# Patient Record
Sex: MALE | Race: WHITE | NOT HISPANIC OR LATINO | Employment: FULL TIME | URBAN - METROPOLITAN AREA
[De-identification: names, ages, dates, MRNs, and addresses within clinical notes are randomized per-mention and may not be internally consistent; named-entity substitution may affect disease eponyms.]

---

## 2017-07-27 ENCOUNTER — HOSPITAL ENCOUNTER (INPATIENT)
Facility: HOSPITAL | Age: 52
LOS: 7 days | Discharge: HOME | DRG: 025 | End: 2017-08-03
Attending: EMERGENCY MEDICINE | Admitting: NEUROLOGICAL SURGERY
Payer: COMMERCIAL

## 2017-07-27 DIAGNOSIS — G93.6 CEREBRAL EDEMA: ICD-10-CM

## 2017-07-27 DIAGNOSIS — T45.515A WARFARIN-INDUCED COAGULOPATHY: ICD-10-CM

## 2017-07-27 DIAGNOSIS — D69.6 THROMBOCYTOPENIA: ICD-10-CM

## 2017-07-27 DIAGNOSIS — C20 RECTAL CANCER: Primary | ICD-10-CM

## 2017-07-27 DIAGNOSIS — D68.32 WARFARIN-INDUCED COAGULOPATHY: ICD-10-CM

## 2017-07-27 DIAGNOSIS — C78.00 LUNG METASTASES: ICD-10-CM

## 2017-07-27 DIAGNOSIS — C79.31 METASTASIS TO BRAIN: ICD-10-CM

## 2017-07-27 DIAGNOSIS — C78.00 MALIGNANT NEOPLASM METASTATIC TO LUNG, UNSPECIFIED LATERALITY: ICD-10-CM

## 2017-07-27 DIAGNOSIS — G93.89 FRONTAL MASS OF BRAIN: ICD-10-CM

## 2017-07-27 DIAGNOSIS — C79.31 BRAIN METASTASES: ICD-10-CM

## 2017-07-27 LAB
ABO + RH BLD: NORMAL
ALBUMIN SERPL BCP-MCNC: 3.6 G/DL
ALBUMIN SERPL BCP-MCNC: 3.9 G/DL
ALP SERPL-CCNC: 83 U/L
ALP SERPL-CCNC: 85 U/L
ALT SERPL W/O P-5'-P-CCNC: 17 U/L
ALT SERPL W/O P-5'-P-CCNC: 17 U/L
ANION GAP SERPL CALC-SCNC: 7 MMOL/L
ANION GAP SERPL CALC-SCNC: 8 MMOL/L
APTT BLDCRRT: 32.9 SEC
AST SERPL-CCNC: 17 U/L
AST SERPL-CCNC: 18 U/L
BASOPHILS # BLD AUTO: 0 K/UL
BASOPHILS # BLD AUTO: 0 K/UL
BASOPHILS NFR BLD: 0 %
BASOPHILS NFR BLD: 0 %
BILIRUB SERPL-MCNC: 0.9 MG/DL
BILIRUB SERPL-MCNC: 0.9 MG/DL
BLD GP AB SCN CELLS X3 SERPL QL: NORMAL
BLD PROD TYP BPU: NORMAL
BLOOD UNIT EXPIRATION DATE: NORMAL
BLOOD UNIT TYPE CODE: 6200
BLOOD UNIT TYPE: NORMAL
BUN SERPL-MCNC: 13 MG/DL
BUN SERPL-MCNC: 14 MG/DL
CALCIUM SERPL-MCNC: 8.9 MG/DL
CALCIUM SERPL-MCNC: 9 MG/DL
CHLORIDE SERPL-SCNC: 104 MMOL/L
CHLORIDE SERPL-SCNC: 106 MMOL/L
CO2 SERPL-SCNC: 23 MMOL/L
CO2 SERPL-SCNC: 25 MMOL/L
CODING SYSTEM: NORMAL
CREAT SERPL-MCNC: 0.7 MG/DL
CREAT SERPL-MCNC: 0.8 MG/DL
DIFFERENTIAL METHOD: ABNORMAL
DIFFERENTIAL METHOD: ABNORMAL
DISPENSE STATUS: NORMAL
EOSINOPHIL # BLD AUTO: 0 K/UL
EOSINOPHIL # BLD AUTO: 0 K/UL
EOSINOPHIL NFR BLD: 0 %
EOSINOPHIL NFR BLD: 0.3 %
ERYTHROCYTE [DISTWIDTH] IN BLOOD BY AUTOMATED COUNT: 13.5 %
ERYTHROCYTE [DISTWIDTH] IN BLOOD BY AUTOMATED COUNT: 13.7 %
EST. GFR  (AFRICAN AMERICAN): >60 ML/MIN/1.73 M^2
EST. GFR  (AFRICAN AMERICAN): >60 ML/MIN/1.73 M^2
EST. GFR  (NON AFRICAN AMERICAN): >60 ML/MIN/1.73 M^2
EST. GFR  (NON AFRICAN AMERICAN): >60 ML/MIN/1.73 M^2
GLUCOSE SERPL-MCNC: 151 MG/DL
GLUCOSE SERPL-MCNC: 156 MG/DL
HCT VFR BLD AUTO: 40.7 %
HCT VFR BLD AUTO: 42 %
HGB BLD-MCNC: 14.2 G/DL
HGB BLD-MCNC: 14.7 G/DL
INR PPP: 1.4
INR PPP: 1.6
INR PPP: 1.6
LYMPHOCYTES # BLD AUTO: 0.3 K/UL
LYMPHOCYTES # BLD AUTO: 0.3 K/UL
LYMPHOCYTES NFR BLD: 5 %
LYMPHOCYTES NFR BLD: 7.6 %
MAGNESIUM SERPL-MCNC: 2.2 MG/DL
MCH RBC QN AUTO: 31.3 PG
MCH RBC QN AUTO: 31.6 PG
MCHC RBC AUTO-ENTMCNC: 34.9 G/DL
MCHC RBC AUTO-ENTMCNC: 35 G/DL
MCV RBC AUTO: 90 FL
MCV RBC AUTO: 90 FL
MONOCYTES # BLD AUTO: 0 K/UL
MONOCYTES # BLD AUTO: 0.1 K/UL
MONOCYTES NFR BLD: 0.6 %
MONOCYTES NFR BLD: 1 %
NEUTROPHILS # BLD AUTO: 3.1 K/UL
NEUTROPHILS # BLD AUTO: 4.9 K/UL
NEUTROPHILS NFR BLD: 91.2 %
NEUTROPHILS NFR BLD: 94 %
NUM UNITS TRANS FFP: NORMAL
PHOSPHATE SERPL-MCNC: 2.6 MG/DL
PLATELET # BLD AUTO: 93 K/UL
PLATELET # BLD AUTO: 93 K/UL
PMV BLD AUTO: 10.6 FL
PMV BLD AUTO: 9.9 FL
POCT GLUCOSE: 112 MG/DL (ref 70–110)
POCT GLUCOSE: 164 MG/DL (ref 70–110)
POTASSIUM SERPL-SCNC: 4 MMOL/L
POTASSIUM SERPL-SCNC: 4.2 MMOL/L
PROT SERPL-MCNC: 7 G/DL
PROT SERPL-MCNC: 7.1 G/DL
PROTHROMBIN TIME: 14.5 SEC
PROTHROMBIN TIME: 16.6 SEC
PROTHROMBIN TIME: 16.6 SEC
RBC # BLD AUTO: 4.54 M/UL
RBC # BLD AUTO: 4.65 M/UL
SODIUM SERPL-SCNC: 135 MMOL/L
SODIUM SERPL-SCNC: 138 MMOL/L
WBC # BLD AUTO: 3.44 K/UL
WBC # BLD AUTO: 5.2 K/UL

## 2017-07-27 PROCEDURE — C9113 INJ PANTOPRAZOLE SODIUM, VIA: HCPCS | Performed by: STUDENT IN AN ORGANIZED HEALTH CARE EDUCATION/TRAINING PROGRAM

## 2017-07-27 PROCEDURE — 63600175 PHARM REV CODE 636 W HCPCS: Performed by: STUDENT IN AN ORGANIZED HEALTH CARE EDUCATION/TRAINING PROGRAM

## 2017-07-27 PROCEDURE — 20000000 HC ICU ROOM

## 2017-07-27 PROCEDURE — 84100 ASSAY OF PHOSPHORUS: CPT

## 2017-07-27 PROCEDURE — A9585 GADOBUTROL INJECTION: HCPCS | Performed by: INTERNAL MEDICINE

## 2017-07-27 PROCEDURE — 83735 ASSAY OF MAGNESIUM: CPT

## 2017-07-27 PROCEDURE — 25000003 PHARM REV CODE 250: Performed by: NURSE PRACTITIONER

## 2017-07-27 PROCEDURE — 99285 EMERGENCY DEPT VISIT HI MDM: CPT | Mod: 25

## 2017-07-27 PROCEDURE — 99255 IP/OBS CONSLTJ NEW/EST HI 80: CPT | Mod: ,,, | Performed by: INTERNAL MEDICINE

## 2017-07-27 PROCEDURE — 99283 EMERGENCY DEPT VISIT LOW MDM: CPT | Mod: ,,, | Performed by: EMERGENCY MEDICINE

## 2017-07-27 PROCEDURE — 80053 COMPREHEN METABOLIC PANEL: CPT

## 2017-07-27 PROCEDURE — 63600175 PHARM REV CODE 636 W HCPCS: Performed by: EMERGENCY MEDICINE

## 2017-07-27 PROCEDURE — 25500020 PHARM REV CODE 255: Performed by: INTERNAL MEDICINE

## 2017-07-27 PROCEDURE — P9017 PLASMA 1 DONOR FRZ W/IN 8 HR: HCPCS

## 2017-07-27 PROCEDURE — 86900 BLOOD TYPING SEROLOGIC ABO: CPT

## 2017-07-27 PROCEDURE — 96361 HYDRATE IV INFUSION ADD-ON: CPT

## 2017-07-27 PROCEDURE — 86850 RBC ANTIBODY SCREEN: CPT

## 2017-07-27 PROCEDURE — 96374 THER/PROPH/DIAG INJ IV PUSH: CPT

## 2017-07-27 PROCEDURE — 85730 THROMBOPLASTIN TIME PARTIAL: CPT

## 2017-07-27 PROCEDURE — 85025 COMPLETE CBC W/AUTO DIFF WBC: CPT

## 2017-07-27 PROCEDURE — 85610 PROTHROMBIN TIME: CPT

## 2017-07-27 PROCEDURE — 99223 1ST HOSP IP/OBS HIGH 75: CPT | Mod: ,,, | Performed by: PHYSICIAN ASSISTANT

## 2017-07-27 PROCEDURE — 85610 PROTHROMBIN TIME: CPT | Mod: 91

## 2017-07-27 PROCEDURE — 63600175 PHARM REV CODE 636 W HCPCS

## 2017-07-27 PROCEDURE — 80053 COMPREHEN METABOLIC PANEL: CPT | Mod: 91

## 2017-07-27 PROCEDURE — 36430 TRANSFUSION BLD/BLD COMPNT: CPT

## 2017-07-27 PROCEDURE — 25000003 PHARM REV CODE 250: Performed by: PHYSICIAN ASSISTANT

## 2017-07-27 RX ORDER — ACETAMINOPHEN 325 MG/1
650 TABLET ORAL EVERY 4 HOURS PRN
Status: DISCONTINUED | OUTPATIENT
Start: 2017-07-27 | End: 2017-08-03 | Stop reason: HOSPADM

## 2017-07-27 RX ORDER — DEXAMETHASONE SODIUM PHOSPHATE 4 MG/ML
10 INJECTION, SOLUTION INTRA-ARTICULAR; INTRALESIONAL; INTRAMUSCULAR; INTRAVENOUS; SOFT TISSUE ONCE
Status: COMPLETED | OUTPATIENT
Start: 2017-07-27 | End: 2017-07-27

## 2017-07-27 RX ORDER — HYDROCODONE BITARTRATE AND ACETAMINOPHEN 500; 5 MG/1; MG/1
TABLET ORAL
Status: DISCONTINUED | OUTPATIENT
Start: 2017-07-27 | End: 2017-07-28

## 2017-07-27 RX ORDER — IBUPROFEN 200 MG
16 TABLET ORAL
Status: DISCONTINUED | OUTPATIENT
Start: 2017-07-27 | End: 2017-08-03 | Stop reason: HOSPADM

## 2017-07-27 RX ORDER — HALOPERIDOL 5 MG/ML
2 INJECTION INTRAMUSCULAR ONCE
Status: COMPLETED | OUTPATIENT
Start: 2017-07-27 | End: 2017-07-27

## 2017-07-27 RX ORDER — DEXAMETHASONE SODIUM PHOSPHATE 4 MG/ML
4 INJECTION, SOLUTION INTRA-ARTICULAR; INTRALESIONAL; INTRAMUSCULAR; INTRAVENOUS; SOFT TISSUE EVERY 6 HOURS
Status: DISCONTINUED | OUTPATIENT
Start: 2017-07-27 | End: 2017-07-27

## 2017-07-27 RX ORDER — IBUPROFEN 200 MG
24 TABLET ORAL
Status: DISCONTINUED | OUTPATIENT
Start: 2017-07-27 | End: 2017-08-03 | Stop reason: HOSPADM

## 2017-07-27 RX ORDER — DEXAMETHASONE SODIUM PHOSPHATE 4 MG/ML
8 INJECTION, SOLUTION INTRA-ARTICULAR; INTRALESIONAL; INTRAMUSCULAR; INTRAVENOUS; SOFT TISSUE EVERY 12 HOURS
Status: DISCONTINUED | OUTPATIENT
Start: 2017-07-27 | End: 2017-07-31

## 2017-07-27 RX ORDER — ONDANSETRON 2 MG/ML
4 INJECTION INTRAMUSCULAR; INTRAVENOUS EVERY 6 HOURS PRN
Status: DISCONTINUED | OUTPATIENT
Start: 2017-07-27 | End: 2017-08-03 | Stop reason: HOSPADM

## 2017-07-27 RX ORDER — GLUCAGON 1 MG
1 KIT INJECTION
Status: DISCONTINUED | OUTPATIENT
Start: 2017-07-27 | End: 2017-08-03 | Stop reason: HOSPADM

## 2017-07-27 RX ORDER — PANTOPRAZOLE SODIUM 40 MG/10ML
40 INJECTION, POWDER, LYOPHILIZED, FOR SOLUTION INTRAVENOUS DAILY
Status: DISCONTINUED | OUTPATIENT
Start: 2017-07-27 | End: 2017-08-01

## 2017-07-27 RX ORDER — GADOBUTROL 604.72 MG/ML
10 INJECTION INTRAVENOUS
Status: COMPLETED | OUTPATIENT
Start: 2017-07-27 | End: 2017-07-27

## 2017-07-27 RX ORDER — HALOPERIDOL 5 MG/ML
INJECTION INTRAMUSCULAR
Status: COMPLETED
Start: 2017-07-27 | End: 2017-07-27

## 2017-07-27 RX ORDER — DEXAMETHASONE SODIUM PHOSPHATE 4 MG/ML
8 INJECTION, SOLUTION INTRA-ARTICULAR; INTRALESIONAL; INTRAMUSCULAR; INTRAVENOUS; SOFT TISSUE EVERY 12 HOURS
Status: DISCONTINUED | OUTPATIENT
Start: 2017-07-27 | End: 2017-07-27

## 2017-07-27 RX ORDER — LEVETIRACETAM 5 MG/ML
500 INJECTION INTRAVASCULAR EVERY 12 HOURS
Status: DISCONTINUED | OUTPATIENT
Start: 2017-07-27 | End: 2017-08-01

## 2017-07-27 RX ORDER — INSULIN ASPART 100 [IU]/ML
1-10 INJECTION, SOLUTION INTRAVENOUS; SUBCUTANEOUS EVERY 6 HOURS PRN
Status: DISCONTINUED | OUTPATIENT
Start: 2017-07-27 | End: 2017-08-03 | Stop reason: HOSPADM

## 2017-07-27 RX ORDER — SODIUM CHLORIDE 9 MG/ML
INJECTION, SOLUTION INTRAVENOUS CONTINUOUS
Status: DISCONTINUED | OUTPATIENT
Start: 2017-07-27 | End: 2017-08-01

## 2017-07-27 RX ADMIN — DEXAMETHASONE SODIUM PHOSPHATE 8 MG: 4 INJECTION, SOLUTION INTRAMUSCULAR; INTRAVENOUS at 09:07

## 2017-07-27 RX ADMIN — LEVETIRACETAM 500 MG: 5 INJECTION INTRAVENOUS at 09:07

## 2017-07-27 RX ADMIN — SODIUM CHLORIDE: 0.9 INJECTION, SOLUTION INTRAVENOUS at 11:07

## 2017-07-27 RX ADMIN — HALOPERIDOL LACTATE 2 MG: 5 INJECTION, SOLUTION INTRAMUSCULAR at 09:07

## 2017-07-27 RX ADMIN — SODIUM CHLORIDE: 0.9 INJECTION, SOLUTION INTRAVENOUS at 09:07

## 2017-07-27 RX ADMIN — DEXAMETHASONE SODIUM PHOSPHATE 10 MG: 4 INJECTION, SOLUTION INTRAMUSCULAR; INTRAVENOUS at 01:07

## 2017-07-27 RX ADMIN — DEXAMETHASONE SODIUM PHOSPHATE 8 MG: 4 INJECTION, SOLUTION INTRAMUSCULAR; INTRAVENOUS at 05:07

## 2017-07-27 RX ADMIN — HALOPERIDOL 2 MG: 5 INJECTION INTRAMUSCULAR at 09:07

## 2017-07-27 RX ADMIN — GADOBUTROL 10 ML: 604.72 INJECTION INTRAVENOUS at 09:07

## 2017-07-27 RX ADMIN — PANTOPRAZOLE SODIUM 40 MG: 40 INJECTION, POWDER, FOR SOLUTION INTRAVENOUS at 09:07

## 2017-07-27 RX ADMIN — SODIUM CHLORIDE 1000 ML: 0.9 INJECTION, SOLUTION INTRAVENOUS at 03:07

## 2017-07-27 RX ADMIN — IOHEXOL 100 ML: 350 INJECTION, SOLUTION INTRAVENOUS at 08:07

## 2017-07-27 NOTE — ASSESSMENT & PLAN NOTE
-- Noted on CT, MRI with and without em at OSH  -- Repeat MRI stealth with and without pending here  -- NSGY, heme/onc services following  -- Admit to NCC for q1h neuro checks given significant associated edema  -- Dexamethasone 10 mg once now, then 8 mg q12h, IV PPI while on high dose of steroids   -- Levetiracetam 500 mg BID for seizure ppx

## 2017-07-27 NOTE — ED TRIAGE NOTES
Claude Penn III, a 52 y.o. male presents to the ED via EMS as tx from [facility] from [place]. Pt here for oncology for lesions on frontal lobe of brain after c/o headache x 2 weeks. Pt has hx of rectal cancer that has metastasized to lungs.      Chief Complaint   Patient presents with    [place] Tx     Pt tx from [facility] from [place]. Pt her for Oncology for lesions on frontal lobe of brain after c/o headache x 2 weeks.     Review of patient's allergies indicates:  No Known Allergies  Past Medical History:   Diagnosis Date    Cancer

## 2017-07-27 NOTE — CONSULTS
[facility]  Hematology/Oncology  Consult Note    Patient Name: Claude Penn III  MRN: [id number]  Admission Date: 7/27/2017  Hospital Length of Stay: 0 days  Code Status: Full Code   Attending Provider: Jeremiah Villasenor MD  Consulting Provider: Rene Rose MD  Primary Care Physician: Dallas Agarwal MD  Principal Problem:Frontal mass of brain    Inpatient consult to Hematology/Oncology  Consult performed by: RENE ROSE  Consult ordered by: GLADIS AGUDELO        Subjective:     HPI: Mr. Trujillo presents from [place] with new right frontal lobe brain mass causing mass effect and vasogenic edema and to two other smaller lesions. He presented with slurred speech, headache x 2 weeks. Primary oncologist Dr Collins who follows patient for metastatic rectal cancer directed patient to the ED. His symptoms have improved with steroids. Patient has been on chronic anticoagulation with coumadin for hx of pe 2013. He was originally diagnosed with rectal ca and mets to the lung in 2013. Has followed at [facility] is additional to locally. He received CRT followed by LAR ypT3N0. KRAS and TP53 mutant, NASIR. He then received FOLFOX then FOLFIRI/avastin followed by maintenance 5FU/alicja then FOLFOX alicja followed by FOLFIRI. Has had PD and awaiting stirvarga.     Oncology Treatment Plan:   [No treatment plan]    Medications:  Continuous Infusions:   sodium chloride 0.9% 100 mL/hr at 07/27/17 1159     Scheduled Meds:   dexamethasone  8 mg Intravenous Q12H    levetiracetam IVPB  500 mg Intravenous Q12H    pantoprazole  40 mg Intravenous Daily     PRN Meds:sodium chloride, acetaminophen, dextrose 50%, dextrose 50%, glucagon (human recombinant), glucose, glucose, insulin aspart, ondansetron     Review of patient's allergies indicates:  No Known Allergies     Past Medical History:   Diagnosis Date    Cancer      Past Surgical History:   Procedure Laterality Date    HERNIA REPAIR      ILEOSTOMY REVISION       rectal tumor      removed     Family History     None        Social History Main Topics    Smoking status: Never Smoker    Smokeless tobacco: Former User    Alcohol use 1.2 oz/week     2 Cans of beer per week    Drug use: No    Sexual activity: No       Review of Systems   Constitutional: Positive for fatigue. Negative for fever.   HENT: Negative for congestion and trouble swallowing.    Eyes: Negative for photophobia and visual disturbance.   Respiratory: Negative for cough and shortness of breath.    Gastrointestinal: Negative for abdominal distention and abdominal pain.   Genitourinary: Negative for dysuria and hematuria.   Musculoskeletal: Negative for myalgias and neck pain.   Skin: Negative for color change and pallor.   Neurological: Positive for speech difficulty and headaches.   Hematological: Negative for adenopathy. Does not bruise/bleed easily.   Psychiatric/Behavioral: Negative for agitation and behavioral problems.     Objective:     Vital Signs (Most Recent):  Temp: 97.5 °F (36.4 °C) (07/27/17 0730)  Pulse: 78 (07/27/17 1100)  Resp: 11 (07/27/17 1100)  BP: (!) 142/93 (07/27/17 1100)  SpO2: (!) 94 % (07/27/17 1100) Vital Signs (24h Range):  Temp:  [97.5 °F (36.4 °C)-97.7 °F (36.5 °C)] 97.5 °F (36.4 °C)  Pulse:  [66-79] 78  Resp:  [11-28] 11  SpO2:  [93 %-99 %] 94 %  BP: (120-155)/(76-94) 142/93     Weight: 120.7 kg (266 lb)  Body mass index is 32.38 kg/m².  Body surface area is 2.54 meters squared.      Intake/Output Summary (Last 24 hours) at 07/27/17 1219  Last data filed at 07/27/17 0915   Gross per 24 hour   Intake             1000 ml   Output              525 ml   Net              475 ml       Physical Exam   Constitutional: He is oriented to person, place, and time. He appears well-developed and well-nourished.   HENT:   Mouth/Throat: Oropharynx is clear and moist. No oropharyngeal exudate.   Eyes: Conjunctivae are normal. Pupils are equal, round, and reactive to light.   Neck: Normal  range of motion.   Cardiovascular: Normal rate and regular rhythm.    Pulmonary/Chest: Effort normal and breath sounds normal.   Abdominal: Soft. Bowel sounds are normal.   Musculoskeletal: Normal range of motion. He exhibits no edema.   Lymphadenopathy:     He has no cervical adenopathy.   Neurological: He is alert and oriented to person, place, and time.   Skin: Skin is warm and dry.   Psychiatric: He has a normal mood and affect. His behavior is normal.       Significant Labs:   CBC:   Recent Labs  Lab 07/27/17  0102 07/27/17  0340   WBC 5.20 3.44*   HGB 14.7 14.2   HCT 42.0 40.7   PLT 93* 93*    and CMP:   Recent Labs  Lab 07/27/17  0102 07/27/17  0340   * 138   K 4.0 4.2    106   CO2 23 25   * 156*   BUN 13 14   CREATININE 0.8 0.7   CALCIUM 9.0 8.9   PROT 7.1 7.0   ALBUMIN 3.9 3.6   BILITOT 0.9 0.9   ALKPHOS 85 83   AST 17 18   ALT 17 17   ANIONGAP 8 7*   EGFRNONAA >60.0 >60.0       Diagnostic Results:  MRI: reviewed    Assessment/Plan:     Active Diagnoses:    Diagnosis Date Noted POA    PRINCIPAL PROBLEM:  Frontal mass of brain [G93.9] 07/27/2017 Yes    Rectal cancer [C20] 07/27/2017 Yes    Warfarin-induced coagulopathy [T45.511A, D68.9] 07/27/2017 Yes      Problems Resolved During this Admission:    Diagnosis Date Noted Date Resolved POA       A/P  Patient has a large right frontal lobe mass with vasogenic edema. Coumadin reversed. On decadron to reduce edema with some symptom improvement. On Keppra ppx. Plan is to remove problematic mass. Additional lesions noted which can be addressed with XRT vs gamma knife, defer to neurosurgery. Patient will eventually follow up with Dr Collins.        Thank you for your consult.     Raysa Hernandez MD  Hematology/Oncology  [facility]      STAFF NOTE:  I have personally taken the history and examined this patient and agree with Dr. Pereira's Note as stated above.

## 2017-07-27 NOTE — CONSULTS
Consult Note  Neurosurgery    Admit Date: 7/27/2017  LOS: 0    Code Status: Full Code     CC: <principal problem not specified>    SUBJECTIVE:     History of Present Illness: 53yo male with pmh of rectal CA presents to [facility] ED with imaging findings suspicious for mass in right frontal subcortex. Pt with two week history of 'acting strange' and 'zoning out with arm and body twitches'. On exam pt is neurologically intact and subjectively asymptomatic.    MRI shows right solitary frontal lesion with significant vasogenic edema and midline shift.    Pt takes warfarin for PE diagnosed in 2013. INR from OSH 1.68.      No Headaches.  No Vertigo.  No Nausea.  No Vomiting.  No Paresthesias.  No Focal deficit.  No Subjective fevers or chills.    Pt is afebrile and without leukocytosis. Pt is neurologically intact and hemodynamically stable.     Neurosurgery is consulted for right frontal mass.           OBJECTIVE:   Vital Signs (Most Recent):   Temp: 97.5 °F (36.4 °C) (07/27/17 0004)  Pulse: 77 (07/27/17 0132)  Resp: 18 (07/27/17 0004)  BP: 120/80 (07/27/17 0132)  SpO2: 96 % (07/27/17 0132)    Vital Signs (24h Range):   Temp:  [97.5 °F (36.4 °C)] 97.5 °F (36.4 °C)  Pulse:  [76-79] 77  Resp:  [18] 18  SpO2:  [95 %-97 %] 96 %  BP: (120-133)/(76-80) 120/80      I & O (Last 24h):  No intake or output data in the 24 hours ending 07/27/17 0322    Physical Exam:  General: well developed, well nourished, no distress.   Head: normocephalic, atraumatic  Cervical Spine: No midline tenderness to palpation.  Thoracolumbosacral Spine: No midline tenderness to palpation.  GCS: Motor: 6/Verbal: 5/Eyes: 4 GCS Total: 15  Mental Status: Awake, Alert, Oriented x 4  Language: No aphasia  Speech: No dysarthria  Facial Droop: None   Cranial nerves: CN III-XII grossly intact.  Visual Fields: Intact.   Eyes: Pupils equal and reactive to light. Intact Conjugate horizontal and vertical pursuit. No nystagmus. No gaze deviation.   Pulmonary: No  distress.  Sensory: No deficit.  Propioception: No deficit in 1st digit of toe bilaterally.  Rectal Tone: Not tested.  Drift: None.  Upper Extremity Ataxia: No Dysmetria Bilaterally  Lower Extremity Ataxia: Not tested.  Dysdiadochokinesia: Not tested.  Reflexes: 2+ patellar bilaterally.  Hernandez: Absent  Clonus: Absent  Babinski: Absent  Romberg: Not Tested  Pulses: Brisk and symmetric radial, DP and tibial pulses.  Motor Strength:    Strength  Shoulder Abduction Elbow Extension Elbow Flexion Wrist Extension Wrist Flexion Finger Opposition Finger Add Finger Abd   Upper: R 5/5 5/5 5/5 5/5 5/5 5/5 5/5 5/5    L 5/5 5/5 5/5 5/5 5/5 5/5 5/5 5/5     Hip Flexion Knee Extension Knee  Flexion Ankle Dflexion Ankle Pflexion EHL     Lower: R 5/5 5/5 5/5 5/5 5/5 5/5      L 5/5 5/5 5/5 5/5 5/5 5/5           Lines/Drains/Airway:          Nutrition/Tube Feeds:   Current Diet Order   Procedures    Diet NPO       Labs:  ABG: No results for input(s): PH, PO2, PCO2, HCO3, POCSATURATED, BE in the last 24 hours.  BMP:  Recent Labs  Lab 07/27/17  0102   *   K 4.0      CO2 23   BUN 13   CREATININE 0.8   *     LFT: Lab Results   Component Value Date    AST 17 07/27/2017    ALT 17 07/27/2017    ALKPHOS 85 07/27/2017    BILITOT 0.9 07/27/2017    ALBUMIN 3.9 07/27/2017    PROT 7.1 07/27/2017     CBC:   Lab Results   Component Value Date    WBC 5.20 07/27/2017    HGB 14.7 07/27/2017    HCT 42.0 07/27/2017    MCV 90 07/27/2017    PLT 93 (L) 07/27/2017     Microbiology x 7d:   Microbiology Results (last 7 days)     ** No results found for the last 168 hours. **            ASSESSMENT/PLAN:   53 yo male with pmh of rectal ca and pe ani-coagulated with warfarin now with imaging concerning for right frontal met. Pt is admitted to hematology oncology    --No acute neurosurgical intervention required.  --Transfer to neuro ICU for q1 neuro checks.  --Chemical coagulopathy reversal per NCC.  --Please hold home anti-coag and  anti-platelet medications.  --Will obtain MRI brain w/wo contrast and STEALTH.  --Will obtain CT chest/abdomen/pelvis with iv contrast.  --Begin dexamethsone 4q6.  --Begin levetiracetam 500 bid.  --Begin q1 neurochecks.  --Begin q1 vital checks.  --Please keep pt NPO.  --Elevate head of bed.  --Will discuss potential management strategies with pt in morning.  --We will continue to monitor closely, please contact us with any questions or concerns.    Brett Wang

## 2017-07-27 NOTE — ED TRIAGE NOTES
Pt transfer from [place] for neuro consult. Pt presented to ED in [place] with c/o HA and slurred speech that comes and goes for the last several weeks. Pt was told by his PCP to go to ED for eval. Pt denies c/o at this time.

## 2017-07-27 NOTE — SUBJECTIVE & OBJECTIVE
Oncology Treatment Plan:     Oncology History   Mutational profile:  K-jim, TP53 mutated.  NASIR.    Adenocarcinoma of rectum   7/9/2013 Initial Diagnosis   Adenocarcinoma of rectum diagnosed on colonoscopy.      7/31/2013 - 9/6/2013 Radiation   Capecitabine-based chemoradiation to 50.4 Gy in 28 fractions      11/22/2013 Surgery   LAR with diverting loop ileostomy; ypT3 N0.        2/2014 - 7/3/2014 Chemotherapy/Biotherapy/Hormonal   FOLFOX ×4 cycles followed by 5-FU x 4 cycles, given thrombocytopenia      1/14/2015 - 5/20/2015 Chemotherapy/Biotherapy/Hormonal   FOLFIRI/bevacizumab for incidentally discovered pulmonary metastases.        5/28/2015 - 1/15/2016 Chemotherapy/Biotherapy/Hormonal   Maintenance 5-FU/bevacizumab      1/15/2016 - 11/9/2016 Chemotherapy/Biotherapy/Hormonal   FOLFOX/bevacizumab      11/9/2016 Progression   PD      11/9/2016 - Chemotherapy/Biotherapy/Hormonal   FOLFIRI            Medications:  Continuous Infusions:   Scheduled Meds:   dexamethasone  10 mg Intravenous Once    dexamethasone  8 mg Intravenous Q12H    levetiracetam IVPB  500 mg Intravenous Q12H    pantoprazole  40 mg Intravenous Daily     PRN Meds:acetaminophen, dextrose 50%, dextrose 50%, glucagon (human recombinant), glucose, glucose, ondansetron     Review of patient's allergies indicates:  No Known Allergies     Past Medical History:   Diagnosis Date    Cancer      Past Surgical History:   Procedure Laterality Date    HERNIA REPAIR      ILEOSTOMY REVISION      rectal tumor      removed     Family History     None        Social History Main Topics    Smoking status: Never Smoker    Smokeless tobacco: Former User    Alcohol use 1.2 oz/week     2 Cans of beer per week    Drug use: No    Sexual activity: No       Review of Systems   Constitutional: Positive for fatigue. Negative for chills and fever.   HENT: Negative for congestion and sore throat.    Eyes: Negative for visual disturbance.   Respiratory: Negative for  cough and shortness of breath.    Cardiovascular: Negative for chest pain, palpitations and leg swelling.   Gastrointestinal: Negative for abdominal pain, blood in stool, constipation, diarrhea, nausea and vomiting.   Genitourinary: Negative for dysuria and hematuria.   Musculoskeletal: Negative for arthralgias and myalgias.   Skin: Negative for rash.   Neurological: Positive for speech difficulty, weakness and headaches. Negative for light-headedness and numbness.   Psychiatric/Behavioral: Negative for agitation.     Objective:     Vital Signs (Most Recent):  Temp: 97.5 °F (36.4 °C) (07/27/17 0004)  Pulse: 76 (07/27/17 0004)  Resp: 18 (07/27/17 0004)  BP: 133/80 (07/27/17 0004)  SpO2: 95 % (07/27/17 0004) Vital Signs (24h Range):  Temp:  [97.5 °F (36.4 °C)] 97.5 °F (36.4 °C)  Pulse:  [76] 76  Resp:  [18] 18  SpO2:  [95 %] 95 %  BP: (133)/(80) 133/80     Weight: 120.7 kg (266 lb)  Body mass index is 32.38 kg/m².  Body surface area is 2.54 meters squared.    No intake or output data in the 24 hours ending 07/27/17 0106    Physical Exam   Constitutional: No distress.   HENT:   Mouth/Throat: Oropharynx is clear and moist. No oropharyngeal exudate.   Eyes: No scleral icterus.   Cardiovascular: Normal rate, regular rhythm and normal heart sounds.    Pulmonary/Chest: Effort normal and breath sounds normal.   Abdominal: Soft. Bowel sounds are normal. There is no tenderness.   Lymphadenopathy:     He has no cervical adenopathy.       Significant Labs:   CBC: No results for input(s): WBC, HGB, HCT, PLT in the last 48 hours., CMP: No results for input(s): NA, K, CL, CO2, GLU, BUN, CREATININE, CALCIUM, PROT, ALBUMIN, BILITOT, ALKPHOS, AST, ALT, ANIONGAP, EGFRNONAA in the last 48 hours.    Invalid input(s): ESTGFAFRICA and Coagulation: No results for input(s): INR, APTT in the last 48 hours.    Invalid input(s): PT    Diagnostic Results:  I have reviewed all pertinent imaging results/findings within the past 24 hours.

## 2017-07-27 NOTE — HPI
51 yo male with of metastatic rectal adenocarcinoma to lungs, with progressive disease on FOLFIRI as well as FOLFOX, and a known K-jim mutation who was transferred from OSH for higher level of care. Patient presented to OSH with 2 wk hx of progressively worsening HA and intermittent dysarthria- workup at OSH notable for mass in frontal lobe concerning for mets with associated vasogenic edema.

## 2017-07-27 NOTE — ED PROVIDER NOTES
Encounter Date: 7/27/2017       History     Chief Complaint   Patient presents with    [place] Tx     Pt tx from [facility] from [place]. Pt her for Oncology for lesions on frontal lobe of brain after c/o headache x 2 weeks.     Urgent evaluation of a 52-year-old male transferred to this facility from [place].  Patient has known rectal cancer with metastases to the lungs.  He is currently being treated by Dr. Taylor??  Unsure of spelling.  Patient presented to clinic Monday, 2 days ago for routine checkup.  Patient reports at that time he's been having headaches for 2 weeks with some abnormal speech and being very fatigued.  He went to the ER today CT scan and MRI revealed new metastatic lesions to the brain.  Patient was sent here for hematology oncology and neurosurgery evaluation.  Currently he does not have a headache.  His GCS score is 15.  He has gotten 10 mg of Decadron IV.  He is in no acute distress.          Review of patient's allergies indicates:  No Known Allergies  Past Medical History:   Diagnosis Date    Cancer      Past Surgical History:   Procedure Laterality Date    HERNIA REPAIR      ILEOSTOMY REVISION      rectal tumor      removed     History reviewed. No pertinent family history.  Social History   Substance Use Topics    Smoking status: Never Smoker    Smokeless tobacco: Former User    Alcohol use 1.2 oz/week     2 Cans of beer per week     Review of Systems   Constitutional: Negative for fever.   HENT: Negative for sore throat.    Respiratory: Negative for shortness of breath.    Cardiovascular: Negative for chest pain.   Gastrointestinal: Negative for nausea.   Genitourinary: Negative for dysuria.   Musculoskeletal: Negative for back pain.   Skin: Negative for rash.   Neurological: Positive for headaches. Negative for weakness.   Hematological: Does not bruise/bleed easily.       Physical Exam     Initial Vitals [07/27/17 0004]   BP Pulse Resp Temp SpO2   133/80 76 18 97.5  °F (36.4 °C) 95 %      MAP       97.67         Physical Exam    Constitutional: Vital signs are normal. He appears well-developed and well-nourished. He is not diaphoretic. No distress.   HENT:   Head: Normocephalic and atraumatic.   Right Ear: External ear normal.   Left Ear: External ear normal.   Eyes: Conjunctivae are normal.   Cardiovascular: Normal rate and regular rhythm. Exam reveals no gallop.    No murmur heard.  Pulmonary/Chest: No respiratory distress. He has no wheezes. He has no rhonchi. He has no rales. He exhibits no tenderness.   Abdominal: Soft. Normal appearance, normal aorta and bowel sounds are normal.   Musculoskeletal: Normal range of motion.   Neurological: He is alert and oriented to person, place, and time.   Nonfocal neuro exam with no acute red flags   Skin: Skin is warm and intact.   Psychiatric: He has a normal mood and affect. His speech is normal and behavior is normal. Cognition and memory are normal.         ED Course   Procedures  Labs Reviewed   CBC W/ AUTO DIFFERENTIAL - Abnormal; Notable for the following:        Result Value    MCH 31.6 (*)     Platelets 93 (*)     Lymph # 0.3 (*)     Mono # 0.1 (*)     Gran% 94.0 (*)     Lymph% 5.0 (*)     Mono% 1.0 (*)     All other components within normal limits   COMPREHENSIVE METABOLIC PANEL - Abnormal; Notable for the following:     Sodium 135 (*)     Glucose 151 (*)     All other components within normal limits   PROTIME-INR - Abnormal; Notable for the following:     Prothrombin Time 16.6 (*)     INR 1.6 (*)     All other components within normal limits   APTT - Abnormal; Notable for the following:     aPTT 32.9 (*)     All other components within normal limits   PROTIME-INR - Abnormal; Notable for the following:     Prothrombin Time 16.6 (*)     INR 1.6 (*)     All other components within normal limits   COMPREHENSIVE METABOLIC PANEL   MAGNESIUM   PHOSPHORUS   CBC W/ AUTO DIFFERENTIAL   TYPE & SCREEN             Medical Decision  Making:   ED Management:  52-year-old male with rectal cancer with metastatic disease to lungs and a brain.  I have contacted hematology oncology along with neurosurgery.  Plan to admit the patient neurosurgery.  Patient has been loaded with IV Decadron. Pt is stable.               Attending Attestation:     Physician Attestation Statement for NP/PA:   I have conducted a face to face encounter with this patient in addition to the NP/PA, due to Medical Complexity    Other NP/PA Attestation Additions:    History of Present Illness: 53 y/o M h/o rectal CA - known mets to lung currently 'inbetween chemo' treatment with HAs over the past few weeks, new metastastic mass with surrounding edema found on CT/MRI at OSH.   Physical Exam: Non focal neuro exam   Medical Decision Making: Decadron ordered.  Hem-onc and neurosurg consulted.  Plan to admit to neurosurg  neurocritical care consulted for admission                 ED Course     Clinical Impression:   The primary encounter diagnosis was Rectal cancer. Diagnoses of Metastasis to brain and Malignant neoplasm metastatic to lung, unspecified laterality were also pertinent to this visit.    Disposition:   Disposition: Admitted  Condition: Stable                        Enoc Ribeiro PA-C  07/27/17 0139

## 2017-07-27 NOTE — PLAN OF CARE
07/27/17 1107   Discharge Assessment   Assessment Type Discharge Planning Assessment   Confirmed/corrected address and phone number on facesheet? Yes   Assessment information obtained from? Patient;Caregiver   Communicated expected length of stay with patient/caregiver yes   Prior to hospitilization cognitive status: Alert/Oriented   Prior to hospitalization functional status: Independent   Current cognitive status: Alert/Oriented   Current Functional Status: Needs Assistance   Arrived From admitted as an inpatient;[facility]  ([facility] [place])   Lives With spouse   Able to Return to Prior Arrangements unable to determine at this time (comments)   Is patient able to care for self after discharge? Unable to determine at this time (comments)   How many people do you have in your home that can help with your care after discharge? 1   Who are your caregiver(s) and their phone number(s)? Sandra Trujillo (wife)  [phone]   Patient's perception of discharge disposition home or selfcare   Readmission Within The Last 30 Days no previous admission in last 30 days   Patient currently being followed by outpatient case management? No   Patient currently receives home health services? No   Does the patient currently use HME? No   Patient currently receives private duty nursing? N/A   Patient currently receives any other outside agency services? No   Equipment Currently Used at Home none   Do you have any problems affording any of your prescribed medications? No   Is the patient taking medications as prescribed? yes   Do you have any financial concerns preventing you from receiving the healthcare you need? No   Does the patient have transportation to healthcare appointments? Yes   Transportation Available family or friend will provide   Does the patient receive services at the Coumadin Clinic? (Patient followed by Dr. Collins at [facility] and in [place])   Are there any open cases? No   Discharge  Plan A Home with family   Discharge Plan B Home with family;Home Health   Patient/Family In Agreement With Plan yes       Discharge/ My Health Packet Folder Given to patient/family:     Yes         PCP:  Dallas Agarwal MD        Pharmacy:    [id number] - [place], [place] - [address]  [address] [place] [address]  Phone: [phone] Fax: [phone]  ,    Emergency Contacts:  Extended Emergency Contact Information  Primary Emergency Contact: Sandra Trujillo  Address: [address] [place] [address], [place] [address] [facility]  Home Phone: [phone]  Mobile Phone: [phone]  Relation: Spouse      Insurance:    Payor: BLUE CROSS BLUE SHIELD / Plan: [employer] [place] PPO / Product Type: PPO /       Jayla Escobar RN, CCRN-K, [facility]  Neuro-Critical Care   X [phone]

## 2017-07-27 NOTE — HPI
Mr. Trujillo is a 51 y/o male with h/o known metastatic rectal CA and h/o PE on warfarin who was transferred [place] [facility] [place] for evaluation of new multiple R sided brain lesions, most notably a 3.5x 3.2x 3.2 anterior right frontal lobe lesion with vasogenic edema producing MLS. Patient and wife report a couple week history of generalized weakness, fatigue, HA and slowing in speech. Patient has known metastases to lungs and is being followed as outpatient by oncology.

## 2017-07-27 NOTE — H&P
[facility]  Neurocritical Care  History & Physical    Admit Date: 7/27/2017  Service Date: 07/27/2017  Length of Stay: 0    Subjective:     Chief Complaint: Frontal mass of brain    History of Present Illness: Mr. Trujillo is a 53 y/o male with h/o known metastatic rectal CA and h/o PE on warfarin who was [place] [facility] [place] evaluation of new multiple R sided brain lesions, most notably a 3.5x 3.2x 3.2 anterior right frontal lobe lesion with vasogenic edema producing MLS. Patient and wife report a couple week history of generalized weakness, fatigue, HA and slowing in speech. Patient has known metastases to lungs and is being followed as outpatient by oncology.       Vitals:   Temp: 97.5 °F (36.4 °C) (07/27/17 0004)  Pulse: 77 (07/27/17 0132)  Resp: 18 (07/27/17 0004)  BP: 120/80 (07/27/17 0132)  SpO2: 96 % (07/27/17 0132)    Temp:  [97.5 °F (36.4 °C)] 97.5 °F (36.4 °C)  Pulse:  [76-79] 77  Resp:  [18] 18  SpO2:  [95 %-97 %] 96 %  BP: (120-133)/(76-80) 120/80    No intake/output data recorded.     Examination:   Constitutional: Well-nourished and -developed. No apparent distress.   Eyes: Conjunctiva clear, anicteric. Lids no lesions.  Head/Ears/Nose/Mouth/Throat/Neck: Moist mucous membranes. External ears, nose atraumatic.   Cardiovascular: Regular rhythm. No murmurs. No leg edema.  Respiratory: Comfortable respirations. Clear to auscultation.  Gastrointestinal: No hernia. Soft, nondistended, nontender. + bowel sounds.    Neurologic:   -[id number]  -Alert. Oriented to person, place, and time. Speech fluent. Follows commands. Recent and remote memory adequate. Judgment good. Insight appropriate.  -Cranial nerves intact  -Strength 4+/5 to LUE, otherwise 5/5 and symmetric in arms, legs. Tone normal.   -Sensation intact to touch in arms, legs.  -Dysmetria noted on LUE     Today I independently reviewed pertinent medications, lines/drains/airways, imaging, lab results,      Assessment/Plan:      Hem/Onc   * Frontal mass of brain    -- Noted on CT, MRI with and without em at OSH  -- Repeat MRI stealth with and without pending here  -- NSGY, heme/onc services following  -- Admit to [facility] for q1h neuro checks given significant associated edema  -- Dexamethasone 10 mg once now, then 8 mg q12h, IV PPI while on high dose of steroids   -- Levetiracetam 500 mg BID for seizure ppx        Warfarin-induced coagulopathy    -- On warfarin for h/o PE  -- INR 1.6  -- 1u FFP now  -- Hold antiplatelet, anticoagulation         Rectal cancer    -- Known primary CA with lung mets  -- CT C/A/P with contrast pending  -- Oncology following             Prophylaxis:  Venous Thromboembolism: mechanical  Stress Ulcer: PPI  Ventilator Pneumonia: not applicable     Activity Orders          None        Full Code    Loren Serrano PA-C  Neurocritical Care  [facility]

## 2017-07-28 ENCOUNTER — ANESTHESIA EVENT (OUTPATIENT)
Dept: SURGERY | Facility: HOSPITAL | Age: 52
DRG: 025 | End: 2017-07-28
Payer: COMMERCIAL

## 2017-07-28 PROBLEM — C79.31 METASTASIS TO BRAIN: Status: ACTIVE | Noted: 2017-07-28

## 2017-07-28 PROBLEM — G93.6 CEREBRAL EDEMA: Status: ACTIVE | Noted: 2017-07-28

## 2017-07-28 LAB
ALBUMIN SERPL BCP-MCNC: 3.8 G/DL
ALP SERPL-CCNC: 85 U/L
ALT SERPL W/O P-5'-P-CCNC: 16 U/L
ANION GAP SERPL CALC-SCNC: 7 MMOL/L
AST SERPL-CCNC: 12 U/L
BASOPHILS # BLD AUTO: 0 K/UL
BASOPHILS NFR BLD: 0 %
BILIRUB SERPL-MCNC: 1.1 MG/DL
BUN SERPL-MCNC: 12 MG/DL
CALCIUM SERPL-MCNC: 8.7 MG/DL
CHLORIDE SERPL-SCNC: 108 MMOL/L
CO2 SERPL-SCNC: 25 MMOL/L
CREAT SERPL-MCNC: 0.7 MG/DL
DIFFERENTIAL METHOD: ABNORMAL
EOSINOPHIL # BLD AUTO: 0 K/UL
EOSINOPHIL NFR BLD: 0 %
ERYTHROCYTE [DISTWIDTH] IN BLOOD BY AUTOMATED COUNT: 13.2 %
EST. GFR  (AFRICAN AMERICAN): >60 ML/MIN/1.73 M^2
EST. GFR  (NON AFRICAN AMERICAN): >60 ML/MIN/1.73 M^2
GLUCOSE SERPL-MCNC: 136 MG/DL
HCT VFR BLD AUTO: 42.1 %
HGB BLD-MCNC: 14.9 G/DL
LYMPHOCYTES # BLD AUTO: 0.4 K/UL
LYMPHOCYTES NFR BLD: 4.7 %
MAGNESIUM SERPL-MCNC: 2.4 MG/DL
MCH RBC QN AUTO: 31.9 PG
MCHC RBC AUTO-ENTMCNC: 35.4 G/DL
MCV RBC AUTO: 90 FL
MONOCYTES # BLD AUTO: 0.2 K/UL
MONOCYTES NFR BLD: 2 %
NEUTROPHILS # BLD AUTO: 7.2 K/UL
NEUTROPHILS NFR BLD: 93.2 %
PHOSPHATE SERPL-MCNC: 3.2 MG/DL
PLATELET # BLD AUTO: 111 K/UL
PMV BLD AUTO: 10.5 FL
POCT GLUCOSE: 120 MG/DL (ref 70–110)
POCT GLUCOSE: 137 MG/DL (ref 70–110)
POCT GLUCOSE: 137 MG/DL (ref 70–110)
POCT GLUCOSE: 139 MG/DL (ref 70–110)
POTASSIUM SERPL-SCNC: 4 MMOL/L
PROT SERPL-MCNC: 7.1 G/DL
RBC # BLD AUTO: 4.67 M/UL
SODIUM SERPL-SCNC: 140 MMOL/L
WBC # BLD AUTO: 7.67 K/UL

## 2017-07-28 PROCEDURE — 83735 ASSAY OF MAGNESIUM: CPT

## 2017-07-28 PROCEDURE — 63600175 PHARM REV CODE 636 W HCPCS: Performed by: STUDENT IN AN ORGANIZED HEALTH CARE EDUCATION/TRAINING PROGRAM

## 2017-07-28 PROCEDURE — 63600175 PHARM REV CODE 636 W HCPCS: Performed by: EMERGENCY MEDICINE

## 2017-07-28 PROCEDURE — 20600001 HC STEP DOWN PRIVATE ROOM

## 2017-07-28 PROCEDURE — 85025 COMPLETE CBC W/AUTO DIFF WBC: CPT

## 2017-07-28 PROCEDURE — 99232 SBSQ HOSP IP/OBS MODERATE 35: CPT | Mod: ,,, | Performed by: NURSE PRACTITIONER

## 2017-07-28 PROCEDURE — 84100 ASSAY OF PHOSPHORUS: CPT

## 2017-07-28 PROCEDURE — C9113 INJ PANTOPRAZOLE SODIUM, VIA: HCPCS | Performed by: STUDENT IN AN ORGANIZED HEALTH CARE EDUCATION/TRAINING PROGRAM

## 2017-07-28 PROCEDURE — 80053 COMPREHEN METABOLIC PANEL: CPT

## 2017-07-28 RX ADMIN — LEVETIRACETAM 500 MG: 5 INJECTION INTRAVENOUS at 08:07

## 2017-07-28 RX ADMIN — DEXAMETHASONE SODIUM PHOSPHATE 8 MG: 4 INJECTION, SOLUTION INTRAMUSCULAR; INTRAVENOUS at 08:07

## 2017-07-28 RX ADMIN — PANTOPRAZOLE SODIUM 40 MG: 40 INJECTION, POWDER, FOR SOLUTION INTRAVENOUS at 08:07

## 2017-07-28 NOTE — HOSPITAL COURSE
7/28: transfer to stepdown with NSGY   7/31: Patient taken to OR for R frontal crani with stealth for tumor resection without complication. Admitted to [facility] for post-operative monitoring  8/1: plan to [place] to NSGY

## 2017-07-28 NOTE — NURSING TRANSFER
Nursing Transfer Note      7/28/2017     Transfer To: [id number]    Transfer via wheelchair    Transfer with cardiac monitoring    Transported by BERNARDO Quinn    Medicines sent: N/A    Chart send with patient: Yes    Notified: spouse, present during transfer    Patient reassessed at: 7/28/17, 1730    Upon arrival to floor: cardiac monitor applied, patient oriented to room, call bell in reach and bed in lowest position    Notified alisa Velazquez RN, who is assuming care at this time.

## 2017-07-28 NOTE — HOSPITAL COURSE
7/28: stable on exam, booked for surgery Monday 7/29: no changes, awaiting surgery  7/30: stable, surgery tomorrow  7/31: OR for R crani for tumor resection  8/1: stable postop, monitored in ICU  8/2: TTF  8/3: Therapy recommending outpatient PT. Plan to DC home today.

## 2017-07-28 NOTE — PROGRESS NOTES
[facility]  Neurosurgery  Progress Note    Subjective:     History of Present Illness: 51yo male with pmh of rectal CA presents to [facility] ED with imaging findings suspicious for mass in right frontal subcortex. Pt with two week history of 'acting strange' and 'zoning out with arm and body twitches'. On exam pt is neurologically intact and subjectively asymptomatic.     MRI shows right solitary frontal lesion with significant vasogenic edema and midline shift.     Pt takes warfarin for PE diagnosed in 2013. INR from OSH 1.68.        No Headaches.  No Vertigo.  No Nausea.  No Vomiting.  No Paresthesias.  No Focal deficit.  No Subjective fevers or chills.     Pt is afebrile and without leukocytosis. Pt is neurologically intact and hemodynamically stable.      Neurosurgery is consulted for right frontal mass.     Post-Op Info:  Procedure(s) (LRB):  Right frontal CRANIOTOMY WITH STEALTH for tumor resection; microscope; [place] (Right)         Interval History: NAEON. Patient feeling better after steroids.    Medications:  Continuous Infusions:   sodium chloride 0.9% 100 mL/hr at 07/28/17 1300     Scheduled Meds:   dexamethasone  8 mg Intravenous Q12H    levetiracetam IVPB  500 mg Intravenous Q12H    pantoprazole  40 mg Intravenous Daily     PRN Meds:acetaminophen, dextrose 50%, dextrose 50%, glucagon (human recombinant), glucose, glucose, insulin aspart, ondansetron     Review of Systems  Objective:     Weight: 120.7 kg (266 lb)  Body mass index is 32.38 kg/m².  Vital Signs (Most Recent):  Temp: 97.6 °F (36.4 °C) (07/28/17 1115)  Pulse: 80 (07/28/17 1100)  Resp: (!) 27 (07/28/17 1100)  BP: (!) 144/90 (07/28/17 1100)  SpO2: 99 % (07/28/17 1100) Vital Signs (24h Range):  Temp:  [97.6 °F (36.4 °C)-98.4 °F (36.9 °C)] 97.6 °F (36.4 °C)  Pulse:  [74-91] 80  Resp:  [9-47] 27  SpO2:  [95 %-100 %] 99 %  BP: (115-162)/() 144/90       Date 07/28/17 0700 - 07/29/17 0659   Shift 5813-72920092 4952-7680  4340-0400 24 Hour Total   I  N  T  A  K  E   P.O. 300   300    Shift Total  (mL/kg) 300  (2.5)   300  (2.5)   O  U  T  P  U  T   Urine  (mL/kg/hr) 150   150    Shift Total  (mL/kg) 150  (1.2)   150  (1.2)   Weight (kg) 120.7 120.7 120.7 120.7     Physical Exam:    Constitutional: He appears well-developed and well-nourished.     Eyes: Pupils are equal, round, and reactive to light. EOM are normal.     Cardiovascular: Regular rhythm.     Abdominal: Soft.     Psych/Behavior: He is alert. He is oriented to person, place, and time.     Musculoskeletal:        Right Upper Extremities: Muscle strength is 5/5.        Left Upper Extremities: Muscle strength is 5/5.       Right Lower Extremities: Muscle strength is 5/5.     Neurological:        Sensory: There is no sensory deficit in the trunk. There is no sensory deficit in the extremities.        DTRs: DTRs are DTRS NORMAL AND SYMMETRICnormal and symmetric.        Cranial nerves: Cranial nerve(s) II, III, IV, V, VI, VII, VIII, IX, X, XI and XII are intact.        Significant Labs:    Recent Labs  Lab 07/27/17 0102 07/27/17  0340 07/28/17  0440   * 156* 136*   * 138 140   K 4.0 4.2 4.0    106 108   CO2 23 25 25   BUN 13 14 12   CREATININE 0.8 0.7 0.7   CALCIUM 9.0 8.9 8.7   MG  --  2.2 2.4       Recent Labs  Lab 07/27/17 0102 07/27/17 0340 07/28/17  0440   WBC 5.20 3.44* 7.67   HGB 14.7 14.2 14.9   HCT 42.0 40.7 42.1   PLT 93* 93* 111*       Recent Labs  Lab 07/27/17 0102 07/27/17  0559   INR 1.6*  1.6* 1.4*   APTT 32.9*  --      Microbiology Results (last 7 days)     ** No results found for the last 168 hours. **        Significant Diagnostics:  MRI: Mri Brain W Wo Contrast    Result Date: 7/27/2017  3.5 cm hemorrhagic enhancing lesion centered in the right middle frontal gyrus compatible with metastasis with surrounding vasogenic edema and right-to-left subfalcine herniation.   Additional subcentimeter metastatic lesion in the right orbitofrontal  lobe. Small focus of enhancement at the left cerebellopontine angle, suspicious for leptomeningeal disease.  A schwannoma can have a similar appearance.  Suggest correlation with CSF analysis.  Mild motion degraded examination. Stealth protocol MRI performed for purposes of procedural localization.     Assessment/Plan:     * Frontal mass of brain    52M PMH rectal cancer with mets to leg, likely new mets to brain.    -- No acute neurosurgical intervention necessary.  -- MRI brain done  -- Dex 4q6  -- Keppra 500bid  -- Plan for OR for crani tumor resection Monday.  -- TTF today.  -- PPI, SQH  -- Vitals, labs, regular diet.             Alfonso Mclaughlin MD  Neurosurgery  [facility]

## 2017-07-28 NOTE — HPI
53yo male with pmh of rectal CA presents to [facility] ED with imaging findings suspicious for mass in right frontal subcortex. Pt with two week history of 'acting strange' and 'zoning out with arm and body twitches'. On exam pt is neurologically intact and subjectively asymptomatic.     MRI shows right solitary frontal lesion with significant vasogenic edema and midline shift.     Pt takes warfarin for PE diagnosed in 2013. INR from OSH 1.68.        No Headaches.  No Vertigo.  No Nausea.  No Vomiting.  No Paresthesias.  No Focal deficit.  No Subjective fevers or chills.     Pt is afebrile and without leukocytosis. Pt is neurologically intact and hemodynamically stable.      Neurosurgery is consulted for right frontal mass.

## 2017-07-28 NOTE — PLAN OF CARE
Problem: Patient Care Overview  Goal: Plan of Care Review  7/27 DX: Brain Mass    PMHX: Rectal CA w/mets to lungs   Outcome: Ongoing (interventions implemented as appropriate)  Focused Review of Systems Throughout Shift: oriented x 3, calm, respirations even and unlabored, and skin is warm, clean, dry, & intact. Neuro assessment WDL    Safety Status: Patient sitting up in bed, no acute distress noted, awake, and alert. Side rails up x 2, call light in reach, bed low and locked. ID band on right wrist. family at bedside.     Mobility Status: patient able to ambulate per self to bathroom. All cables and iv tubing disconnected. Will continue to monitor.     Plan of Care: Patient verbalized understanding and taught back status and plan of care. Patient progressed with no setbacks today. Adjustments to plan included: Diet advanced after CT and MRI completed.  Surgery scheduled for Monday . Patient denies needs at this time.

## 2017-07-28 NOTE — ASSESSMENT & PLAN NOTE
52M PMH rectal cancer with mets to leg, likely new mets to brain.    -- No acute neurosurgical intervention necessary.  -- MRI brain done  -- Dex 4q6  -- Keppra 500bid  -- Plan for OR for crani tumor resection Monday.  -- TTF today.  -- PPI, SQH  -- Vitals, labs, regular diet.

## 2017-07-28 NOTE — PLAN OF CARE
Problem: Patient Care Overview  Goal: Plan of Care Review  7/27 DX: Brain Mass   7/27 CT w/ IV contrast chest abd pelvis  7/27 Repeat MRI   7/27 - Surgery and blood consent signed, plan for SX on Monday 7/31 7/28: Anesthesia consent signed. Transfer orders to 7th floor in place    PMHX: Rectal CA w/mets to lungs   [place]  Q6 Accu check  Q1 neuro checks   Outcome: Ongoing (interventions implemented as appropriate)  No acute events this shift. VSS, afebrile. Q1H neuro checks in place, AAOx4, follows commands. Denies pain. SpO2 %, RA. NS @ 100 mL/hr. Pt voids without difficulty. Ambulates in room without difficulty. Pt currently OOBTC. Call light within reach. Transfer orders in place, awaiting bed placement at this time. Pt and spouse updated on POC. Anesthesia consent obtained today for scheduled procedure on Monday. See flowsheets for assessment and vitals. Will continue to monitor pt.

## 2017-07-28 NOTE — PROGRESS NOTES
[facility]  Neurocritical Care  Progress/Transfer Note    Admit Date: 7/27/2017  Service Date: 07/28/2017  Length of Stay: 1    Subjective:     Chief Complaint: Frontal mass of brain    History of Present Illness: Mr. Trujillo is a 51 y/o male with h/o known metastatic rectal CA and h/o PE on warfarin who was transferred to [facility] from [place] for evaluation of new multiple R sided brain lesions, most notably a 3.5x 3.2x 3.2 anterior right frontal lobe lesion with vasogenic edema producing MLS. Patient and wife report a couple week history of generalized weakness, fatigue, HA and slowing in speech. Patient has known metastases to lungs and is being followed as outpatient by oncology.       Hospital Course: 7/28: [facility]     No new subjective & objective note has been filed under this hospital service since the last note was generated.    Assessment/Plan:     Hem/Onc   * Frontal mass of brain    -- Noted on CT, MRI with and without em at OSH  -- Repeat MRI stealth with and without pending here  -- NSGY, heme/onc services following  -- Admit to [facility] for q1h neuro checks given significant associated edema  -- Dexamethasone 10 mg once now, then 8 mg q12h, IV PPI while on high dose of steroids   -- Levetiracetam 500 mg BID for seizure ppx  -- Stepdown to [facility]   -- Plan for OR 7/31 per [facility]        Warfarin-induced coagulopathy    -- On warfarin for h/o PE  -- INR 1.6  -- 1u FFP   -- Hold antiplatelet, anticoagulation         Rectal cancer    -- Known primary CA with lung mets  -- CT C/A/P with contrast pending  -- Oncology following             Prophylaxis:  Venous Thromboembolism: mechanical  Stress Ulcer: PPI  Ventilator Pneumonia: not applicable     Activity Orders          None        Full Code    Ranjit Yan NP  Neurocritical Care  [facility]

## 2017-07-28 NOTE — ASSESSMENT & PLAN NOTE
-- Noted on CT, MRI with and without em at OSH  -- Repeat MRI stealth with and without pending here  -- NSGY, heme/onc services following  -- Admit to NCC for q1h neuro checks given significant associated edema  -- Dexamethasone 10 mg once now, then 8 mg q12h, IV PPI while on high dose of steroids   -- Levetiracetam 500 mg BID for seizure ppx  -- Stepdown to NSGY   -- Plan for OR 7/31 per nsgy

## 2017-07-28 NOTE — ANESTHESIA PREPROCEDURE EVALUATION
Pre-operative evaluation for Procedure(s) (LRB):  Right frontal CRANIOTOMY WITH STEALTH for tumor resection; microscope; [place] (Right)    Mr. Trujillo is a 53 y/o male with h/o known metastatic rectal CA and h/o PE on warfarin (7/27/17 INR 1.6)  who was transferred to [facility] [place] for evaluation of new multiple R sided brain lesions. Plan is to undergo the above stated procedure on 7/31/17.    IV Access:    Central Venous Catheter Line          Port A Cath Single Lumen 07/27/17 0619 left subclavian 1 day      Peripheral Intravenous Line          Peripheral IV - Single Lumen 07/27/17 0102 Right Wrist 1 day        Peripheral IV - Single Lumen 07/27/17 1245 Left;Anterior Forearm less than 1 day          Oxygen Requirements:    O2/Vent Data   (Last 4)     07/28 0430 07/28 0500 07/28 0600 07/28 0700   SpO2 (%) 100 100 100 97   O2 Device (Oxygen Therapy)    room air         Infusions:    NaCl at 100 mL/hr.       Last Airway:  None on file.         Patient Active Problem List   Diagnosis    Perirectal abscess    Intra-abdominal abscess    Rectal cancer    Frontal mass of brain    Warfarin-induced coagulopathy    Metastasis to brain    Cerebral edema       Review of patient's allergies indicates:  No Known Allergies    No current facility-administered medications on file prior to encounter.      Current Outpatient Prescriptions on File Prior to Encounter   Medication Sig Dispense Refill    diphenoxylate-atropine 2.5-0.025 mg (LOMOTIL) 2.5-0.025 mg per tablet Take 1 tablet by mouth 4 (four) times daily as needed for Diarrhea.      ergocalciferol (VITAMIN D2) 50,000 unit Cap Take 50,000 Units by mouth every 7 days.      warfarin (COUMADIN) 7.5 MG tablet Take 7.5 mg by mouth once daily.      olsalazine (DIPENTUM) 250 mg capsule Take 500 mg by mouth 2 (two) times daily.      promethazine (PHENERGAN) 25 MG tablet Take 25 mg by mouth every 4 (four) hours.         Past Surgical History:   Procedure Laterality Date     HERNIA REPAIR      ILEOSTOMY REVISION      rectal tumor      removed       Social History     Social History    Marital status:      Spouse name: N/A    Number of children: N/A    Years of education: N/A     Occupational History    Not on file.     Social History Main Topics    Smoking status: Never Smoker    Smokeless tobacco: Former User    Alcohol use 1.2 oz/week     2 Cans of beer per week    Drug use: No    Sexual activity: No     Other Topics Concern    Not on file     Social History Narrative    No narrative on file         Vital Signs Range (Last 24H):  Temp:  [36.4 °C (97.6 °F)-36.9 °C (98.4 °F)]   Pulse:  [74-91]   Resp:  [9-47]   BP: (115-163)/(63-94)   SpO2:  [94 %-100 %]       CBC:   Recent Labs      17   0340  17   0440   WBC  3.44*  7.67   RBC  4.54*  4.67   HGB  14.2  14.9   HCT  40.7  42.1   PLT  93*  111*   MCV  90  90   MCH  31.3*  31.9*   MCHC  34.9  35.4       CMP:   Recent Labs      17   0340  17   0440   NA  138  140   K  4.2  4.0   CL  106  108   CO2  25  25   BUN  14  12   CREATININE  0.7  0.7   GLU  156*  136*   MG  2.2  2.4   PHOS  2.6*  3.2   CALCIUM  8.9  8.7   ALBUMIN  3.6  3.8   PROT  7.0  7.1   ALKPHOS  83  85   ALT  17  16   AST  18  12   BILITOT  0.9  1.1*       INR  Recent Labs      17   0102  17   0559   INR  1.6*  1.6*  1.4*   APTT  32.9*   --            Diagnostic Studies:      EK/9/16    Test Reason : R00.0  Blood Pressure : 111/081 mmHG  Vent. Rate : 103 BPM     Atrial Rate : 103 BPM     P-R Int : 192 ms          QRS Dur : 098 ms      QT Int : 344 ms       P-R-T Axes : 024 004 020 degrees     QTc Int : 450 ms    Sinus tachycardia  Normal ECG    No previous ECGs available      2D Echo: None on file.       Anesthesia Evaluation    I have reviewed the Patient Summary Reports.    I have reviewed the Nursing Notes.      Review of Systems  Anesthesia Hx:  History of prior surgery of interest to airway management or  planning: Denies Family Hx of Anesthesia complications.   Denies Personal Hx of Anesthesia complications.   Hematology/Oncology:  Hematology Normal   Oncology Normal     EENT/Dental:EENT/Dental Normal   Cardiovascular:  Cardiovascular Normal  Anticoagulation on coumadin for warfarin.    Pulmonary:   Prior PE   Renal/:  Renal/ Normal     Hepatic/GI:  Hepatic/GI Normal    Musculoskeletal:  Musculoskeletal Normal    Neurological:   Rectal Ca metastatic disease to the matilde. Now with vascular edema.    Endocrine:  Endocrine Normal    Dermatological:  Skin Normal    Psych:  Psychiatric Normal           Physical Exam  General:  Well nourished    Airway/Jaw/Neck:  Airway Findings: Mouth Opening: Normal Tongue: Normal  General Airway Assessment: Adult  Mallampati: III  Improves to II with phonation.  TM Distance: Normal, at least 6 cm  Jaw/Neck Findings:  Neck ROM: Normal ROM     Eyes/Ears/Nose:  EYES/EARS/NOSE FINDINGS: Normal   Dental:  Dental Findings: Periodontal disease, Mild   Chest/Lungs:  Chest/Lungs Findings: Clear to auscultation, Normal Respiratory Rate     Heart/Vascular:  Heart Findings: Rate: Normal  Rhythm: Regular Rhythm  Heart murmur: negative    Abdomen:  Abdomen Findings: Normal    Musculoskeletal:  Musculoskeletal Findings: Normal   Skin:  Skin Findings: Normal    Mental Status:  Mental Status Findings: Normal        Anesthesia Plan  Type of Anesthesia, risks & benefits discussed:  Anesthesia Type:  general, MAC, regional  Patient's Preference:   Intra-op Monitoring Plan: standard ASA monitors  Intra-op Monitoring Plan Comments:   Post Op Pain Control Plan: IV/PO Opioids PRN and multimodal analgesia  Post Op Pain Control Plan Comments:   Induction:   IV  Beta Blocker:  Patient is not currently on a Beta-Blocker (No further documentation required).       Informed Consent: Patient understands risks and agrees with Anesthesia plan.  Questions answered. Anesthesia consent signed with patient.  ASA Score:  3     Day of Surgery Review of History & Physical: I have interviewed and examined the patient. I have reviewed the patient's H&P dated:  There are no significant changes.  H&P update referred to the surgeon.         Ready For Surgery From Anesthesia Perspective.

## 2017-07-28 NOTE — SUBJECTIVE & OBJECTIVE
Interval History: NAEON. Patient feeling better after steroids.    Medications:  Continuous Infusions:   sodium chloride 0.9% 100 mL/hr at 07/28/17 1300     Scheduled Meds:   dexamethasone  8 mg Intravenous Q12H    levetiracetam IVPB  500 mg Intravenous Q12H    pantoprazole  40 mg Intravenous Daily     PRN Meds:acetaminophen, dextrose 50%, dextrose 50%, glucagon (human recombinant), glucose, glucose, insulin aspart, ondansetron     Review of Systems  Objective:     Weight: 120.7 kg (266 lb)  Body mass index is 32.38 kg/m².  Vital Signs (Most Recent):  Temp: 97.6 °F (36.4 °C) (07/28/17 1115)  Pulse: 80 (07/28/17 1100)  Resp: (!) 27 (07/28/17 1100)  BP: (!) 144/90 (07/28/17 1100)  SpO2: 99 % (07/28/17 1100) Vital Signs (24h Range):  Temp:  [97.6 °F (36.4 °C)-98.4 °F (36.9 °C)] 97.6 °F (36.4 °C)  Pulse:  [74-91] 80  Resp:  [9-47] 27  SpO2:  [95 %-100 %] 99 %  BP: (115-162)/() 144/90       Date 07/28/17 0700 - 07/29/17 0659   Shift 1628-5488 0400-4419 1187-1521 24 Hour Total   I  N  T  A  K  E   P.O. 300   300    Shift Total  (mL/kg) 300  (2.5)   300  (2.5)   O  U  T  P  U  T   Urine  (mL/kg/hr) 150   150    Shift Total  (mL/kg) 150  (1.2)   150  (1.2)   Weight (kg) 120.7 120.7 120.7 120.7     Physical Exam:    Constitutional: He appears well-developed and well-nourished.     Eyes: Pupils are equal, round, and reactive to light. EOM are normal.     Cardiovascular: Regular rhythm.     Abdominal: Soft.     Psych/Behavior: He is alert. He is oriented to person, place, and time.     Musculoskeletal:        Right Upper Extremities: Muscle strength is 5/5.        Left Upper Extremities: Muscle strength is 5/5.       Right Lower Extremities: Muscle strength is 5/5.     Neurological:        Sensory: There is no sensory deficit in the trunk. There is no sensory deficit in the extremities.        DTRs: DTRs are DTRS NORMAL AND SYMMETRICnormal and symmetric.        Cranial nerves: Cranial nerve(s) II, III, IV, V, VI,  VII, VIII, IX, X, XI and XII are intact.        Significant Labs:    Recent Labs  Lab 07/27/17  0102 07/27/17 0340 07/28/17  0440   * 156* 136*   * 138 140   K 4.0 4.2 4.0    106 108   CO2 23 25 25   BUN 13 14 12   CREATININE 0.8 0.7 0.7   CALCIUM 9.0 8.9 8.7   MG  --  2.2 2.4       Recent Labs  Lab 07/27/17 0102 07/27/17 0340 07/28/17  0440   WBC 5.20 3.44* 7.67   HGB 14.7 14.2 14.9   HCT 42.0 40.7 42.1   PLT 93* 93* 111*       Recent Labs  Lab 07/27/17 0102 07/27/17  0559   INR 1.6*  1.6* 1.4*   APTT 32.9*  --      Microbiology Results (last 7 days)     ** No results found for the last 168 hours. **        Significant Diagnostics:  MRI: Mri Brain W Wo Contrast    Result Date: 7/27/2017  3.5 cm hemorrhagic enhancing lesion centered in the right middle frontal gyrus compatible with metastasis with surrounding vasogenic edema and right-to-left subfalcine herniation.   Additional subcentimeter metastatic lesion in the right orbitofrontal lobe. Small focus of enhancement at the left cerebellopontine angle, suspicious for leptomeningeal disease.  A schwannoma can have a similar appearance.  Suggest correlation with CSF analysis.  Mild motion degraded examination. Stealth protocol MRI performed for purposes of procedural localization.

## 2017-07-29 LAB
ALBUMIN SERPL BCP-MCNC: 3.5 G/DL
ALP SERPL-CCNC: 72 U/L
ALT SERPL W/O P-5'-P-CCNC: 14 U/L
ANION GAP SERPL CALC-SCNC: 8 MMOL/L
AST SERPL-CCNC: 11 U/L
BASOPHILS # BLD AUTO: 0 K/UL
BASOPHILS NFR BLD: 0 %
BILIRUB SERPL-MCNC: 0.9 MG/DL
BUN SERPL-MCNC: 17 MG/DL
CALCIUM SERPL-MCNC: 8.2 MG/DL
CHLORIDE SERPL-SCNC: 112 MMOL/L
CO2 SERPL-SCNC: 21 MMOL/L
CREAT SERPL-MCNC: 0.7 MG/DL
DIFFERENTIAL METHOD: ABNORMAL
EOSINOPHIL # BLD AUTO: 0 K/UL
EOSINOPHIL NFR BLD: 0 %
ERYTHROCYTE [DISTWIDTH] IN BLOOD BY AUTOMATED COUNT: 13.3 %
EST. GFR  (AFRICAN AMERICAN): >60 ML/MIN/1.73 M^2
EST. GFR  (NON AFRICAN AMERICAN): >60 ML/MIN/1.73 M^2
GLUCOSE SERPL-MCNC: 130 MG/DL
HCT VFR BLD AUTO: 39.9 %
HGB BLD-MCNC: 13.3 G/DL
LYMPHOCYTES # BLD AUTO: 0.3 K/UL
LYMPHOCYTES NFR BLD: 4.3 %
MAGNESIUM SERPL-MCNC: 2.4 MG/DL
MCH RBC QN AUTO: 30.6 PG
MCHC RBC AUTO-ENTMCNC: 33.3 G/DL
MCV RBC AUTO: 92 FL
MONOCYTES # BLD AUTO: 0.2 K/UL
MONOCYTES NFR BLD: 2.4 %
NEUTROPHILS # BLD AUTO: 6.2 K/UL
NEUTROPHILS NFR BLD: 93 %
PHOSPHATE SERPL-MCNC: 2.9 MG/DL
PLATELET # BLD AUTO: 102 K/UL
PMV BLD AUTO: 11 FL
POCT GLUCOSE: 100 MG/DL (ref 70–110)
POCT GLUCOSE: 124 MG/DL (ref 70–110)
POCT GLUCOSE: 129 MG/DL (ref 70–110)
POCT GLUCOSE: 137 MG/DL (ref 70–110)
POTASSIUM SERPL-SCNC: 3.9 MMOL/L
PROT SERPL-MCNC: 6.4 G/DL
RBC # BLD AUTO: 4.34 M/UL
SODIUM SERPL-SCNC: 141 MMOL/L
WBC # BLD AUTO: 6.68 K/UL

## 2017-07-29 PROCEDURE — 84100 ASSAY OF PHOSPHORUS: CPT

## 2017-07-29 PROCEDURE — 80053 COMPREHEN METABOLIC PANEL: CPT

## 2017-07-29 PROCEDURE — 83735 ASSAY OF MAGNESIUM: CPT

## 2017-07-29 PROCEDURE — 63600175 PHARM REV CODE 636 W HCPCS: Performed by: STUDENT IN AN ORGANIZED HEALTH CARE EDUCATION/TRAINING PROGRAM

## 2017-07-29 PROCEDURE — C9113 INJ PANTOPRAZOLE SODIUM, VIA: HCPCS | Performed by: STUDENT IN AN ORGANIZED HEALTH CARE EDUCATION/TRAINING PROGRAM

## 2017-07-29 PROCEDURE — 85025 COMPLETE CBC W/AUTO DIFF WBC: CPT

## 2017-07-29 PROCEDURE — 20600001 HC STEP DOWN PRIVATE ROOM

## 2017-07-29 PROCEDURE — 63600175 PHARM REV CODE 636 W HCPCS: Performed by: EMERGENCY MEDICINE

## 2017-07-29 PROCEDURE — 25000003 PHARM REV CODE 250: Performed by: STUDENT IN AN ORGANIZED HEALTH CARE EDUCATION/TRAINING PROGRAM

## 2017-07-29 RX ORDER — CHLORPROMAZINE HYDROCHLORIDE 25 MG/1
25 TABLET, FILM COATED ORAL EVERY 8 HOURS PRN
Status: DISCONTINUED | OUTPATIENT
Start: 2017-07-29 | End: 2017-08-03 | Stop reason: HOSPADM

## 2017-07-29 RX ADMIN — LEVETIRACETAM 500 MG: 5 INJECTION INTRAVENOUS at 08:07

## 2017-07-29 RX ADMIN — PANTOPRAZOLE SODIUM 40 MG: 40 INJECTION, POWDER, FOR SOLUTION INTRAVENOUS at 08:07

## 2017-07-29 RX ADMIN — DEXAMETHASONE SODIUM PHOSPHATE 8 MG: 4 INJECTION, SOLUTION INTRAMUSCULAR; INTRAVENOUS at 08:07

## 2017-07-29 RX ADMIN — CHLORPROMAZINE HYDROCHLORIDE 25 MG: 25 TABLET, SUGAR COATED ORAL at 04:07

## 2017-07-29 NOTE — PROGRESS NOTES
[facility]  Neurosurgery  Progress Note    Subjective:     History of Present Illness: 53yo male with pmh of rectal CA presents to [facility] ED with imaging findings suspicious for mass in right frontal subcortex. Pt with two week history of 'acting strange' and 'zoning out with arm and body twitches'. On exam pt is neurologically intact and subjectively asymptomatic.     MRI shows right solitary frontal lesion with significant vasogenic edema and midline shift.     Pt takes warfarin for PE diagnosed in 2013. INR from OSH 1.68.        No Headaches.  No Vertigo.  No Nausea.  No Vomiting.  No Paresthesias.  No Focal deficit.  No Subjective fevers or chills.     Pt is afebrile and without leukocytosis. Pt is neurologically intact and hemodynamically stable.      Neurosurgery is consulted for right frontal mass.     Post-Op Info:  Procedure(s) (LRB):  Right frontal CRANIOTOMY WITH STEALTH for tumor resection; microscope; [place] (Right)         Interval History: naeon    Medications:  Continuous Infusions:   sodium chloride 0.9% 100 mL/hr at 07/28/17 1700     Scheduled Meds:   dexamethasone  8 mg Intravenous Q12H    levetiracetam IVPB  500 mg Intravenous Q12H    pantoprazole  40 mg Intravenous Daily     PRN Meds:acetaminophen, dextrose 50%, dextrose 50%, glucagon (human recombinant), glucose, glucose, insulin aspart, ondansetron     Review of Systems  Objective:     Weight: 120.7 kg (266 lb)  Body mass index is 32.38 kg/m².  Vital Signs (Most Recent):  Temp: 98 °F (36.7 °C) (07/29/17 0720)  Pulse: 73 (07/29/17 0720)  Resp: 18 (07/29/17 0720)  BP: (!) 141/62 (07/29/17 0720)  SpO2: (!) 94 % (07/29/17 0720) Vital Signs (24h Range):  Temp:  [97.4 °F (36.3 °C)-98.3 °F (36.8 °C)] 98 °F (36.7 °C)  Pulse:  [72-89] 73  Resp:  [18-30] 18  SpO2:  [94 %-100 %] 94 %  BP: (130-159)/(62-98) 141/62                           Neurosurgery Physical Exam  Constitutional: He appears well-developed and well-nourished.       Eyes: Pupils are equal, round, and reactive to light. EOM are normal.      Cardiovascular: Regular rhythm.      Abdominal: Soft.     Psych/Behavior: He is alert. He is oriented to person, place, and time.     Musculoskeletal:        Right Upper Extremities: Muscle strength is 5/5.        Left Upper Extremities: Muscle strength is 5/5.       Right Lower Extremities: Muscle strength is 5/5.     Neurological:        Sensory: There is no sensory deficit in the trunk. There is no sensory deficit in the extremities.        DTRs: DTRs are DTRS NORMAL AND SYMMETRICnormal and symmetric.        Cranial nerves: Cranial nerve(s) II, III, IV, V, VI, VII, VIII, IX, X, XI and XII are intact.        Significant Labs:    Recent Labs  Lab 07/28/17 0440 07/29/17  0353   * 130*    141   K 4.0 3.9    112*   CO2 25 21*   BUN 12 17   CREATININE 0.7 0.7   CALCIUM 8.7 8.2*   MG 2.4 2.4       Recent Labs  Lab 07/28/17 0440 07/29/17  0353   WBC 7.67 6.68   HGB 14.9 13.3*   HCT 42.1 39.9*   * 102*     No results for input(s): LABPT, INR, APTT in the last 48 hours.  Microbiology Results (last 7 days)     ** No results found for the last 168 hours. **        All pertinent labs from the last 24 hours have been reviewed.    Significant Diagnostics:  CT: No results found in the last 24 hours.    Assessment/Plan:     * Frontal mass of brain    52M PMH rectal cancer with mets to leg, likely new mets to brain.    -- No acute neurosurgical intervention necessary.  -- MRI brain done  -- Dex 4q6  -- Keppra 500bid  -- Plan for OR for crani tumor resection Monday.  -- TTF today.  -- PPI, SQH  -- Vitals, labs, regular diet.             Lisandro navarrete MD  Neurosurgery  [facility]

## 2017-07-29 NOTE — SUBJECTIVE & OBJECTIVE
Interval History: naeon    Medications:  Continuous Infusions:   sodium chloride 0.9% 100 mL/hr at 07/28/17 1700     Scheduled Meds:   dexamethasone  8 mg Intravenous Q12H    levetiracetam IVPB  500 mg Intravenous Q12H    pantoprazole  40 mg Intravenous Daily     PRN Meds:acetaminophen, dextrose 50%, dextrose 50%, glucagon (human recombinant), glucose, glucose, insulin aspart, ondansetron     Review of Systems  Objective:     Weight: 120.7 kg (266 lb)  Body mass index is 32.38 kg/m².  Vital Signs (Most Recent):  Temp: 98 °F (36.7 °C) (07/29/17 0720)  Pulse: 73 (07/29/17 0720)  Resp: 18 (07/29/17 0720)  BP: (!) 141/62 (07/29/17 0720)  SpO2: (!) 94 % (07/29/17 0720) Vital Signs (24h Range):  Temp:  [97.4 °F (36.3 °C)-98.3 °F (36.8 °C)] 98 °F (36.7 °C)  Pulse:  [72-89] 73  Resp:  [18-30] 18  SpO2:  [94 %-100 %] 94 %  BP: (130-159)/(62-98) 141/62                           Neurosurgery Physical Exam  Constitutional: He appears well-developed and well-nourished.      Eyes: Pupils are equal, round, and reactive to light. EOM are normal.      Cardiovascular: Regular rhythm.      Abdominal: Soft.     Psych/Behavior: He is alert. He is oriented to person, place, and time.     Musculoskeletal:        Right Upper Extremities: Muscle strength is 5/5.        Left Upper Extremities: Muscle strength is 5/5.       Right Lower Extremities: Muscle strength is 5/5.     Neurological:        Sensory: There is no sensory deficit in the trunk. There is no sensory deficit in the extremities.        DTRs: DTRs are DTRS NORMAL AND SYMMETRICnormal and symmetric.        Cranial nerves: Cranial nerve(s) II, III, IV, V, VI, VII, VIII, IX, X, XI and XII are intact.        Significant Labs:    Recent Labs  Lab 07/28/17  0440 07/29/17  0353   * 130*    141   K 4.0 3.9    112*   CO2 25 21*   BUN 12 17   CREATININE 0.7 0.7   CALCIUM 8.7 8.2*   MG 2.4 2.4       Recent Labs  Lab 07/28/17  0440 07/29/17  0353   WBC 7.67 6.68   HGB  14.9 13.3*   HCT 42.1 39.9*   * 102*     No results for input(s): LABPT, INR, APTT in the last 48 hours.  Microbiology Results (last 7 days)     ** No results found for the last 168 hours. **        All pertinent labs from the last 24 hours have been reviewed.    Significant Diagnostics:  CT: No results found in the last 24 hours.

## 2017-07-29 NOTE — PLAN OF CARE
Plan of care reviewed with patient. Vital signs stable. No complaints of pain. Neuro intact. NS @100 infusing. Will continue to monitor.

## 2017-07-30 LAB
ALBUMIN SERPL BCP-MCNC: 3.2 G/DL
ALP SERPL-CCNC: 68 U/L
ALT SERPL W/O P-5'-P-CCNC: 13 U/L
ANION GAP SERPL CALC-SCNC: 7 MMOL/L
AST SERPL-CCNC: 11 U/L
BASOPHILS # BLD AUTO: 0 K/UL
BASOPHILS NFR BLD: 0 %
BILIRUB SERPL-MCNC: 1 MG/DL
BUN SERPL-MCNC: 12 MG/DL
CALCIUM SERPL-MCNC: 8.3 MG/DL
CHLORIDE SERPL-SCNC: 111 MMOL/L
CO2 SERPL-SCNC: 19 MMOL/L
CREAT SERPL-MCNC: 0.7 MG/DL
DIFFERENTIAL METHOD: ABNORMAL
EOSINOPHIL # BLD AUTO: 0 K/UL
EOSINOPHIL NFR BLD: 0 %
ERYTHROCYTE [DISTWIDTH] IN BLOOD BY AUTOMATED COUNT: 13.1 %
EST. GFR  (AFRICAN AMERICAN): >60 ML/MIN/1.73 M^2
EST. GFR  (NON AFRICAN AMERICAN): >60 ML/MIN/1.73 M^2
GLUCOSE SERPL-MCNC: 133 MG/DL
HCT VFR BLD AUTO: 37.5 %
HGB BLD-MCNC: 13.1 G/DL
LYMPHOCYTES # BLD AUTO: 0.3 K/UL
LYMPHOCYTES NFR BLD: 7.6 %
MAGNESIUM SERPL-MCNC: 2 MG/DL
MCH RBC QN AUTO: 31.5 PG
MCHC RBC AUTO-ENTMCNC: 34.9 G/DL
MCV RBC AUTO: 90 FL
MONOCYTES # BLD AUTO: 0.2 K/UL
MONOCYTES NFR BLD: 4 %
NEUTROPHILS # BLD AUTO: 3.9 K/UL
NEUTROPHILS NFR BLD: 88 %
PHOSPHATE SERPL-MCNC: 2.5 MG/DL
PLATELET # BLD AUTO: 74 K/UL
PMV BLD AUTO: 10.1 FL
POCT GLUCOSE: 101 MG/DL (ref 70–110)
POCT GLUCOSE: 118 MG/DL (ref 70–110)
POCT GLUCOSE: 134 MG/DL (ref 70–110)
POCT GLUCOSE: 77 MG/DL (ref 70–110)
POTASSIUM SERPL-SCNC: 4 MMOL/L
PROT SERPL-MCNC: 5.9 G/DL
RBC # BLD AUTO: 4.16 M/UL
SODIUM SERPL-SCNC: 137 MMOL/L
WBC # BLD AUTO: 4.48 K/UL

## 2017-07-30 PROCEDURE — 83735 ASSAY OF MAGNESIUM: CPT

## 2017-07-30 PROCEDURE — 85025 COMPLETE CBC W/AUTO DIFF WBC: CPT

## 2017-07-30 PROCEDURE — 20600001 HC STEP DOWN PRIVATE ROOM

## 2017-07-30 PROCEDURE — 63600175 PHARM REV CODE 636 W HCPCS: Performed by: STUDENT IN AN ORGANIZED HEALTH CARE EDUCATION/TRAINING PROGRAM

## 2017-07-30 PROCEDURE — 25000003 PHARM REV CODE 250: Performed by: NURSE PRACTITIONER

## 2017-07-30 PROCEDURE — 80053 COMPREHEN METABOLIC PANEL: CPT

## 2017-07-30 PROCEDURE — 84100 ASSAY OF PHOSPHORUS: CPT

## 2017-07-30 PROCEDURE — 36415 COLL VENOUS BLD VENIPUNCTURE: CPT

## 2017-07-30 PROCEDURE — 63600175 PHARM REV CODE 636 W HCPCS: Performed by: EMERGENCY MEDICINE

## 2017-07-30 PROCEDURE — C9113 INJ PANTOPRAZOLE SODIUM, VIA: HCPCS | Performed by: STUDENT IN AN ORGANIZED HEALTH CARE EDUCATION/TRAINING PROGRAM

## 2017-07-30 RX ADMIN — SODIUM CHLORIDE: 0.9 INJECTION, SOLUTION INTRAVENOUS at 04:07

## 2017-07-30 RX ADMIN — DEXAMETHASONE SODIUM PHOSPHATE 8 MG: 4 INJECTION, SOLUTION INTRAMUSCULAR; INTRAVENOUS at 09:07

## 2017-07-30 RX ADMIN — LEVETIRACETAM 500 MG: 5 INJECTION INTRAVENOUS at 09:07

## 2017-07-30 RX ADMIN — PANTOPRAZOLE SODIUM 40 MG: 40 INJECTION, POWDER, FOR SOLUTION INTRAVENOUS at 09:07

## 2017-07-30 NOTE — PLAN OF CARE
Problem: Patient Care Overview  Goal: Plan of Care Review  7/27 DX: Brain Mass   7/27 CT w/ IV contrast chest abd pelvis  7/27 Repeat MRI   7/27 - Surgery and blood consent signed, plan for SX on Monday 7/31 7/28: Anesthesia consent signed. Transfer orders to 7th floor in place    PMHX: Rectal CA w/mets to lungs   [place]  Q6 Accu check  Q1 neuro checks    Outcome: Ongoing (interventions implemented as appropriate)  Patient is awaiting crainiotomy tomorrow. Patient is showing no neurological deficits. No complaints of pain or hiccoughs.

## 2017-07-30 NOTE — PLAN OF CARE
Problem: Patient Care Overview  Goal: Plan of Care Review    No acute changes overnight. Pt AAOx4. Neuro checks performed q 4 hr. Slight drooping noted to R side of mouth. No slurring noted with speech. Patient denies any numbess, tingling, headaches, or pain. Continues on NS @ 100 ml/hr. Glucose monitoring performed q 6 hrs. No other issues noted. Wife remains at bedside. Will contiue to monitor.

## 2017-07-30 NOTE — PLAN OF CARE
Problem: Patient Care Overview  Goal: Plan of Care Review    No acute changes overnight. Pt AAOX4. Ambulatory and independent. Denies any pain when asked. Neuro checks performed q 4 hrs. CBG monitioring q 6 hrs. Readings WNL. No other issues noted. Will continue to monitor.

## 2017-07-31 ENCOUNTER — SURGERY (OUTPATIENT)
Age: 52
End: 2017-07-31

## 2017-07-31 ENCOUNTER — ANESTHESIA (OUTPATIENT)
Dept: SURGERY | Facility: HOSPITAL | Age: 52
DRG: 025 | End: 2017-07-31
Payer: COMMERCIAL

## 2017-07-31 ENCOUNTER — TELEPHONE (OUTPATIENT)
Dept: PHARMACY | Facility: CLINIC | Age: 52
End: 2017-07-31

## 2017-07-31 PROBLEM — C79.31 BRAIN METASTASES: Status: ACTIVE | Noted: 2017-07-31

## 2017-07-31 PROBLEM — C78.00 LUNG METASTASES: Status: ACTIVE | Noted: 2017-07-31

## 2017-07-31 PROBLEM — R29.810 FACIAL DROOP: Status: ACTIVE | Noted: 2017-07-31

## 2017-07-31 PROBLEM — R00.1 BRADYCARDIA: Status: ACTIVE | Noted: 2017-07-31

## 2017-07-31 PROBLEM — R47.1 DYSARTHRIA: Status: ACTIVE | Noted: 2017-07-31

## 2017-07-31 LAB
ABO + RH BLD: NORMAL
ALBUMIN SERPL BCP-MCNC: 3.2 G/DL
ALP SERPL-CCNC: 64 U/L
ALT SERPL W/O P-5'-P-CCNC: 13 U/L
ANION GAP SERPL CALC-SCNC: 8 MMOL/L
AST SERPL-CCNC: 9 U/L
BASOPHILS # BLD AUTO: 0 K/UL
BASOPHILS # BLD AUTO: 0 K/UL
BASOPHILS NFR BLD: 0 %
BASOPHILS NFR BLD: 0 %
BILIRUB SERPL-MCNC: 1 MG/DL
BLD GP AB SCN CELLS X3 SERPL QL: NORMAL
BLD PROD TYP BPU: NORMAL
BLOOD UNIT EXPIRATION DATE: NORMAL
BLOOD UNIT TYPE CODE: 5100
BLOOD UNIT TYPE CODE: 7300
BLOOD UNIT TYPE CODE: 9500
BLOOD UNIT TYPE: NORMAL
BUN SERPL-MCNC: 11 MG/DL
CALCIUM SERPL-MCNC: 7.6 MG/DL
CHLORIDE SERPL-SCNC: 109 MMOL/L
CO2 SERPL-SCNC: 21 MMOL/L
CODING SYSTEM: NORMAL
CREAT SERPL-MCNC: 0.6 MG/DL
DIFFERENTIAL METHOD: ABNORMAL
DIFFERENTIAL METHOD: ABNORMAL
DISPENSE STATUS: NORMAL
EOSINOPHIL # BLD AUTO: 0 K/UL
EOSINOPHIL # BLD AUTO: 0 K/UL
EOSINOPHIL NFR BLD: 0 %
EOSINOPHIL NFR BLD: 0 %
ERYTHROCYTE [DISTWIDTH] IN BLOOD BY AUTOMATED COUNT: 13.1 %
ERYTHROCYTE [DISTWIDTH] IN BLOOD BY AUTOMATED COUNT: 13.2 %
EST. GFR  (AFRICAN AMERICAN): >60 ML/MIN/1.73 M^2
EST. GFR  (NON AFRICAN AMERICAN): >60 ML/MIN/1.73 M^2
GLUCOSE SERPL-MCNC: 129 MG/DL
HCT VFR BLD AUTO: 34.3 %
HCT VFR BLD AUTO: 36.4 %
HGB BLD-MCNC: 11.8 G/DL
HGB BLD-MCNC: 12.8 G/DL
LYMPHOCYTES # BLD AUTO: 0.2 K/UL
LYMPHOCYTES # BLD AUTO: 0.2 K/UL
LYMPHOCYTES NFR BLD: 5.2 %
LYMPHOCYTES NFR BLD: 5.8 %
MAGNESIUM SERPL-MCNC: 2.1 MG/DL
MCH RBC QN AUTO: 30.8 PG
MCH RBC QN AUTO: 31.1 PG
MCHC RBC AUTO-ENTMCNC: 34.4 G/DL
MCHC RBC AUTO-ENTMCNC: 35.2 G/DL
MCV RBC AUTO: 88 FL
MCV RBC AUTO: 90 FL
MONOCYTES # BLD AUTO: 0.2 K/UL
MONOCYTES # BLD AUTO: 0.2 K/UL
MONOCYTES NFR BLD: 3.6 %
MONOCYTES NFR BLD: 4.3 %
NEUTROPHILS # BLD AUTO: 3.2 K/UL
NEUTROPHILS # BLD AUTO: 3.7 K/UL
NEUTROPHILS NFR BLD: 90.2 %
NEUTROPHILS NFR BLD: 90.4 %
PHOSPHATE SERPL-MCNC: 2.6 MG/DL
PLATELET # BLD AUTO: 61 K/UL
PLATELET # BLD AUTO: 87 K/UL
PMV BLD AUTO: 10.4 FL
PMV BLD AUTO: 10.8 FL
POCT GLUCOSE: 130 MG/DL (ref 70–110)
POTASSIUM SERPL-SCNC: 3.8 MMOL/L
PROT SERPL-MCNC: 5.8 G/DL
RBC # BLD AUTO: 3.83 M/UL
RBC # BLD AUTO: 4.12 M/UL
SODIUM SERPL-SCNC: 138 MMOL/L
TRANS PLATPHERESIS VOL PATIENT: NORMAL ML
WBC # BLD AUTO: 3.49 K/UL
WBC # BLD AUTO: 4.11 K/UL

## 2017-07-31 PROCEDURE — 25000003 PHARM REV CODE 250: Performed by: STUDENT IN AN ORGANIZED HEALTH CARE EDUCATION/TRAINING PROGRAM

## 2017-07-31 PROCEDURE — 25000003 PHARM REV CODE 250: Performed by: NURSE PRACTITIONER

## 2017-07-31 PROCEDURE — 88341 IMHCHEM/IMCYTCHM EA ADD ANTB: CPT | Mod: 26,,, | Performed by: PATHOLOGY

## 2017-07-31 PROCEDURE — 36000712 HC OR TIME LEV V 1ST 15 MIN: Performed by: NEUROLOGICAL SURGERY

## 2017-07-31 PROCEDURE — 86901 BLOOD TYPING SEROLOGIC RH(D): CPT

## 2017-07-31 PROCEDURE — 84100 ASSAY OF PHOSPHORUS: CPT

## 2017-07-31 PROCEDURE — 69990 MICROSURGERY ADD-ON: CPT | Mod: ,,, | Performed by: NEUROLOGICAL SURGERY

## 2017-07-31 PROCEDURE — 61510 CRNEC TREPH EXC BRN TUM STTL: CPT | Mod: ,,, | Performed by: NEUROLOGICAL SURGERY

## 2017-07-31 PROCEDURE — 63600175 PHARM REV CODE 636 W HCPCS: Performed by: STUDENT IN AN ORGANIZED HEALTH CARE EDUCATION/TRAINING PROGRAM

## 2017-07-31 PROCEDURE — P9035 PLATELET PHERES LEUKOREDUCED: HCPCS

## 2017-07-31 PROCEDURE — C1713 ANCHOR/SCREW BN/BN,TIS/BN: HCPCS | Performed by: NEUROLOGICAL SURGERY

## 2017-07-31 PROCEDURE — 85025 COMPLETE CBC W/AUTO DIFF WBC: CPT | Mod: 91

## 2017-07-31 PROCEDURE — 36415 COLL VENOUS BLD VENIPUNCTURE: CPT

## 2017-07-31 PROCEDURE — D9220A PRA ANESTHESIA: Mod: ,,, | Performed by: ANESTHESIOLOGY

## 2017-07-31 PROCEDURE — 27201423 OPTIME MED/SURG SUP & DEVICES STERILE SUPPLY: Performed by: NEUROLOGICAL SURGERY

## 2017-07-31 PROCEDURE — 00B70ZZ EXCISION OF CEREBRAL HEMISPHERE, OPEN APPROACH: ICD-10-PCS | Performed by: NEUROLOGICAL SURGERY

## 2017-07-31 PROCEDURE — 37000008 HC ANESTHESIA 1ST 15 MINUTES: Performed by: NEUROLOGICAL SURGERY

## 2017-07-31 PROCEDURE — 37000009 HC ANESTHESIA EA ADD 15 MINS: Performed by: NEUROLOGICAL SURGERY

## 2017-07-31 PROCEDURE — 93010 ELECTROCARDIOGRAM REPORT: CPT | Mod: ,,, | Performed by: INTERNAL MEDICINE

## 2017-07-31 PROCEDURE — 20000000 HC ICU ROOM

## 2017-07-31 PROCEDURE — 61781 SCAN PROC CRANIAL INTRA: CPT | Mod: ,,, | Performed by: NEUROLOGICAL SURGERY

## 2017-07-31 PROCEDURE — 63600175 PHARM REV CODE 636 W HCPCS: Performed by: PSYCHIATRY & NEUROLOGY

## 2017-07-31 PROCEDURE — 36000713 HC OR TIME LEV V EA ADD 15 MIN: Performed by: NEUROLOGICAL SURGERY

## 2017-07-31 PROCEDURE — 88307 TISSUE EXAM BY PATHOLOGIST: CPT | Mod: 26,,, | Performed by: PATHOLOGY

## 2017-07-31 PROCEDURE — 80053 COMPREHEN METABOLIC PANEL: CPT

## 2017-07-31 PROCEDURE — C9113 INJ PANTOPRAZOLE SODIUM, VIA: HCPCS | Performed by: STUDENT IN AN ORGANIZED HEALTH CARE EDUCATION/TRAINING PROGRAM

## 2017-07-31 PROCEDURE — 63600175 PHARM REV CODE 636 W HCPCS: Performed by: NEUROLOGICAL SURGERY

## 2017-07-31 PROCEDURE — 86900 BLOOD TYPING SEROLOGIC ABO: CPT

## 2017-07-31 PROCEDURE — 36620 INSERTION CATHETER ARTERY: CPT | Mod: 59,,, | Performed by: ANESTHESIOLOGY

## 2017-07-31 PROCEDURE — 25000003 PHARM REV CODE 250: Performed by: NEUROLOGICAL SURGERY

## 2017-07-31 PROCEDURE — 86920 COMPATIBILITY TEST SPIN: CPT

## 2017-07-31 PROCEDURE — 88307 TISSUE EXAM BY PATHOLOGIST: CPT | Performed by: PATHOLOGY

## 2017-07-31 PROCEDURE — 83735 ASSAY OF MAGNESIUM: CPT

## 2017-07-31 PROCEDURE — 93005 ELECTROCARDIOGRAM TRACING: CPT

## 2017-07-31 PROCEDURE — 27201037 HC PRESSURE MONITORING SET UP

## 2017-07-31 PROCEDURE — 88342 IMHCHEM/IMCYTCHM 1ST ANTB: CPT | Mod: 26,,, | Performed by: PATHOLOGY

## 2017-07-31 PROCEDURE — 99223 1ST HOSP IP/OBS HIGH 75: CPT | Mod: ,,, | Performed by: PSYCHIATRY & NEUROLOGY

## 2017-07-31 DEVICE — SCREW SD 1.5X4MM TI CROSS PIN: Type: IMPLANTABLE DEVICE | Site: CRANIAL | Status: FUNCTIONAL

## 2017-07-31 DEVICE — PLATE BONE BUR HOLE COVER 10MM: Type: IMPLANTABLE DEVICE | Site: CRANIAL | Status: FUNCTIONAL

## 2017-07-31 RX ORDER — BUPIVACAINE HYDROCHLORIDE AND EPINEPHRINE 5; 5 MG/ML; UG/ML
INJECTION, SOLUTION EPIDURAL; INTRACAUDAL; PERINEURAL
Status: DISCONTINUED | OUTPATIENT
Start: 2017-07-31 | End: 2017-07-31

## 2017-07-31 RX ORDER — LEVETIRACETAM 500 MG/5ML
INJECTION, SOLUTION, CONCENTRATE INTRAVENOUS
Status: DISCONTINUED | OUTPATIENT
Start: 2017-07-31 | End: 2017-07-31

## 2017-07-31 RX ORDER — FENTANYL CITRATE 50 UG/ML
INJECTION, SOLUTION INTRAMUSCULAR; INTRAVENOUS
Status: DISCONTINUED | OUTPATIENT
Start: 2017-07-31 | End: 2017-07-31

## 2017-07-31 RX ORDER — GABAPENTIN 300 MG/1
300 CAPSULE ORAL 3 TIMES DAILY
Status: DISCONTINUED | OUTPATIENT
Start: 2017-07-31 | End: 2017-08-02

## 2017-07-31 RX ORDER — HYDROCODONE BITARTRATE AND ACETAMINOPHEN 500; 5 MG/1; MG/1
TABLET ORAL
Status: DISCONTINUED | OUTPATIENT
Start: 2017-07-31 | End: 2017-08-03 | Stop reason: HOSPADM

## 2017-07-31 RX ORDER — MIDAZOLAM HYDROCHLORIDE 1 MG/ML
INJECTION, SOLUTION INTRAMUSCULAR; INTRAVENOUS
Status: DISCONTINUED | OUTPATIENT
Start: 2017-07-31 | End: 2017-07-31

## 2017-07-31 RX ORDER — LIDOCAINE HYDROCHLORIDE AND EPINEPHRINE 10; 10 MG/ML; UG/ML
INJECTION, SOLUTION INFILTRATION; PERINEURAL
Status: DISCONTINUED | OUTPATIENT
Start: 2017-07-31 | End: 2017-07-31

## 2017-07-31 RX ORDER — PROPOFOL 10 MG/ML
VIAL (ML) INTRAVENOUS
Status: DISCONTINUED | OUTPATIENT
Start: 2017-07-31 | End: 2017-07-31

## 2017-07-31 RX ORDER — DEXAMETHASONE SODIUM PHOSPHATE 4 MG/ML
4 INJECTION, SOLUTION INTRA-ARTICULAR; INTRALESIONAL; INTRAMUSCULAR; INTRAVENOUS; SOFT TISSUE EVERY 6 HOURS
Status: DISCONTINUED | OUTPATIENT
Start: 2017-07-31 | End: 2017-08-01

## 2017-07-31 RX ORDER — BACITRACIN 50000 [IU]/1
INJECTION, POWDER, FOR SOLUTION INTRAMUSCULAR
Status: DISCONTINUED | OUTPATIENT
Start: 2017-07-31 | End: 2017-07-31

## 2017-07-31 RX ORDER — LIDOCAINE HYDROCHLORIDE 10 MG/ML
INJECTION, SOLUTION INTRAVENOUS
Status: DISCONTINUED | OUTPATIENT
Start: 2017-07-31 | End: 2017-07-31

## 2017-07-31 RX ORDER — METOPROLOL TARTRATE 1 MG/ML
INJECTION, SOLUTION INTRAVENOUS
Status: DISCONTINUED | OUTPATIENT
Start: 2017-07-31 | End: 2017-07-31

## 2017-07-31 RX ORDER — DEXAMETHASONE SODIUM PHOSPHATE 4 MG/ML
INJECTION, SOLUTION INTRA-ARTICULAR; INTRALESIONAL; INTRAMUSCULAR; INTRAVENOUS; SOFT TISSUE
Status: DISCONTINUED | OUTPATIENT
Start: 2017-07-31 | End: 2017-07-31

## 2017-07-31 RX ORDER — GABAPENTIN 300 MG/1
300 CAPSULE ORAL 3 TIMES DAILY
Status: DISCONTINUED | OUTPATIENT
Start: 2017-07-31 | End: 2017-07-31

## 2017-07-31 RX ORDER — ROCURONIUM BROMIDE 10 MG/ML
INJECTION, SOLUTION INTRAVENOUS
Status: DISCONTINUED | OUTPATIENT
Start: 2017-07-31 | End: 2017-07-31

## 2017-07-31 RX ORDER — HYDRALAZINE HYDROCHLORIDE 20 MG/ML
INJECTION INTRAMUSCULAR; INTRAVENOUS
Status: DISCONTINUED | OUTPATIENT
Start: 2017-07-31 | End: 2017-07-31

## 2017-07-31 RX ORDER — ATROPINE SULFATE 0.4 MG/ML
INJECTION, SOLUTION ENDOTRACHEAL; INTRAMEDULLARY; INTRAMUSCULAR; INTRAVENOUS; SUBCUTANEOUS
Status: DISPENSED
Start: 2017-07-31 | End: 2017-08-01

## 2017-07-31 RX ORDER — HYDRALAZINE HYDROCHLORIDE 20 MG/ML
10 INJECTION INTRAMUSCULAR; INTRAVENOUS EVERY 4 HOURS PRN
Status: DISCONTINUED | OUTPATIENT
Start: 2017-07-31 | End: 2017-08-03 | Stop reason: HOSPADM

## 2017-07-31 RX ORDER — PROPOFOL 10 MG/ML
VIAL (ML) INTRAVENOUS CONTINUOUS PRN
Status: DISCONTINUED | OUTPATIENT
Start: 2017-07-31 | End: 2017-07-31

## 2017-07-31 RX ORDER — SUCCINYLCHOLINE CHLORIDE 20 MG/ML
INJECTION INTRAMUSCULAR; INTRAVENOUS
Status: DISCONTINUED | OUTPATIENT
Start: 2017-07-31 | End: 2017-07-31

## 2017-07-31 RX ORDER — BACITRACIN ZINC 500 UNIT/G
OINTMENT (GRAM) TOPICAL
Status: DISCONTINUED | OUTPATIENT
Start: 2017-07-31 | End: 2017-07-31

## 2017-07-31 RX ORDER — SODIUM CHLORIDE 9 MG/ML
INJECTION, SOLUTION INTRAVENOUS CONTINUOUS PRN
Status: DISCONTINUED | OUTPATIENT
Start: 2017-07-31 | End: 2017-07-31

## 2017-07-31 RX ADMIN — GABAPENTIN 300 MG: 300 CAPSULE ORAL at 05:07

## 2017-07-31 RX ADMIN — THROMBIN, TOPICAL (BOVINE) 5000 UNITS: KIT at 08:07

## 2017-07-31 RX ADMIN — PROPOFOL 75 MG: 10 INJECTION, EMULSION INTRAVENOUS at 09:07

## 2017-07-31 RX ADMIN — SODIUM CHLORIDE: 0.9 INJECTION, SOLUTION INTRAVENOUS at 05:07

## 2017-07-31 RX ADMIN — CEFTRIAXONE 2 G: 1 INJECTION, SOLUTION INTRAVENOUS at 07:07

## 2017-07-31 RX ADMIN — SUCCINYLCHOLINE CHLORIDE 160 MG: 20 INJECTION, SOLUTION INTRAMUSCULAR; INTRAVENOUS at 07:07

## 2017-07-31 RX ADMIN — HYDRALAZINE HYDROCHLORIDE 10 MG: 20 INJECTION INTRAMUSCULAR; INTRAVENOUS at 03:07

## 2017-07-31 RX ADMIN — PROPOFOL 200 MG: 10 INJECTION, EMULSION INTRAVENOUS at 07:07

## 2017-07-31 RX ADMIN — FENTANYL CITRATE 100 MCG: 50 INJECTION, SOLUTION INTRAMUSCULAR; INTRAVENOUS at 07:07

## 2017-07-31 RX ADMIN — HYDRALAZINE HYDROCHLORIDE 5 MG: 20 INJECTION INTRAMUSCULAR; INTRAVENOUS at 09:07

## 2017-07-31 RX ADMIN — SODIUM CHLORIDE: 0.9 INJECTION, SOLUTION INTRAVENOUS at 10:07

## 2017-07-31 RX ADMIN — LEVETIRACETAM 1000 MG: 100 INJECTION, SOLUTION, CONCENTRATE INTRAVENOUS at 08:07

## 2017-07-31 RX ADMIN — REMIFENTANIL HYDROCHLORIDE 0.2 MCG/KG/MIN: 1 INJECTION, POWDER, LYOPHILIZED, FOR SOLUTION INTRAVENOUS at 07:07

## 2017-07-31 RX ADMIN — PROPOFOL 100 MCG/KG/MIN: 10 INJECTION, EMULSION INTRAVENOUS at 07:07

## 2017-07-31 RX ADMIN — FIBRINOGEN HUMAN AND THROMBIN HUMAN 5 ML: KIT at 08:07

## 2017-07-31 RX ADMIN — DEXAMETHASONE SODIUM PHOSPHATE 12 MG: 4 INJECTION, SOLUTION INTRAMUSCULAR; INTRAVENOUS at 08:07

## 2017-07-31 RX ADMIN — ACETAMINOPHEN 650 MG: 325 TABLET ORAL at 03:07

## 2017-07-31 RX ADMIN — PANTOPRAZOLE SODIUM 40 MG: 40 INJECTION, POWDER, FOR SOLUTION INTRAVENOUS at 11:07

## 2017-07-31 RX ADMIN — METOPROLOL TARTRATE 1 MG: 5 INJECTION, SOLUTION INTRAVENOUS at 09:07

## 2017-07-31 RX ADMIN — PROPOFOL 40 MG: 10 INJECTION, EMULSION INTRAVENOUS at 07:07

## 2017-07-31 RX ADMIN — ACETAMINOPHEN 650 MG: 325 TABLET ORAL at 08:07

## 2017-07-31 RX ADMIN — HYDRALAZINE HYDROCHLORIDE 10 MG: 20 INJECTION INTRAMUSCULAR; INTRAVENOUS at 08:07

## 2017-07-31 RX ADMIN — ROCURONIUM BROMIDE 50 MG: 10 INJECTION, SOLUTION INTRAVENOUS at 07:07

## 2017-07-31 RX ADMIN — DEXAMETHASONE SODIUM PHOSPHATE 4 MG: 4 INJECTION, SOLUTION INTRAMUSCULAR; INTRAVENOUS at 05:07

## 2017-07-31 RX ADMIN — LIDOCAINE HYDROCHLORIDE AND EPINEPHRINE 8 ML: 10; 10 INJECTION, SOLUTION INFILTRATION; PERINEURAL at 09:07

## 2017-07-31 RX ADMIN — BUPIVACAINE HYDROCHLORIDE AND EPINEPHRINE BITARTRATE 8 ML: 5; .0091 INJECTION, SOLUTION EPIDURAL; INTRACAUDAL; PERINEURAL at 09:07

## 2017-07-31 RX ADMIN — LEVETIRACETAM 500 MG: 5 INJECTION INTRAVENOUS at 11:07

## 2017-07-31 RX ADMIN — BACITRACIN ZINC 1 TUBE: 500 OINTMENT TOPICAL at 08:07

## 2017-07-31 RX ADMIN — LEVETIRACETAM 500 MG: 5 INJECTION INTRAVENOUS at 08:07

## 2017-07-31 RX ADMIN — GELATIN ABSORBABLE SPONGE SIZE 100 1 APPLICATOR: MISC at 08:07

## 2017-07-31 RX ADMIN — GABAPENTIN 300 MG: 300 CAPSULE ORAL at 09:07

## 2017-07-31 RX ADMIN — SODIUM CHLORIDE, SODIUM GLUCONATE, SODIUM ACETATE, POTASSIUM CHLORIDE, MAGNESIUM CHLORIDE, SODIUM PHOSPHATE, DIBASIC, AND POTASSIUM PHOSPHATE: .53; .5; .37; .037; .03; .012; .00082 INJECTION, SOLUTION INTRAVENOUS at 07:07

## 2017-07-31 RX ADMIN — ROCURONIUM BROMIDE 5 MG: 10 INJECTION, SOLUTION INTRAVENOUS at 07:07

## 2017-07-31 RX ADMIN — SODIUM CHLORIDE, SODIUM GLUCONATE, SODIUM ACETATE, POTASSIUM CHLORIDE, MAGNESIUM CHLORIDE, SODIUM PHOSPHATE, DIBASIC, AND POTASSIUM PHOSPHATE: .53; .5; .37; .037; .03; .012; .00082 INJECTION, SOLUTION INTRAVENOUS at 08:07

## 2017-07-31 RX ADMIN — SODIUM CHLORIDE: 0.9 INJECTION, SOLUTION INTRAVENOUS at 07:07

## 2017-07-31 RX ADMIN — MIDAZOLAM HYDROCHLORIDE 2 MG: 1 INJECTION, SOLUTION INTRAMUSCULAR; INTRAVENOUS at 07:07

## 2017-07-31 RX ADMIN — ONDANSETRON 4 MG: 2 INJECTION INTRAMUSCULAR; INTRAVENOUS at 09:07

## 2017-07-31 RX ADMIN — LIDOCAINE HYDROCHLORIDE 80 MG: 10 INJECTION, SOLUTION INTRAVENOUS at 07:07

## 2017-07-31 RX ADMIN — BACITRACIN 50000 UNITS: 50000 INJECTION, POWDER, LYOPHILIZED, FOR SOLUTION INTRAMUSCULAR at 08:07

## 2017-07-31 NOTE — PLAN OF CARE
Problem: Patient Care Overview  Goal: Plan of Care Review  7/27 DX: Brain Mass   7/27 CT w/ IV contrast chest abd pelvis  7/27 Repeat MRI   7/27 - Surgery and blood consent signed, plan for SX on Monday 7/31 7/28: Anesthesia consent signed. Transfer orders to 7th floor in place    PMHX: Rectal CA w/mets to lungs   Q6 Accu check  Q1 neuro checks      Outcome: Ongoing (interventions implemented as appropriate)  POC reviewed with pt and family at 1400. Pt verbalized understanding. All questions and concerns addressed. No acute events today. Pt progressing toward goals. Will continue to monitor. See flowsheets for full assessment and VS info.

## 2017-07-31 NOTE — SUBJECTIVE & OBJECTIVE
Interval History: NAEON    Medications:  Continuous Infusions:   sodium chloride 0.9% 100 mL/hr at 07/30/17 1633     Scheduled Meds:   dexamethasone  8 mg Intravenous Q12H    levetiracetam IVPB  500 mg Intravenous Q12H    pantoprazole  40 mg Intravenous Daily     PRN Meds:acetaminophen, chlorproMAZINE, dextrose 50%, dextrose 50%, glucagon (human recombinant), glucose, glucose, insulin aspart, ondansetron     Review of Systems    Objective:     Weight: 120.7 kg (266 lb)  Body mass index is 32.38 kg/m².  Vital Signs (Most Recent):  Temp: 97.8 °F (36.6 °C) (07/30/17 2027)  Pulse: 60 (07/30/17 2027)  Resp: 18 (07/30/17 2027)  BP: (!) 180/90 (07/30/17 2027)  SpO2: (!) 92 % (07/30/17 1217) Vital Signs (24h Range):  Temp:  [97 °F (36.1 °C)-98.2 °F (36.8 °C)] 97.8 °F (36.6 °C)  Pulse:  [60-83] 60  Resp:  [18] 18  SpO2:  [92 %-96 %] 92 %  BP: (128-180)/(78-91) 180/90                           Neurosurgery Physical Exam    Constitutional: He appears well-developed and well-nourished.      Eyes: Pupils are equal, round, and reactive to light. EOM are normal.      Cardiovascular: Regular rhythm.      Abdominal: Soft.     Psych/Behavior: He is alert. He is oriented to person, place, and time.     Musculoskeletal:        Right Upper Extremities: Muscle strength is 5/5.        Left Upper Extremities: Muscle strength is 5/5.       Right Lower Extremities: Muscle strength is 5/5.     Neurological:        Sensory: There is no sensory deficit in the trunk. There is no sensory deficit in the extremities.        DTRs: DTRs are DTRS NORMAL AND SYMMETRICnormal and symmetric.        Cranial nerves: Cranial nerve(s) II, III, IV, V, VI, VII, VIII, IX, X, XI and XII are intact.        Significant Labs:    Recent Labs  Lab 07/29/17  0353 07/30/17  0429   * 133*    137   K 3.9 4.0   * 111*   CO2 21* 19*   BUN 17 12   CREATININE 0.7 0.7   CALCIUM 8.2* 8.3*   MG 2.4 2.0       Recent Labs  Lab 07/29/17  0353 07/30/17  0425    WBC 6.68 4.48   HGB 13.3* 13.1*   HCT 39.9* 37.5*   * 74*     No results for input(s): LABPT, INR, APTT in the last 48 hours.  Microbiology Results (last 7 days)     ** No results found for the last 168 hours. **        All pertinent labs from the last 24 hours have been reviewed.    Significant Diagnostics:  CT: No results found in the last 24 hours.

## 2017-07-31 NOTE — PHYSICIAN QUERY
PT Name: Claude Penn III  MR #: [id number]     Physician Query Form - Documentation Clarification      CDS/: DESHAUN Best RN              Contact information: [phone]    This form is a permanent document in the medical record.     Query Date: July 31, 2017    By submitting this query, we are merely seeking further clarification of documentation. Please utilize your independent clinical judgment when addressing the question(s) below.    The Medical record reflects the following:    Supporting Clinical Findings Location in Medical Record   3.5 cm hemorrhagic enhancing lesion centered in the right middle frontal gyrus compatible with metastasis with surrounding vasogenic edema and right-to-left subfalcine herniation.      · --Begin q1 neurochecks.      dexamethasone injection 4 mg 4 mg, Intravenous, Every 6 hours         Plan for OR for crani tumor resection Monday..  MRI Brain 7/27            Neuro surgery consult 7/27      MAR 7/31          Neurosurgery PN 7/30                 Patient and wife report a couple week history of generalized weakness, fatigue, HA and slowing in speech.         H & P 7/27                                                                            Doctor, Please specify diagnosis or diagnoses associated with above clinical findings.    Provider Use Only      [   x  ]  Cerebral Compression    [     ]  Other                                                                                                                         [  ] Clinically undetermined

## 2017-07-31 NOTE — NURSING
Pt off floor to OR, no signs of distress noted. Denies pain. VSS. NPO since midnight, NS infusing at 100ml/hr. Wife at bedside. Wife has belongings.

## 2017-07-31 NOTE — TRANSFER OF CARE
"Anesthesia Transfer of Care Note    Patient: Claude Penn III    Procedure(s) Performed: Procedure(s) (LRB):  Right frontal CRANIOTOMY WITH STEALTH for tumor resection; microscope; zee (Right)    Patient location: ICU    Anesthesia Type: general    Transport from OR: Transported from OR on 6-10 L/min O2 by face mask with adequate spontaneous ventilation    Post pain: adequate analgesia    Post assessment: no apparent anesthetic complications    Post vital signs: stable    Level of consciousness: awake, oriented and alert    Nausea/Vomiting: no nausea/vomiting    Complications: none          Last vitals:   Visit Vitals  /87 (BP Location: Right arm, Patient Position: Lying, BP Method: Automatic)   Pulse 68   Temp 36.5 °C (97.7 °F) (Oral)   Resp 18   Ht 6' 4" (1.93 m)   Wt 116.1 kg (256 lb)   SpO2 97%   BMI 31.16 kg/m²     "

## 2017-07-31 NOTE — SUBJECTIVE & OBJECTIVE
Past Medical History:   Diagnosis Date    Cancer      Past Surgical History:   Procedure Laterality Date    HERNIA REPAIR      ILEOSTOMY REVISION      rectal tumor      removed      No current facility-administered medications on file prior to encounter.      Current Outpatient Prescriptions on File Prior to Encounter   Medication Sig Dispense Refill    diphenoxylate-atropine 2.5-0.025 mg (LOMOTIL) 2.5-0.025 mg per tablet Take 1 tablet by mouth 4 (four) times daily as needed for Diarrhea.      ergocalciferol (VITAMIN D2) 50,000 unit Cap Take 50,000 Units by mouth every 7 days.      warfarin (COUMADIN) 7.5 MG tablet Take 7.5 mg by mouth once daily.      olsalazine (DIPENTUM) 250 mg capsule Take 500 mg by mouth 2 (two) times daily.      promethazine (PHENERGAN) 25 MG tablet Take 25 mg by mouth every 4 (four) hours.        Allergies: Review of patient's allergies indicates no known allergies.    History reviewed. No pertinent family history.  Social History   Substance Use Topics    Smoking status: Never Smoker    Smokeless tobacco: Former User    Alcohol use 1.2 oz/week     2 Cans of beer per week     Review of Systems   Constitutional: Negative for chills, diaphoresis and fever.   HENT: Negative for trouble swallowing.    Eyes: Negative for visual disturbance.   Respiratory: Negative for cough, chest tightness and shortness of breath.    Cardiovascular: Negative for chest pain.   Gastrointestinal: Negative for abdominal pain, nausea and vomiting.   Endocrine: Negative for heat intolerance.   Genitourinary: Negative for difficulty urinating.   Musculoskeletal: Negative for neck pain and neck stiffness.   Skin: Negative for rash.   Neurological: Negative for seizures, facial asymmetry, speech difficulty, weakness, numbness and headaches.   Psychiatric/Behavioral: Negative for confusion.     Objective:     Vitals:  Temp: 98.3 °F (36.8 °C) (07/31/17 1105)  Pulse: (!) 56 (07/31/17 1205)  Resp: 12 (07/31/17  1205)  BP: (!) 148/86 (07/31/17 1205)  SpO2: 97 % (07/31/17 1205)    Temp:  [96.9 °F (36.1 °C)-98.6 °F (37 °C)] 98.3 °F (36.8 °C)  Pulse:  [56-68] 56  Resp:  [12-29] 12  SpO2:  [96 %-100 %] 97 %  BP: (122-180)/(86-93) 148/86  Arterial Line BP: (157-158)/(79-82) 157/82              07/30 0701 - 07/31 0700  In: 400 [P.O.:200]  Out: -     Physical Exam   Constitutional: He is oriented to person, place, and time. He appears well-developed and well-nourished.   HENT:   Head: Normocephalic and atraumatic.   Right Ear: External ear normal.   Left Ear: External ear normal.   Mouth/Throat: Oropharynx is clear and moist.   Eyes: Conjunctivae are normal.   Neck: Normal range of motion.   Cardiovascular: Normal rate, regular rhythm and intact distal pulses.    Pulmonary/Chest: Effort normal and breath sounds normal.   Abdominal: Soft. Bowel sounds are normal. There is no tenderness.   Musculoskeletal: He exhibits no deformity.   Neurological: He is alert and oriented to person, place, and time. He has normal strength and normal reflexes. No cranial nerve deficit or sensory deficit (subjectively to light touch). He exhibits normal muscle tone. Coordination (Finger to Nose WNL BL) normal. GCS eye subscore is 4. GCS verbal subscore is 5. GCS motor subscore is 6. He displays no Babinski's sign on the right side. He displays no Babinski's sign on the left side.   Pupils Equal Round and Reactive to Light, Corneal Reflex Intact bilateral, ExtraOccular Muscles Intact bilateral, facies symmetric to sensation bilateral, slight L VII palsy UMN pattern, muscles of mastication grossly intact bilateral, hearing grossly intact to finger rub bilateral, occulocephalic reflex intact bilateral, tongue/soft palate midline, SCM/trap 5/5 bilateral    Slight dysarthria   Skin: Skin is warm and dry.   Psychiatric: His behavior is normal.   Vitals reviewed.                    07/30 0701 - 07/31 0700  In: 400 [P.O.:200]  Out: -        4  07/30 0701 -  07/31 0700  In: 400 [P.O.:200]  Out: -    No results for input(s): PH, PCO2, PO2, BE, POCLAC, LACTATE in the last 24 hours.  Recent Labs  Lab 07/31/17  0338      K 3.8      CO2 21*   BUN 11   CREATININE 0.6   CALCIUM 7.6*   MG 2.1   PHOS 2.6*     Recent Labs  Lab 07/31/17  0925   WBC 3.49*   HGB 11.8*   PLT 87*     Recent Labs  Lab 07/31/17  0338   PROT 5.8*   ALBUMIN 3.2*   AST 9*   ALT 13   ALKPHOS 64   BILITOT 1.0     Recent Labs      07/30/17   1201  07/30/17   1712  07/31/17   0534   POCTGLUCOSE  77  101  130*         Lines/Drains/Airways     Central Venous Catheter Line                 Port A Cath Single Lumen 07/27/17 0619 left subclavian 4 days          Drain                 Closed/Suction Drain 05/12/16 1716 Right Buttock Bulb 8 Fr. 444 days         Urethral Catheter 07/31/17 0737 Non-latex;Straight-tip 16 Fr. less than 1 day          Arterial Line                 Arterial Line 07/31/17 0735 Left Radial less than 1 day          Peripheral Intravenous Line                 Peripheral IV - Single Lumen 07/30/17 2234 Left Forearm less than 1 day         Peripheral IV - Single Lumen 07/31/17 0741 Right Hand less than 1 day               Activity Orders          None                  Today I personally reviewed pertinent medications, lines/drains/airways, imaging, cardiology, lab results,

## 2017-07-31 NOTE — ASSESSMENT & PLAN NOTE
-- NSGY, heme/onc services following  -- s/p crani with resection   -- Admit to [facility] for q1h neuro checks  -- Dexamethasone 4mg IV q6 hours for vasogenic edema  -- Levetiracetam 500 mg q12 hours for seizure ppx  -- stat head ct for any change in exam

## 2017-07-31 NOTE — TELEPHONE ENCOUNTER
[facility] received prescription for Stivarga 160mg daily for 3 weeks then 1 week off.  $4.00 copay.     Patient's Estimated Creatinine Clearance: 196.1 mL/min (based on Cr of 0.6).     Dosage appropriate based on therapy for Colorectal cancer, metastatic: Oral: 160 mg once daily for the first 21 days of each 28-day cycle; continue until disease progression or unacceptable toxicity (Jimbo 2013)    DDIs: medication list reviewed, none.

## 2017-07-31 NOTE — H&P
[facility]  Neurocritical Care  History & Physical    Admit Date: 7/27/2017  Service Date: 07/31/2017  Length of Stay: 4    Subjective:     Chief Complaint: Frontal mass of brain    History of Present Illness: Mr. Trujillo is a 51 y/o male with h/o known metastatic rectal CA and h/o PE on warfarin who [place] [facility] [place] evaluation of new multiple R sided brain lesions, most notably a 3.5x 3.2x 3.2 anterior right frontal lobe lesion with vasogenic edema producing MLS. Patient and wife report a couple week history of generalized weakness, fatigue, HA and slowing in speech. Patient has known metastases to lungs and is being followed as outpatient by oncology.       Past Medical History:   Diagnosis Date    Cancer      Past Surgical History:   Procedure Laterality Date    HERNIA REPAIR      ILEOSTOMY REVISION      rectal tumor      removed      No current facility-administered medications on file prior to encounter.      Current Outpatient Prescriptions on File Prior to Encounter   Medication Sig Dispense Refill    diphenoxylate-atropine 2.5-0.025 mg (LOMOTIL) 2.5-0.025 mg per tablet Take 1 tablet by mouth 4 (four) times daily as needed for Diarrhea.      ergocalciferol (VITAMIN D2) 50,000 unit Cap Take 50,000 Units by mouth every 7 days.      warfarin (COUMADIN) 7.5 MG tablet Take 7.5 mg by mouth once daily.      olsalazine (DIPENTUM) 250 mg capsule Take 500 mg by mouth 2 (two) times daily.      promethazine (PHENERGAN) 25 MG tablet Take 25 mg by mouth every 4 (four) hours.        Allergies: Review of patient's allergies indicates no known allergies.    History reviewed. No pertinent family history.  Social History   Substance Use Topics    Smoking status: Never Smoker    Smokeless tobacco: Former User    Alcohol use 1.2 oz/week     2 Cans of beer per week     Review of Systems   Constitutional: Negative for chills, diaphoresis and fever.   HENT: Negative for trouble  swallowing.    Eyes: Negative for visual disturbance.   Respiratory: Negative for cough, chest tightness and shortness of breath.    Cardiovascular: Negative for chest pain.   Gastrointestinal: Negative for abdominal pain, nausea and vomiting.   Endocrine: Negative for heat intolerance.   Genitourinary: Negative for difficulty urinating.   Musculoskeletal: Negative for neck pain and neck stiffness.   Skin: Negative for rash.   Neurological: Negative for seizures, facial asymmetry, speech difficulty, weakness, numbness and headaches.   Psychiatric/Behavioral: Negative for confusion.     Objective:     Vitals:  Temp: 98.3 °F (36.8 °C) (07/31/17 1105)  Pulse: (!) 56 (07/31/17 1205)  Resp: 12 (07/31/17 1205)  BP: (!) 148/86 (07/31/17 1205)  SpO2: 97 % (07/31/17 1205)    Temp:  [96.9 °F (36.1 °C)-98.6 °F (37 °C)] 98.3 °F (36.8 °C)  Pulse:  [56-68] 56  Resp:  [12-29] 12  SpO2:  [96 %-100 %] 97 %  BP: (122-180)/(86-93) 148/86  Arterial Line BP: (157-158)/(79-82) 157/82              07/30 0701 - 07/31 0700  In: 400 [P.O.:200]  Out: -     Physical Exam   Constitutional: He is oriented to person, place, and time. He appears well-developed and well-nourished.   HENT:   Head: Normocephalic and atraumatic.   Right Ear: External ear normal.   Left Ear: External ear normal.   Mouth/Throat: Oropharynx is clear and moist.   Eyes: Conjunctivae are normal.   Neck: Normal range of motion.   Cardiovascular: Normal rate, regular rhythm and intact distal pulses.    Pulmonary/Chest: Effort normal and breath sounds normal.   Abdominal: Soft. Bowel sounds are normal. There is no tenderness.   Musculoskeletal: He exhibits no deformity.   Neurological: He is alert and oriented to person, place, and time. He has normal strength and normal reflexes. No cranial nerve deficit or sensory deficit (subjectively to light touch). He exhibits normal muscle tone. Coordination (Finger to Nose WNL BL) normal. GCS eye subscore is 4. GCS verbal subscore is  5. GCS motor subscore is 6. He displays no Babinski's sign on the right side. He displays no Babinski's sign on the left side.   Pupils Equal Round and Reactive to Light, Corneal Reflex Intact bilateral, ExtraOccular Muscles Intact bilateral, facies symmetric to sensation bilateral, slight L VII palsy UMN pattern, muscles of mastication grossly intact bilateral, hearing grossly intact to finger rub bilateral, occulocephalic reflex intact bilateral, tongue/soft palate midline, SCM/trap 5/5 bilateral    Slight dysarthria   Skin: Skin is warm and dry.   Psychiatric: His behavior is normal.   Vitals reviewed.                    07/30 0701 - 07/31 0700  In: 400 [P.O.:200]  Out: -        4  07/30 0701 - 07/31 0700  In: 400 [P.O.:200]  Out: -    No results for input(s): PH, PCO2, PO2, BE, POCLAC, LACTATE in the last 24 hours.  Recent Labs  Lab 07/31/17  0338      K 3.8      CO2 21*   BUN 11   CREATININE 0.6   CALCIUM 7.6*   MG 2.1   PHOS 2.6*     Recent Labs  Lab 07/31/17  0925   WBC 3.49*   HGB 11.8*   PLT 87*     Recent Labs  Lab 07/31/17  0338   PROT 5.8*   ALBUMIN 3.2*   AST 9*   ALT 13   ALKPHOS 64   BILITOT 1.0     Recent Labs      07/30/17   1201  07/30/17   1712  07/31/17   0534   POCTGLUCOSE  77  101  130*         Lines/Drains/Airways     Central Venous Catheter Line                 Port A Cath Single Lumen 07/27/17 0619 left subclavian 4 days          Drain                 Closed/Suction Drain 05/12/16 1716 Right Buttock Bulb 8 Fr. 444 days         Urethral Catheter 07/31/17 0737 Non-latex;Straight-tip 16 Fr. less than 1 day          Arterial Line                 Arterial Line 07/31/17 0735 Left Radial less than 1 day          Peripheral Intravenous Line                 Peripheral IV - Single Lumen 07/30/17 2234 Left Forearm less than 1 day         Peripheral IV - Single Lumen 07/31/17 0741 Right Hand less than 1 day               Activity Orders          None                  Today I personally  reviewed pertinent medications, lines/drains/airways, imaging, cardiology, lab results,     Assessment/Plan:     Neuro   Dysarthria    See frontal mass above        Brain metastases    See frontal mass        Cerebral edema    See frontal mass        Metastasis to brain    See frontal mass above        Pulmonary   Lung metastases    Oncology following        Hem/Onc   * Frontal mass of brain    -- NSGY, heme/onc services following  -- s/p crani with resection   -- Admit to [facility] for q1h neuro checks  -- Dexamethasone 4mg IV q6 hours for vasogenic edema  -- Levetiracetam 500 mg q12 hours for seizure ppx  -- stat head ct for any change in exam          Warfarin-induced coagulopathy    -- On warfarin for h/o PE  -- will get U/S BL UE and LE to eval for need for IVF        Rectal cancer    -- Known primary CA with lung mets  -- Oncology following         Other   Facial droop    See frontal mass above        Bradycardia    EKG  Atropine to bedside            Prophylaxis:  Venous Thromboembolism: mechanical  Stress Ulcer: PPI  Ventilator Pneumonia: not applicable     Activity Orders          None        Full Code    James Khan MD  Neurocritical Care  [facility]

## 2017-07-31 NOTE — ANESTHESIA PROCEDURE NOTES
Arterial    Diagnosis: brain tumor    Patient location during procedure: done in OR  Procedure start time: 7/31/2017 7:35 AM  Timeout: 7/31/2017 7:35 AM  Procedure end time: 7/31/2017 7:40 AM  Staffing  Anesthesiologist: SAHARA MARTINEZ JR  Resident/CRNA: SAMUEL MCGRAW  Performed: resident/CRNA   Anesthesiologist was present at the time of the procedure.  Preanesthetic Checklist  Completed: patient identified, site marked, surgical consent, pre-op evaluation, timeout performed, IV checked, risks and benefits discussed, monitors and equipment checked and anesthesia consent givenArterial  Skin Prep: chlorhexidine gluconate  Local Infiltration: none  Orientation: left  Location: radial  Catheter Size: 20 G  Catheter placement by Anatomical landmarks. Heme positive aspiration all ports.Insertion Attempts: 1  Assessment  Dressing: secured with tape and tegaderm  Patient: Tolerated well

## 2017-07-31 NOTE — PROGRESS NOTES
[facility]  Neurosurgery  Progress Note    Subjective:     History of Present Illness: 53yo male with pmh of rectal CA [place] [facility] [place] findings suspicious for mass in right frontal subcortex. Pt with two week history of 'acting strange' and 'zoning out with arm and body twitches'. On exam pt is neurologically intact and subjectively asymptomatic.     MRI shows right solitary frontal lesion with significant vasogenic edema and midline shift.     Pt takes warfarin for PE diagnosed in 2013. INR from OSH 1.68.        No Headaches.  No Vertigo.  No Nausea.  No Vomiting.  No Paresthesias.  No Focal deficit.  No Subjective fevers or chills.     Pt is afebrile and without leukocytosis. Pt is neurologically intact and hemodynamically stable.      Neurosurgery is consulted for right frontal mass.     Post-Op Info:  Procedure(s) (LRB):  Right frontal CRANIOTOMY WITH STEALTH for tumor resection; microscope; [place] (Right)         Interval History: NAEON    Medications:  Continuous Infusions:   sodium chloride 0.9% 100 mL/hr at 07/30/17 1633     Scheduled Meds:   dexamethasone  8 mg Intravenous Q12H    levetiracetam IVPB  500 mg Intravenous Q12H    pantoprazole  40 mg Intravenous Daily     PRN Meds:acetaminophen, chlorproMAZINE, dextrose 50%, dextrose 50%, glucagon (human recombinant), glucose, glucose, insulin aspart, ondansetron     Review of Systems    Objective:     Weight: 120.7 kg (266 lb)  Body mass index is 32.38 kg/m².  Vital Signs (Most Recent):  Temp: 97.8 °F (36.6 °C) (07/30/17 2027)  Pulse: 60 (07/30/17 2027)  Resp: 18 (07/30/17 2027)  BP: (!) 180/90 (07/30/17 2027)  SpO2: (!) 92 % (07/30/17 1217) Vital Signs (24h Range):  Temp:  [97 °F (36.1 °C)-98.2 °F (36.8 °C)] 97.8 °F (36.6 °C)  Pulse:  [60-83] 60  Resp:  [18] 18  SpO2:  [92 %-96 %] 92 %  BP: (128-180)/(78-91) 180/90                           Neurosurgery Physical Exam    Constitutional: He appears well-developed and  well-nourished.      Eyes: Pupils are equal, round, and reactive to light. EOM are normal.      Cardiovascular: Regular rhythm.      Abdominal: Soft.     Psych/Behavior: He is alert. He is oriented to person, place, and time.     Musculoskeletal:        Right Upper Extremities: Muscle strength is 5/5.        Left Upper Extremities: Muscle strength is 5/5.       Right Lower Extremities: Muscle strength is 5/5.     Neurological:        Sensory: There is no sensory deficit in the trunk. There is no sensory deficit in the extremities.        DTRs: DTRs are DTRS NORMAL AND SYMMETRICnormal and symmetric.        Cranial nerves: Cranial nerve(s) II, III, IV, V, VI, VII, VIII, IX, X, XI and XII are intact.        Significant Labs:    Recent Labs  Lab 07/29/17 0353 07/30/17 0429   * 133*    137   K 3.9 4.0   * 111*   CO2 21* 19*   BUN 17 12   CREATININE 0.7 0.7   CALCIUM 8.2* 8.3*   MG 2.4 2.0       Recent Labs  Lab 07/29/17 0353 07/30/17  0429   WBC 6.68 4.48   HGB 13.3* 13.1*   HCT 39.9* 37.5*   * 74*     No results for input(s): LABPT, INR, APTT in the last 48 hours.  Microbiology Results (last 7 days)     ** No results found for the last 168 hours. **        All pertinent labs from the last 24 hours have been reviewed.    Significant Diagnostics:  CT: No results found in the last 24 hours.    Assessment/Plan:     * Frontal mass of brain    52M PMH rectal cancer with mets to leg, likely new mets to brain.    -- Dex 4q6  -- Keppra 500bid  -- Plan for OR for crani tumor resection Monday..  -- PPI, SQH  -- Vitals, labs, regular diet.   -- NPO at midnight            Alfonso Mclaughlin MD  Neurosurgery  [facility]

## 2017-07-31 NOTE — ASSESSMENT & PLAN NOTE
52M PMH rectal cancer with mets to leg, likely new mets to brain.    -- Dex 4q6  -- Keppra 500bid  -- Plan for OR for crani tumor resection Monday..  -- PPI, SQH  -- Vitals, labs, regular diet.   -- NPO at midnight

## 2017-07-31 NOTE — BRIEF OP NOTE
[facility]  Brief Operative Note    SUMMARY     Surgery Date: 7/31/2017     Surgeon(s) and Role:     * Vlad Alonso MD - Primary    Assisting Surgeon: TANNA Mclaughlin MD - Resident Assisting    Pre-op Diagnosis:  Metastasis to brain [C79.31]  Frontal mass of brain [G93.9]    Post-op Diagnosis:  Post-Op Diagnosis Codes:     * Metastasis to brain [C79.31]     * Frontal mass of brain [G93.9]    Procedure(s) (LRB):  Right frontal CRANIOTOMY WITH STEALTH for tumor resection; microscope; [place] (Right)    Anesthesia: General    Description of Procedure: R crani with resection of tumor    Description of the findings of the procedure: R frontal brain lesion    Estimated Blood Loss: 100 mL         Specimens:   Specimen (12h ago through future)    None

## 2017-08-01 PROBLEM — C79.31 BRAIN METASTASIS: Status: ACTIVE | Noted: 2017-08-01

## 2017-08-01 LAB
ALBUMIN SERPL BCP-MCNC: 3.2 G/DL
ALP SERPL-CCNC: 71 U/L
ALT SERPL W/O P-5'-P-CCNC: 18 U/L
ANION GAP SERPL CALC-SCNC: 8 MMOL/L
AST SERPL-CCNC: 18 U/L
BASOPHILS # BLD AUTO: 0 K/UL
BASOPHILS NFR BLD: 0 %
BILIRUB SERPL-MCNC: 1 MG/DL
BUN SERPL-MCNC: 10 MG/DL
CALCIUM SERPL-MCNC: 7.8 MG/DL
CHLORIDE SERPL-SCNC: 111 MMOL/L
CO2 SERPL-SCNC: 20 MMOL/L
CREAT SERPL-MCNC: 0.6 MG/DL
DIFFERENTIAL METHOD: ABNORMAL
EOSINOPHIL # BLD AUTO: 0 K/UL
EOSINOPHIL NFR BLD: 0 %
ERYTHROCYTE [DISTWIDTH] IN BLOOD BY AUTOMATED COUNT: 13.1 %
EST. GFR  (AFRICAN AMERICAN): >60 ML/MIN/1.73 M^2
EST. GFR  (NON AFRICAN AMERICAN): >60 ML/MIN/1.73 M^2
GLUCOSE SERPL-MCNC: 121 MG/DL
GLUCOSE SERPL-MCNC: 134 MG/DL (ref 70–110)
HCO3 UR-SCNC: 21.1 MMOL/L (ref 24–28)
HCT VFR BLD AUTO: 37.8 %
HCT VFR BLD CALC: 32 %PCV (ref 36–54)
HGB BLD-MCNC: 13.1 G/DL
INR PPP: 1
LYMPHOCYTES # BLD AUTO: 0.2 K/UL
LYMPHOCYTES NFR BLD: 3.4 %
MAGNESIUM SERPL-MCNC: 2.6 MG/DL
MCH RBC QN AUTO: 31.1 PG
MCHC RBC AUTO-ENTMCNC: 34.7 G/DL
MCV RBC AUTO: 90 FL
MONOCYTES # BLD AUTO: 0.6 K/UL
MONOCYTES NFR BLD: 8.2 %
NEUTROPHILS # BLD AUTO: 6 K/UL
NEUTROPHILS NFR BLD: 88.1 %
PCO2 BLDA: 30 MMHG (ref 35–45)
PH SMN: 7.45 [PH] (ref 7.35–7.45)
PHOSPHATE SERPL-MCNC: 2.8 MG/DL
PLATELET # BLD AUTO: 120 K/UL
PMV BLD AUTO: 10.8 FL
PO2 BLDA: 118 MMHG (ref 80–100)
POC BE: -3 MMOL/L
POC IONIZED CALCIUM: 0.98 MMOL/L (ref 1.06–1.42)
POC SATURATED O2: 99 % (ref 95–100)
POC TCO2: 22 MMOL/L (ref 23–27)
POCT GLUCOSE: 109 MG/DL (ref 70–110)
POCT GLUCOSE: 115 MG/DL (ref 70–110)
POCT GLUCOSE: 120 MG/DL (ref 70–110)
POTASSIUM BLD-SCNC: 3.3 MMOL/L (ref 3.5–5.1)
POTASSIUM SERPL-SCNC: 4 MMOL/L
PROT SERPL-MCNC: 6.2 G/DL
PROTHROMBIN TIME: 11 SEC
RBC # BLD AUTO: 4.21 M/UL
SAMPLE: ABNORMAL
SODIUM BLD-SCNC: 139 MMOL/L (ref 136–145)
SODIUM SERPL-SCNC: 139 MMOL/L
WBC # BLD AUTO: 6.83 K/UL

## 2017-08-01 PROCEDURE — 84100 ASSAY OF PHOSPHORUS: CPT

## 2017-08-01 PROCEDURE — 85025 COMPLETE CBC W/AUTO DIFF WBC: CPT

## 2017-08-01 PROCEDURE — 99233 SBSQ HOSP IP/OBS HIGH 50: CPT | Mod: ,,, | Performed by: PSYCHIATRY & NEUROLOGY

## 2017-08-01 PROCEDURE — 25000003 PHARM REV CODE 250: Performed by: STUDENT IN AN ORGANIZED HEALTH CARE EDUCATION/TRAINING PROGRAM

## 2017-08-01 PROCEDURE — 25000003 PHARM REV CODE 250: Performed by: INTERNAL MEDICINE

## 2017-08-01 PROCEDURE — 80053 COMPREHEN METABOLIC PANEL: CPT

## 2017-08-01 PROCEDURE — C9113 INJ PANTOPRAZOLE SODIUM, VIA: HCPCS | Performed by: STUDENT IN AN ORGANIZED HEALTH CARE EDUCATION/TRAINING PROGRAM

## 2017-08-01 PROCEDURE — 63600175 PHARM REV CODE 636 W HCPCS: Performed by: STUDENT IN AN ORGANIZED HEALTH CARE EDUCATION/TRAINING PROGRAM

## 2017-08-01 PROCEDURE — 85610 PROTHROMBIN TIME: CPT

## 2017-08-01 PROCEDURE — 63600175 PHARM REV CODE 636 W HCPCS: Performed by: PSYCHIATRY & NEUROLOGY

## 2017-08-01 PROCEDURE — 83735 ASSAY OF MAGNESIUM: CPT

## 2017-08-01 PROCEDURE — 25000003 PHARM REV CODE 250: Performed by: NURSE PRACTITIONER

## 2017-08-01 PROCEDURE — 20000000 HC ICU ROOM

## 2017-08-01 RX ORDER — LEVETIRACETAM 500 MG/1
500 TABLET ORAL 2 TIMES DAILY
Status: DISCONTINUED | OUTPATIENT
Start: 2017-08-01 | End: 2017-08-03 | Stop reason: HOSPADM

## 2017-08-01 RX ORDER — PANTOPRAZOLE SODIUM 40 MG/1
40 TABLET, DELAYED RELEASE ORAL DAILY
Status: DISCONTINUED | OUTPATIENT
Start: 2017-08-02 | End: 2017-08-03 | Stop reason: HOSPADM

## 2017-08-01 RX ORDER — DEXAMETHASONE 4 MG/1
4 TABLET ORAL EVERY 6 HOURS
Status: DISCONTINUED | OUTPATIENT
Start: 2017-08-01 | End: 2017-08-03 | Stop reason: HOSPADM

## 2017-08-01 RX ADMIN — PANTOPRAZOLE SODIUM 40 MG: 40 INJECTION, POWDER, FOR SOLUTION INTRAVENOUS at 08:08

## 2017-08-01 RX ADMIN — LEVETIRACETAM 500 MG: 5 INJECTION INTRAVENOUS at 08:08

## 2017-08-01 RX ADMIN — DEXAMETHASONE 4 MG: 4 TABLET ORAL at 06:08

## 2017-08-01 RX ADMIN — DEXAMETHASONE SODIUM PHOSPHATE 4 MG: 4 INJECTION, SOLUTION INTRAMUSCULAR; INTRAVENOUS at 06:08

## 2017-08-01 RX ADMIN — GABAPENTIN 300 MG: 300 CAPSULE ORAL at 06:08

## 2017-08-01 RX ADMIN — ACETAMINOPHEN 650 MG: 325 TABLET ORAL at 09:08

## 2017-08-01 RX ADMIN — DEXAMETHASONE 4 MG: 4 TABLET ORAL at 11:08

## 2017-08-01 RX ADMIN — LEVETIRACETAM 500 MG: 500 TABLET, FILM COATED ORAL at 09:08

## 2017-08-01 RX ADMIN — GABAPENTIN 300 MG: 300 CAPSULE ORAL at 02:08

## 2017-08-01 RX ADMIN — GABAPENTIN 300 MG: 300 CAPSULE ORAL at 09:08

## 2017-08-01 RX ADMIN — DEXAMETHASONE SODIUM PHOSPHATE 4 MG: 4 INJECTION, SOLUTION INTRAMUSCULAR; INTRAVENOUS at 01:08

## 2017-08-01 NOTE — SUBJECTIVE & OBJECTIVE
Interval History:  Hemicrani yesterday.    Review of Systems   Constitutional: Negative for chills, diaphoresis and fever.   HENT: Negative for trouble swallowing.    Eyes: Negative for visual disturbance.   Respiratory: Negative for cough, chest tightness and shortness of breath.    Cardiovascular: Negative for chest pain.   Gastrointestinal: Negative for abdominal pain, nausea and vomiting.   Endocrine: Negative for heat intolerance.   Genitourinary: Negative for difficulty urinating.   Musculoskeletal: Negative for neck pain and neck stiffness.   Skin: Negative for rash.   Neurological: Negative for seizures, facial asymmetry, speech difficulty, numbness and headaches.     Objective:     Vitals:  Temp: 98.2 °F (36.8 °C) (08/01/17 1105)  Pulse: (!) 58 (08/01/17 1305)  Resp: 18 (08/01/17 1305)  BP: 129/85 (08/01/17 1305)  SpO2: 95 % (08/01/17 1305)    Temp:  [97.2 °F (36.2 °C)-98.2 °F (36.8 °C)] 98.2 °F (36.8 °C)  Pulse:  [46-91] 58  Resp:  [10-30] 18  SpO2:  [90 %-99 %] 95 %  BP: (122-164)/(73-97) 129/85  Arterial Line BP: (114-159)/(55-98) 156/77              07/31 0701 - 08/01 0700  In: 3583 [I.V.:2985]  Out: 4965 [Urine:4865]    Physical Exam   Constitutional: He appears well-developed and well-nourished.   HENT:   Head: Normocephalic.   Bandage to hemicrani site C/D/I   Eyes: Conjunctivae are normal.   Neck: Normal range of motion.   Cardiovascular: Normal rate and regular rhythm.    Pulmonary/Chest: Effort normal and breath sounds normal.   Abdominal: Soft. There is no tenderness.   Musculoskeletal: He exhibits no deformity.   Neurological: He is alert. No sensory deficit (subjectively to light touch). Abnormal coordination: Finger to Nose WNL BL. GCS eye subscore is 4. GCS verbal subscore is 5. GCS motor subscore is 6.   Pupils Equal Round and Reactive to Light, Corneal Reflex Intact bilateral, ExtraOccular Muscles Intact bilateral, facies symmetric to sensation bilateral.  Slight dysarthria.  4+/5 on L    Skin: Skin is warm and dry.   Psychiatric: His behavior is normal.   Vitals reviewed.    Medications:  Continuous Scheduled  dexamethasone 4 mg Q6H   gabapentin 300 mg TID   levetiracetam 500 mg BID   [START ON 8/2/2017] pantoprazole 40 mg Daily   PRN  sodium chloride  Q24H PRN   sodium chloride  Q24H PRN   acetaminophen 650 mg Q4H PRN   chlorproMAZINE 25 mg Q8H PRN   dextrose 50% 12.5 g PRN   dextrose 50% 25 g PRN   glucagon (human recombinant) 1 mg PRN   glucose 16 g PRN   glucose 24 g PRN   hydrALAZINE 10 mg Q4H PRN   insulin aspart 1-10 Units Q6H PRN   ondansetron 4 mg Q6H PRN     Today I personally reviewed pertinent medications, lines/drains/airways, imaging, cardiology, lab results,

## 2017-08-01 NOTE — SUBJECTIVE & OBJECTIVE
Interval History: s/p right frontal crani for tumor resection. Doing well post-operatively. No complaints. Likely stepdown today.     Medications:  Continuous Infusions:   Scheduled Meds:   dexamethasone  4 mg Oral Q6H    gabapentin  300 mg Oral TID    levetiracetam  500 mg Oral BID    [START ON 8/2/2017] pantoprazole  40 mg Oral Daily     PRN Meds:sodium chloride, sodium chloride, acetaminophen, chlorproMAZINE, dextrose 50%, dextrose 50%, glucagon (human recombinant), glucose, glucose, hydrALAZINE, insulin aspart, ondansetron     Review of Systems  Objective:     Weight: 116.1 kg (256 lb)  Body mass index is 31.16 kg/m².  Vital Signs (Most Recent):  Temp: 98.2 °F (36.8 °C) (08/01/17 1105)  Pulse: 61 (08/01/17 1405)  Resp: (!) 22 (08/01/17 1405)  BP: (!) 151/90 (08/01/17 1405)  SpO2: 98 % (08/01/17 1405) Vital Signs (24h Range):  Temp:  [97.2 °F (36.2 °C)-98.2 °F (36.8 °C)] 98.2 °F (36.8 °C)  Pulse:  [51-91] 61  Resp:  [11-30] 22  SpO2:  [90 %-99 %] 98 %  BP: (122-164)/(73-97) 151/90  Arterial Line BP: (114-159)/(55-98) 156/77       Date 08/01/17 0700 - 08/02/17 0659   Shift 4356-2211 7432-1105 3433-0084 24 Hour Total   I  N  T  A  K  E   P.O. 250   250    I.V.  (mL/kg) 408.3  (3.5)   408.3  (3.5)    Shift Total  (mL/kg) 658.3  (5.7)   658.3  (5.7)   O  U  T  P  U  T   Urine  (mL/kg/hr) 655  (0.7)   655    Shift Total  (mL/kg) 655  (5.6)   655  (5.6)   Weight (kg) 116.1 116.1 116.1 116.1                        Neurosurgery Physical Exam  Alert and oriented to person, place and time  Disdiadokinetic on left  Follows commands x 4  PERRL      Significant Labs:    Recent Labs  Lab 07/31/17 0338 08/01/17 0220   * 121*    139   K 3.8 4.0    111*   CO2 21* 20*   BUN 11 10   CREATININE 0.6 0.6   CALCIUM 7.6* 7.8*   MG 2.1 2.6       Recent Labs  Lab 07/31/17 0338 07/31/17  0848 07/31/17  0925 08/01/17 0220   WBC 4.11  --  3.49* 6.83   HGB 12.8*  --  11.8* 13.1*   HCT 36.4* 32* 34.3* 37.8*   PLT 61*   --  87* 120*       Recent Labs  Lab 08/01/17  1138   INR 1.0     Microbiology Results (last 7 days)     ** No results found for the last 168 hours. **        All pertinent labs from the last 24 hours have been reviewed.    Significant Diagnostics:  CT: No results found in the last 24 hours.

## 2017-08-01 NOTE — PLAN OF CARE
Problem: Patient Care Overview  Goal: Plan of Care Review  7/27 DX: Brain Mass   7/27 CT w/ IV contrast chest abd pelvis  7/27 Repeat MRI   7/27 - Surgery and blood consent signed, plan for SX on Monday 7/31 7/28: Anesthesia consent signed. Transfer orders to 7th floor in place    PMHX: Rectal CA w/mets to lungs   [place]  Q6 Accu check  Q1 neuro checks    Outcome: Ongoing (interventions implemented as appropriate)  POC reviewed with pt at 0600. Pt verbalized understanding. Questions and concerns addressed. No acute events overnight. Pt progressing toward goals. Will continue to monitor. See flowsheets for full assessment and VS info

## 2017-08-01 NOTE — PLAN OF CARE
Patient in the Neuro ICU S/p Right frontal CRANIOTOMY WITH STEALTH for tumor resection; microscope; [place] (Right) 1 Day Post-Op  Awaiting post op therapy recs.   CM will follow for discharge needs.     Jayla Escobar RN, CCRN-K, [facility]  Neuro-Critical Care   [phone]

## 2017-08-01 NOTE — PROGRESS NOTES
[facility]  Neurosurgery  Progress Note    Subjective:     History of Present Illness: 51yo male with pmh of rectal CA presents to [facility] ED with imaging findings suspicious for mass in right frontal subcortex. Pt with two week history of 'acting strange' and 'zoning out with arm and body twitches'. On exam pt is neurologically intact and subjectively asymptomatic.     MRI shows right solitary frontal lesion with significant vasogenic edema and midline shift.     Pt takes warfarin for PE diagnosed in 2013. INR from OSH 1.68.        No Headaches.  No Vertigo.  No Nausea.  No Vomiting.  No Paresthesias.  No Focal deficit.  No Subjective fevers or chills.     Pt is afebrile and without leukocytosis. Pt is neurologically intact and hemodynamically stable.      Neurosurgery is consulted for right frontal mass.     Post-Op Info:  Procedure(s) (LRB):  Right frontal CRANIOTOMY WITH STEALTH for tumor resection; microscope; [place] (Right)   1 Day Post-Op     Interval History: s/p right frontal crani for tumor resection. Doing well post-operatively. No complaints. Likely stepdown today.     Medications:  Continuous Infusions:   Scheduled Meds:   dexamethasone  4 mg Oral Q6H    gabapentin  300 mg Oral TID    levetiracetam  500 mg Oral BID    [START ON 8/2/2017] pantoprazole  40 mg Oral Daily     PRN Meds:sodium chloride, sodium chloride, acetaminophen, chlorproMAZINE, dextrose 50%, dextrose 50%, glucagon (human recombinant), glucose, glucose, hydrALAZINE, insulin aspart, ondansetron     Review of Systems  Objective:     Weight: 116.1 kg (256 lb)  Body mass index is 31.16 kg/m².  Vital Signs (Most Recent):  Temp: 98.2 °F (36.8 °C) (08/01/17 1105)  Pulse: 61 (08/01/17 1405)  Resp: (!) 22 (08/01/17 1405)  BP: (!) 151/90 (08/01/17 1405)  SpO2: 98 % (08/01/17 1405) Vital Signs (24h Range):  Temp:  [97.2 °F (36.2 °C)-98.2 °F (36.8 °C)] 98.2 °F (36.8 °C)  Pulse:  [51-91] 61  Resp:  [11-30] 22  SpO2:  [90 %-99 %]  98 %  BP: (122-164)/(73-97) 151/90  Arterial Line BP: (114-159)/(55-98) 156/77       Date 08/01/17 0700 - 08/02/17 0659   Shift [id number] 2019-4974 24 Hour Total   I  N  T  A  K  E   P.O. 250   250    I.V.  (mL/kg) 408.3  (3.5)   408.3  (3.5)    Shift Total  (mL/kg) 658.3  (5.7)   658.3  (5.7)   O  U  T  P  U  T   Urine  (mL/kg/hr) 655  (0.7)   655    Shift Total  (mL/kg) 655  (5.6)   655  (5.6)   Weight (kg) 116.1 116.1 116.1 116.1                        Neurosurgery Physical Exam  Alert and oriented to person, place and time  Disdiadokinetic on left  Follows commands x 4  PERRL      Significant Labs:    Recent Labs  Lab 07/31/17  0338 08/01/17  0220   * 121*    139   K 3.8 4.0    111*   CO2 21* 20*   BUN 11 10   CREATININE 0.6 0.6   CALCIUM 7.6* 7.8*   MG 2.1 2.6       Recent Labs  Lab 07/31/17  0338 07/31/17  0848 07/31/17  0925 08/01/17  0220   WBC 4.11  --  3.49* 6.83   HGB 12.8*  --  11.8* 13.1*   HCT 36.4* 32* 34.3* 37.8*   PLT 61*  --  87* 120*       Recent Labs  Lab 08/01/17  1138   INR 1.0     Microbiology Results (last 7 days)     ** No results found for the last 168 hours. **        All pertinent labs from the last 24 hours have been reviewed.    Significant Diagnostics:  CT: No results found in the last 24 hours.    Assessment/Plan:     * Frontal mass of brain    52M PMH rectal cancer with mets to leg, likely new mets to brain.    -- Dex 4q6  -- Keppra 500bid  -- PPI, SQH  -- Vitals, labs, regular diet.   -- may stepdown to neurosurgery floor             Lisandro navarrete MD  Neurosurgery  [facility]

## 2017-08-01 NOTE — ASSESSMENT & PLAN NOTE
-- NSGY, heme/onc services following  -- s/p crani with resection 7/31  -- Dexamethasone 4mgq6 hours for vasogenic edema  -- Levetiracetam 500 mg q12 hours for seizure ppx  -- stat head ct for any change in exam  -- SD to NSGY today

## 2017-08-01 NOTE — PROGRESS NOTES
[facility]  Neurocritical Care  Progress Note    Admit Date: 7/27/2017  Service Date: 08/01/2017  Length of Stay: 5    Subjective:     Chief Complaint: Frontal mass of brain    History of Present Illness: Mr. Trujillo is a 53 y/o male with h/o known metastatic rectal CA and h/o PE on warfarin who was transferred to [facility] from [place] for evaluation of new multiple R sided brain lesions, most notably a 3.5x 3.2x 3.2 anterior right frontal lobe lesion with vasogenic edema producing MLS. Patient and wife report a couple week history of generalized weakness, fatigue, HA and slowing in speech. Patient has known metastases to lungs and is being followed as outpatient by oncology.     Hospital Course: 7/28: transfer to stepdown with NSGY   7/31: Patient taken to OR for R frontal crani with stealth for tumor resection without complication. Admitted to [facility] for post-operative monitoring  8/1: plan to SD to NS    Interval History:  Hemicrani yesterday.    Review of Systems   Constitutional: Negative for chills, diaphoresis and fever.   HENT: Negative for trouble swallowing.    Eyes: Negative for visual disturbance.   Respiratory: Negative for cough, chest tightness and shortness of breath.    Cardiovascular: Negative for chest pain.   Gastrointestinal: Negative for abdominal pain, nausea and vomiting.   Endocrine: Negative for heat intolerance.   Genitourinary: Negative for difficulty urinating.   Musculoskeletal: Negative for neck pain and neck stiffness.   Skin: Negative for rash.   Neurological: Negative for seizures, facial asymmetry, speech difficulty, numbness and headaches.     Objective:     Vitals:  Temp: 98.2 °F (36.8 °C) (08/01/17 1105)  Pulse: (!) 58 (08/01/17 1305)  Resp: 18 (08/01/17 1305)  BP: 129/85 (08/01/17 1305)  SpO2: 95 % (08/01/17 1305)    Temp:  [97.2 °F (36.2 °C)-98.2 °F (36.8 °C)] 98.2 °F (36.8 °C)  Pulse:  [46-91] 58  Resp:  [10-30] 18  SpO2:  [90 %-99 %] 95 %  BP: (122-164)/(73-97)  129/85  Arterial Line BP: (114-159)/(55-98) 156/77              07/31 0701 - 08/01 0700  In: 3583 [I.V.:2985]  Out: 4965 [Urine:4865]    Physical Exam   Constitutional: He appears well-developed and well-nourished.   HENT:   Head: Normocephalic.   Bandage to hemicrani site C/D/I   Eyes: Conjunctivae are normal.   Neck: Normal range of motion.   Cardiovascular: Normal rate and regular rhythm.    Pulmonary/Chest: Effort normal and breath sounds normal.   Abdominal: Soft. There is no tenderness.   Musculoskeletal: He exhibits no deformity.   Neurological: He is alert. No sensory deficit (subjectively to light touch). Abnormal coordination: Finger to Nose WNL BL. GCS eye subscore is 4. GCS verbal subscore is 5. GCS motor subscore is 6.   Pupils Equal Round and Reactive to Light, Corneal Reflex Intact bilateral, ExtraOccular Muscles Intact bilateral, facies symmetric to sensation bilateral.  Slight dysarthria.  4+/5 on L   Skin: Skin is warm and dry.   Psychiatric: His behavior is normal.   Vitals reviewed.    Medications:  Continuous Scheduled  dexamethasone 4 mg Q6H   gabapentin 300 mg TID   levetiracetam 500 mg BID   [START ON 8/2/2017] pantoprazole 40 mg Daily   PRN  sodium chloride  Q24H PRN   sodium chloride  Q24H PRN   acetaminophen 650 mg Q4H PRN   chlorproMAZINE 25 mg Q8H PRN   dextrose 50% 12.5 g PRN   dextrose 50% 25 g PRN   glucagon (human recombinant) 1 mg PRN   glucose 16 g PRN   glucose 24 g PRN   hydrALAZINE 10 mg Q4H PRN   insulin aspart 1-10 Units Q6H PRN   ondansetron 4 mg Q6H PRN     Today I personally reviewed pertinent medications, lines/drains/airways, imaging, cardiology, lab results,     Assessment/Plan:     Neuro   Dysarthria    See frontal mass         Brain metastases    See frontal mass        Cerebral edema    Dex 4mg q6h  2/2 frontal brain lesion        Pulmonary   Lung metastases    Oncology following  H/o rectal CA        Hem/Onc   * Frontal mass of brain    -- NSGY, heme/onc services  following  -- s/p crani with resection 7/31  -- Dexamethasone 4mgq6 hours for vasogenic edema  -- Levetiracetam 500 mg q12 hours for seizure ppx  -- stat head ct for any change in exam  -- SD to NSGY today        Warfarin-induced coagulopathy    -- On warfarin for h/o PE  -- BUE and BLE US 7/31 negative for DVT  -- recheck INR        Rectal cancer    -- Known primary CA with lung mets  -- Oncology following             Prophylaxis:  Venous Thromboembolism: mechanical - plan to start chemical tomorrow  Stress Ulcer: PPI  Ventilator Pneumonia: not applicable     Activity Orders          Activity as tolerated starting at 08/01 1117        Full Code    Missy Payne MD  Neurocritical Care  [facility]

## 2017-08-01 NOTE — ASSESSMENT & PLAN NOTE
52M PMH rectal cancer with mets to leg, likely new mets to brain.    -- Dex 4q6  -- Keppra 500bid  -- PPI, SQH  -- Vitals, labs, regular diet.   -- may stepdown to neurosurgery floor

## 2017-08-01 NOTE — PLAN OF CARE
Problem: Patient Care Overview  Goal: Plan of Care Review  7/27 DX: Brain Mass   7/27 CT w/ IV contrast chest abd pelvis  7/27 Repeat MRI   7/27 - Surgery and blood consent signed, plan for SX on Monday 7/31 7/28: Anesthesia consent signed. Transfer orders to 7th floor in place    PMHX: Rectal CA w/mets to lungs   Day Bath  Q6 Accu check  Q1 neuro checks    Outcome: Ongoing (interventions implemented as appropriate)  POC reviewed with pt and family at 1400. Pt verbalized understanding. Questions and concerns addressed. No acute events today. Pt progressing toward goals. Will continue to monitor. See flowsheets for full assessment and VS info.

## 2017-08-01 NOTE — PLAN OF CARE
ICU Attending Note  Neurocritical Care    [id number]    -dexamethasone to PO  -levetiracetam to PO for epilepsy  --160  -stop IVF  -check INR  -ADAT  -plan heparin prophylaxis tomorrow  -remove AL  -activity as tolerated  -transfer to floor on Neurosurgery

## 2017-08-01 NOTE — PROGRESS NOTES
Pharmacist Intervention IV to PO Note    Claude Penn III is a 52 y.o. male being treated with IV medications levetiracetam and pantoprazole     Patient Data:    Vital Signs (Most Recent):  Temp: 97.8 °F (36.6 °C) (08/01/17 0705)  Pulse: 66 (08/01/17 0905)  Resp: 12 (08/01/17 0905)  BP: (!) 155/91 (08/01/17 0905)  SpO2: 95 % (08/01/17 0905)   Vital Signs (72h Range):  Temp:  [96.9 °F (36.1 °C)-98.6 °F (37 °C)]   Pulse:  [46-97]   Resp:  [10-30]   BP: (122-180)/(73-98)   SpO2:  [90 %-100 %]   Arterial Line BP: (114-159)/()      CBC:    Recent Labs     Lab 07/31/17  0338 07/31/17  0848 07/31/17  0925 08/01/17  0220   WBC 4.11 --  3.49* 6.83   RBC 4.12* --  3.83* 4.21*   HGB 12.8* --  11.8* 13.1*   HCT 36.4* 32* 34.3* 37.8*   PLT 61* --  87* 120*   MCV 88 --  90 90   MCH 31.1* --  30.8 31.1*   MCHC 35.2 --  34.4 34.7     CMP:     Recent Labs     Lab 07/30/17  0429 07/31/17  0338 08/01/17  0220   * 129* 121*   CALCIUM 8.3* 7.6* 7.8*   ALBUMIN 3.2* 3.2* 3.2*   PROT 5.9* 5.8* 6.2    138 139   K 4.0 3.8 4.0   CO2 19* 21* 20*   * 109 111*   BUN 12 11 10   CREATININE 0.7 0.6 0.6   ALKPHOS 68 64 71   ALT 13 13 18   AST 11 9* 18   BILITOT 1.0 1.0 1.0       Dietary Orders:  Diet Orders            Diet Adult Regular: Regular starting at 07/31 2010            Based on the following criteria, this patient qualifies for intravenous to oral conversion:  [x] The patients gastrointestinal tract is functioning (tolerating medications via oral or enteral route for 24 hours and tolerating food or enteral feeds for 24 hours).  [x] The patient is hemodynamically stable for 24 hours (heart rate <100 beats per minute, systolic blood pressure >99 mm Hg, and respiratory rate <20 breaths per minute).  [x] The patient shows clinical improvement (afebrile for at least 24 hours and white blood cell count downtrending or normalized). Additionally, the patient must be non-neutropenic (absolute neutrophil count >500  cells/mm3).  [x] For antimicrobials, the patient has received IV therapy for at least 24 hours.    IV medications levetiracetam and pantoprazole will be changed to the oral route.     Pharmacist's Name: Bea Bro, PharmD, [facility]  Pharmacist's Extension: [phone]

## 2017-08-02 PROBLEM — D69.6 THROMBOCYTOPENIA: Status: ACTIVE | Noted: 2017-08-02

## 2017-08-02 LAB
ALBUMIN SERPL BCP-MCNC: 3.3 G/DL
ALP SERPL-CCNC: 76 U/L
ALT SERPL W/O P-5'-P-CCNC: 16 U/L
ANION GAP SERPL CALC-SCNC: 8 MMOL/L
AST SERPL-CCNC: 13 U/L
BASOPHILS # BLD AUTO: 0 K/UL
BASOPHILS NFR BLD: 0 %
BILIRUB SERPL-MCNC: 1 MG/DL
BLD PROD TYP BPU: NORMAL
BLOOD UNIT EXPIRATION DATE: NORMAL
BLOOD UNIT TYPE CODE: 6200
BLOOD UNIT TYPE: NORMAL
BUN SERPL-MCNC: 14 MG/DL
CALCIUM SERPL-MCNC: 7.9 MG/DL
CHLORIDE SERPL-SCNC: 107 MMOL/L
CO2 SERPL-SCNC: 21 MMOL/L
CODING SYSTEM: NORMAL
CREAT SERPL-MCNC: 0.7 MG/DL
DIFFERENTIAL METHOD: ABNORMAL
DISPENSE STATUS: NORMAL
EOSINOPHIL # BLD AUTO: 0 K/UL
EOSINOPHIL NFR BLD: 0 %
ERYTHROCYTE [DISTWIDTH] IN BLOOD BY AUTOMATED COUNT: 13.1 %
EST. GFR  (AFRICAN AMERICAN): >60 ML/MIN/1.73 M^2
EST. GFR  (NON AFRICAN AMERICAN): >60 ML/MIN/1.73 M^2
GLUCOSE SERPL-MCNC: 127 MG/DL
HCT VFR BLD AUTO: 38.8 %
HGB BLD-MCNC: 13.7 G/DL
LYMPHOCYTES # BLD AUTO: 0.2 K/UL
LYMPHOCYTES NFR BLD: 2.8 %
MAGNESIUM SERPL-MCNC: 2.5 MG/DL
MCH RBC QN AUTO: 31.5 PG
MCHC RBC AUTO-ENTMCNC: 35.3 G/DL
MCV RBC AUTO: 89 FL
MONOCYTES # BLD AUTO: 0.4 K/UL
MONOCYTES NFR BLD: 6.7 %
NEUTROPHILS # BLD AUTO: 5.5 K/UL
NEUTROPHILS NFR BLD: 90 %
PHOSPHATE SERPL-MCNC: 2.6 MG/DL
PLATELET # BLD AUTO: 92 K/UL
PMV BLD AUTO: 10.1 FL
POCT GLUCOSE: 118 MG/DL (ref 70–110)
POCT GLUCOSE: 121 MG/DL (ref 70–110)
POCT GLUCOSE: 134 MG/DL (ref 70–110)
POCT GLUCOSE: 147 MG/DL (ref 70–110)
POTASSIUM SERPL-SCNC: 4 MMOL/L
PROT SERPL-MCNC: 6.3 G/DL
RBC # BLD AUTO: 4.35 M/UL
SODIUM SERPL-SCNC: 136 MMOL/L
TRANS PLATPHERESIS VOL PATIENT: NORMAL ML
WBC # BLD AUTO: 6.14 K/UL

## 2017-08-02 PROCEDURE — 94660 CPAP INITIATION&MGMT: CPT

## 2017-08-02 PROCEDURE — 84100 ASSAY OF PHOSPHORUS: CPT

## 2017-08-02 PROCEDURE — 99232 SBSQ HOSP IP/OBS MODERATE 35: CPT | Mod: ,,, | Performed by: PSYCHIATRY & NEUROLOGY

## 2017-08-02 PROCEDURE — P9035 PLATELET PHERES LEUKOREDUCED: HCPCS

## 2017-08-02 PROCEDURE — 36430 TRANSFUSION BLD/BLD COMPNT: CPT

## 2017-08-02 PROCEDURE — 25000003 PHARM REV CODE 250: Performed by: INTERNAL MEDICINE

## 2017-08-02 PROCEDURE — 63600175 PHARM REV CODE 636 W HCPCS: Performed by: STUDENT IN AN ORGANIZED HEALTH CARE EDUCATION/TRAINING PROGRAM

## 2017-08-02 PROCEDURE — 25000003 PHARM REV CODE 250: Performed by: NURSE PRACTITIONER

## 2017-08-02 PROCEDURE — 20600001 HC STEP DOWN PRIVATE ROOM

## 2017-08-02 PROCEDURE — 85025 COMPLETE CBC W/AUTO DIFF WBC: CPT

## 2017-08-02 PROCEDURE — 80053 COMPREHEN METABOLIC PANEL: CPT

## 2017-08-02 PROCEDURE — 83735 ASSAY OF MAGNESIUM: CPT

## 2017-08-02 RX ORDER — HYDROCODONE BITARTRATE AND ACETAMINOPHEN 500; 5 MG/1; MG/1
TABLET ORAL
Status: DISCONTINUED | OUTPATIENT
Start: 2017-08-02 | End: 2017-08-03 | Stop reason: HOSPADM

## 2017-08-02 RX ADMIN — LEVETIRACETAM 500 MG: 500 TABLET, FILM COATED ORAL at 08:08

## 2017-08-02 RX ADMIN — DEXAMETHASONE 4 MG: 4 TABLET ORAL at 11:08

## 2017-08-02 RX ADMIN — DEXAMETHASONE 4 MG: 4 TABLET ORAL at 05:08

## 2017-08-02 RX ADMIN — DEXAMETHASONE 4 MG: 4 TABLET ORAL at 12:08

## 2017-08-02 RX ADMIN — GABAPENTIN 300 MG: 300 CAPSULE ORAL at 05:08

## 2017-08-02 RX ADMIN — PANTOPRAZOLE SODIUM 40 MG: 40 TABLET, DELAYED RELEASE ORAL at 08:08

## 2017-08-02 NOTE — SUBJECTIVE & OBJECTIVE
Interval History:  Boarding for NSGY.    Review of Systems   Constitutional: Negative for chills, diaphoresis and fever.   HENT: Negative for trouble swallowing.    Eyes: Negative for visual disturbance.   Respiratory: Negative for cough, chest tightness and shortness of breath.    Cardiovascular: Negative for chest pain.   Gastrointestinal: Negative for abdominal pain, nausea and vomiting.   Endocrine: Negative for heat intolerance.   Genitourinary: Negative for difficulty urinating.   Musculoskeletal: Negative for neck pain and neck stiffness.   Skin: Negative for rash.   Neurological: Negative for seizures, facial asymmetry, speech difficulty, numbness and headaches.     Objective:     Vitals:  Temp: 98.5 °F (36.9 °C) (08/02/17 1105)  Pulse: (!) 52 (08/02/17 1200)  Resp: 17 (08/02/17 1200)  BP: (!) 157/89 (08/02/17 1200)  SpO2: 100 % (08/02/17 1200)    Temp:  [97.7 °F (36.5 °C)-98.5 °F (36.9 °C)] 98.5 °F (36.9 °C)  Pulse:  [49-93] 52  Resp:  [9-39] 17  SpO2:  [96 %-100 %] 100 %  BP: (115-157)/(45-97) 157/89        08/01 0701 - 08/02 0700  In: 1728.3 [P.O.:1320; I.V.:408.3]  Out: 2280 [Urine:2280]    Physical Exam   Constitutional: He appears well-developed and well-nourished.   HENT:   Head: Normocephalic.   Bandage to hemicrani site C/D/I   Eyes: Conjunctivae are normal.   Neck: Normal range of motion.   Cardiovascular: Normal rate and regular rhythm.    Pulmonary/Chest: Effort normal and breath sounds normal.   Abdominal: Soft. There is no tenderness.   Musculoskeletal: He exhibits no deformity.   Neurological: He is alert. No sensory deficit. GCS eye subscore is 4. GCS verbal subscore is 5. GCS motor subscore is 6.   Pupils Equal Round and Reactive to Light, Corneal Reflex Intact bilateral, ExtraOccular Muscles Intact bilateral, facies symmetric to sensation bilateral.  Slight dysarthria.  4+/5 on L   Skin: Skin is warm and dry.   Psychiatric: His behavior is normal.   Vitals  reviewed.    Medications:  Continuous Scheduled    dexamethasone 4 mg Q6H   levetiracetam 500 mg BID   pantoprazole 40 mg Daily   PRN    sodium chloride  Q24H PRN   sodium chloride  Q24H PRN   sodium chloride  Q24H PRN   acetaminophen 650 mg Q4H PRN   chlorproMAZINE 25 mg Q8H PRN   dextrose 50% 12.5 g PRN   dextrose 50% 25 g PRN   glucagon (human recombinant) 1 mg PRN   glucose 16 g PRN   glucose 24 g PRN   hydrALAZINE 10 mg Q4H PRN   insulin aspart 1-10 Units Q6H PRN   ondansetron 4 mg Q6H PRN     Today I personally reviewed pertinent medications, lines/drains/airways, imaging, cardiology, lab results,

## 2017-08-02 NOTE — PLAN OF CARE
ICU Attending Note  Neurocritical Care    [id number]    -dexamethasone  -levetiracetam  -gabapentin x3 d for incisional pain  --160  -PLT >100 per Neurosurgery  -hold heparin until PLT reliably >100  -pantoprazole prophylaxis  -transfer to floor on Neurosurgery

## 2017-08-02 NOTE — NURSING TRANSFER
Nursing Transfer Note      8/2/2017     Transfer to 747 from 7080.    Transfer via wheelchair.    Transfer with room air, tele monitoring, and personal belongings.     Transported by RN x 2    Medicines sent: none    Chart send with patient: yes    Notified: BERNARDO Gutiérrez, wife and sister at bedside    Upon arrival to floor: Transferred into bed. Bed in lowest position. Wheels locked. Side rails upx3. Urinal within reach. Call light within reach. Pt verbalized understanding to use call light if need. Pt wife and sister at bedside. BERNARDO Gutiérrez notified of arrival.

## 2017-08-02 NOTE — PROGRESS NOTES
[facility]  Neurocritical Care  Progress Note    Admit Date: 7/27/2017  Service Date: 08/02/2017  Length of Stay: 6    Subjective:     Chief Complaint: Frontal mass of brain    History of Present Illness: Mr. Trujillo is a 53 y/o male with h/o known metastatic rectal CA and h/o PE on warfarin who was transferred to [facility] [place] [facility] from [place] for evaluation of new multiple R sided brain lesions, most notably a 3.5x 3.2x 3.2 anterior right frontal lobe lesion with vasogenic edema producing MLS. Patient and wife report a couple week history of generalized weakness, fatigue, HA and slowing in speech. Patient has known metastases to lungs and is being followed as outpatient by oncology.       Hospital Course: 7/28: transfer to stepdown with NSGY   7/31: Patient taken to OR for R frontal crani with stealth for tumor resection without complication. Admitted to [facility] for post-operative monitoring  8/1: plan to SD to NSGY    Interval History:  Boarding for NSGY.    Review of Systems   Constitutional: Negative for chills, diaphoresis and fever.   HENT: Negative for trouble swallowing.    Eyes: Negative for visual disturbance.   Respiratory: Negative for cough, chest tightness and shortness of breath.    Cardiovascular: Negative for chest pain.   Gastrointestinal: Negative for abdominal pain, nausea and vomiting.   Endocrine: Negative for heat intolerance.   Genitourinary: Negative for difficulty urinating.   Musculoskeletal: Negative for neck pain and neck stiffness.   Skin: Negative for rash.   Neurological: Negative for seizures, facial asymmetry, speech difficulty, numbness and headaches.     Objective:     Vitals:  Temp: 98.5 °F (36.9 °C) (08/02/17 1105)  Pulse: (!) 52 (08/02/17 1200)  Resp: 17 (08/02/17 1200)  BP: (!) 157/89 (08/02/17 1200)  SpO2: 100 % (08/02/17 1200)    Temp:  [97.7 °F (36.5 °C)-98.5 °F (36.9 °C)] 98.5 °F (36.9 °C)  Pulse:  [49-93] 52  Resp:  [9-39] 17  SpO2:  [96 %-100 %] 100 %  BP:  (115-157)/(45-97) 157/89        08/01 0701 - 08/02 0700  In: 1728.3 [P.O.:1320; I.V.:408.3]  Out: 2280 [Urine:2280]    Physical Exam   Constitutional: He appears well-developed and well-nourished.   HENT:   Head: Normocephalic.   Bandage to hemicrani site C/D/I   Eyes: Conjunctivae are normal.   Neck: Normal range of motion.   Cardiovascular: Normal rate and regular rhythm.    Pulmonary/Chest: Effort normal and breath sounds normal.   Abdominal: Soft. There is no tenderness.   Musculoskeletal: He exhibits no deformity.   Neurological: He is alert. No sensory deficit. GCS eye subscore is 4. GCS verbal subscore is 5. GCS motor subscore is 6.   Pupils Equal Round and Reactive to Light, Corneal Reflex Intact bilateral, ExtraOccular Muscles Intact bilateral, facies symmetric to sensation bilateral.  Slight dysarthria.  4+/5 on L   Skin: Skin is warm and dry.   Psychiatric: His behavior is normal.   Vitals reviewed.    Medications:  Continuous Scheduled    dexamethasone 4 mg Q6H   levetiracetam 500 mg BID   pantoprazole 40 mg Daily   PRN    sodium chloride  Q24H PRN   sodium chloride  Q24H PRN   sodium chloride  Q24H PRN   acetaminophen 650 mg Q4H PRN   chlorproMAZINE 25 mg Q8H PRN   dextrose 50% 12.5 g PRN   dextrose 50% 25 g PRN   glucagon (human recombinant) 1 mg PRN   glucose 16 g PRN   glucose 24 g PRN   hydrALAZINE 10 mg Q4H PRN   insulin aspart 1-10 Units Q6H PRN   ondansetron 4 mg Q6H PRN     Today I personally reviewed pertinent medications, lines/drains/airways, imaging, cardiology, lab results,     Assessment/Plan:     Neuro   Dysarthria    See frontal mass         Brain metastases    Metastatic adenoCA, as seen on path report from specimen sent to path on 7/31  H/o rectal CA  See frontal mass        Cerebral edema    Dex 4mg q6h  2/2 frontal brain lesion        Pulmonary   Lung metastases    Oncology following  H/o rectal CA        Hem/Onc   * Frontal mass of brain    -- NSGY, heme/onc services following  --  s/p crani with resection 7/31  -- Dexamethasone 4mgq6 hours for vasogenic edema  -- Levetiracetam 500 mg q12 hours for seizure ppx  -- Boarding for NSGY        Warfarin-induced coagulopathy    -- On warfarin for h/o PE  -- BUE and BLE US 7/31 negative for DVT  -- PT/INR wnl        Rectal cancer    -- Known primary CA with lung and brain mets  -- Oncology following         Other   Thrombocytopenia    Plts 92 today from 120 yesterday  Platelets infusing, per NSGY  Goal plt>100K            Prophylaxis:  Venous Thromboembolism: mechanical 2/2 thrombocytopenia  Stress Ulcer: PPI  Ventilator Pneumonia: not applicable     Activity Orders          Activity as tolerated starting at 08/01 1117        Full Code    Missy Payne MD  Neurocritical Care  [facility]

## 2017-08-02 NOTE — ASSESSMENT & PLAN NOTE
-- NSGY, heme/onc services following  -- s/p crani with resection 7/31  -- Dexamethasone 4mgq6 hours for vasogenic edema  -- Levetiracetam 500 mg q12 hours for seizure ppx  -- Boarding for NSGY

## 2017-08-02 NOTE — PLAN OF CARE
Problem: Patient Care Overview  Goal: Plan of Care Review  7/27 DX: Brain Mass   7/27 CT w/ IV contrast chest abd pelvis  7/27 Repeat MRI   7/27 - Surgery and blood consent signed, plan for SX on Monday 7/31 7/28: Anesthesia consent signed. Transfer orders to 7th floor in place    PMHX: Rectal CA w/mets to lungs   Day Bath  Q6 Accu check  Q1 neuro checks    Outcome: Ongoing (interventions implemented as appropriate)  POC reviewed with pt at 0600. Pt verbalized understanding. Questions and concerns addressed. No acute events overnight. Pt progressing toward goals. Has transfer orders, awaiting bed. Will continue to monitor. See flowsheets for full assessment and VS info

## 2017-08-02 NOTE — OP NOTE
DATE OF PROCEDURE:  07/31/2017    SURGEON:  Vlad Alonso M.D., Ph.D.    ASSISTANT:  Alfonso Mclaughlin M.D. (the assistant is a [facility] Resident   Neurosurgery resident).    PREOPERATIVE DIAGNOSES:  1.  Right frontal metastasis.  2.  History of rectal cancer.  3.  Cerebral edema.    POSTOPERATIVE DIAGNOSES:  1.  Right frontal metastasis.  2.  History of rectal cancer.  3.  Cerebral edema.    PROCEDURES:  1.  Right frontal craniotomy for resection of tumor.  2.  Stealth navigation.  3.  Microsurgical technique.    INDICATIONS IN DETAIL:  Mr. Claude Penn is a very pleasant gentleman who   presented with generalized weakness, fatigue and headache with slurred speech.    The patient had an MRI that showed a 3.5 cm hemorrhagic enhancing lesion seen on   the right frontal gyrus compatible with metastasis as well as additional   subcentimeter metastatic lesion in the right orbitofrontal region.  In addition,   there was a small focus and enhancement at the lesser cerebellopontine angle   suspicious for leptomeningeal disease.  The patient was seen and evaluated by   Dr. Vlad Alonso and after the risks, benefits and alternatives were described,   he wished to proceed with removal of the large hemorrhagic lesion.    PROCEDURE IN DETAIL:  The patient was brought to the Operating Room and general   anesthetic was administered.  All proper lines were placed.  The patient was   placed in the supine position on an operative table.  A bump was placed under   his right shoulder and his head was turned towards the left to allow access to   the right frontal region.  The patient's head was placed in 3-point fixation   using Manter headholder.  The Stealth navigation system was brought to the   field and an accurate registration was achieved using the tracer function.  The   area of tumor could be seen and this was traced over the patient's scalp.  The   hairline was traced and then the hair was clipped over the area of the  tumor.  A   line bisecting the projection of the tumor on the skin was drawn of   approximately 8 cm.  The head was then cleaned, prepped and draped in usual   fashion and an incision was made using a #10 blade of the aforementioned line.    This was carried deep to the area of the calvarium and dissection of both the   temporalis muscle and the skin was made and held in retraction using a   cerebellar retractor.  At this point, 2 mana holes were made at the superior and   inferior aspect of our exposure.  Once this was performed using a high-speed   Liqu[employer] drill with an acorn bur, we then turned a craniotomy flap using a   craniotome attachment.  Once this was removed, the dura was opened with the flap   forward.  The microscope was brought to the field.  At this point, the cortex   could be seen.  We verified the location using stealth navigation and could see   discoloration over the area of tumor.  We then performed a corticectomy using   suction and bipolar.  The hemorrhagic tumor was soon seen.  We were then able to   dissect around this tumor and then the tumor was then cored out using suction   and bipolar.  We were able to remove the entire tumor from all the areas except   for the very pial surface on the sylvian fissure where the tumor was densely   infiltrated.  We did not perform any aggressive maneuvers at that level given   the location of the MCA just inferior to the area of tumor removal.  At this   point, the wound was irrigated copiously and all bleeding points were   coagulated.  Attention was turned to closure.  The resection cavity was lined   with Surgicel and sealed in place using Evicel.  Once this was complete, the   dura was closed using 4-0 Nurolon sutures in an interrupted fashion.  The bone   flap was replaced using Nando plates and screws.  The soft tissue was then   closed in layers with staples in the skin.  A clean dressing was placed.  The   patient was taken out of 3-point  fixation.  The patient was awakened by the   Anesthesia staff.  At that point, the patient was awake and following commands,   he was extubated.  EBL was approximately 100 mL.  There were no intra procedure   complications.  All counts were correct at the end of the surgery.    Dr. Vlad Alonso was present during the entire procedure.      GEREMIAS  dd: 08/02/2017 12:22:10 (CDT)  td: 08/02/2017 12:48:18 (CDT)  Doc ID   #[id number]  Job ID #[id number]    CC:

## 2017-08-03 VITALS
DIASTOLIC BLOOD PRESSURE: 86 MMHG | RESPIRATION RATE: 18 BRPM | WEIGHT: 256 LBS | HEART RATE: 68 BPM | BODY MASS INDEX: 31.17 KG/M2 | OXYGEN SATURATION: 97 % | TEMPERATURE: 98 F | HEIGHT: 76 IN | SYSTOLIC BLOOD PRESSURE: 128 MMHG

## 2017-08-03 LAB
ALBUMIN SERPL BCP-MCNC: 3.4 G/DL
ALP SERPL-CCNC: 78 U/L
ALT SERPL W/O P-5'-P-CCNC: 14 U/L
ANION GAP SERPL CALC-SCNC: 10 MMOL/L
AST SERPL-CCNC: 11 U/L
BASOPHILS # BLD AUTO: 0 K/UL
BASOPHILS NFR BLD: 0 %
BILIRUB SERPL-MCNC: 1.3 MG/DL
BUN SERPL-MCNC: 17 MG/DL
CALCIUM SERPL-MCNC: 8.1 MG/DL
CHLORIDE SERPL-SCNC: 105 MMOL/L
CO2 SERPL-SCNC: 21 MMOL/L
CREAT SERPL-MCNC: 0.7 MG/DL
DIFFERENTIAL METHOD: ABNORMAL
EOSINOPHIL # BLD AUTO: 0 K/UL
EOSINOPHIL NFR BLD: 0 %
ERYTHROCYTE [DISTWIDTH] IN BLOOD BY AUTOMATED COUNT: 13.2 %
EST. GFR  (AFRICAN AMERICAN): >60 ML/MIN/1.73 M^2
EST. GFR  (NON AFRICAN AMERICAN): >60 ML/MIN/1.73 M^2
GLUCOSE SERPL-MCNC: 117 MG/DL
HCT VFR BLD AUTO: 40.2 %
HGB BLD-MCNC: 14.3 G/DL
INR PPP: 1
LYMPHOCYTES # BLD AUTO: 0.3 K/UL
LYMPHOCYTES NFR BLD: 4.9 %
MAGNESIUM SERPL-MCNC: 2.4 MG/DL
MCH RBC QN AUTO: 30.8 PG
MCHC RBC AUTO-ENTMCNC: 35.6 G/DL
MCV RBC AUTO: 87 FL
MONOCYTES # BLD AUTO: 0.4 K/UL
MONOCYTES NFR BLD: 6 %
NEUTROPHILS # BLD AUTO: 5.9 K/UL
NEUTROPHILS NFR BLD: 88.7 %
PHOSPHATE SERPL-MCNC: 2.4 MG/DL
PLATELET # BLD AUTO: 114 K/UL
PMV BLD AUTO: 10.5 FL
POCT GLUCOSE: 100 MG/DL (ref 70–110)
POCT GLUCOSE: 141 MG/DL (ref 70–110)
POCT GLUCOSE: 154 MG/DL (ref 70–110)
POCT GLUCOSE: 94 MG/DL (ref 70–110)
POTASSIUM SERPL-SCNC: 3.9 MMOL/L
PROT SERPL-MCNC: 6.5 G/DL
PROTHROMBIN TIME: 10.7 SEC
RBC # BLD AUTO: 4.64 M/UL
SODIUM SERPL-SCNC: 136 MMOL/L
WBC # BLD AUTO: 6.7 K/UL

## 2017-08-03 PROCEDURE — 83735 ASSAY OF MAGNESIUM: CPT

## 2017-08-03 PROCEDURE — 25000003 PHARM REV CODE 250: Performed by: INTERNAL MEDICINE

## 2017-08-03 PROCEDURE — 94640 AIRWAY INHALATION TREATMENT: CPT

## 2017-08-03 PROCEDURE — 92523 SPEECH SOUND LANG COMPREHEN: CPT

## 2017-08-03 PROCEDURE — 63600175 PHARM REV CODE 636 W HCPCS: Performed by: STUDENT IN AN ORGANIZED HEALTH CARE EDUCATION/TRAINING PROGRAM

## 2017-08-03 PROCEDURE — 99024 POSTOP FOLLOW-UP VISIT: CPT | Mod: ,,, | Performed by: PHYSICIAN ASSISTANT

## 2017-08-03 PROCEDURE — 99900035 HC TECH TIME PER 15 MIN (STAT)

## 2017-08-03 PROCEDURE — 97161 PT EVAL LOW COMPLEX 20 MIN: CPT

## 2017-08-03 PROCEDURE — 94799 UNLISTED PULMONARY SVC/PX: CPT

## 2017-08-03 PROCEDURE — 97165 OT EVAL LOW COMPLEX 30 MIN: CPT

## 2017-08-03 PROCEDURE — 94761 N-INVAS EAR/PLS OXIMETRY MLT: CPT

## 2017-08-03 PROCEDURE — 84100 ASSAY OF PHOSPHORUS: CPT

## 2017-08-03 PROCEDURE — 85025 COMPLETE CBC W/AUTO DIFF WBC: CPT

## 2017-08-03 PROCEDURE — 85610 PROTHROMBIN TIME: CPT

## 2017-08-03 PROCEDURE — 36415 COLL VENOUS BLD VENIPUNCTURE: CPT

## 2017-08-03 PROCEDURE — 80053 COMPREHEN METABOLIC PANEL: CPT

## 2017-08-03 RX ORDER — PANTOPRAZOLE SODIUM 40 MG/1
40 TABLET, DELAYED RELEASE ORAL DAILY
Qty: 30 TABLET | Refills: 3 | Status: SHIPPED | OUTPATIENT
Start: 2017-08-03 | End: 2018-08-03

## 2017-08-03 RX ORDER — DEXAMETHASONE 4 MG/1
4 TABLET ORAL EVERY 6 HOURS
Qty: 120 TABLET | Refills: 0 | Status: ON HOLD | OUTPATIENT
Start: 2017-08-03 | End: 2017-09-06 | Stop reason: HOSPADM

## 2017-08-03 RX ORDER — LEVETIRACETAM 500 MG/1
500 TABLET ORAL 2 TIMES DAILY
Qty: 60 TABLET | Refills: 3 | Status: ON HOLD | OUTPATIENT
Start: 2017-08-03 | End: 2017-08-25 | Stop reason: DRUGHIGH

## 2017-08-03 RX ADMIN — DEXAMETHASONE 4 MG: 4 TABLET ORAL at 11:08

## 2017-08-03 RX ADMIN — LEVETIRACETAM 500 MG: 500 TABLET, FILM COATED ORAL at 08:08

## 2017-08-03 RX ADMIN — PANTOPRAZOLE SODIUM 40 MG: 40 TABLET, DELAYED RELEASE ORAL at 08:08

## 2017-08-03 RX ADMIN — DEXAMETHASONE 4 MG: 4 TABLET ORAL at 06:08

## 2017-08-03 NOTE — PLAN OF CARE
"Problem: Patient Care Overview  Goal: Individualization & Mutuality  Outcome: Ongoing (interventions implemented as appropriate)  POC reviewed with patient and spouse at bedside, understanding verbalized. Patient able to reposition independently and ambulate to restroom with stand by assist, no neuro deficits noted. No complaints of pain. Craini incision remains clean, well approximated, with staples intact. Educated patient on call bell and bed alarm use, understanding verbalized. Commode placed in bathroom for safety purposes, states toilet is "too low". VSS, all safety precautions maintained, will continue to monitor.       "

## 2017-08-03 NOTE — DISCHARGE SUMMARY
[facility]  Neurosurgery  Discharge Summary      Patient Name: Claude Penn III  MRN: [id number]  Admission Date: 7/27/2017  Hospital Length of Stay: 7 days  Discharge Date and Time:  08/03/2017   Attending Physician: Vlad Alonso MD   Discharging Provider: Salo Cormier PA-C  Primary Care Provider: Dallas Agarwal MD    HPI:   53yo male with pmh of rectal CA presents to [facility] [place] [place] ED with imaging findings suspicious for mass in right frontal subcortex. Pt with two week history of 'acting strange' and 'zoning out with arm and body twitches'. On exam pt is neurologically intact and subjectively asymptomatic.     MRI shows right solitary frontal lesion with significant vasogenic edema and midline shift.     Pt takes warfarin for PE diagnosed in 2013. INR from OSH 1.68.        No Headaches.  No Vertigo.  No Nausea.  No Vomiting.  No Paresthesias.  No Focal deficit.  No Subjective fevers or chills.     Pt is afebrile and without leukocytosis. Pt is neurologically intact and hemodynamically stable.      Neurosurgery is consulted for right frontal mass.     Procedure(s) (LRB):  Right frontal CRANIOTOMY WITH STEALTH for tumor resection; microscope; [place] (Right)     Hospital Course:  7/28: stable on exam, booked for surgery Monday 7/29: no changes, awaiting surgery  7/30: stable, surgery tomorrow  7/31: OR for R crani for tumor resection  8/1: stable postop, monitored in ICU  8/2: TTF  8/3: Therapy recommending outpatient PT. Plan to DC home today.     Consults:   Consults         Status Ordering Provider     Inpatient consult to Hematology/Oncology  Once     Provider:  (Not yet assigned)    Completed GLADIS AGUDELO     Inpatient consult to Neurosurgery  Once     Provider:  (Not yet assigned)    Completed GLADIS AGUDELO              Pending Diagnostic Studies:     None        Final Active Diagnoses:    Diagnosis Date Noted POA    PRINCIPAL PROBLEM:  Frontal mass of brain [G93.9] 07/27/2017 Yes     Thrombocytopenia [D69.6] 08/02/2017 Unknown    Brain metastasis [C79.31] 08/01/2017 Yes    Brain metastases [C79.31] 07/31/2017 Yes    Bradycardia [R00.1] 07/31/2017 Yes    Lung metastases [C78.00] 07/31/2017 Yes    Facial droop [R29.810] 07/31/2017 Yes    Dysarthria [R47.1] 07/31/2017 Yes    Metastasis to brain [C79.31] 07/28/2017 No    Cerebral edema [G93.6] 07/28/2017 No    Rectal cancer [C20] 07/27/2017 Yes    Warfarin-induced coagulopathy [T45.511A, D68.9] 07/27/2017 Yes      Problems Resolved During this Admission:    Diagnosis Date Noted Date Resolved POA      Discharged Condition: good    Disposition: home with outpatient therapy    Follow Up:  Follow-up Information     Vlad Alonso MD On 8/11/2017.    Specialties:  Neurosurgery, Spine Surgery  Why:  gamma knife radiosurgery   Contact information:  [address] [place] [id number] [phone]                 Patient Instructions:   No discharge procedures on file.  Medications:  Reconciled Home Medications:   Current Discharge Medication List      START taking these medications    Details   dexamethasone (DECADRON) 4 MG Tab Take 1 tablet (4 mg total) by mouth every 6 (six) hours.  Qty: 120 tablet, Refills: 0      levetiracetam (KEPPRA) 500 MG Tab Take 1 tablet (500 mg total) by mouth 2 (two) times daily.  Qty: 60 tablet, Refills: 3      pantoprazole (PROTONIX) 40 MG tablet Take 1 tablet (40 mg total) by mouth once daily.  Qty: 30 tablet, Refills: 3         CONTINUE these medications which have NOT CHANGED    Details   diphenoxylate-atropine 2.5-0.025 mg (LOMOTIL) 2.5-0.025 mg per tablet Take 1 tablet by mouth 4 (four) times daily as needed for Diarrhea.      ergocalciferol (VITAMIN D2) 50,000 unit Cap Take 50,000 Units by mouth every 7 days.      olsalazine (DIPENTUM) 250 mg capsule Take 500 mg by mouth 2 (two) times daily.      promethazine (PHENERGAN) 25 MG tablet Take 25 mg by mouth every 4 (four) hours.         STOP taking these  medications       warfarin (COUMADIN) 7.5 MG tablet Comments: hold coumadin till 1-2 weeks after gamma knife radiosurgery  Reason for Stopping:               Salo Cormier PA-C  Neurosurgery  [facility]

## 2017-08-03 NOTE — PROGRESS NOTES
Discharge instructions reviewed with pt and wife. Both verbalized understanding. IV and telemetry removed at discharge. Pt in possession of all belongings. Wheelchair ordered. NAD

## 2017-08-03 NOTE — PROGRESS NOTES
[facility]  Neurosurgery  Progress Note    Subjective:     History of Present Illness: 51yo male with pmh of rectal CA [place] [facility] [place] findings suspicious for mass in right frontal subcortex. Pt with two week history of 'acting strange' and 'zoning out with arm and body twitches'. On exam pt is neurologically intact and subjectively asymptomatic.     MRI shows right solitary frontal lesion with significant vasogenic edema and midline shift.     Pt takes warfarin for PE diagnosed in 2013. INR from OSH 1.68.        No Headaches.  No Vertigo.  No Nausea.  No Vomiting.  No Paresthesias.  No Focal deficit.  No Subjective fevers or chills.     Pt is afebrile and without leukocytosis. Pt is neurologically intact and hemodynamically stable.      Neurosurgery is consulted for right frontal mass.     Post-Op Info:  Procedure(s) (LRB):  Right frontal CRANIOTOMY WITH STEALTH for tumor resection; microscope; [place] (Right)   2 Days Post-Op     Interval History: NAEON.    Medications:  Continuous Infusions:   Scheduled Meds:   dexamethasone  4 mg Oral Q6H    levetiracetam  500 mg Oral BID    pantoprazole  40 mg Oral Daily     PRN Meds:sodium chloride, sodium chloride, sodium chloride, acetaminophen, chlorproMAZINE, dextrose 50%, dextrose 50%, glucagon (human recombinant), glucose, glucose, hydrALAZINE, insulin aspart, ondansetron     Review of Systems    Objective:     Weight: 116.1 kg (256 lb)  Body mass index is 31.16 kg/m².  Vital Signs (Most Recent):  Temp: 97.7 °F (36.5 °C) (08/02/17 2049)  Pulse: 69 (08/02/17 2049)  Resp: 20 (08/02/17 2049)  BP: 133/75 (08/02/17 2049)  SpO2: 99 % (08/02/17 2049) Vital Signs (24h Range):  Temp:  [97.7 °F (36.5 °C)-98.5 °F (36.9 °C)] 97.7 °F (36.5 °C)  Pulse:  [49-77] 69  Resp:  [9-39] 20  SpO2:  [96 %-100 %] 99 %  BP: (115-157)/(45-97) 133/75       Date 08/02/17 0700 - 08/03/17 0659   Shift [id number] 8074-5219 5590-8218 24 Hour Total    I  N  T  A  K  E   P.O. 225 250  475    I.V.  (mL/kg) 20  (0.2)   20  (0.2)    Blood 225   225    Shift Total  (mL/kg) 470  (4) 250  (2.2)  720  (6.2)   O  U  T  P  U  T   Urine  (mL/kg/hr) 925  (1) 400  1325    Other 0 0  0    Shift Total  (mL/kg) 925  (8) 400  (3.4)  1325  (11.4)   Weight (kg) 116.1 116.1 116.1 116.1                        Neurosurgery Physical Exam    Alert and oriented to person, place and time  Disdiadokinetic on left  Follows commands x 4  PERRL      Significant Labs:    Recent Labs  Lab 08/01/17 0220 08/02/17  0317   * 127*    136   K 4.0 4.0   * 107   CO2 20* 21*   BUN 10 14   CREATININE 0.6 0.7   CALCIUM 7.8* 7.9*   MG 2.6 2.5       Recent Labs  Lab 08/01/17 0220 08/02/17  0317   WBC 6.83 6.14   HGB 13.1* 13.7*   HCT 37.8* 38.8*   * 92*       Recent Labs  Lab 08/01/17  1138   INR 1.0     Microbiology Results (last 7 days)     ** No results found for the last 168 hours. **        All pertinent labs from the last 24 hours have been reviewed.    Significant Diagnostics:  CT: No results found in the last 24 hours.    Assessment/Plan:     * Frontal mass of brain    52M PMH rectal cancer with mets to leg, likely new mets to brain.    -- Dex 4q6  -- Keppra 500bid  -- PPI, SQH  -- Vitals, labs, regular diet.   -- 1 platelets today, keep >100k  -- OOB 6 hours a  day  -- may stepdown to neurosurgery floor             Alfonso Mclaughlin MD  Neurosurgery  [facility]

## 2017-08-03 NOTE — PLAN OF CARE
Problem: Occupational Therapy Goal  Goal: Occupational Therapy Goal  Outcome: Ongoing (interventions implemented as appropriate)  Pt d/c'd from acute OT services.  No goals set.  CLYDE Alvarez  8/3/2017

## 2017-08-03 NOTE — PT/OT/SLP EVAL
Occupational Therapy  Evaluation/Discharge    Claude Penn III   MRN: [id number]   Admitting Diagnosis: Frontal mass of brain    OT Date of Treatment: 08/03/17   OT Start Time: 0905  OT Stop Time: 0915  OT Total Time (min): 10 min  Co-eval with PT    Billable Minutes:  Evaluation 10    Diagnosis: Frontal mass of brain S/p Right frontal CRANIOTOMY WITH STEALTH for tumor resection; microscope; koch (Right)    Past Medical History:   Diagnosis Date    Cancer       Past Surgical History:   Procedure Laterality Date    HERNIA REPAIR      ILEOSTOMY REVISION      rectal tumor      removed     Referring physician: Missy Payne MD  Date referred to OT: 8/2/17    General Precautions: Standard, fall  Orthopedic Precautions: N/A  Braces: N/A    Patient History:  Living Environment  Living Environment Comment: Pt lives with wife in 1SH with threshold [place].  Reports (I) with ADLs and mobility.  Wife and other family can assist if needed.  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    Equipment Currently Used at Home: none    Subjective:  Communicated with RN prior to session.    Chief Complaint: none  Patient/Family stated goals: return home    Pain/Comfort  Pain Rating 1: 0/10  Pain Rating Post-Intervention 1: 0/10    Objective:  Pt presented supine with spouse present    Cognitive Exam:  Oriented to: Person, Place, Time and Situation  Follows Commands/attention: Follows multistep  commands  Communication: clear/fluent  Memory:  No Deficits noted  Safety awareness/insight to disability: intact  Coping skills/emotional control: Appropriate to situation    Visual/perceptual:  Intact    Physical Exam:  Postural examination/scapula alignment: Rounded shoulder, R hip elevation and R scapular depression.   Skin integrity: Visible skin intact  Edema: None noted     Sensation:   Intact    Upper Extremity Range of Motion:  Right Upper Extremity: WFL  Left Upper Extremity: WFL    Upper Extremity  "Strength:  Right Upper Extremity: WFL  Left Upper Extremity: WFL   Strength: WFL    Fine motor coordination:   Intact    Gross motor coordination: WFL    Functional Mobility:  Bed Mobility:  Supine to Sit: Independent     Transfers:  Sit <> Stand Assistance: Supervision  Sit <> Stand Assistive Device: No Assistive Device     Functional Ambulation:  ~200ft no LOB or SOB    Activities of Daily Living:  UE dressing: Set-up assist     Balance:   Static Sit: GOOD+: Takes MAXIMAL challenges from all directions.    Dynamic Sit: GOOD+: Maintains balance through MAXIMAL excursions of active trunk motion  Static Stand: GOOD: Takes MODERATE challenges from all directions  Dynamic stand: GOOD: Needs SUPERVISION only during gait and able to self right with moderate     Therapeutic Activities and Exercises:  Pt and wife educated on role of OT-discharge from OT services; verbalized understanding with no further questions    AM-PAC 6 CLICK ADL  How much help from another person does this patient currently need?  1 = Unable, Total/Dependent Assistance  2 = A lot, Maximum/Moderate Assistance  3 = A little, Minimum/Contact Guard/Supervision  4 = None, Modified [place]/Independent    Putting on and taking off regular lower body clothing? : 4  Bathing (including washing, rinsing, drying)?: 4  Toileting, which includes using toilet, bedpan, or urinal? : 4  Putting on and taking off regular upper body clothing?: 4  Taking care of personal grooming such as brushing teeth?: 4  Eating meals?: 4  Total Score: 24    AM-PAC Raw Score CMS "G-Code Modifier Level of Impairment Assistance   6 % Total / Unable   7 - 9 CM 80 - 100% Maximal Assist   10-14 CL 60 - 80% Moderate Assist   15 - 19 CK 40 - 60% Moderate Assist   20 - 22 CJ 20 - 40% Minimal Assist   23 CI 1-20% SBA / CGA   24 CH 0% Independent/ Mod I       Patient left up in chair with call button in reach and wife present    Assessment:  Claude Penn III is a 52 y.o. male with " a medical diagnosis of Frontal mass of brain and presents completing ADL/functional mobility (I)-SPV level.  Pt is performing at baseline level of function and will not benefit from skilled OT services at this time.  Pt/spouse verbalized understanding of this.      Pt presented with a low complexity OT evaluation. Pt required a brief review of medical history and occupational profile. Pt demod 0 performance deficits (physical, cognitive, or psychosocial) resulting in limitations and engagement restrictions. Clinical decision making required analytical complexity with limited treatment options. Pt with cormorbidities and required minimal modification of task/assistance with assessment.       Discharge recommendations: home no OT needs    Barriers to discharge: Barriers to Discharge: None    Equipment recommendations:  none    CLYDE Alvarez  08/03/2017

## 2017-08-03 NOTE — SUBJECTIVE & OBJECTIVE
Interval History: NAEON.    Medications:  Continuous Infusions:   Scheduled Meds:   dexamethasone  4 mg Oral Q6H    levetiracetam  500 mg Oral BID    pantoprazole  40 mg Oral Daily     PRN Meds:sodium chloride, sodium chloride, sodium chloride, acetaminophen, chlorproMAZINE, dextrose 50%, dextrose 50%, glucagon (human recombinant), glucose, glucose, hydrALAZINE, insulin aspart, ondansetron     Review of Systems    Objective:     Weight: 116.1 kg (256 lb)  Body mass index is 31.16 kg/m².  Vital Signs (Most Recent):  Temp: 97.7 °F (36.5 °C) (08/02/17 2049)  Pulse: 69 (08/02/17 2049)  Resp: 20 (08/02/17 2049)  BP: 133/75 (08/02/17 2049)  SpO2: 99 % (08/02/17 2049) Vital Signs (24h Range):  Temp:  [97.7 °F (36.5 °C)-98.5 °F (36.9 °C)] 97.7 °F (36.5 °C)  Pulse:  [49-77] 69  Resp:  [9-39] 20  SpO2:  [96 %-100 %] 99 %  BP: (115-157)/(45-97) 133/75       Date 08/02/17 0700 - 08/03/17 0659   Shift 7502-8424 9295-2680 5936-5366 24 Hour Total   I  N  T  A  K  E   P.O. 225 250  475    I.V.  (mL/kg) 20  (0.2)   20  (0.2)    Blood 225   225    Shift Total  (mL/kg) 470  (4) 250  (2.2)  720  (6.2)   O  U  T  P  U  T   Urine  (mL/kg/hr) 925  (1) 400  1325    Other 0 0  0    Shift Total  (mL/kg) 925  (8) 400  (3.4)  1325  (11.4)   Weight (kg) 116.1 116.1 116.1 116.1                        Neurosurgery Physical Exam    Alert and oriented to person, place and time  Disdiadokinetic on left  Follows commands x 4  PERRL      Significant Labs:    Recent Labs  Lab 08/01/17  0220 08/02/17  0317   * 127*    136   K 4.0 4.0   * 107   CO2 20* 21*   BUN 10 14   CREATININE 0.6 0.7   CALCIUM 7.8* 7.9*   MG 2.6 2.5       Recent Labs  Lab 08/01/17  0220 08/02/17  0317   WBC 6.83 6.14   HGB 13.1* 13.7*   HCT 37.8* 38.8*   * 92*       Recent Labs  Lab 08/01/17  1138   INR 1.0     Microbiology Results (last 7 days)     ** No results found for the last 168 hours. **        All pertinent labs from the last 24 hours have been  reviewed.    Significant Diagnostics:  CT: No results found in the last 24 hours.

## 2017-08-03 NOTE — ASSESSMENT & PLAN NOTE
52M PMH rectal cancer with mets to leg, likely new mets to brain.    -- Dex 4q6  -- Keppra 500bid  -- PPI, SQH  -- Vitals, labs, regular diet.   -- 1 platelets today, keep >100k  -- OOB 6 hours a  day  -- may stepdown to neurosurgery floor

## 2017-08-03 NOTE — SUBJECTIVE & OBJECTIVE
Interval History: NAEON. Patient was TTF 8/2/17. Patient received 1 pack plt 8/2/17 for plt level 87; pt states he's has low plt at baseline and is followed by outpt hem/onc in [place], [place]. He was ambulating in halls prior to exam. He has no complaints. Family at bedside. Tolerating diet. Plan to DC home today and f/u with Dr. Alonso on 8/11 for gamma knife radiosurgery.     Medications:  Continuous Infusions:   Scheduled Meds:   dexamethasone  4 mg Oral Q6H    levetiracetam  500 mg Oral BID    pantoprazole  40 mg Oral Daily     PRN Meds:sodium chloride, sodium chloride, sodium chloride, acetaminophen, chlorproMAZINE, dextrose 50%, dextrose 50%, glucagon (human recombinant), glucose, glucose, hydrALAZINE, insulin aspart, ondansetron     Review of Systems  Objective:     Weight: 116.1 kg (256 lb)  Body mass index is 31.16 kg/m².  Vital Signs (Most Recent):  Temp: 98 °F (36.7 °C) (08/03/17 1126)  Pulse: 75 (08/03/17 1126)  Resp: 18 (08/03/17 1126)  BP: 128/86 (08/03/17 1126)  SpO2: 97 % (08/03/17 1126) Vital Signs (24h Range):  Temp:  [97.7 °F (36.5 °C)-98.3 °F (36.8 °C)] 98 °F (36.7 °C)  Pulse:  [49-80] 75  Resp:  [13-26] 18  SpO2:  [95 %-100 %] 97 %  BP: (125-152)/(75-93) 128/86                           Neurosurgery Physical Exam   General: well developed, well nourished. no acute distress. Comfortable.   Neurologic: Awake, alert and oriented x3. Thought content appropriate.  Head: normocephalic, atraumatic   GCS: Motor: 6/Verbal: 5/Eyes: 4 GCS Total: 15  Cranial nerves: face symmetric, tongue midline, pupils equal, round, reactive to light with accomodation, extraocular muscles intact. CN II-XII grossly intact.   Language: no aphasia  Speech: no dysarthria   Sensory: response to light touch throughout  Motor Strength: Moves all extremities spontaneously with good tone. Full strength upper and lower extremities. No abnormal movements seen.        Pronator Drift: no drift noted  Coordination: finger to nose normal  bilaterally  Clonus: absent  Babinski: absent   Gait: Normal  No focal numbness or weakness  ENT: normal hearing with finger rub  Heart: RRR, no cyanosis, pallor, or edema.   Lungs:  normal respiratory effort  Abdomen: soft, non-tender and symmetric  Extremities: warm with no cyanosis, edema, or clubbing  Pulses: palpable distal pulses  Skin: warm, dry and intact. No visible rashes or lesions.       wound is c/d/i, no edema, ttp,  no drainage.  wound edges are well approximated with staples.      Significant Labs:    Recent Labs  Lab 08/02/17 0317 08/03/17  0417   * 117*    136   K 4.0 3.9    105   CO2 21* 21*   BUN 14 17   CREATININE 0.7 0.7   CALCIUM 7.9* 8.1*   MG 2.5 2.4       Recent Labs  Lab 08/02/17 0317 08/03/17  0417   WBC 6.14 6.70   HGB 13.7* 14.3   HCT 38.8* 40.2   PLT 92* 114*       Recent Labs  Lab 08/03/17  0931   INR 1.0     Microbiology Results (last 7 days)     ** No results found for the last 168 hours. **            Significant Diagnostics:  Post op CTH 7/31 reviewed

## 2017-08-03 NOTE — PT/OT/SLP EVAL
"Speech Language Pathology Evaluation/Discharge Summary      Claude Penn III   MRN: [id number]   Admitting Diagnosis: Frontal mass of brain    Diet recommendations: Solid Diet Level: Regular  Liquid Diet Level: Thin   · Pt pending discharge later this date, Pt would benefit from ongoing assessment of higher level cognitive skills as an out Patient     SLP Treatment Date: 08/03/17  Speech Start Time: 1058     Speech Stop Time: 1118     Speech Total (min): 20 min       TREATMENT BILLABLE MINUTES:  Eval 20     Diagnosis: Frontal mass of brain      Past Medical History:   Diagnosis Date    Cancer      Past Surgical History:   Procedure Laterality Date    HERNIA REPAIR      ILEOSTOMY REVISION      rectal tumor      removed              General Precautions: Standard,          Social Hx: Patient lives with spouse    Occupational/hobbies/homemaking: [employer]     Subjective:  "i'm gonna have plenty of help I can take a break from work for a while"     Pain/Comfort  Pain Rating 1: 0/10  Pain Rating Post-Intervention 1: 0/10     Objective:    Oral Musculature Evaluation  Oral Musculature: WFL     Cognitive Status:  Behavioral Observations: alert, appropriate and cooperative- spouse reports intermittent mood fluctuations different from baseline    Memory: WFL immediate recall 100%, delayed recall 2/3 independently increased to 100% with min verbal cue   Orientation: WFL    Attention: WFL   Problem Solving:  Basic WFL- ongoing assessment of higher level skills warranted   Pragmatics: flat affect  Executive Function: compare/constrast task 100% however responses were rigid in nature, sequencing 100% acc independently, planning to completed simple clock drawing tasks impaired warranting moderate verbal cueing and 3rd trial to complete correctly -ongoing assessment of higher level skills warranted. Mental flexibility 75% acc independently      Language:     Auditory Comprehension: able to identify objects, " answers yes/no questions and able to follow commands-WFL     Verbal Expression: automatic speech, fluency and conversational speech-WFL     Motor Speech: WFL     Voice: WFL       Reading: basic WFL     Writing: not yet assessed, pt demosntrated ability to wite 1-12 within clock drawing task legibly     Visual-Spatial: no deficits observed     Additional Treatment:  Education provided re: role of SLP within acute care setting. SLP discussed with spouse re: types of deficits commonly associated with area of surgery and mild impairments observed today. SLP encouraged spouse to establish outpatient evaluation and services to ensure ongoing assessment of higher level skills and that strategies able to be implemented for optimal safety within home environment. Spouse demonstrated understanding and in agreement with plan.     FIM:  Social Interaction: 6 Complete Liberty--The patient interacts appropriately with staff, other patients, and family members (e.g., controls temper, accepts criticism, is aware that words and actions have an impact on others), and does not require medication for   Problem Solvin Modified [place]--In most situations, the patient recognizes a present problem, and with only mild difficulty makes appropriate decisions, initiates and carries out a sequence of steps to solve complex problems, or requires more than a   Comprehension: 7 Complete [place]--The patient understand complex or abstract directions and conversation, and understand either spoken or written language (not necessarily English).   Expression: 7 Complete [place]--The patient expresses complex or abstract ideas clearly and fluently (not necessarily in English).   Memory: 7 Complete [place]--The patient recognizes people frequently encountered, remebers daily routines, and executes requests of others without need for repetition.     Assessment:  Claude Penn III is a 52 y.o. male with a SLP diagnosis of mild  Cognitive-Linguistic Impairment. ongoing assessment of high level cognitive skills warranted.        Discharge recommendations: Discharge Facility/Level Of Care Needs: outpatient speech therapy     Plan:   Plan of Care reviewed with: patient, spouse        Vivian Zavala CCC-SLP  08/03/2017

## 2017-08-03 NOTE — PROGRESS NOTES
[facility]  Neurosurgery  Progress Note    Subjective:     History of Present Illness: 53yo male with pmh of rectal CA presents to [facility] ED with imaging findings suspicious for mass in right frontal subcortex. Pt with two week history of 'acting strange' and 'zoning out with arm and body twitches'. On exam pt is neurologically intact and subjectively asymptomatic.     MRI shows right solitary frontal lesion with significant vasogenic edema and midline shift.     Pt takes warfarin for PE diagnosed in 2013. INR from OSH 1.68.        No Headaches.  No Vertigo.  No Nausea.  No Vomiting.  No Paresthesias.  No Focal deficit.  No Subjective fevers or chills.     Pt is afebrile and without leukocytosis. Pt is neurologically intact and hemodynamically stable.      Neurosurgery is consulted for right frontal mass.     Post-Op Info:  Procedure(s) (LRB):  Right frontal CRANIOTOMY WITH STEALTH for tumor resection; microscope; [place] (Right)   3 Days Post-Op     Interval History: NAEON. Patient was TTF 8/2/17. Patient received 1 pack plt 8/2/17 for plt level 87; pt states he's has low plt at baseline and is followed by outpt hem/onc in [place], [place]. He was ambulating in halls prior to exam. He has no complaints. Family at bedside. Tolerating diet. Plan to DC home today and f/u with Dr. Alonso on 8/11 for gamma knife radiosurgery.     Medications:  Continuous Infusions:   Scheduled Meds:   dexamethasone  4 mg Oral Q6H    levetiracetam  500 mg Oral BID    pantoprazole  40 mg Oral Daily     PRN Meds:sodium chloride, sodium chloride, sodium chloride, acetaminophen, chlorproMAZINE, dextrose 50%, dextrose 50%, glucagon (human recombinant), glucose, glucose, hydrALAZINE, insulin aspart, ondansetron     Review of Systems  Objective:     Weight: 116.1 kg (256 lb)  Body mass index is 31.16 kg/m².  Vital Signs (Most Recent):  Temp: 98 °F (36.7 °C) (08/03/17 1126)  Pulse: 75 (08/03/17 1126)  Resp: 18 (08/03/17 1126)  BP:  128/86 (08/03/17 1126)  SpO2: 97 % (08/03/17 1126) Vital Signs (24h Range):  Temp:  [97.7 °F (36.5 °C)-98.3 °F (36.8 °C)] 98 °F (36.7 °C)  Pulse:  [49-80] 75  Resp:  [13-26] 18  SpO2:  [95 %-100 %] 97 %  BP: (125-152)/(75-93) 128/86                           Neurosurgery Physical Exam   General: well developed, well nourished. no acute distress. Comfortable.   Neurologic: Awake, alert and oriented x3. Thought content appropriate.  Head: normocephalic, atraumatic   GCS: Motor: 6/Verbal: 5/Eyes: 4 GCS Total: 15  Cranial nerves: face symmetric, tongue midline, pupils equal, round, reactive to light with accomodation, extraocular muscles intact. CN II-XII grossly intact.   Language: no aphasia  Speech: no dysarthria   Sensory: response to light touch throughout  Motor Strength: Moves all extremities spontaneously with good tone. Full strength upper and lower extremities. No abnormal movements seen.        Pronator Drift: no drift noted  Coordination: finger to nose normal bilaterally  Clonus: absent  Babinski: absent   Gait: Normal  No focal numbness or weakness  ENT: normal hearing with finger rub  Heart: RRR, no cyanosis, pallor, or edema.   Lungs:  normal respiratory effort  Abdomen: soft, non-tender and symmetric  Extremities: warm with no cyanosis, edema, or clubbing  Pulses: palpable distal pulses  Skin: warm, dry and intact. No visible rashes or lesions.       wound is c/d/i, no edema, ttp,  no drainage.  wound edges are well approximated with staples.      Significant Labs:    Recent Labs  Lab 08/02/17 0317 08/03/17 0417   * 117*    136   K 4.0 3.9    105   CO2 21* 21*   BUN 14 17   CREATININE 0.7 0.7   CALCIUM 7.9* 8.1*   MG 2.5 2.4       Recent Labs  Lab 08/02/17 0317 08/03/17  0417   WBC 6.14 6.70   HGB 13.7* 14.3   HCT 38.8* 40.2   PLT 92* 114*       Recent Labs  Lab 08/03/17  0931   INR 1.0     Microbiology Results (last 7 days)     ** No results found for the last 168 hours. **             Significant Diagnostics:  Post op [facility] 7/31 reviewed     Assessment/Plan:     * Frontal mass of brain    52M PMH rectal cancer with mets to leg, likely new mets to brain. S/p crani for resection 7/31/17 with Dr. Alonso.     -- Dex 4q6  -- Keppra 500bid  -- PPI, SQH  -- Vitals, labs, regular diet.   -- OOB 6 hours a  day  -- Therapy recommending outpatient PT.  -- Plan to DC home today. Patient has follow up with Dr. Alonso on 8/11/17 for gamma knife radiosurgery. Patient is to hold coumadin till 1-2 weeks after gamma knife procedure, which will be further directed per Dr. Alonso.     Patient and family was counseled about activity restriction, wound care, follow up appointment, and other discharge instructions. Patient and family verbalized understanding. All of their questions were answered. They were encouraged to call clinic with any concerns.     Discussed with Dr. Celeste Cormier PA-C  Neurosurgery  [facility]

## 2017-08-03 NOTE — PLAN OF CARE
Patient discharged home with family. Patient to follow up with Outpatient Therapy. No other needs identified. Neurosurgery clinic to schedule follow up appointments.

## 2017-08-03 NOTE — PLAN OF CARE
08/03/17 1543   Final Note   Assessment Type Final Discharge Note   Discharge Disposition Home   Discharge planning education complete? Yes   Hospital Follow Up  Appt(s) scheduled? No  (Neurosurgery clinic to schedule follow up.)   Discharge plans and expectations educations in teach back method with documentation complete? Yes   Offered [facility] -- Bedside Delivery? n/a   Discharge/Hospital Encounter Summary to (non-[facility]) PCP n/a   Referral to Outpatient Case Management complete? n/a   Referral to / orders for Home Health Complete? n/a   30 day supply of medicines given at discharge, if documented non-compliance / non-adherence? n/a   Any social issues identified prior to discharge? No   Did you assess the readiness or willingness of the family or caregiver to support self management of care? Yes

## 2017-08-03 NOTE — PT/OT/SLP EVAL
Physical Therapy  Evaluation/ Discharge    Claude Penn III   MRN: [id number]   Admitting Diagnosis: Frontal mass of brain    PT Received On: 08/03/17  PT Start Time: 0902     PT Stop Time: 0918    PT Total Time (min): 16 min       Billable Minutes:  Evaluation 16    Diagnosis: Frontal mass of brain   S/p Right frontal CRANIOTOMY WITH STEALTH for tumor resection; microscope; [place] (Right)    History: personal factors and/or comorbidities that impact the plan of care: rectal CA  Evaluation of Body Systems: cardiovascular/pulmonary, integumentary, musculoskeletal, neuromuscular, cognition/communication  Functional Outcome Tools: AMPAC, ROM, MMT  Clinical Presentation: stable      Evaluation Complexity Level: Low    Past Medical History:   Diagnosis Date    Cancer       Past Surgical History:   Procedure Laterality Date    HERNIA REPAIR      ILEOSTOMY REVISION      rectal tumor      removed       Referring physician: DESHAUN Alonso  Date referred to PT: 08/02/2017    General Precautions: Standard, fall  Orthopedic Precautions: N/A   Braces: N/A            Patient History:  Lives With: spouse  Living Arrangements: house  Home Layout: Able to live on 1st floor  Living Environment Comment: Pt lives with wife in 1-story house with threshold [place]E. Pt reports (I) with ADLs and amb. Pt reports wife and other family members are able to provide assist if needed. Pt currently working at construction company.   Equipment Currently Used at Home: none  DME owned (not currently used): none    Previous Level of Function:  Ambulation Skills: independent  Transfer Skills: independent  ADL Skills: independent  Work/Leisure Activity: independent    Subjective:  Communicated with RN prior to session.  Pt agreeable to therapy session.   Chief Complaint: NA  Patient goals: return home    Pain/Comfort  Pain Rating 1: 0/10  Pain Rating Post-Intervention 1: 0/10      Objective:         Cognitive Exam:  Oriented to: Person, Place, Time and  Situation    Follows Commands/attention: Follows multistep  commands  Communication: clear/fluent  Safety awareness/insight to disability: intact    Physical Exam:  Postural examination/scapula alignment: Rounded shoulder, R hip elevation and R scapular depression.     Skin integrity: Visible skin intact  Edema: None noted B LE    Sensation:   Intact  light/touch B LE    Lower Extremity Range of Motion:  Right Lower Extremity: WFL  Left Lower Extremity: WFL    Lower Extremity Strength:  Right Lower Extremity: WFL  Left Lower Extremity: WFL     Gross motor coordination: WFL    Functional Mobility:  Bed Mobility:  Supine to Sit: Independent    Transfers:  Sit <> Stand Assistance: Supervision  Sit <> Stand Assistive Device: No Assistive Device    Gait:   Gait Distance: ~200ft no LOB or SOB  Assistance 1: Supervision  Gait Assistive Device: No device  Gait Pattern: reciprocal    Balance:   Static Sit: GOOD+: Takes MAXIMAL challenges from all directions.    Dynamic Sit: GOOD+: Maintains balance through MAXIMAL excursions of active trunk motion  Static Stand: GOOD: Takes MODERATE challenges from all directions  Dynamic stand: GOOD: Needs SUPERVISION only during gait and able to self right with moderate     Therapeutic Activities and Exercises:  Pt and wife educated on role of PT/POC, amb in hallway 3x's daily.  Pt and wife educated on benefits of OP PT for posture and spine/hip alignment.   Pt safe to amb in hallway with family or RN staff.     AM-PAC 6 CLICK MOBILITY  How much help from another person does this patient currently need?   1 = Unable, Total/Dependent Assistance  2 = A lot, Maximum/Moderate Assistance  3 = A little, Minimum/Contact Guard/Supervision  4 = None, Modified Patrick/Independent    Turning over in bed (including adjusting bedclothes, sheets and blankets)?: 4  Sitting down on and standing up from a chair with arms (e.g., wheelchair, bedside commode, etc.): 3  Moving from lying on back to  sitting on the side of the bed?: 4  Moving to and from a bed to a chair (including a wheelchair)?: 3  Need to walk in hospital room?: 3  Climbing 3-5 steps with a railing?: 3  Total Score: 20     AM-PAC Raw Score CMS G-Code Modifier Level of Impairment Assistance   6 % Total / Unable   7 - 9 CM 80 - 100% Maximal Assist   10 - 14 CL 60 - 80% Moderate Assist   15 - 19 CK 40 - 60% Moderate Assist   20 - 22 CJ 20 - 40% Minimal Assist   23 CI 1-20% SBA / CGA   24 CH 0% Independent/ Mod I     Patient left up in chair with call button in reach, RN notified and wife present.    Assessment:   Claude Penn III is a 52 y.o. male with a medical diagnosis of Frontal mass of brain. Pt performed bed mobility (I) and transfers S. Pt amb ~200ft S without AD; no LOB or SOB. Pt safe to d/c home from a mobility standpoint without needs for skilled PT at this time. Pt will benefit from OP PT for posture and spine/hip alignment.     Discharge recommendations: Discharge Facility/Level Of Care Needs: outpatient PT     Barriers to discharge: Barriers to Discharge: None    Equipment recommendations: Equipment Needed After Discharge: none     GOALS:    Physical Therapy Goals     Not on file          Multidisciplinary Problems (Resolved)        Problem: Physical Therapy Goal    Goal Priority Disciplines Outcome Goal Variances Interventions   Physical Therapy Goal   (Resolved)     PT/OT, PT Outcome(s) achieved                     PLAN:    D/C PT.     HUDSON RICHARDSON, PT  08/03/2017

## 2017-08-03 NOTE — ASSESSMENT & PLAN NOTE
52M PMH rectal cancer with mets to leg, likely new mets to brain. S/p crani for resection 7/31/17 with Dr. Alonso.     -- Dex 4q6  -- Keppra 500bid  -- PPI, SQH  -- Vitals, labs, regular diet.   -- OOB 6 hours a  day  -- Therapy recommending outpatient PT.  -- Plan to DC home today. Patient has follow up with Dr. Alonso on 8/11/17 for gamma knife radiosurgery. Patient is to hold coumadin till 1-2 weeks after gamma knife procedure, which will be further directed per Dr. Alonso.     Patient and family was counseled about activity restriction, wound care, follow up appointment, and other discharge instructions. Patient and family verbalized understanding. All of their questions were answered. They were encouraged to call clinic with any concerns.     Discussed with Dr. Alonso

## 2017-08-03 NOTE — PLAN OF CARE
Problem: Physical Therapy Goal  Goal: Physical Therapy Goal  Outcome: Outcome(s) achieved Date Met: 08/03/17  Pt evaluation complete. Pt safe to d/c home from a mobility standpoint without needs for skilled PT at this time.     HUDSON RICHARDSON, PT  8/3/2017

## 2017-08-04 LAB
BLD PROD TYP BPU: NORMAL
BLD PROD TYP BPU: NORMAL
BLOOD UNIT EXPIRATION DATE: NORMAL
BLOOD UNIT EXPIRATION DATE: NORMAL
BLOOD UNIT TYPE CODE: 6200
BLOOD UNIT TYPE CODE: 6200
BLOOD UNIT TYPE: NORMAL
BLOOD UNIT TYPE: NORMAL
CODING SYSTEM: NORMAL
CODING SYSTEM: NORMAL
DISPENSE STATUS: NORMAL
DISPENSE STATUS: NORMAL
TRANS ERYTHROCYTES VOL PATIENT: NORMAL ML
TRANS ERYTHROCYTES VOL PATIENT: NORMAL ML

## 2017-08-04 NOTE — TELEPHONE ENCOUNTER
F/u call about Stivarga medication initiation. Spoke Sandra (spouse) and will f/u with MD office after Aug 15th appointment to clarify if patient will start Stivarga and if we will ship medication to office.

## 2017-08-05 NOTE — ANESTHESIA POSTPROCEDURE EVALUATION
"Anesthesia Post Evaluation    Patient: Claude Penn III    Procedure(s) Performed: Procedure(s) (LRB):  Right frontal CRANIOTOMY WITH STEALTH for tumor resection; microscope; [place] (Right)    Final Anesthesia Type: general  Patient location during evaluation: floor  Patient participation: Yes- Able to Participate  Level of consciousness: awake and alert  Post-procedure vital signs: reviewed and stable  Pain management: adequate  Airway patency: patent  PONV status at discharge: No PONV  Anesthetic complications: no      Cardiovascular status: blood pressure returned to baseline  Respiratory status: unassisted  Hydration status: euvolemic  Follow-up not needed.        Visit Vitals  /86 (BP Location: Right arm, Patient Position: Sitting, BP Method: Automatic)   Pulse 68   Temp 36.7 °C (98 °F) (Oral)   Resp 18   Ht 6' 4" (1.93 m)   Wt 116.1 kg (256 lb)   SpO2 97%   BMI 31.16 kg/m²       Pain/Marie Score: No Data Recorded   Pt seen on floor prior to DC. Note entered after DC  Rich An Jr, MD  8/5/2017        "

## 2017-08-15 ENCOUNTER — TELEPHONE (OUTPATIENT)
Dept: NEUROSURGERY | Facility: CLINIC | Age: 52
End: 2017-08-15

## 2017-08-15 NOTE — TELEPHONE ENCOUNTER
----- Message from Daron Monreal sent at 8/15/2017 11:42 AM CDT -----  Contact: Patient @ [phone]  Patient is requesting a return call regarding a f/u appt, and to discuss issues he's having, pls call

## 2017-08-16 NOTE — TELEPHONE ENCOUNTER
Stivarga start to be determined by Dr. Collins sometime next week.  Will f/u on 8/25. Patient aware to call [facility] with any questions or changes.

## 2017-08-17 ENCOUNTER — TELEPHONE (OUTPATIENT)
Dept: NEUROSURGERY | Facility: CLINIC | Age: 52
End: 2017-08-17

## 2017-08-17 NOTE — TELEPHONE ENCOUNTER
----- Message from Daron Monreal sent at 8/17/2017 11:28 AM CDT -----  Contact: Yasmin ( nurse practitioner ) with Dr. Collins [phone] opt 2  Caller is calling to get clearance to restart the anticoagulant therapy, pls call or fax clinic notes to [phone] AttN: Yasmin

## 2017-08-17 NOTE — TELEPHONE ENCOUNTER
Spoke with Jojo, advised that Dr. Alonso is out of the office until Monday. Will follow up with instructions.

## 2017-08-22 PROCEDURE — 99284 EMERGENCY DEPT VISIT MOD MDM: CPT

## 2017-08-22 PROCEDURE — 11000001 HC ACUTE MED/SURG PRIVATE ROOM

## 2017-08-22 PROCEDURE — 99283 EMERGENCY DEPT VISIT LOW MDM: CPT | Mod: ,,, | Performed by: EMERGENCY MEDICINE

## 2017-08-23 ENCOUNTER — HOSPITAL ENCOUNTER (INPATIENT)
Facility: HOSPITAL | Age: 52
LOS: 2 days | Discharge: HOME | DRG: 175 | End: 2017-08-25
Attending: EMERGENCY MEDICINE | Admitting: INTERNAL MEDICINE
Payer: COMMERCIAL

## 2017-08-23 DIAGNOSIS — D69.6 THROMBOCYTOPENIA: ICD-10-CM

## 2017-08-23 DIAGNOSIS — C79.31 METASTASIS TO BRAIN: ICD-10-CM

## 2017-08-23 DIAGNOSIS — M85.80 OSTEOPENIA, UNSPECIFIED LOCATION: ICD-10-CM

## 2017-08-23 DIAGNOSIS — I27.82 OTHER CHRONIC PULMONARY EMBOLISM WITHOUT ACUTE COR PULMONALE: ICD-10-CM

## 2017-08-23 DIAGNOSIS — Z86.711 HISTORY OF PULMONARY EMBOLISM: ICD-10-CM

## 2017-08-23 DIAGNOSIS — C20 RECTAL CANCER: ICD-10-CM

## 2017-08-23 DIAGNOSIS — C79.31 BRAIN METASTASES: ICD-10-CM

## 2017-08-23 DIAGNOSIS — C79.9 METASTATIC MALIGNANT NEOPLASM, UNSPECIFIED SITE: ICD-10-CM

## 2017-08-23 DIAGNOSIS — I26.99 OTHER ACUTE PULMONARY EMBOLISM WITHOUT ACUTE COR PULMONALE: Primary | ICD-10-CM

## 2017-08-23 DIAGNOSIS — C20 ADENOCARCINOMA OF RECTUM: ICD-10-CM

## 2017-08-23 DIAGNOSIS — B37.0 THRUSH: ICD-10-CM

## 2017-08-23 DIAGNOSIS — G93.6 CEREBRAL EDEMA: ICD-10-CM

## 2017-08-23 DIAGNOSIS — G93.89 FRONTAL MASS OF BRAIN: ICD-10-CM

## 2017-08-23 DIAGNOSIS — K56.609 LARGE BOWEL OBSTRUCTION: ICD-10-CM

## 2017-08-23 LAB
ABO + RH BLD: NORMAL
ALBUMIN SERPL BCP-MCNC: 2.8 G/DL
ALP SERPL-CCNC: 61 U/L
ALT SERPL W/O P-5'-P-CCNC: 49 U/L
ANION GAP SERPL CALC-SCNC: 9 MMOL/L
AST SERPL-CCNC: 21 U/L
BASOPHILS # BLD AUTO: 0.01 K/UL
BASOPHILS NFR BLD: 0.1 %
BILIRUB SERPL-MCNC: 1.9 MG/DL
BILIRUB UR QL STRIP: NEGATIVE
BLD GP AB SCN CELLS X3 SERPL QL: NORMAL
BUN SERPL-MCNC: 20 MG/DL
CALCIUM SERPL-MCNC: 7.9 MG/DL
CHLORIDE SERPL-SCNC: 106 MMOL/L
CLARITY UR REFRACT.AUTO: CLEAR
CO2 SERPL-SCNC: 23 MMOL/L
COLOR UR AUTO: YELLOW
CREAT SERPL-MCNC: 0.7 MG/DL
DIFFERENTIAL METHOD: ABNORMAL
EOSINOPHIL # BLD AUTO: 0 K/UL
EOSINOPHIL NFR BLD: 0 %
ERYTHROCYTE [DISTWIDTH] IN BLOOD BY AUTOMATED COUNT: 13.6 %
EST. GFR  (AFRICAN AMERICAN): >60 ML/MIN/1.73 M^2
EST. GFR  (NON AFRICAN AMERICAN): >60 ML/MIN/1.73 M^2
GLUCOSE SERPL-MCNC: 88 MG/DL
GLUCOSE UR QL STRIP: NEGATIVE
HCT VFR BLD AUTO: 41.7 %
HGB BLD-MCNC: 15 G/DL
HGB UR QL STRIP: NEGATIVE
KETONES UR QL STRIP: NEGATIVE
LEUKOCYTE ESTERASE UR QL STRIP: NEGATIVE
LYMPHOCYTES # BLD AUTO: 0.4 K/UL
LYMPHOCYTES NFR BLD: 5.1 %
MCH RBC QN AUTO: 31.3 PG
MCHC RBC AUTO-ENTMCNC: 36 G/DL
MCV RBC AUTO: 87 FL
MONOCYTES # BLD AUTO: 0.2 K/UL
MONOCYTES NFR BLD: 2.3 %
NEUTROPHILS # BLD AUTO: 6.8 K/UL
NEUTROPHILS NFR BLD: 92.1 %
NITRITE UR QL STRIP: NEGATIVE
PH UR STRIP: 6 [PH] (ref 5–8)
PLATELET # BLD AUTO: 56 K/UL
PMV BLD AUTO: 9.8 FL
POTASSIUM SERPL-SCNC: 3.8 MMOL/L
PROT SERPL-MCNC: 5.5 G/DL
PROT UR QL STRIP: NEGATIVE
RBC # BLD AUTO: 4.8 M/UL
SODIUM SERPL-SCNC: 138 MMOL/L
SP GR UR STRIP: >1.03 (ref 1–1.03)
URN SPEC COLLECT METH UR: ABNORMAL
UROBILINOGEN UR STRIP-ACNC: 1 EU/DL
WBC # BLD AUTO: 7.38 K/UL

## 2017-08-23 PROCEDURE — 86901 BLOOD TYPING SEROLOGIC RH(D): CPT

## 2017-08-23 PROCEDURE — 36415 COLL VENOUS BLD VENIPUNCTURE: CPT

## 2017-08-23 PROCEDURE — 11000001 HC ACUTE MED/SURG PRIVATE ROOM

## 2017-08-23 PROCEDURE — 86900 BLOOD TYPING SEROLOGIC ABO: CPT

## 2017-08-23 PROCEDURE — 99223 1ST HOSP IP/OBS HIGH 75: CPT | Mod: ,,, | Performed by: INTERNAL MEDICINE

## 2017-08-23 PROCEDURE — 85025 COMPLETE CBC W/AUTO DIFF WBC: CPT

## 2017-08-23 PROCEDURE — 99024 POSTOP FOLLOW-UP VISIT: CPT | Mod: ,,, | Performed by: PHYSICIAN ASSISTANT

## 2017-08-23 PROCEDURE — 25000003 PHARM REV CODE 250: Performed by: STUDENT IN AN ORGANIZED HEALTH CARE EDUCATION/TRAINING PROGRAM

## 2017-08-23 PROCEDURE — 63600175 PHARM REV CODE 636 W HCPCS: Performed by: INTERNAL MEDICINE

## 2017-08-23 PROCEDURE — 99232 SBSQ HOSP IP/OBS MODERATE 35: CPT | Mod: ,,, | Performed by: COLON & RECTAL SURGERY

## 2017-08-23 PROCEDURE — 81003 URINALYSIS AUTO W/O SCOPE: CPT

## 2017-08-23 PROCEDURE — 25000003 PHARM REV CODE 250: Performed by: INTERNAL MEDICINE

## 2017-08-23 PROCEDURE — 80053 COMPREHEN METABOLIC PANEL: CPT

## 2017-08-23 RX ORDER — ONDANSETRON 2 MG/ML
4 INJECTION INTRAMUSCULAR; INTRAVENOUS EVERY 12 HOURS PRN
Status: DISCONTINUED | OUTPATIENT
Start: 2017-08-23 | End: 2017-08-25 | Stop reason: HOSPADM

## 2017-08-23 RX ORDER — LEVETIRACETAM 5 MG/ML
500 INJECTION INTRAVASCULAR EVERY 12 HOURS
Status: DISCONTINUED | OUTPATIENT
Start: 2017-08-23 | End: 2017-08-25

## 2017-08-23 RX ORDER — SODIUM CHLORIDE, SODIUM LACTATE, POTASSIUM CHLORIDE, CALCIUM CHLORIDE 600; 310; 30; 20 MG/100ML; MG/100ML; MG/100ML; MG/100ML
INJECTION, SOLUTION INTRAVENOUS CONTINUOUS
Status: DISCONTINUED | OUTPATIENT
Start: 2017-08-23 | End: 2017-08-25

## 2017-08-23 RX ORDER — NYSTATIN 100000 [USP'U]/ML
500000 SUSPENSION ORAL 4 TIMES DAILY
Status: DISCONTINUED | OUTPATIENT
Start: 2017-08-23 | End: 2017-08-25 | Stop reason: HOSPADM

## 2017-08-23 RX ORDER — DEXAMETHASONE SODIUM PHOSPHATE 4 MG/ML
4 INJECTION, SOLUTION INTRA-ARTICULAR; INTRALESIONAL; INTRAMUSCULAR; INTRAVENOUS; SOFT TISSUE EVERY 6 HOURS
Status: DISCONTINUED | OUTPATIENT
Start: 2017-08-23 | End: 2017-08-25

## 2017-08-23 RX ORDER — ONDANSETRON 8 MG/1
8 TABLET, ORALLY DISINTEGRATING ORAL EVERY 8 HOURS PRN
Status: DISCONTINUED | OUTPATIENT
Start: 2017-08-23 | End: 2017-08-25 | Stop reason: HOSPADM

## 2017-08-23 RX ORDER — ACETAMINOPHEN 500 MG
2 TABLET ORAL DAILY
Status: ON HOLD | COMMUNITY
End: 2017-08-24 | Stop reason: CLARIF

## 2017-08-23 RX ORDER — CHOLECALCIFEROL (VITAMIN D3) 50 MCG
1 TABLET ORAL DAILY
COMMUNITY
End: 2017-08-23

## 2017-08-23 RX ADMIN — NYSTATIN 500000 UNITS: 500000 SUSPENSION ORAL at 11:08

## 2017-08-23 RX ADMIN — DEXAMETHASONE SODIUM PHOSPHATE 4 MG: 4 INJECTION, SOLUTION INTRAMUSCULAR; INTRAVENOUS at 06:08

## 2017-08-23 RX ADMIN — NYSTATIN 500000 UNITS: 500000 SUSPENSION ORAL at 06:08

## 2017-08-23 RX ADMIN — LEVETIRACETAM 500 MG: 5 INJECTION INTRAVENOUS at 09:08

## 2017-08-23 RX ADMIN — DEXAMETHASONE SODIUM PHOSPHATE 4 MG: 4 INJECTION, SOLUTION INTRAMUSCULAR; INTRAVENOUS at 12:08

## 2017-08-23 RX ADMIN — LEVETIRACETAM 500 MG: 5 INJECTION INTRAVENOUS at 10:08

## 2017-08-23 RX ADMIN — SODIUM CHLORIDE, SODIUM LACTATE, POTASSIUM CHLORIDE, AND CALCIUM CHLORIDE: .6; .31; .03; .02 INJECTION, SOLUTION INTRAVENOUS at 02:08

## 2017-08-23 NOTE — TREATMENT PLAN
Brief Note (full note to follow):    Patient seen this a.m. And chart reviewed.     Outside images now in [facility] and reviewed.     Impression:  LBO above previous anastomosis of unknown etiology. Not complete as patient is having bowel function and passing flatus but degree of transverse colon dilation is significant.     - Will round on patient shortly with Dr. Chilel  - May need flex sigmoidoscopy to eval area/lesion     Michelle Rosales MD  Colorectal surgery fellow

## 2017-08-23 NOTE — ED PROVIDER NOTES
Encounter Date: 8/22/2017       History     Chief Complaint   Patient presents with    Other     Pt transferred from [facility] by private vehicle. Pt sent here for teratment of pulmonary embolism     52-year-old male with extensive past medical history presents to the ED as a transfer from [facility] for evaluation of multiple clinical abnormalities.     Patient's past medical history significant for rectal cancer with metastasis.  [facility]  He is followed here by Colon and rectal surgery.  Most recently he had metastasis to the brain with surgery performed by neurosurgery here.  His Coumadin was held due to the gamma knife procedure.  Subsequently he has developed a pulmonary embolism, of note patient has a history of pulmonary emboli and was on Coumadin for this.  2 nights ago he began having symptoms that he had in the past when he was diagnosed with a PE.  Symptoms include shortness of breath GRCEO pains in the chest fatigue and weakness.  CT scan at outside facility reveals PE present.  Additionally patient has been having lower abdominal pain and distention.  Last bowel movement was earlier today.  CT scan at outside facility revealed possible bowel obstruction in addition to his PE finding.  Currently the patient is stable and resting well.  He is in no acute distress at this time.            Review of patient's allergies indicates:  No Known Allergies  Past Medical History:   Diagnosis Date    Cancer      Past Surgical History:   Procedure Laterality Date    HERNIA REPAIR      ILEOSTOMY REVISION      rectal tumor      removed     History reviewed. No pertinent family history.  Social History   Substance Use Topics    Smoking status: Never Smoker    Smokeless tobacco: Former User    Alcohol use 1.2 oz/week     2 Cans of beer per week     Review of Systems   Constitutional: Positive for fatigue and fever.   HENT: Negative for  sore throat.    Respiratory: Positive for cough and shortness of breath.    Cardiovascular: Positive for chest pain.   Gastrointestinal: Positive for abdominal pain and diarrhea. Negative for nausea.   Genitourinary: Negative for dysuria.   Musculoskeletal: Negative for back pain.   Skin: Negative for rash.   Neurological: Positive for weakness.   Hematological: Does not bruise/bleed easily.       Physical Exam     Initial Vitals [08/22/17 2334]   BP Pulse Resp Temp SpO2   (!) 140/96 68 18 97.6 °F (36.4 °C) 97 %      MAP       110.67         Physical Exam    Constitutional: Vital signs are normal. He appears well-developed and well-nourished. He is diaphoretic. No distress.   HENT:   Head: Normocephalic and atraumatic.   Right Ear: External ear normal.   Left Ear: External ear normal.   Eyes: Conjunctivae and EOM are normal. Right eye exhibits no discharge. Left eye exhibits no discharge.   Cardiovascular: Normal rate, regular rhythm and normal heart sounds. Exam reveals no gallop and no friction rub.    No murmur heard.  Pulmonary/Chest: Breath sounds normal. No respiratory distress. He has no wheezes. He has no rhonchi. He has no rales. He exhibits no tenderness.   No pain on exam, clear to auscultation with good air movement   Abdominal: Soft. Normal appearance, normal aorta and bowel sounds are normal. He exhibits no distension and no mass. There is no tenderness. There is no rebound and no guarding.   No pain on exam   Musculoskeletal: Normal range of motion.   Neurological: He is alert and oriented to person, place, and time.   Skin: Skin is warm and intact.   Psychiatric: He has a normal mood and affect. His speech is normal and behavior is normal. Cognition and memory are normal.         ED Course   Procedures  Labs Reviewed - No data to display          Medical Decision Making:   ED Management:  52-year-old male with metastatic rectal cancer, s/p chemo and surgery.  Patient also has new development of a PE  diagnosed at outside hospital, but given recent Neurosurgery here to excise brain mass, sent here for recommendations on anti-coagulation. No active SOB/hypoxia while at rest. Also found to have possible large bowel obstruction on CT.     Discussed with Neurosurgery, who recommends admission to IM for management PE, and they will see pt and make recs. CRS think obstruction very unlikely since pt passing gas and no emesis/tenderness, they will also follow.    Will admit the patient to internal medicine for further workup and treatment.  Patient stable in the ED.                Attending Attestation:     Physician Attestation Statement for NP/PA:   I have conducted a face to face encounter with this patient in addition to the NP/PA, due to Medical Complexity    Other NP/PA Attestation Additions:      Medical Decision Making:     Metastatic colon CA on chemo, off Coumadin due to recent brain met surgery, with worsening GRECO, dx with PE at outside ED, sent here for Neurosurgery consult regarding anti-coagulation given recent surgery. Also possible large bowel obstruction on outside CT, CRS will consult but he is passing gas so unlikely complete obstruction. Per NS recs, admitted to IM                 ED Course     Clinical Impression:   The primary encounter diagnosis was Other acute pulmonary embolism without acute cor pulmonale. Diagnoses of Rectal cancer and Metastatic malignant neoplasm, unspecified site were also pertinent to this visit.    Disposition:   Disposition: Admitted  Condition: Stable                        Enoc Ribeiro PA-C  08/23/17 0555       John Currie MD  08/23/17 0708

## 2017-08-23 NOTE — SUBJECTIVE & OBJECTIVE
Past Medical History:   Diagnosis Date    Cancer        Past Surgical History:   Procedure Laterality Date    HERNIA REPAIR      ILEOSTOMY REVISION      rectal tumor      removed       Review of patient's allergies indicates:   Allergen Reactions    No known drug allergies        No current facility-administered medications on file prior to encounter.      Current Outpatient Prescriptions on File Prior to Encounter   Medication Sig    dexamethasone (DECADRON) 4 MG Tab Take 1 tablet (4 mg total) by mouth every 6 (six) hours.    diphenoxylate-atropine 2.5-0.025 mg (LOMOTIL) 2.5-0.025 mg per tablet Take 1 tablet by mouth 4 (four) times daily as needed for Diarrhea.    ergocalciferol (VITAMIN D2) 50,000 unit Cap Take 50,000 Units by mouth every 7 days.    levetiracetam (KEPPRA) 500 MG Tab Take 1 tablet (500 mg total) by mouth 2 (two) times daily.    pantoprazole (PROTONIX) 40 MG tablet Take 1 tablet (40 mg total) by mouth once daily.    promethazine (PHENERGAN) 25 MG tablet Take 25 mg by mouth every 4 (four) hours.    [DISCONTINUED] olsalazine (DIPENTUM) 250 mg capsule Take 500 mg by mouth 2 (two) times daily.     Family History     None        Social History Main Topics    Smoking status: Never Smoker    Smokeless tobacco: Former User    Alcohol use 1.2 oz/week     2 Cans of beer per week    Drug use: No    Sexual activity: No     Review of Systems   Constitutional: Positive for fatigue. Negative for chills and fever.   HENT: Negative for ear pain, hearing loss, mouth sores and sore throat.    Eyes: Negative for photophobia, pain and visual disturbance.   Respiratory: Positive for chest tightness (resolved) and shortness of breath. Negative for cough.    Cardiovascular: Positive for leg swelling. Negative for chest pain and palpitations.   Gastrointestinal: Positive for abdominal distention and diarrhea (Increased frequency of watery stools). Negative for abdominal pain, anal bleeding, blood in stool,  constipation, nausea, rectal pain and vomiting.   Endocrine: Negative for polydipsia and polyuria.   Genitourinary: Negative for dysuria and hematuria.   Musculoskeletal: Negative for arthralgias and myalgias.   Skin: Negative for rash and wound.   Neurological: Negative for dizziness, syncope and light-headedness.     Objective:     Vital Signs (Most Recent):  Temp: 97.6 °F (36.4 °C) (08/22/17 2334)  Pulse: 82 (08/23/17 0417)  Resp: 18 (08/22/17 2334)  BP: (!) 143/92 (08/23/17 0417)  SpO2: 100 % (08/23/17 0417) Vital Signs (24h Range):  Temp:  [97.6 °F (36.4 °C)] 97.6 °F (36.4 °C)  Pulse:  [59-82] 82  Resp:  [18] 18  SpO2:  [97 %-100 %] 100 %  BP: (129-143)/(85-96) 143/92     Weight: 112 kg (247 lb)  Body mass index is 30.87 kg/m².    Physical Exam   Constitutional: He is oriented to person, place, and time. He appears well-developed and well-nourished. No distress.   HENT:   Head: Normocephalic and atraumatic.   Eyes: Conjunctivae and EOM are normal. Pupils are equal, round, and reactive to light.   Neck: Normal range of motion. Neck supple. No JVD present.   Cardiovascular: Normal rate, regular rhythm, normal heart sounds and intact distal pulses.  Exam reveals no gallop and no friction rub.    No murmur heard.  Pulmonary/Chest: No tachypnea. No respiratory distress. He has decreased breath sounds in the right middle field, the right lower field, the left middle field and the left lower field. He has no wheezes. He has no rhonchi. He has no rales. He exhibits no tenderness.   Abdominal: Soft. He exhibits distension. Bowel sounds are absent. There is no tenderness.   Well healed surgical scars   Musculoskeletal: He exhibits edema (BL LE edema). He exhibits no tenderness or deformity.   Neurological: He is alert and oriented to person, place, and time. No cranial nerve deficit.   Skin: Skin is warm and dry. No rash noted. He is not diaphoretic.   Psychiatric: He has a normal mood and affect. His behavior is normal.    Vitals reviewed.      Significant Labs:   CBC:   Recent Labs  Lab 08/23/17 0316   WBC 7.38   HGB 15.0   HCT 41.7   PLT 56*     CMP:   Recent Labs  Lab 08/23/17 0316      K 3.8      CO2 23   GLU 88   BUN 20   CREATININE 0.7   CALCIUM 7.9*   PROT 5.5*   ALBUMIN 2.8*   BILITOT 1.9*   ALKPHOS 61   AST 21   ALT 49*   ANIONGAP 9   EGFRNONAA >60.0     Coagulation: PENDING

## 2017-08-23 NOTE — CONSULTS
[facility]  Colorectal Surgery  Consult Note    Patient Name: Claude Penn III  MRN: [id number]  Admission Date: 8/23/2017  Hospital Length of Stay: 0 days  Attending Physician: Jesse Ritter MD  Primary Care Provider: Dallas Agarwal MD    Consults  Subjective:     Chief Complaint/Reason for Admission: abdominal distension, SOB    History of Present Illness:  Claude Penn is a 52 year old male patient with history of stage 4 rectal cancer s/p LAR and DLI and subsequent takedown at [facility] in 2013 with bilateral lung metastases s/p FOLFOX then FOLFIRI/avastin followed by maintenance 5FU/alicja then FOLFOX alicja followed by FOLFIRI & 7/2017  with multiple R brain mets s/p 8/11 gamma knife at [facility] and 2013 PE (previously on coumadin; held 7/31). He has continued lung metastases and brain metastases and is s/p recent right frontal craniotomy and tumor resection on 7/31/2017 by Dr. Alonso.     He presented as a transfer early this morning from [facility] ([facility]) for recurrent PE and management of anticoagulation given recent neurosurgery as well as with complaints of abdominal pain, bloating and diarrhea. He is still passing flatus and having some bowel function. He has not noted any blood. He had a CT scan at the outside hospital which showed a distended transverse colon continuing down to the rectosigmoid junction with decompressed sigmoid. On time of exam, he is hemodynamically stable. Of note, approximately 1 month ago, abdominal CT without rectosigmoid transition point.       PTA Medications   Medication Sig    cholecalciferol, vitamin D3, 2,000 unit Cap Take 2 capsules by mouth once daily.    dexamethasone (DECADRON) 4 MG Tab Take 1 tablet (4 mg total) by mouth every 6 (six) hours.    diphenoxylate-atropine 2.5-0.025 mg (LOMOTIL) 2.5-0.025 mg per tablet Take 1 tablet by mouth 4 (four) times daily as needed for Diarrhea.    ergocalciferol (VITAMIN D2) 50,000 unit  Cap Take 50,000 Units by mouth every 7 days.    levetiracetam (KEPPRA) 500 MG Tab Take 1 tablet (500 mg total) by mouth 2 (two) times daily.    pantoprazole (PROTONIX) 40 MG tablet Take 1 tablet (40 mg total) by mouth once daily.    promethazine (PHENERGAN) 25 MG tablet Take 25 mg by mouth every 4 (four) hours.       Review of patient's allergies indicates:   Allergen Reactions    No known drug allergies        Past Medical History:   Diagnosis Date    Cancer      Past Surgical History:   Procedure Laterality Date    HERNIA REPAIR      ILEOSTOMY REVISION      rectal tumor      removed     Family History     None        Social History Main Topics    Smoking status: Never Smoker    Smokeless tobacco: Former User    Alcohol use 1.2 oz/week     2 Cans of beer per week    Drug use: No    Sexual activity: No     Review of Systems   Constitutional: Positive for fatigue. Negative for chills, diaphoresis and fever.   Eyes: Negative for visual disturbance.   Respiratory: Positive for shortness of breath. Negative for cough.    Cardiovascular: Positive for chest pain.   Gastrointestinal: Positive for abdominal distention, abdominal pain and diarrhea. Negative for blood in stool, constipation, nausea, rectal pain and vomiting.   Genitourinary: Negative for dysuria.   Skin: Negative for color change.   Neurological: Negative for dizziness and syncope.     Objective:     Vital Signs (Most Recent):  Temp: 97.7 °F (36.5 °C) (08/23/17 1200)  Pulse: 62 (08/23/17 1200)  Resp: 18 (08/23/17 1200)  BP: 119/73 (08/23/17 1200)  SpO2: 98 % (08/23/17 1200) Vital Signs (24h Range):  Temp:  [96 °F (35.6 °C)-97.7 °F (36.5 °C)] 97.7 °F (36.5 °C)  Pulse:  [59-82] 62  Resp:  [16-18] 18  SpO2:  [92 %-100 %] 98 %  BP: (117-143)/(73-96) 119/73     Weight: 112 kg (247 lb)  Body mass index is 30.87 kg/m².    Physical Exam   Constitutional: He is oriented to person, place, and time. He appears well-developed.   HENT:   Head: Normocephalic  and atraumatic.   Eyes: EOM are normal.   Cardiovascular: Normal rate and regular rhythm.    Pulmonary/Chest: Effort normal.   Abdominal: Soft. He exhibits distension. He exhibits no mass. There is tenderness (mild to palpation). There is no rebound and no guarding. No hernia.   Hyperechoic on percussion c/w increased gas   Musculoskeletal: Normal range of motion.   Neurological: He is alert and oriented to person, place, and time.   Skin: Skin is warm. Capillary refill takes less than 2 seconds.   jaundiced   Psychiatric: He has a normal mood and affect. His behavior is normal.   Nursing note and vitals reviewed.      Significant Labs:  BMP (Last 3 Results):   Recent Labs  Lab 08/23/17 0316   GLU 88      K 3.8      CO2 23   BUN 20   CREATININE 0.7   CALCIUM 7.9*     CBC (Last 3 Results):   Recent Labs  Lab 08/23/17 0316   WBC 7.38   RBC 4.80   HGB 15.0   HCT 41.7   PLT 56*   MCV 87   MCH 31.3*   MCHC 36.0     Coagulation: No results for input(s): LABPROT, INR, APTT in the last 168 hours.  LFTs:   Recent Labs  Lab 08/23/17 0316   ALT 49*   AST 21   ALKPHOS 61   BILITOT 1.9*   PROT 5.5*   ALBUMIN 2.8*       Significant Diagnostics:  CT: I have reviewed all pertinent results/findings within the past 24 hours:  Outside CT scan with dilated transverse and descending colon with transition point at rectosigmoid junction and without obvious mass    Assessment/Plan:     Adenocarcinoma of rectum    Metastatic rectal cancer dx in 2013 s/p multiple systemic therapies and resection of primary and brain metastases who presents with SOB and abdominal distension and findings of recurrent PE and possible LBO.     - Gastrograffin enema today  - Continue NPO (minimal sips of clears ok)  - Possible flexible sigmoidoscopy tomorrow pending GGE findings   - Discussed with patient that we would consider a stent or colostomy for palliation of symptoms as patient may not tolerate a large operation at time             Thank  you for your consult. I will follow-up with patient. Please contact us if you have any additional questions.    Laverne Rosales MD  Colorectal Surgery  [facility]

## 2017-08-23 NOTE — HPI
Mr. Trujillo is a 53 yo gentleman w/ h/o Stage 4 CRC w/ BL lung mets s/p 2013 resection &  FOLFOX then FOLFIRI/avastin followed by maintenance 5FU/alicja then FOLFOX alicja followed by FOLFIRI & 7/2017 multiple R brain mets s/p 8/11 gamma knife at [facility] and 2013 PE (previously on coumadin; held 7/31) transfered from [facility] ([facility])for recurrent PE & large bowel obstruction.  Patient reports that since his 8/11 gamma knife he has been progressively more short of breath.  During this interval he has also been having worsening abdominal distention & pain with associated increased flatulance but with + BM's.  Two days ago his wife noticed that his GRECO had progressed to a point similar to 2013 when he was Dx w/ a PE.  That evening she also noticed he was having increased work of breathing and decided to take him to [facility] ED.  In the ED he had a chest CT notable for a near occlusive segmental acute PE in the RML & abdominal CT suggestive of large bowel obstruction.  In light of his recent procedures he was transferred to [facility] for CRS & NSGY evaluation.

## 2017-08-23 NOTE — ASSESSMENT & PLAN NOTE
Metastatic rectal cancer dx in 2013 s/p multiple systemic therapies and resection of primary and brain metastases who presents with SOB and abdominal distension and findings of recurrent PE and possible LBO.     - Gastrograffin enema today  - Continue NPO (minimal sips of clears ok)  - Possible flexible sigmoidoscopy tomorrow pending GGE findings   - Discussed with patient that we would consider a stent or colostomy for palliation of symptoms as patient may not tolerate a large operation at time

## 2017-08-23 NOTE — ASSESSMENT & PLAN NOTE
At home on keppra for seizure PPx  -Home keppra 500 mg IV BID converted to IV while NPO  -Seizure precautions

## 2017-08-23 NOTE — HPI
Patient is a 52 year old male with a history of rectal cancer s/p resection and brain metastasis s/p resection by this service last month as well as recent SRS who presents as transfer from OSH with PE.  He does have a history of PE for which he was previously on coumadin, which was stopped for his gamma knife procedure. He was having some unusual breathing yesterday, wife felt similar to when he last had PE, so she brought him in. He has also been having issues with gas and abdominal pain, CT demonstrated possible LBO. Neurosurgery is consulted for recommendations regarding anticoagulation. Patient denies headaches, nausea, vomiting, visual changes, weakness, and numbness.

## 2017-08-23 NOTE — PROGRESS NOTES
[facility]  Neurosurgery  Progress Note    Subjective:     History of Present Illness: Patient is a 52 year old male with a history of rectal cancer s/p resection and brain metastasis s/p resection by this service last month as well as recent SRS who presents as transfer from [facility] with PE.  He does have a history of PE for which he was previously on coumadin, which was stopped for his gamma knife procedure. He was having some unusual breathing yesterday, wife felt similar to when he last had PE, so she brought him in. He has also been having issues with gas and abdominal pain, CT demonstrated possible LBO. Neurosurgery is consulted for recommendations regarding anticoagulation. Patient denies headaches, nausea, vomiting, visual changes, weakness, and numbness.    Post-Op Info:  * No surgery found *       Interval History: Wife is at bedside, requesting pathology report results.  Patient complains of abdominal pain & gas.  No new headaches, weakness, or vision changes.    Medications:  Continuous Infusions:   lactated Ringers 100 mL/hr at 08/23/17 1416     Scheduled Meds:   dexamethasone  4 mg Intravenous Q6H    levetiracetam IVPB  500 mg Intravenous Q12H    nystatin  500,000 Units Oral QID     PRN Meds:ondansetron, ondansetron     Review of Systems  Objective:     Weight: 112 kg (247 lb)  Body mass index is 30.87 kg/m².  Vital Signs (Most Recent):  Temp: 97.7 °F (36.5 °C) (08/23/17 1200)  Pulse: 62 (08/23/17 1200)  Resp: 18 (08/23/17 1200)  BP: 119/73 (08/23/17 1200)  SpO2: 98 % (08/23/17 1200) Vital Signs (24h Range):  Temp:  [96 °F (35.6 °C)-97.7 °F (36.5 °C)] 97.7 °F (36.5 °C)  Pulse:  [59-82] 62  Resp:  [16-18] 18  SpO2:  [92 %-100 %] 98 %  BP: (117-143)/(73-96) 119/73       Date 08/23/17 0700 - 08/24/17 0659   Shift 3947-0229 1177-3820 9329-7047 24 Hour Total   I  N  T  A  K  E   Shift Total  (mL/kg)       O  U  T  P  U  T   Urine  (mL/kg/hr) 100  (0.1)   100    Shift Total  (mL/kg)  100  (0.9)   100  (0.9)   Weight (kg) 112 112 112 112                        Neurosurgery Physical Exam   General: well developed, well nourished, no distress  Head: normocephalic, atraumatic  Neurologic: Alert and oriented. Thought content appropriate  GCS: Motor: 6/Verbal: 5/Eyes: 4 GCS Total: 15  Mental Status: Awake, Alert, Oriented x 4  Language: No aphasia  Speech: No dysarthria  Cranial nerves: face symmetric, tongue midline, CN II-XII grossly intact.   Eyes: pupils equal, round, reactive to light with accommodation, EOMI  Pulmonary: normal respirations, not labored, no accessory muscles used  Abdomen: soft, non-distended, not tender to palpation  Sensory: intact to light touch throughout  Motor Strength: Moves all extremities spontaneously with good tone.  Full strength upper and lower extremities. No abnormal movements seen.     Strength  Deltoids Triceps Biceps Wrist Extension Wrist Flexion Hand    Upper: R 5/5 5/5 5/5 5/5 5/5 5/5    L 5/5 5/5 5/5 5/5 5/5 5/5     Iliopsoas Quadriceps Knee  Flexion Tibialis  anterior Gastro- cnemius EHL   Lower: R 5/5 5/5 5/5 5/5 5/5 5/5    L 5/5 5/5 5/5 5/5 5/5 5/5     Pronator Drift: no drift noted  Finger-to-nose: Intact bilaterally  Hernandez: absent  Clonus: absent  Babinski: absent  Pulses: 2+ and symmetric radial and dorsalis pedis  Skin: warm, dry and intact, no rashes  Incision is healing well in the right temporal region.  There is no drainage, erythema, or edema.        Significant Labs:    Recent Labs  Lab 08/23/17  0316   GLU 88      K 3.8      CO2 23   BUN 20   CREATININE 0.7   CALCIUM 7.9*       Recent Labs  Lab 08/23/17  0316   WBC 7.38   HGB 15.0   HCT 41.7   PLT 56*     No results for input(s): LABPT, INR, APTT in the last 48 hours.  Microbiology Results (last 7 days)     ** No results found for the last 168 hours. **        Pathology Report: supports the diagnosis of a metastatic adenocarcinoma consistent with a colorectal primary  tumor.    Significant Diagnostics:  No new imaging    Assessment/Plan:     Frontal mass of brain    52M PMHx rectal cancer s/p resection with brain mets s/p resection and gamma knife presenting with PE and possible LBO. Neurointact on exam. Neurosurgery consulted for anticoagulation recs in the setting of recent neurosurgical procedures.  -Neurologically stable  -OK to anticoagulate from neurosurgery standpoint with whatever agent the primary team feels is appropriate  -Discussed pathology report, consistent with colorectal primary, as expected  -Will schedule follow up in outpatient setting with Dr. Alonso in 2 weeks  -Will sign off at this time  -Rounded on patient with Dr. Celeste Florez PA-C  Neurosurgery  [facility]

## 2017-08-23 NOTE — ED TRIAGE NOTES
Pt presents to ED after driving from [facility] for PE. Pt stated that he had a brain tumor removed here 3 weeks ago and stated that he was taken off of his blood thinners. Pt was diagnosed with PE at [facility] and was sent here. Pt denies CP, SOB, N/V/D.     LOC: Patient name and date of birth verified.  The patient is awake, alert and aware of environment with an appropriate affect, the patient is oriented x 3 and speaking appropriately.  Pt in NAD.    APPEARANCE: Patient resting comfortably and in no acute distress, patient is clean and well groomed, patient's clothing is properly fastened.  SKIN: The skin is warm and dry, color consistent with ethnicity, patient has normal skin turgor and moist mucus membranes, skin intact, no breakdown or brusing noted.  MUSCULOSKELETAL: Patient moving all extremities well, no obvious swelling or deformities noted.  RESPIRATORY: Airway is open and patent, respirations are spontaneous, patient has a normal effort and rate, no accessory muscle use noted.  CARDIAC: Patient has a normal rate and rhythm, no periphreal edema noted, capillary refill < 3 seconds.  ABDOMEN: Soft and non tender to palpation, no distention noted. Bowel sounds present in all four quadrants.  NEUROLOGIC: Eyes open spontaneously, behavior appropriate to situation, follows commands, facial expression symmetrical, bilateral hand grasp equal and even, purposeful motor response noted, normal sensation in all extremities when touched with a finger.

## 2017-08-23 NOTE — ASSESSMENT & PLAN NOTE
52M PMHx rectal cancer s/p resection with brain mets s/p resection and gamma knife presenting with PE and possible LBO. Neurointact on exam. Neurosurgery consulted for anticoagulation recs in the setting of recent neurosurgical procedures.  -Neurologically stable  -OK to anticoagulate from neurosurgery standpoint with whatever agent the primary team feels is appropriate  -Discussed pathology report, consistent with colorectal primary, as expected  -Will schedule follow up in outpatient setting with Dr. Alonso in 2 weeks  -Will sign off at this time  -Rounded on patient with Dr. Alonso

## 2017-08-23 NOTE — ASSESSMENT & PLAN NOTE
Patient with known mets presenting with recurrent PE.  Normally would restart AC at this time, but in light of his recent procedures with NSGY, holding AC pending their evaluation for safety to resume AC.  -Holding AC pending NSGY evaluation  -NSGY is aware of patient, no need to contact in am

## 2017-08-23 NOTE — HOSPITAL COURSE
He was admitted to the hospitalist service from the Ed. He has had some flatus this morning as well as some bowel function.

## 2017-08-23 NOTE — CONSULTS
[facility]  Neurosurgery  Consult Note    Consults  Subjective:     Chief Complaint/Reason for Admission: PE    History of Present Illness: Patient is a 52 year old male with a history of rectal cancer s/p resection and brain metastasis s/p resection by this service last month as well as recent SRS who presents as transfer from [facility] with PE.  He does have a history of PE for which he was previously on coumadin, which was stopped for his gamma knife procedure. He was having some unusual breathing yesterday, wife felt similar to when he last had PE, so she brought him in. He has also been having issues with gas and abdominal pain, CT demonstrated possible LBO. Neurosurgery is consulted for recommendations regarding anticoagulation. Patient denies headaches, nausea, vomiting, visual changes, weakness, and numbness.      (Not in a hospital admission)    Review of patient's allergies indicates:  No Known Allergies    Past Medical History:   Diagnosis Date    Cancer      Past Surgical History:   Procedure Laterality Date    HERNIA REPAIR      ILEOSTOMY REVISION      rectal tumor      removed     Family History     None        Social History Main Topics    Smoking status: Never Smoker    Smokeless tobacco: Former User    Alcohol use 1.2 oz/week     2 Cans of beer per week    Drug use: No    Sexual activity: No     Review of Systems   Constitutional: Positive for fatigue and fever.   HENT: Negative for sore throat.    Respiratory: Positive for cough and shortness of breath.    Cardiovascular: Positive for chest pain.   Gastrointestinal: Positive for abdominal pain and diarrhea. Negative for nausea.   Genitourinary: Negative for dysuria.   Musculoskeletal: Negative for back pain.   Skin: Negative for rash.   Neurological: Negative for weakness, numbness, seizure, AMS.   Hematological: Does not bruise/bleed easily    Objective:     Weight: 112 kg (247 lb)  Body mass index is 30.87 kg/m².  Vital  Signs (Most Recent):  Temp: 97.6 °F (36.4 °C) (08/22/17 2334)  Pulse: 82 (08/23/17 0417)  Resp: 18 (08/22/17 2334)  BP: (!) 143/92 (08/23/17 0417)  SpO2: 100 % (08/23/17 0417) Vital Signs (24h Range):  Temp:  [97.6 °F (36.4 °C)] 97.6 °F (36.4 °C)  Pulse:  [59-82] 82  Resp:  [18] 18  SpO2:  [97 %-100 %] 100 %  BP: (129-143)/(85-96) 143/92     Physical Exam:    Constitutional: He appears well-developed and well-nourished.     Eyes: Pupils are equal, round, and reactive to light. EOM are normal.     Cardiovascular: Normal rate.     Abdominal: Soft.     Psych/Behavior: He is alert. He is oriented to person, place, and time.     Musculoskeletal:        Right Upper Extremities: Muscle strength is 5/5.        Left Upper Extremities: Muscle strength is 5/5.       Right Lower Extremities: Muscle strength is 5/5.        Left Lower Extremities: Muscle strength is 5/5.     Neurological:        Sensory: There is no sensory deficit in the trunk. There is no sensory deficit in the extremities.        DTRs: DTRs are DTRS NORMAL AND SYMMETRICnormal and symmetric.        Cranial nerves: Cranial nerve(s) II, III, IV, V, VI, VII, VIII, IX, X, XI and XII are intact.       Significant Labs:    Recent Labs  Lab 08/23/17  0316   GLU 88      K 3.8      CO2 23   BUN 20   CREATININE 0.7   CALCIUM 7.9*       Recent Labs  Lab 08/23/17 0316   WBC 7.38   HGB 15.0   HCT 41.7   PLT 56*     No results for input(s): LABPT, INR, APTT in the last 48 hours.  Microbiology Results (last 7 days)     ** No results found for the last 168 hours. **          Significant Diagnostics:  I have reviewed all pertinent imaging results/findings within the past 24 hours.    Assessment/Plan:     Frontal mass of brain    52M PMH rectal cancer s/p resection with brain mets s/p resection and gamma knife presenting with PE and possible LBO. Neurointact on exam. Neurosurgery consulted for anticoagulation recs in the setting of recent neurosurgical  procedures.    -- OK to anticoagulate per primary team.  -- Recommend follow up CTH without contrast after therapeutic on anticoagulation.  -- Medical management per primary team.  -- Will follow, call with any questions.            Thank you for your consult. I will follow-up with patient. Please contact us if you have any additional questions.    Alfonso Mclaughlin MD  Neurosurgery  [facility]

## 2017-08-23 NOTE — PLAN OF CARE
Dallas Agarwal MD   [address] / [place] [address]       [id number] - [place], [place] - [address]  [address] [place] [address]  Phone: [phone] Fax: [phone]    Payor: BLUE CROSS BLUE SHIELD / Plan: [place] PPO / Product Type: PPO /         08/23/17 1158   Discharge Assessment   Assessment Type Discharge Planning Assessment   Confirmed/corrected address and phone number on facesheet? Yes   Assessment information obtained from? Patient;Caregiver   Expected Length of Stay (days) 1   Communicated expected length of stay with patient/caregiver yes   Prior to hospitilization cognitive status: Alert/Oriented   Prior to hospitalization functional status: Independent   Current cognitive status: Alert/Oriented   Current Functional Status: Independent   Lives With spouse   Able to Return to Prior Arrangements yes   Is patient able to care for self after discharge? Yes   Patient's perception of discharge disposition acute care hospital   Readmission Within The Last 30 Days current reason for admission unrelated to previous admission   Patient currently being followed by outpatient case management? No   Patient currently receives any other outside agency services? No   Equipment Currently Used at Home none   Do you have any problems affording any of your prescribed medications? No   Is the patient taking medications as prescribed? yes   Does the patient have transportation home? Yes   Transportation Available family or friend will provide   Does the patient receive services at the [facility]? Yes   Discharge Plan A Home with family   Patient/Family In Agreement With Plan yes   Readmission Questionnaire   At the time of your discharge, did someone talk to you about what your health problems were? Yes   At the time of discharge, did someone talk to you about what to watch out for regarding worsening of your health problem? Yes   At the time of discharge, did someone talk to you about what to do  if you experienced worsening of your health problem? Yes   At the time of discharge, did someone talk to you about which medication to take when you left the hospital and which ones to stop taking? Yes   At the time of discharge, did someone talk to you about when and where to follow up with a doctor after you left the hospital? Yes   Do you have problems taking your medications as prescribed? No   Do you have any problems affording any of  your prescribed medications? No   Do you have problems obtaining/receiving your medications? No   Does the patient have transportation to healthcare appointments? Yes   Living Arrangements house   Does the patient have family/friends to help with healtcare needs after discharge? yes   Does your caregiver provide all the help you need? Yes

## 2017-08-23 NOTE — SUBJECTIVE & OBJECTIVE
PTA Medications   Medication Sig    cholecalciferol, vitamin D3, 2,000 unit Cap Take 2 capsules by mouth once daily.    dexamethasone (DECADRON) 4 MG Tab Take 1 tablet (4 mg total) by mouth every 6 (six) hours.    diphenoxylate-atropine 2.5-0.025 mg (LOMOTIL) 2.5-0.025 mg per tablet Take 1 tablet by mouth 4 (four) times daily as needed for Diarrhea.    ergocalciferol (VITAMIN D2) 50,000 unit Cap Take 50,000 Units by mouth every 7 days.    levetiracetam (KEPPRA) 500 MG Tab Take 1 tablet (500 mg total) by mouth 2 (two) times daily.    pantoprazole (PROTONIX) 40 MG tablet Take 1 tablet (40 mg total) by mouth once daily.    promethazine (PHENERGAN) 25 MG tablet Take 25 mg by mouth every 4 (four) hours.       Review of patient's allergies indicates:   Allergen Reactions    No known drug allergies        Past Medical History:   Diagnosis Date    Cancer      Past Surgical History:   Procedure Laterality Date    HERNIA REPAIR      ILEOSTOMY REVISION      rectal tumor      removed     Family History     None        Social History Main Topics    Smoking status: Never Smoker    Smokeless tobacco: Former User    Alcohol use 1.2 oz/week     2 Cans of beer per week    Drug use: No    Sexual activity: No     Review of Systems   Constitutional: Positive for fatigue. Negative for chills, diaphoresis and fever.   Eyes: Negative for visual disturbance.   Respiratory: Positive for shortness of breath. Negative for cough.    Cardiovascular: Positive for chest pain.   Gastrointestinal: Positive for abdominal distention, abdominal pain and diarrhea. Negative for blood in stool, constipation, nausea, rectal pain and vomiting.   Genitourinary: Negative for dysuria.   Skin: Negative for color change.   Neurological: Negative for dizziness and syncope.     Objective:     Vital Signs (Most Recent):  Temp: 97.7 °F (36.5 °C) (08/23/17 1200)  Pulse: 62 (08/23/17 1200)  Resp: 18 (08/23/17 1200)  BP: 119/73 (08/23/17 1200)  SpO2:  98 % (08/23/17 1200) Vital Signs (24h Range):  Temp:  [96 °F (35.6 °C)-97.7 °F (36.5 °C)] 97.7 °F (36.5 °C)  Pulse:  [59-82] 62  Resp:  [16-18] 18  SpO2:  [92 %-100 %] 98 %  BP: (117-143)/(73-96) 119/73     Weight: 112 kg (247 lb)  Body mass index is 30.87 kg/m².    Physical Exam   Constitutional: He is oriented to person, place, and time. He appears well-developed.   HENT:   Head: Normocephalic and atraumatic.   Eyes: EOM are normal.   Cardiovascular: Normal rate and regular rhythm.    Pulmonary/Chest: Effort normal.   Abdominal: Soft. He exhibits distension. He exhibits no mass. There is tenderness (mild to palpation). There is no rebound and no guarding. No hernia.   Hyperechoic on percussion c/w increased gas   Musculoskeletal: Normal range of motion.   Neurological: He is alert and oriented to person, place, and time.   Skin: Skin is warm. Capillary refill takes less than 2 seconds.   jaundiced   Psychiatric: He has a normal mood and affect. His behavior is normal.   Nursing note and vitals reviewed.      Significant Labs:  BMP (Last 3 Results):   Recent Labs  Lab 08/23/17  0316   GLU 88      K 3.8      CO2 23   BUN 20   CREATININE 0.7   CALCIUM 7.9*     CBC (Last 3 Results):   Recent Labs  Lab 08/23/17  0316   WBC 7.38   RBC 4.80   HGB 15.0   HCT 41.7   PLT 56*   MCV 87   MCH 31.3*   MCHC 36.0     Coagulation: No results for input(s): LABPROT, INR, APTT in the last 168 hours.  LFTs:   Recent Labs  Lab 08/23/17  0316   ALT 49*   AST 21   ALKPHOS 61   BILITOT 1.9*   PROT 5.5*   ALBUMIN 2.8*       Significant Diagnostics:  CT: I have reviewed all pertinent results/findings within the past 24 hours:  Outside CT scan with dilated transverse and descending colon with transition point at rectosigmoid junction and without obvious mass

## 2017-08-23 NOTE — H&P
[facility] Hospital Medicine  History & Physical    Patient Name: Claude Penn III  MRN: [id number]  Admission Date: 8/23/2017  Attending Physician: Jesse Ritter MD   Primary Care Provider: Dallas Agarwal MD    Hospital Medicine Team: [facility]OS[facility] MED 2 Natan Hart MD     Patient information was obtained from patient, spouse/SO, past medical records and ER records.     Subjective:     Principal Problem:Pulmonary embolism without acute cor pulmonale    Chief Complaint:   Chief Complaint   Patient presents with    Other     Pt transferred from [facility] by private vehicle. Pt sent here for teratment of pulmonary embolism        HPI: Mr. 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  Patient reports that since his 8/11 gamma knife he has been progressively more short of breath.  During this interval he has also been having worsening abdominal distention & pain with associated increased flatulance but with + BM's.  Two days ago his wife noticed that his GRECO had progressed to a point similar to 2013 when he was Dx w/ a PE.  That evening she also noticed he was having increased work of breathing and decided to take him to [facility] ED.  In the ED he had a chest CT notable for a near occlusive segmental acute PE in the RML & abdominal CT suggestive of large bowel obstruction.  In light of his recent procedures he was transferred to [facility] for CRS & NSGY evaluation.      Past Medical History:   Diagnosis Date    Cancer        Past Surgical History:   Procedure Laterality Date    HERNIA REPAIR      ILEOSTOMY REVISION      rectal tumor       removed       Review of patient's allergies indicates:   Allergen Reactions    No known drug allergies        No current facility-administered medications on file prior to encounter.      Current Outpatient Prescriptions on File Prior to Encounter   Medication Sig    dexamethasone (DECADRON) 4 MG Tab Take 1 tablet (4 mg total) by mouth every 6 (six) hours.    diphenoxylate-atropine 2.5-0.025 mg (LOMOTIL) 2.5-0.025 mg per tablet Take 1 tablet by mouth 4 (four) times daily as needed for Diarrhea.    ergocalciferol (VITAMIN D2) 50,000 unit Cap Take 50,000 Units by mouth every 7 days.    levetiracetam (KEPPRA) 500 MG Tab Take 1 tablet (500 mg total) by mouth 2 (two) times daily.    pantoprazole (PROTONIX) 40 MG tablet Take 1 tablet (40 mg total) by mouth once daily.    promethazine (PHENERGAN) 25 MG tablet Take 25 mg by mouth every 4 (four) hours.    [DISCONTINUED] olsalazine (DIPENTUM) 250 mg capsule Take 500 mg by mouth 2 (two) times daily.     Family History     None        Social History Main Topics    Smoking status: Never Smoker    Smokeless tobacco: Former User    Alcohol use 1.2 oz/week     2 Cans of beer per week    Drug use: No    Sexual activity: No     Review of Systems   Constitutional: Positive for fatigue. Negative for chills and fever.   HENT: Negative for ear pain, hearing loss, mouth sores and sore throat.    Eyes: Negative for photophobia, pain and visual disturbance.   Respiratory: Positive for chest tightness (resolved) and shortness of breath. Negative for cough.    Cardiovascular: Positive for leg swelling. Negative for chest pain and palpitations.   Gastrointestinal: Positive for abdominal distention and diarrhea (Increased frequency of watery stools). Negative for abdominal pain, anal bleeding, blood in stool, constipation, nausea, rectal pain and vomiting.   Endocrine: Negative for polydipsia and polyuria.   Genitourinary: Negative for dysuria and hematuria.   Musculoskeletal:  Negative for arthralgias and myalgias.   Skin: Negative for rash and wound.   Neurological: Negative for dizziness, syncope and light-headedness.     Objective:     Vital Signs (Most Recent):  Temp: 97.6 °F (36.4 °C) (08/22/17 2334)  Pulse: 82 (08/23/17 0417)  Resp: 18 (08/22/17 2334)  BP: (!) 143/92 (08/23/17 0417)  SpO2: 100 % (08/23/17 0417) Vital Signs (24h Range):  Temp:  [97.6 °F (36.4 °C)] 97.6 °F (36.4 °C)  Pulse:  [59-82] 82  Resp:  [18] 18  SpO2:  [97 %-100 %] 100 %  BP: (129-143)/(85-96) 143/92     Weight: 112 kg (247 lb)  Body mass index is 30.87 kg/m².    Physical Exam   Constitutional: He is oriented to person, place, and time. He appears well-developed and well-nourished. No distress.   HENT: Oral thrush  Head: Normocephalic and atraumatic.   Eyes: Conjunctivae and EOM are normal. Pupils are equal, round, and reactive to light.   Neck: Normal range of motion. Neck supple. No JVD present.   Cardiovascular: Normal rate, regular rhythm, normal heart sounds and intact distal pulses.  Exam reveals no gallop and no friction rub.    No murmur heard.  Pulmonary/Chest: No tachypnea. No respiratory distress. He has decreased breath sounds in the right middle field, the right lower field, the left middle field and the left lower field. He has no wheezes. He has no rhonchi. He has no rales. He exhibits no tenderness.   Abdominal: Soft. He exhibits distension. Bowel sounds are absent. There is no tenderness.   Well healed surgical scars   Musculoskeletal: He exhibits edema (BL LE edema). He exhibits no tenderness or deformity.   Neurological: He is alert and oriented to person, place, and time. No cranial nerve deficit.   Skin: Skin is warm and dry. No rash noted. He is not diaphoretic.   Psychiatric: He has a normal mood and affect. His behavior is normal.   Vitals reviewed.      Significant Labs:   CBC:   Recent Labs  Lab 08/23/17  0316   WBC 7.38   HGB 15.0   HCT 41.7   PLT 56*     CMP:   Recent Labs  Lab  08/23/17  0316      K 3.8      CO2 23   GLU 88   BUN 20   CREATININE 0.7   CALCIUM 7.9*   PROT 5.5*   ALBUMIN 2.8*   BILITOT 1.9*   ALKPHOS 61   AST 21   ALT 49*   ANIONGAP 9   EGFRNONAA >60.0     Coagulation: PENDING    Assessment/Plan:     * Pulmonary embolism without acute cor pulmonale    Patient with known mets presenting with recurrent PE.  He is currently stable w/ RRR noted on exam and tele.  Normally would restart AC at this time, but in light of his recent procedures with NSGY, holding AC pending their evaluation for safety to resume AC.  -Holding AC pending NSGY evaluation  -NSGY is aware of patient, no need to contact in am        Large bowel obstruction    Patient still passing stool, but his BM's have become more loose recently.  Outside CT showed a transition point in the sigmoid colon w/ distal colon decompressed.  -NPO pending evaluation by CRS  -CRS is aware of patient and will be rounding on him this morning        Cerebral edema    At home on Decadron 4 mg q6h for vasogenic edema  -Home Decadron 4 mg po converted to IV q6h        Frontal mass of brain    At home on keppra for seizure PPx  -Home keppra 500 mg IV BID converted to IV while NPO  -Seizure precautions        Brain metastases    -q4h neuro checks        Adenocarcinoma of rectum    -Holding home Lomotil 2.5-0.025 mg po q6h PRN for diarrhea        Osteopenia    -Holding home Ergocalciferol 50k U qweek        Thrombocytopenia    Sequlae of hypersplenism in setting of metastatic CRC noted on OSH CT        Thrush    -Nystatin swish & swallow QID          VTE Risk Mitigation         Ordered     Medium Risk of VTE  Once      08/23/17 0502     Place SAPPHIRE hose  Until discontinued      08/23/17 0502     Place sequential compression device  Until discontinued      08/23/17 0502     Reason for No Pharmacological VTE Prophylaxis  Once      08/23/17 0502             Natan Hart MD  Department of Hospital Medicine   [facility]

## 2017-08-23 NOTE — HPI
Claude Penn is a 52 year old male patient with history of stage 4 rectal cancer s/p LAR and DLI and subsequent takedown at [facility] in 2013 with bilateral lung metastases s/p FOLFOX then FOLFIRI/avastin followed by maintenance 5FU/alicja then FOLFOX alicja followed by FOLFIRI & 7/2017 with multiple R brain mets s/p 8/11 gamma knife at [facility] and 2013 PE (previously on coumadin; held 7/31). He has continued lung metastases and brain metastases and is s/p recent right frontal craniotomy and tumor resection on 7/31/2017 by Dr. Alonso.     He presented as a transfer early this morning from [facility] ([facility]) for recurrent PE and management of anticoagulation given recent neurosurgery as well as with complaints of abdominal pain, bloating and diarrhea. He is still passing flatus and having some bowel function. He has not noted any blood. He had a CT scan at the outside hospital which showed a distended transverse colon continuing down to the rectosigmoid junction with decompressed sigmoid. On time of exam, he is hemodynamically stable. Of note, approximately 1 month ago, abdominal CT without rectosigmoid transition point.

## 2017-08-23 NOTE — ASSESSMENT & PLAN NOTE
Patient still passing stool, but his BM's have become more loose recently.  Outside CT showed a transition point in the sigmoid colon w/ distal colon decompressed.  -NPO pending evaluation by CRS  -CRS is aware of patient and will be rounding on him this morning

## 2017-08-23 NOTE — SUBJECTIVE & OBJECTIVE
Interval History: Wife is at bedside, requesting pathology report results.  Patient complains of abdominal pain & gas.  No new headaches, weakness, or vision changes.    Medications:  Continuous Infusions:   lactated Ringers 100 mL/hr at 08/23/17 1416     Scheduled Meds:   dexamethasone  4 mg Intravenous Q6H    levetiracetam IVPB  500 mg Intravenous Q12H    nystatin  500,000 Units Oral QID     PRN Meds:ondansetron, ondansetron     Review of Systems  Objective:     Weight: 112 kg (247 lb)  Body mass index is 30.87 kg/m².  Vital Signs (Most Recent):  Temp: 97.7 °F (36.5 °C) (08/23/17 1200)  Pulse: 62 (08/23/17 1200)  Resp: 18 (08/23/17 1200)  BP: 119/73 (08/23/17 1200)  SpO2: 98 % (08/23/17 1200) Vital Signs (24h Range):  Temp:  [96 °F (35.6 °C)-97.7 °F (36.5 °C)] 97.7 °F (36.5 °C)  Pulse:  [59-82] 62  Resp:  [16-18] 18  SpO2:  [92 %-100 %] 98 %  BP: (117-143)/(73-96) 119/73       Date 08/23/17 0700 - 08/24/17 0659   Shift 8620-5839 8349-9907 5312-9158 24 Hour Total   I  N  T  A  K  E   Shift Total  (mL/kg)       O  U  T  P  U  T   Urine  (mL/kg/hr) 100  (0.1)   100    Shift Total  (mL/kg) 100  (0.9)   100  (0.9)   Weight (kg) 112 112 112 112                        Neurosurgery Physical Exam   General: well developed, well nourished, no distress  Head: normocephalic, atraumatic  Neurologic: Alert and oriented. Thought content appropriate  GCS: Motor: 6/Verbal: 5/Eyes: 4 GCS Total: 15  Mental Status: Awake, Alert, Oriented x 4  Language: No aphasia  Speech: No dysarthria  Cranial nerves: face symmetric, tongue midline, CN II-XII grossly intact.   Eyes: pupils equal, round, reactive to light with accommodation, EOMI  Pulmonary: normal respirations, not labored, no accessory muscles used  Abdomen: soft, non-distended, not tender to palpation  Sensory: intact to light touch throughout  Motor Strength: Moves all extremities spontaneously with good tone.  Full strength upper and lower extremities. No abnormal movements  seen.     Strength  Deltoids Triceps Biceps Wrist Extension Wrist Flexion Hand    Upper: R 5/5 5/5 5/5 5/5 5/5 5/5    L 5/5 5/5 5/5 5/5 5/5 5/5     Iliopsoas Quadriceps Knee  Flexion Tibialis  anterior Gastro- cnemius EHL   Lower: R 5/5 5/5 5/5 5/5 5/5 5/5    L 5/5 5/5 5/5 5/5 5/5 5/5     Pronator Drift: no drift noted  Finger-to-nose: Intact bilaterally  Hernandez: absent  Clonus: absent  Babinski: absent  Pulses: 2+ and symmetric radial and dorsalis pedis  Skin: warm, dry and intact, no rashes  Incision is healing well in the right temporal region.  There is no drainage, erythema, or edema.        Significant Labs:    Recent Labs  Lab 08/23/17  0316   GLU 88      K 3.8      CO2 23   BUN 20   CREATININE 0.7   CALCIUM 7.9*       Recent Labs  Lab 08/23/17  0316   WBC 7.38   HGB 15.0   HCT 41.7   PLT 56*     No results for input(s): LABPT, INR, APTT in the last 48 hours.  Microbiology Results (last 7 days)     ** No results found for the last 168 hours. **        Pathology Report: supports the diagnosis of a metastatic adenocarcinoma consistent with a colorectal primary tumor.    Significant Diagnostics:  No new imaging

## 2017-08-23 NOTE — ASSESSMENT & PLAN NOTE
52M PMH rectal cancer s/p resection with brain mets s/p resection and gamma knife presenting with PE and possible LBO. Neurointact on exam. Neurosurgery consulted for anticoagulation recs in the setting of recent neurosurgical procedures.    -- OK to anticoagulate per primary team.  -- Recommend follow up CTH without contrast after therapeutic on anticoagulation.  -- Medical management per primary team.  -- Will follow, call with any questions.

## 2017-08-23 NOTE — SUBJECTIVE & OBJECTIVE
(Not in a hospital admission)    Review of patient's allergies indicates:  No Known Allergies    Past Medical History:   Diagnosis Date    Cancer      Past Surgical History:   Procedure Laterality Date    HERNIA REPAIR      ILEOSTOMY REVISION      rectal tumor      removed     Family History     None        Social History Main Topics    Smoking status: Never Smoker    Smokeless tobacco: Former User    Alcohol use 1.2 oz/week     2 Cans of beer per week    Drug use: No    Sexual activity: No     Review of Systems   Constitutional: Positive for fatigue and fever.   HENT: Negative for sore throat.    Respiratory: Positive for cough and shortness of breath.    Cardiovascular: Positive for chest pain.   Gastrointestinal: Positive for abdominal pain and diarrhea. Negative for nausea.   Genitourinary: Negative for dysuria.   Musculoskeletal: Negative for back pain.   Skin: Negative for rash.   Neurological: Negative for weakness, numbness, seizure, AMS.   Hematological: Does not bruise/bleed easily    Objective:     Weight: 112 kg (247 lb)  Body mass index is 30.87 kg/m².  Vital Signs (Most Recent):  Temp: 97.6 °F (36.4 °C) (08/22/17 2334)  Pulse: 82 (08/23/17 0417)  Resp: 18 (08/22/17 2334)  BP: (!) 143/92 (08/23/17 0417)  SpO2: 100 % (08/23/17 0417) Vital Signs (24h Range):  Temp:  [97.6 °F (36.4 °C)] 97.6 °F (36.4 °C)  Pulse:  [59-82] 82  Resp:  [18] 18  SpO2:  [97 %-100 %] 100 %  BP: (129-143)/(85-96) 143/92     Physical Exam:    Constitutional: He appears well-developed and well-nourished.     Eyes: Pupils are equal, round, and reactive to light. EOM are normal.     Cardiovascular: Normal rate.     Abdominal: Soft.     Psych/Behavior: He is alert. He is oriented to person, place, and time.     Musculoskeletal:        Right Upper Extremities: Muscle strength is 5/5.        Left Upper Extremities: Muscle strength is 5/5.       Right Lower Extremities: Muscle strength is 5/5.        Left Lower Extremities:  Muscle strength is 5/5.     Neurological:        Sensory: There is no sensory deficit in the trunk. There is no sensory deficit in the extremities.        DTRs: DTRs are DTRS NORMAL AND SYMMETRICnormal and symmetric.        Cranial nerves: Cranial nerve(s) II, III, IV, V, VI, VII, VIII, IX, X, XI and XII are intact.       Significant Labs:    Recent Labs  Lab 08/23/17  0316   GLU 88      K 3.8      CO2 23   BUN 20   CREATININE 0.7   CALCIUM 7.9*       Recent Labs  Lab 08/23/17  0316   WBC 7.38   HGB 15.0   HCT 41.7   PLT 56*     No results for input(s): LABPT, INR, APTT in the last 48 hours.  Microbiology Results (last 7 days)     ** No results found for the last 168 hours. **          Significant Diagnostics:  I have reviewed all pertinent imaging results/findings within the past 24 hours.

## 2017-08-23 NOTE — ASSESSMENT & PLAN NOTE
Patient with known mets presenting with recurrent PE.  He is currently stable w/ RRR noted on exam and tele.  .  -NSGY evaluated patient.  - Ok to restarted home coumadin.  - Will initiate lovenox to coumadin bridging once CRC has finished procedures.

## 2017-08-24 LAB
ALBUMIN SERPL BCP-MCNC: 2.7 G/DL
ALP SERPL-CCNC: 60 U/L
ALT SERPL W/O P-5'-P-CCNC: 47 U/L
ANION GAP SERPL CALC-SCNC: 4 MMOL/L
AST SERPL-CCNC: 19 U/L
BILIRUB SERPL-MCNC: 1.8 MG/DL
BUN SERPL-MCNC: 16 MG/DL
CALCIUM SERPL-MCNC: 8.2 MG/DL
CHLORIDE SERPL-SCNC: 106 MMOL/L
CO2 SERPL-SCNC: 28 MMOL/L
CREAT SERPL-MCNC: 0.7 MG/DL
EST. GFR  (AFRICAN AMERICAN): >60 ML/MIN/1.73 M^2
EST. GFR  (NON AFRICAN AMERICAN): >60 ML/MIN/1.73 M^2
GLUCOSE SERPL-MCNC: 102 MG/DL
POTASSIUM SERPL-SCNC: 5 MMOL/L
PROT SERPL-MCNC: 5.1 G/DL
SODIUM SERPL-SCNC: 138 MMOL/L

## 2017-08-24 PROCEDURE — 25000003 PHARM REV CODE 250: Performed by: STUDENT IN AN ORGANIZED HEALTH CARE EDUCATION/TRAINING PROGRAM

## 2017-08-24 PROCEDURE — 11000001 HC ACUTE MED/SURG PRIVATE ROOM

## 2017-08-24 PROCEDURE — 63600175 PHARM REV CODE 636 W HCPCS: Performed by: INTERNAL MEDICINE

## 2017-08-24 PROCEDURE — 25000003 PHARM REV CODE 250: Performed by: INTERNAL MEDICINE

## 2017-08-24 PROCEDURE — 80053 COMPREHEN METABOLIC PANEL: CPT

## 2017-08-24 PROCEDURE — 36415 COLL VENOUS BLD VENIPUNCTURE: CPT

## 2017-08-24 PROCEDURE — 99233 SBSQ HOSP IP/OBS HIGH 50: CPT | Mod: ,,, | Performed by: INTERNAL MEDICINE

## 2017-08-24 PROCEDURE — 25500020 PHARM REV CODE 255: Performed by: INTERNAL MEDICINE

## 2017-08-24 RX ORDER — WARFARIN 6 MG/1
6 TABLET ORAL DAILY
Status: ON HOLD | COMMUNITY
End: 2017-08-25 | Stop reason: DRUGHIGH

## 2017-08-24 RX ORDER — CETIRIZINE HYDROCHLORIDE 10 MG/1
10 TABLET ORAL DAILY
COMMUNITY

## 2017-08-24 RX ORDER — LANOLIN ALCOHOL/MO/W.PET/CERES
2 CREAM (GRAM) TOPICAL 2 TIMES DAILY
COMMUNITY

## 2017-08-24 RX ADMIN — DEXAMETHASONE SODIUM PHOSPHATE 4 MG: 4 INJECTION, SOLUTION INTRAMUSCULAR; INTRAVENOUS at 05:08

## 2017-08-24 RX ADMIN — DEXAMETHASONE SODIUM PHOSPHATE 4 MG: 4 INJECTION, SOLUTION INTRAMUSCULAR; INTRAVENOUS at 12:08

## 2017-08-24 RX ADMIN — NYSTATIN 500000 UNITS: 500000 SUSPENSION ORAL at 05:08

## 2017-08-24 RX ADMIN — NYSTATIN 500000 UNITS: 500000 SUSPENSION ORAL at 12:08

## 2017-08-24 RX ADMIN — LEVETIRACETAM 500 MG: 5 INJECTION INTRAVENOUS at 08:08

## 2017-08-24 RX ADMIN — DEXAMETHASONE SODIUM PHOSPHATE 4 MG: 4 INJECTION, SOLUTION INTRAMUSCULAR; INTRAVENOUS at 11:08

## 2017-08-24 RX ADMIN — DIATRIZOATE MEGLUMINE AND DIATRIZOATE SODIUM 240 ML: 600; 100 SOLUTION ORAL; RECTAL at 10:08

## 2017-08-24 RX ADMIN — NYSTATIN 500000 UNITS: 500000 SUSPENSION ORAL at 11:08

## 2017-08-24 RX ADMIN — SODIUM CHLORIDE, SODIUM LACTATE, POTASSIUM CHLORIDE, AND CALCIUM CHLORIDE: .6; .31; .03; .02 INJECTION, SOLUTION INTRAVENOUS at 01:08

## 2017-08-24 RX ADMIN — LEVETIRACETAM 500 MG: 5 INJECTION INTRAVENOUS at 09:08

## 2017-08-24 NOTE — SUBJECTIVE & OBJECTIVE
Interval History: NAEON over night. Patient slept well. He is scheduled for barium enema this morning.     Review of Systems   Constitutional: Negative for chills and fever.   HENT: Negative for ear pain, hearing loss, mouth sores and sore throat.    Eyes: Negative for photophobia, pain and visual disturbance.   Respiratory: Negative for cough, chest tightness and shortness of breath.    Cardiovascular: Positive for leg swelling. Negative for chest pain and palpitations.   Gastrointestinal: Negative for abdominal distention, abdominal pain, constipation, diarrhea (Increased frequency of watery stools), nausea and vomiting.   Endocrine: Negative for polydipsia and polyuria.   Genitourinary: Negative for dysuria and hematuria.   Musculoskeletal: Negative for arthralgias and myalgias.   Skin: Negative for rash and wound.   Neurological: Negative for dizziness, syncope and light-headedness.     Objective:     Vital Signs (Most Recent):  Temp: 97.6 °F (36.4 °C) (08/24/17 1203)  Pulse: 60 (08/24/17 1203)  Resp: 16 (08/24/17 1203)  BP: (!) 159/97 (08/24/17 1203)  SpO2: 98 % (08/24/17 1203) Vital Signs (24h Range):  Temp:  [97 °F (36.1 °C)-97.6 °F (36.4 °C)] 97.6 °F (36.4 °C)  Pulse:  [54-64] 60  Resp:  [16-18] 16  SpO2:  [98 %-100 %] 98 %  BP: (108-159)/(72-97) 159/97     Weight: 112 kg (247 lb)  Body mass index is 30.87 kg/m².    Intake/Output Summary (Last 24 hours) at 08/24/17 1316  Last data filed at 08/24/17 0412   Gross per 24 hour   Intake              794 ml   Output                0 ml   Net              794 ml      Physical Exam   Constitutional: He is oriented to person, place, and time. He appears well-developed and well-nourished. No distress.   HENT:   Head: Normocephalic and atraumatic.   Eyes: Conjunctivae and EOM are normal. Pupils are equal, round, and reactive to light.   Neck: Normal range of motion. Neck supple. No JVD present.   Cardiovascular: Normal rate, regular rhythm, normal heart sounds and  intact distal pulses.  Exam reveals no gallop and no friction rub.    No murmur heard.  Pulmonary/Chest: No tachypnea. No respiratory distress. He has decreased breath sounds in the right middle field, the right lower field, the left middle field and the left lower field. He has no wheezes. He has no rhonchi. He has no rales. He exhibits no tenderness.   Abdominal: Soft. Bowel sounds are normal. He exhibits no distension. There is no tenderness. There is no guarding.   Well healed surgical scars   Musculoskeletal: He exhibits edema (BL LE edema). He exhibits no tenderness or deformity.   Neurological: He is alert and oriented to person, place, and time. No cranial nerve deficit.   Skin: Skin is warm and dry. No rash noted. He is not diaphoretic.   Psychiatric: He has a normal mood and affect. His behavior is normal.   Vitals reviewed.      Significant Labs:   Recent Results (from the past 24 hour(s))   Comprehensive metabolic panel    Collection Time: 08/24/17  5:09 AM   Result Value Ref Range    Sodium 138 136 - 145 mmol/L    Potassium 5.0 3.5 - 5.1 mmol/L    Chloride 106 95 - 110 mmol/L    CO2 28 23 - 29 mmol/L    Glucose 102 70 - 110 mg/dL    BUN, Bld 16 6 - 20 mg/dL    Creatinine 0.7 0.5 - 1.4 mg/dL    Calcium 8.2 (L) 8.7 - 10.5 mg/dL    Total Protein 5.1 (L) 6.0 - 8.4 g/dL    Albumin 2.7 (L) 3.5 - 5.2 g/dL    Total Bilirubin 1.8 (H) 0.1 - 1.0 mg/dL    Alkaline Phosphatase 60 55 - 135 U/L    AST 19 10 - 40 U/L    ALT 47 (H) 10 - 44 U/L    Anion Gap 4 (L) 8 - 16 mmol/L    eGFR if African American >60.0 >60 mL/min/1.73 m^2    eGFR if non African American >60.0 >60 mL/min/1.73 m^2         Significant Imaging:   X-ray abdomen: There is gaseous distention of the descending colon  with a paucity of gas in the rectum. Bibasilar pulmonary nodules.

## 2017-08-24 NOTE — PLAN OF CARE
Problem: Patient Care Overview  Goal: Plan of Care Review  Outcome: Ongoing (interventions implemented as appropriate)  Pt is progressing with plan of care. Frequent rounds made to assess pain and safety.  Neuro checks q4 hours. Wife at bedside. Pt remains NPO throughout shift. Bed locked and at lowest position. Side rails up x 2. Call light within reach. Will continue to monitor.

## 2017-08-24 NOTE — PLAN OF CARE
Problem: Patient Care Overview  Goal: Plan of Care Review  Outcome: Ongoing (interventions implemented as appropriate)  Patient is AAO x 4, calm, vs stable, pt underwent sigmoidoscopy, pulses palpable, no falls noted as fall precautions are active, pt denies pain, no signs or symptoms of skin breakdown noted, Family at bedside, no complaints at the time, will continue to monitor.

## 2017-08-24 NOTE — ASSESSMENT & PLAN NOTE
Patient still passing stool, but his BM's have become more loose recently.  Outside CT showed a transition point in the sigmoid colon w/ distal colon decompressed.  - CRC performing barium enema with possible sigmoidoscopy this morning. Will f/u results. Appreciate recommendations.   - Continue IV normal saline.

## 2017-08-24 NOTE — PROGRESS NOTES
On call for IM 2 paged regarding pt heart rate of 54. Pt asymptomatic. No complaints at this time. Per MD Drew will cont to monitor pt. No new orders at this time.

## 2017-08-24 NOTE — PROGRESS NOTES
[facility] Hospital Medicine  Progress Note    Patient Name: Claude Penn III  MRN: [id number]  Patient Class: IP- Inpatient   Admission Date: 8/23/2017  Length of Stay: 1 days  Attending Physician: Jesse Ritter MD  Primary Care Provider: Dallas Agarwal MD    [facility] Medicine Team: [facility] HOSP MED 2 Josue Strong MD    Subjective:     Principal Problem:Large bowel obstruction    HPI:  Mr. 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  Patient reports that since his 8/11 gamma knife he has been progressively more short of breath.  During this interval he has also been having worsening abdominal distention & pain with associated increased flatulance but with + BM's.  Two days ago his wife noticed that his GRCEO had progressed to a point similar to 2013 when he was Dx w/ a PE.  That evening she also noticed he was having increased work of breathing and decided to take him to [facility] ED.  In the ED he had a chest CT notable for a near occlusive segmental acute PE in the RML & abdominal CT suggestive of large bowel obstruction.  In light of his recent procedures he was transferred to [facility] for CRS & NSGY evaluation.      Hospital Course:  No notes on file    Interval History: NAEON over night. Patient slept well. He is scheduled for barium enema this morning.     Review of Systems   Constitutional: Negative for chills and fever.   HENT: Negative for ear pain, hearing loss, mouth sores and sore throat.    Eyes: Negative for photophobia, pain and visual disturbance.   Respiratory: Negative for cough, chest tightness and shortness of breath.    Cardiovascular:  Positive for leg swelling. Negative for chest pain and palpitations.   Gastrointestinal: Negative for abdominal distention, abdominal pain, constipation, diarrhea (Increased frequency of watery stools), nausea and vomiting.   Endocrine: Negative for polydipsia and polyuria.   Genitourinary: Negative for dysuria and hematuria.   Musculoskeletal: Negative for arthralgias and myalgias.   Skin: Negative for rash and wound.   Neurological: Negative for dizziness, syncope and light-headedness.     Objective:     Vital Signs (Most Recent):  Temp: 97.6 °F (36.4 °C) (08/24/17 1203)  Pulse: 60 (08/24/17 1203)  Resp: 16 (08/24/17 1203)  BP: (!) 159/97 (08/24/17 1203)  SpO2: 98 % (08/24/17 1203) Vital Signs (24h Range):  Temp:  [97 °F (36.1 °C)-97.6 °F (36.4 °C)] 97.6 °F (36.4 °C)  Pulse:  [54-64] 60  Resp:  [16-18] 16  SpO2:  [98 %-100 %] 98 %  BP: (108-159)/(72-97) 159/97     Weight: 112 kg (247 lb)  Body mass index is 30.87 kg/m².    Intake/Output Summary (Last 24 hours) at 08/24/17 1316  Last data filed at 08/24/17 0412   Gross per 24 hour   Intake              794 ml   Output                0 ml   Net              794 ml      Physical Exam   Constitutional: He is oriented to person, place, and time. He appears well-developed and well-nourished. No distress.   HENT:   Head: Normocephalic and atraumatic.   Eyes: Conjunctivae and EOM are normal. Pupils are equal, round, and reactive to light.   Neck: Normal range of motion. Neck supple. No JVD present.   Cardiovascular: Normal rate, regular rhythm, normal heart sounds and intact distal pulses.  Exam reveals no gallop and no friction rub.    No murmur heard.  Pulmonary/Chest: No tachypnea. No respiratory distress. He has decreased breath sounds in the right middle field, the right lower field, the left middle field and the left lower field. He has no wheezes. He has no rhonchi. He has no rales. He exhibits no tenderness.   Abdominal: Soft. Bowel sounds are normal. He exhibits  no distension. There is no tenderness. There is no guarding.   Well healed surgical scars   Musculoskeletal: He exhibits edema (BL LE edema). He exhibits no tenderness or deformity.   Neurological: He is alert and oriented to person, place, and time. No cranial nerve deficit.   Skin: Skin is warm and dry. No rash noted. He is not diaphoretic.   Psychiatric: He has a normal mood and affect. His behavior is normal.   Vitals reviewed.      Significant Labs:   Recent Results (from the past 24 hour(s))   Comprehensive metabolic panel    Collection Time: 08/24/17  5:09 AM   Result Value Ref Range    Sodium 138 136 - 145 mmol/L    Potassium 5.0 3.5 - 5.1 mmol/L    Chloride 106 95 - 110 mmol/L    CO2 28 23 - 29 mmol/L    Glucose 102 70 - 110 mg/dL    BUN, Bld 16 6 - 20 mg/dL    Creatinine 0.7 0.5 - 1.4 mg/dL    Calcium 8.2 (L) 8.7 - 10.5 mg/dL    Total Protein 5.1 (L) 6.0 - 8.4 g/dL    Albumin 2.7 (L) 3.5 - 5.2 g/dL    Total Bilirubin 1.8 (H) 0.1 - 1.0 mg/dL    Alkaline Phosphatase 60 55 - 135 U/L    AST 19 10 - 40 U/L    ALT 47 (H) 10 - 44 U/L    Anion Gap 4 (L) 8 - 16 mmol/L    eGFR if African American >60.0 >60 mL/min/1.73 m^2    eGFR if non African American >60.0 >60 mL/min/1.73 m^2         Significant Imaging:   X-ray abdomen: There is gaseous distention of the descending colon  with a paucity of gas in the rectum. Bibasilar pulmonary nodules.    Assessment/Plan:      History of pulmonary embolism    - Will initiate lovenox to coumadin bridging once CRC has finished procedures.          Thrush    -Nystatin swish & swallow QID        Osteopenia    -Holding home Ergocalciferol 50k U qweek        Pulmonary embolism without acute cor pulmonale    Patient with known mets presenting with recurrent PE.  He is currently stable w/ RRR noted on exam and tele.  .  -NSGY evaluated patient.  - Ok to restarted home coumadin.  - Will initiate lovenox to coumadin bridging once CRC has finished procedures.         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   -q4h neuro checks        Cerebral edema    - Continue Decadron 4 mg IV q6h        Frontal mass of brain    -Keppra 500 mg IV BID for seizure prophylaxis.   -Seizure precautions        Adenocarcinoma of rectum    -Holding home Lomotil 2.5-0.025 mg po q6h PRN for diarrhea        * Large bowel obstruction    Patient still passing stool, but his BM's have become more loose recently.  Outside CT showed a transition point in the sigmoid colon w/ distal colon decompressed.  - CRC performing barium enema with possible sigmoidoscopy this morning. Will f/u results. Appreciate recommendations.   - Continue IV normal saline.           VTE Risk Mitigation         Ordered     Medium Risk of VTE  Once      08/23/17 0502     Place SAPPHIRE hose  Until discontinued      08/23/17 0502     Place sequential compression device  Until discontinued      08/23/17 0502     Reason for No Pharmacological VTE Prophylaxis  Once      08/23/17 0502              Josue Strong MD PGY-1  Department of Hospital Medicine   [facility]

## 2017-08-24 NOTE — PHARMACY MED REC
"Admission Medication Reconciliation - Pharmacy Consult Note    The home medication history was taken by Rupa Tobar, Pharmacy Tech.     Based on information gathered and subsequent review by the clinical pharmacist, the items below may need attention.    You may go to "Admission" then "Reconcile Home Medications" tabs to review and/or act upon these items.      No issues noted with the medication reconciliation.      Potential issues to be addressed PRIOR TO DISCHARGE  o Will restart warfarin with lovenox bridge following GGE w/ sigmoidoscopy  o Holding other non-essential meds    Please address this information as you see fit.  Feel free to contact us if you have any questions or require assistance.    Naveen Metz, PharmD  [id number]        Patient's prior to admission medication regimen was as follows:  Prescriptions Prior to Admission   Medication Sig Dispense Refill Last Dose    calcium citrate-vitamin D3 315-200 mg (CITRACAL+D) 315-200 mg-unit per tablet Take 2 tablets by mouth 2 (two) times daily.       cetirizine (ZYRTEC) 10 MG tablet Take 10 mg by mouth once daily.       dexamethasone (DECADRON) 4 MG Tab Take 1 tablet (4 mg total) by mouth every 6 (six) hours. 120 tablet 0 8/22/2017    diphenoxylate-atropine 2.5-0.025 mg (LOMOTIL) 2.5-0.025 mg per tablet Take 1 tablet by mouth 4 (four) times daily as needed for Diarrhea.   8/23/2017 at Unknown time    levetiracetam (KEPPRA) 500 MG Tab Take 1 tablet (500 mg total) by mouth 2 (two) times daily. 60 tablet 3 8/22/2017    pantoprazole (PROTONIX) 40 MG tablet Take 1 tablet (40 mg total) by mouth once daily. 30 tablet 3 8/22/2017    promethazine (PHENERGAN) 25 MG tablet Take 25 mg by mouth every 4 (four) hours as needed.    8/22/2017    warfarin (COUMADIN) 6 MG tablet Take 6 mg by mouth Daily.            Please add appropriate    SmartPhrase below:        "

## 2017-08-24 NOTE — MEDICAL/APP STUDENT
"Progress Note  Hospital Medicine    Patient Name: Claude Penn III  YOB: 1965    Admit Date: 8/23/2017                     LOS: 1    SUBJECTIVE:     Reason for Admission:  Large bowel obstruction  Mr Trujillo is a 51 yo male with PMHx of recurrent PE,  colerectal carcinoma s/p resection with brain and lung metastasis who was transferred from [facility] for evaluation of anticoagulation and large bowl obstruction. CT at outside hospital revelaed near occlusive segmental PE in right middle lobe and abdominal CT revealed large bowel obstruction.    Interval history: Patient reports no acute events overnight. He states he is feeling well this morning, with no chest pain, SOB, fever, chills, nausea, abdominal pain and urinary symptoms. Patient has had 4 BM in the last 24 hours, described as "diarrhea like", with no blood in the stool.  Patient is NPO, getting Gastrografin enema with sigmoidoscopy today.    OBJECTIVE:     Vital Signs Range (Last 24H):  Temp:  [96.8 °F (36 °C)-97.7 °F (36.5 °C)]   Pulse:  [54-65]   Resp:  [16-18]   BP: (108-127)/(72-89)   SpO2:  [92 %-100 %] Body mass index is 30.87 kg/m².  Wt Readings from Last 1 Encounters:   08/23/17 1950 112 kg (247 lb)   08/23/17 0615 112 kg (247 lb)   08/22/17 2334 112 kg (247 lb)       I & O (Last 24H):  Intake/Output Summary (Last 24 hours) at 08/24/17 0755  Last data filed at 08/24/17 0412   Gross per 24 hour   Intake              794 ml   Output              100 ml   Net              694 ml       Physical Exam:  General: awake, alert, oriented to TPP. Appears in no distress, breathing comfortably.  Head: Normocephalic and atraumatic.   Eyes: Conjunctivae and EOM are normal. Pupils are equal, round, and reactive to light.   Neck: Normal range of motion. Neck supple. No JVD present.   Cardiovascular: Normal rate, regular rhythm, normal heart sounds and intact distal pulses.  Exam reveals no gallop and no friction rub.    No murmur " heard  Pulmonary: decreased breath sounds right and left lower fields.  Abdomen: distended, BS hypoactive  MSK: lower extremity edema, with calf tenderness, and varicose veins.  Diagnostic Results:    Recent Labs  Lab 08/23/17 0316   WBC 7.38   HGB 15.0   HCT 41.7   PLT 56*       Recent Labs  Lab 08/23/17 0316 08/24/17  0509    138   K 3.8 5.0    106   CO2 23 28   BUN 20 16   CREATININE 0.7 0.7   GLU 88 102   CALCIUM 7.9* 8.2*       Recent Labs  Lab 08/23/17 0316 08/24/17  0509   ALKPHOS 61 60   ALT 49* 47*   AST 21 19   ALBUMIN 2.8* 2.7*   PROT 5.5* 5.1*   BILITOT 1.9* 1.8*     No results found for: HGBA1C  No results for input(s): POCTGLUCOSE in the last 72 hours.    ASSESSMENT/PLAN:         Plan:  GGE with sigmoidoscopy today.  Currently holding anticoagulation meds, will start on Warfarin with Lovenox bridging after the procedure.  Continue IV Decadron q6h (vasogenic cerebral edema) and IV Keppra (seizures) for brain mets.    Yarelis Saucedo, MS3

## 2017-08-25 VITALS
OXYGEN SATURATION: 95 % | HEIGHT: 75 IN | DIASTOLIC BLOOD PRESSURE: 86 MMHG | TEMPERATURE: 97 F | WEIGHT: 247 LBS | HEART RATE: 78 BPM | SYSTOLIC BLOOD PRESSURE: 133 MMHG | RESPIRATION RATE: 18 BRPM | BODY MASS INDEX: 30.71 KG/M2

## 2017-08-25 PROBLEM — K56.609 LARGE BOWEL OBSTRUCTION: Status: RESOLVED | Noted: 2017-08-23 | Resolved: 2017-08-25

## 2017-08-25 LAB
ALBUMIN SERPL BCP-MCNC: 2.7 G/DL
ALP SERPL-CCNC: 60 U/L
ALT SERPL W/O P-5'-P-CCNC: 46 U/L
ANION GAP SERPL CALC-SCNC: 5 MMOL/L
AST SERPL-CCNC: 16 U/L
BILIRUB SERPL-MCNC: 1.9 MG/DL
BUN SERPL-MCNC: 14 MG/DL
CALCIUM SERPL-MCNC: 7.8 MG/DL
CHLORIDE SERPL-SCNC: 105 MMOL/L
CO2 SERPL-SCNC: 25 MMOL/L
CREAT SERPL-MCNC: 0.7 MG/DL
EST. GFR  (AFRICAN AMERICAN): >60 ML/MIN/1.73 M^2
EST. GFR  (NON AFRICAN AMERICAN): >60 ML/MIN/1.73 M^2
GLUCOSE SERPL-MCNC: 101 MG/DL
INR PPP: 1.1
POTASSIUM SERPL-SCNC: 4.4 MMOL/L
PROT SERPL-MCNC: 4.9 G/DL
PROTHROMBIN TIME: 11.2 SEC
SODIUM SERPL-SCNC: 135 MMOL/L

## 2017-08-25 PROCEDURE — 25000003 PHARM REV CODE 250: Performed by: STUDENT IN AN ORGANIZED HEALTH CARE EDUCATION/TRAINING PROGRAM

## 2017-08-25 PROCEDURE — 25000003 PHARM REV CODE 250: Performed by: INTERNAL MEDICINE

## 2017-08-25 PROCEDURE — 85610 PROTHROMBIN TIME: CPT

## 2017-08-25 PROCEDURE — 80053 COMPREHEN METABOLIC PANEL: CPT

## 2017-08-25 PROCEDURE — 36415 COLL VENOUS BLD VENIPUNCTURE: CPT

## 2017-08-25 PROCEDURE — 99239 HOSP IP/OBS DSCHRG MGMT >30: CPT | Mod: ,,, | Performed by: INTERNAL MEDICINE

## 2017-08-25 PROCEDURE — 63600175 PHARM REV CODE 636 W HCPCS: Performed by: INTERNAL MEDICINE

## 2017-08-25 RX ORDER — LEVETIRACETAM 500 MG/1
500 TABLET ORAL 2 TIMES DAILY
Status: DISCONTINUED | OUTPATIENT
Start: 2017-08-25 | End: 2017-08-25 | Stop reason: HOSPADM

## 2017-08-25 RX ORDER — ENOXAPARIN SODIUM 150 MG/ML
1 INJECTION SUBCUTANEOUS
Status: DISCONTINUED | OUTPATIENT
Start: 2017-08-25 | End: 2017-08-25 | Stop reason: HOSPADM

## 2017-08-25 RX ORDER — WARFARIN 6 MG/1
6 TABLET ORAL DAILY
Qty: 30 TABLET | Refills: 2 | Status: ON HOLD | OUTPATIENT
Start: 2017-08-25 | End: 2017-09-06 | Stop reason: HOSPADM

## 2017-08-25 RX ORDER — LEVETIRACETAM 500 MG/1
500 TABLET ORAL 2 TIMES DAILY
Qty: 60 TABLET | Refills: 2 | Status: ON HOLD | OUTPATIENT
Start: 2017-08-25 | End: 2017-10-18 | Stop reason: HOSPADM

## 2017-08-25 RX ORDER — NYSTATIN 100000 [USP'U]/ML
500000 SUSPENSION ORAL 4 TIMES DAILY
Qty: 1 BOTTLE | Refills: 9 | Status: SHIPPED | OUTPATIENT
Start: 2017-08-25

## 2017-08-25 RX ORDER — ENOXAPARIN SODIUM 150 MG/ML
1 INJECTION SUBCUTANEOUS EVERY 12 HOURS
Qty: 10 SYRINGE | Refills: 0 | Status: ON HOLD | OUTPATIENT
Start: 2017-08-25 | End: 2017-09-06 | Stop reason: HOSPADM

## 2017-08-25 RX ORDER — DEXAMETHASONE 4 MG/1
4 TABLET ORAL EVERY 6 HOURS
Status: DISCONTINUED | OUTPATIENT
Start: 2017-08-25 | End: 2017-08-25 | Stop reason: HOSPADM

## 2017-08-25 RX ORDER — NYSTATIN 100000 [USP'U]/ML
500000 SUSPENSION ORAL 4 TIMES DAILY
Qty: 10 BOTTLE | Refills: 0 | Status: SHIPPED | OUTPATIENT
Start: 2017-08-25 | End: 2017-08-25

## 2017-08-25 RX ADMIN — NYSTATIN 500000 UNITS: 500000 SUSPENSION ORAL at 11:08

## 2017-08-25 RX ADMIN — DEXAMETHASONE SODIUM PHOSPHATE 4 MG: 4 INJECTION, SOLUTION INTRAMUSCULAR; INTRAVENOUS at 05:08

## 2017-08-25 RX ADMIN — DEXAMETHASONE 4 MG: 4 TABLET ORAL at 11:08

## 2017-08-25 RX ADMIN — LEVETIRACETAM 500 MG: 500 TABLET ORAL at 11:08

## 2017-08-25 RX ADMIN — ENOXAPARIN SODIUM 110 MG: 150 INJECTION SUBCUTANEOUS at 11:08

## 2017-08-25 RX ADMIN — SODIUM CHLORIDE, SODIUM LACTATE, POTASSIUM CHLORIDE, AND CALCIUM CHLORIDE: .6; .31; .03; .02 INJECTION, SOLUTION INTRAVENOUS at 01:08

## 2017-08-25 RX ADMIN — NYSTATIN 500000 UNITS: 500000 SUSPENSION ORAL at 05:08

## 2017-08-25 NOTE — ASSESSMENT & PLAN NOTE
- Will initiate lovenox to coumadin bridging today.   - Lovenox 110 mg SubQ Q12  - Warfarin 6 mg PO QD

## 2017-08-25 NOTE — PROGRESS NOTES
Pt wishes to speak to physician, pt refuses meds until physician consults with pt. Physician notified and will follow up with pt. Will continue to monitor.

## 2017-08-25 NOTE — PLAN OF CARE
Future Appointments  Date Time Provider Department Center   8/30/2017 11:45 AM Vlad Alonso MD [address]   9/5/2017 10:15 AM Vlda Alonso MD [facility]        08/25/17 1340   Final Note   Assessment Type Final Discharge Note   Discharge Disposition Home   What phone number can be called within the next 1-3 days to see how you are doing after discharge? [phone]   Hospital Follow Up  Appt(s) scheduled? Yes   Right Care Referral Info   Post Acute Recommendation No Care

## 2017-08-25 NOTE — PROGRESS NOTES
Pt discharged. Instructions given and explained. Pt verbalized understanding with no questions. Pt AAOx3, waiting on transport

## 2017-08-25 NOTE — ASSESSMENT & PLAN NOTE
- Patient stable throughout his stay.  - Will start lovenox to warfarin bridging today.  - Lovenox 100 mg SubQ Q12  - Warfain 6 mg PO QD

## 2017-08-25 NOTE — PROGRESS NOTES
Pharmacist Intervention IV to PO Note    Claude Penn III is a 52 y.o. male being treated with IV medication Keppra    Patient Data:    Vital Signs (Most Recent):  Temp: 97.9 °F (36.6 °C) (08/25/17 0402)  Pulse: 65 (08/25/17 0402)  Resp: 18 (08/25/17 0402)  BP: 124/85 (08/25/17 0402)  SpO2: 98 % (08/25/17 0402) Vital Signs (72h Range):  Temp:  [96 °F (35.6 °C)-97.9 °F (36.6 °C)]   Pulse:  [54-82]   Resp:  [16-18]   BP: (108-159)/(72-97)   SpO2:  [92 %-100 %]      CBC:    Recent Labs     Lab 08/23/17 0316   WBC 7.38   RBC 4.80   HGB 15.0   HCT 41.7   PLT 56*   MCV 87   MCH 31.3*   MCHC 36.0     CMP:     Recent Labs     Lab 08/23/17  0316 08/24/17  0509 08/25/17  0405   GLU 88 102 101   CALCIUM 7.9* 8.2* 7.8*   ALBUMIN 2.8* 2.7* 2.7*   PROT 5.5* 5.1* 4.9*    138 135*   K 3.8 5.0 4.4   CO2 23 28 25    106 105   BUN 20 16 14   CREATININE 0.7 0.7 0.7   ALKPHOS 61 60 60   ALT 49* 47* 46*   AST 21 19 16   BILITOT 1.9* 1.8* 1.9*       Dietary Orders:  Diet Orders            Diet Adult Regular Coumadin Restriction: Regular starting at 08/25 0858            Based on the following criteria, this patient qualifies for intravenous to oral conversion:  [x] The patients gastrointestinal tract is functioning (tolerating medications via oral or enteral route for 24 hours and tolerating food or enteral feeds for 24 hours).  [x] The patient is hemodynamically stable for 24 hours (heart rate <100 beats per minute, systolic blood pressure >99 mm Hg, and respiratory rate <20 breaths per minute).  [x] The patient shows clinical improvement (afebrile for at least 24 hours and white blood cell count downtrending or normalized). Additionally, the patient must be non-neutropenic (absolute neutrophil count >500 cells/mm3).  [x] For antimicrobials, the patient has received IV therapy for at least 24 hours.    IV medication Keppra 500 mg IV BID will be changed to oral medication Keppra 500 mg PO BID    Pharmacist's Name: KIP JESUS  VIPIN  Pharmacist's Extension: [phone]

## 2017-08-25 NOTE — MEDICAL/APP STUDENT
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 CT at outside hospital revelaed near occlusive segmental PE in right middle lobe and abdominal CT revealed large bowel obstruction. Patient underwent  barium enema procedure yesterday, results were normal.    Interval history: Patient reports no acute events overnight. He states that he is feeling well. Denies abdominal pain, N/V, chest pain, SOB and hemoptysis. Reports having 4-5 loose, small quantity BM in the last 24 hours, denies blood, mucous in the stool. Denies fevers, headaches, and urinary symptoms.     OBJECTIVE:     Vital Signs Range (Last 24H):  Temp:  [97.6 °F (36.4 °C)-97.9 °F (36.6 °C)]   Pulse:  [59-65]   Resp:  [16-18]   BP: (117-159)/(78-97)   SpO2:  [97 %-99 %] Body mass index is 30.87 kg/m².  Wt Readings from Last 1 Encounters:   08/23/17 1950 112 kg (247 lb)   08/23/17 0615 112 kg (247 lb)   08/22/17 2334 112 kg (247 lb)       I & O (Last 24H):  Intake/Output Summary (Last 24 hours) at 08/25/17 0801  Last data filed at 08/25/17 0300   Gross per 24 hour   Intake              900 ml   Output                0 ml   Net              900 ml       Physical Exam:  Constitutional: He is oriented to person, place, and time. He appears well-developed and well-nourished. No distress.   HENT:   Head: Normocephalic and atraumatic.   Eyes: Conjunctivae and EOM are normal. Pupils are equal, round, and reactive to light.   Neck: Normal range of motion. Neck supple. No JVD present.   Cardiovascular: Normal rate, regular rhythm, normal heart sounds and intact distal  pulses.  Exam reveals no gallop and no friction rub.    No murmur heard.  Pulmonary/Chest: normal respiratory effort, with no tachypnea. Decreased breath sounds in the right and left lower fields. No wheezes, no rales.   Abdominal: soft, non-distended, mild tenderness in the right and left lower quadrants. Bowel sounds are hypoactive.  Well healed surgical scars   Musculoskeletal: bilateral lower leg edema with varciose veins. SCDs are in place.   Neurological: He is alert and oriented to person, place, and time. No cranial nerve deficit.   Skin: Skin is warm and dry. No rash noted. He is not diaphoretic.   Psychiatric: He has a normal mood and affect. His behavior is normal.   Vitals reviewed.    Diagnostic Results:    Recent Labs  Lab 08/23/17 0316   WBC 7.38   HGB 15.0   HCT 41.7   PLT 56*       Recent Labs  Lab 08/23/17 0316 08/24/17  0509 08/25/17  0405    138 135*   K 3.8 5.0 4.4    106 105   CO2 23 28 25   BUN 20 16 14   CREATININE 0.7 0.7 0.7   GLU 88 102 101   CALCIUM 7.9* 8.2* 7.8*       Recent Labs  Lab 08/23/17 0316 08/24/17  0509 08/25/17  0405   ALKPHOS 61 60 60   ALT 49* 47* 46*   AST 21 19 16   ALBUMIN 2.8* 2.7* 2.7*   PROT 5.5* 5.1* 4.9*   BILITOT 1.9* 1.8* 1.9*     No results found for: HGBA1C  No results for input(s): POCTGLUCOSE in the last 72 hours.    ASSESSMENT/PLAN:     DVT/PE:  Plan to start Warfarin with lovenox bridge today. Start on Coumadin diet today.     Thrombocytopenia:  possibly sequale of hypersplenism in the setting of metastatic CRC.    Brain metastasis:  Continue Keppra for seizure prophylaxis    Thrush:  Continue Nystatin    Plan to discharge today      Yarelis Saucedo, MS3

## 2017-08-25 NOTE — HOSPITAL COURSE
Mr. Trujillo is a 51 yo gentleman with PMHx of stage 4 CRC w/ BL lung mets s/p 2013 resection & 7/2017 multiple R brain mets s/p  gamma knife at [facility] (8/11) and 2013 PE (previously on coumadin; held 7/31/17) transfered from [facility] for recurrent PE & large bowel obstruction. Patient was admitted and evaluated by neurosurgery, who OK'd reinitiation of coumadin for PE. Coumadin was held for potential sigmoidoscopy following barium enema. Barium enema showed no evidence of bowel obstruction and sidmoidoscopy was not indicated at this time. Patient was restarted on Lovenox and Coumadin on 8/25/2017 and discharged to home.

## 2017-08-25 NOTE — ASSESSMENT & PLAN NOTE
-Holding home Lomotil 2.5-0.025 mg po q6h PRN for diarrhea  - Patient endorsing more solid bowel movements today.   - Lomotil will be restarted at discharge.

## 2017-08-25 NOTE — DISCHARGE SUMMARY
[facility] Hospital Medicine  Discharge Summary      Patient Name: Claude Penn III  MRN: [id number]  Admission Date: 8/23/2017  Hospital Length of Stay: 2 days  Discharge Date and Time: No discharge date for patient encounter.  Attending Physician: Jesse Ritter MD   Discharging Provider: Josue Strong MD  Primary Care Provider: Dallas Agarwal MD  Hospital Medicine Team: [facility] 2 Josue Strong MD    HPI:   Mr. Trujillo is a 53 yo gentleman w/ h/o Stage 4 CRC w/ BL lung mets s/p 2013 resection &  FOLFOX then FOLFIRI/avastin followed by maintenance 5FU/alicja then FOLFOX alicja followed by FOLFIRI & 7/2017 multiple R brain mets s/p 8/11 gamma knife at [facility] and 2013 PE (previously on coumadin; held 7/31) transfered from [facility] ([facility])for recurrent PE & large bowel obstruction.  Patient reports that since his 8/11 gamma knife he has been progressively more short of breath.  During this interval he has also been having worsening abdominal distention & pain with associated increased flatulance but with + BM's.  Two days ago his wife noticed that his GRECO had progressed to a point similar to 2013 when he was Dx w/ a PE.  That evening she also noticed he was having increased work of breathing and decided to take him to [facility] ED.  In the ED he had a chest CT notable for a near occlusive segmental acute PE in the RML & abdominal CT suggestive of large bowel obstruction.  In light of his recent procedures he was transferred to [facility] [place] [facility] & NSGY evaluation.      * No surgery found *      Indwelling Lines/Drains at time of discharge:   Lines/Drains/Airways     Central Venous Catheter Line                 Port A Cath Single Lumen 07/27/17 0619 left subclavian 29 days          Drain                 Closed/Suction Drain 05/12/16 1716 Right Buttock Bulb 8 Fr. 469 days              Hospital Course:   Mr. Trujillo is a 53 yo gentleman with PMHx of stage 4 CRC w/ BL lung mets  s/p 2013 resection & 7/2017 multiple R brain mets s/p  gamma knife at [facility] (8/11) and 2013 PE (previously on coumadin; held 7/31/17) transfered from [facility] for recurrent PE & large bowel obstruction. Patient was admitted and evaluated by neurosurgery, who OK'd reinitiation of coumadin for PE. Coumadin was held for potential sigmoidoscopy following barium enema. Barium enema showed no evidence of bowel obstruction and sidmoidoscopy was not indicated at this time. Patient was restarted on Lovenox and Coumadin on 8/25/2017 and discharged to home.      Patient was seen and examined this morning prior to discharge. No acute events over night. Patient states he is feeling well this morning. CRS decided against sigmoidoscopy today due to normal barium study.     Review of Systems   Constitutional: Negative for chills and fever.   HENT: Negative for ear pain, hearing loss, mouth sores and sore throat.    Eyes: Negative for photophobia, pain and visual disturbance.   Respiratory: Negative for cough, chest tightness and shortness of breath.    Cardiovascular: Positive for leg swelling. Negative for chest pain and palpitations.   Gastrointestinal: Negative for abdominal distention, abdominal pain, constipation, diarrhea, nausea and vomiting.   Endocrine: Negative for polydipsia and polyuria.   Genitourinary: Negative for dysuria and hematuria.   Musculoskeletal: Negative for arthralgias and myalgias.   Skin: Negative for rash and wound.   Neurological: Negative for dizziness, syncope and light-headedness.     Vitals:    08/24/17 2334 08/25/17 0402 08/25/17 0800 08/25/17 1100   BP: 117/81 124/85 131/79 133/86   BP Location: Right arm Right arm Right arm Right arm   Patient Position: Lying Lying Lying Lying   Pulse: 62 65 68 78   Resp: 18 18 18    Temp: 97.9 °F (36.6 °C) 97.9 °F (36.6 °C) 96 °F (35.6 °C) 96.6 °F (35.9 °C)   TempSrc: Temporal Temporal Oral    SpO2: 98% 98% 97% 95%    Weight:       Height:         Physical Exam   Constitutional: He is oriented to person, place, and time. He appears well-developed and well-nourished. No distress.   HENT:   Head: Normocephalic and atraumatic.   Eyes: Conjunctivae and EOM are normal. Pupils are equal, round, and reactive to light.   Neck: Normal range of motion. Neck supple. No JVD present.   Cardiovascular: Normal rate, regular rhythm, normal heart sounds and intact distal pulses.  Exam reveals no gallop and no friction rub.    No murmur heard.  Pulmonary/Chest: No tachypnea. No respiratory distress. He has decreased breath sounds in the right middle field, the right lower field, the left middle field and the left lower field. He has no wheezes. He has no rhonchi. He has no rales. He exhibits no tenderness.   Abdominal: Soft. Bowel sounds are normal. He exhibits no distension. There is no tenderness. There is no guarding.   Well healed surgical scars   Musculoskeletal: He exhibits edema (BL LE edema). He exhibits no tenderness or deformity.   Neurological: He is alert and oriented to person, place, and time. No cranial nerve deficit.   Skin: Skin is warm and dry. No rash noted. He is not diaphoretic.   Psychiatric: He has a normal mood and affect. His behavior is normal.     Consults:   Consults         Status Ordering Provider     Inpatient consult to Colorectal Surgery  Once     Provider:  (Not yet assigned)    Acknowledged GLADIS AGUDELO     Inpatient consult to Neurosurgery  Once     Provider:  (Not yet assigned)    Acknowledged GLADIS AGUDELO          Significant Diagnostic Studies: Labs:   BMP:   Recent Labs  Lab 08/24/17  0509 08/25/17  0405    101    135*   K 5.0 4.4    105   CO2 28 25   BUN 16 14   CREATININE 0.7 0.7   CALCIUM 8.2* 7.8*   , CMP   Recent Labs  Lab 08/24/17  0509 08/25/17  0405    135*   K 5.0 4.4    105   CO2 28 25    101   BUN 16 14   CREATININE 0.7 0.7   CALCIUM 8.2* 7.8*    PROT 5.1* 4.9*   ALBUMIN 2.7* 2.7*   BILITOT 1.8* 1.9*   ALKPHOS 60 60   AST 19 16   ALT 47* 46*   ANIONGAP 4* 5*   ESTGFRAFRICA >60.0 >60.0   EGFRNONAA >60.0 >60.0    and CBC No results for input(s): WBC, HGB, HCT, PLT in the last 48 hours.    Pending Diagnostic Studies:     None        Final Active Diagnoses:    Diagnosis Date Noted POA    PRINCIPAL PROBLEM:  Pulmonary embolism without acute cor pulmonale [I26.99] 08/23/2017 Yes    Osteopenia [M85.80] 08/23/2017 Yes    Thrush [B37.0] 08/23/2017 Yes    History of pulmonary embolism [Z86.711] 08/23/2017 Yes    Thrombocytopenia [D69.6] 08/02/2017 Yes    Brain metastases [C79.31] 07/31/2017 Yes    Cerebral edema [G93.6] 07/28/2017 Yes    Frontal mass of brain [G93.9] 07/27/2017 Yes    Adenocarcinoma of rectum [C20] 07/27/2017 Yes      Problems Resolved During this Admission:    Diagnosis Date Noted Date Resolved POA    Large bowel obstruction [K56.60] 08/23/2017 08/25/2017 Yes      History of pulmonary embolism    - Will initiate lovenox to coumadin bridging today.   - Lovenox 110 mg SubQ Q12  - Warfarin 6 mg PO QD          Thrush    - Continue Nystatin swish & swallow QID        Osteopenia    -Holding home Ergocalciferol 50k U qweek  - Will restart at discharge.        Brain metastases    - Continue Keppra for seizure prophylaxis.   -q4h neuro checks        Cerebral edema    - Continue Decadron 4 mg PO q6h        Frontal mass of brain    -Keppra 500 mg PO BID for seizure prophylaxis.   -Seizure precautions        Adenocarcinoma of rectum    -Holding home Lomotil 2.5-0.025 mg po q6h PRN for diarrhea  - Patient endorsing more solid bowel movements today.   - Lomotil will be restarted at discharge.         * Pulmonary embolism without acute cor pulmonale    - Patient stable throughout his stay.  - Will start lovenox to warfarin bridging today.  - Lovenox 100 mg SubQ Q12  - Warfain 6 mg PO QD            Discharged Condition: stable    Disposition: Patient  discharged to home.     Follow Up: Patient to follow up with coumadin clinic near home.    Patient Instructions:   No discharge procedures on file.  Medications:  Reconciled Home Medications:   Current Discharge Medication List      START taking these medications    Details   enoxaparin (LOVENOX) 120 mg/0.8 mL Syrg Inject 0.7 mLs (110 mg total) into the skin every 12 (twelve) hours.  Qty: 10 Syringe, Refills: 0      levetiracetam (KEPPRA) 500 MG Tab Take 1 tablet (500 mg total) by mouth 2 (two) times daily.  Qty: 60 tablet, Refills: 2      nystatin (MYCOSTATIN) 100,000 unit/mL suspension Take 5 mLs (500,000 Units total) by mouth 4 (four) times daily.  Qty: 1 Bottle, Refills: 9      warfarin (COUMADIN) 6 MG tablet Take 1 tablet (6 mg total) by mouth Daily.  Qty: 30 tablet, Refills: 2         CONTINUE these medications which have NOT CHANGED    Details   calcium citrate-vitamin D3 315-200 mg (CITRACAL+D) 315-200 mg-unit per tablet Take 2 tablets by mouth 2 (two) times daily.      cetirizine (ZYRTEC) 10 MG tablet Take 10 mg by mouth once daily.      dexamethasone (DECADRON) 4 MG Tab Take 1 tablet (4 mg total) by mouth every 6 (six) hours.  Qty: 120 tablet, Refills: 0      diphenoxylate-atropine 2.5-0.025 mg (LOMOTIL) 2.5-0.025 mg per tablet Take 1 tablet by mouth 4 (four) times daily as needed for Diarrhea.      pantoprazole (PROTONIX) 40 MG tablet Take 1 tablet (40 mg total) by mouth once daily.  Qty: 30 tablet, Refills: 3      promethazine (PHENERGAN) 25 MG tablet Take 25 mg by mouth every 4 (four) hours as needed.          STOP taking these medications       cholecalciferol, vitamin D3, 2,000 unit Tab Comments:   Reason for Stopping:         olsalazine (DIPENTUM) 250 mg capsule Comments:   Reason for Stopping:             Time spent on the discharge of patient: 30 minutes    HOS POC IP DISCHARGE SUMMARY    Josue Strong MD PGY-1   Department of [facility] Medicine  [facility]

## 2017-08-28 ENCOUNTER — HOSPITAL ENCOUNTER (INPATIENT)
Facility: HOSPITAL | Age: 52
LOS: 13 days | Discharge: HOME | DRG: 802 | End: 2017-09-10
Attending: SURGERY | Admitting: HOSPITALIST
Payer: COMMERCIAL

## 2017-08-28 DIAGNOSIS — S36.09XA RUPTURED SPLEEN: ICD-10-CM

## 2017-08-28 DIAGNOSIS — R60.0 EDEMA EXTREMITIES: ICD-10-CM

## 2017-08-28 DIAGNOSIS — R60.9 SWELLING: ICD-10-CM

## 2017-08-28 DIAGNOSIS — R00.0 TACHYCARDIA: ICD-10-CM

## 2017-08-28 DIAGNOSIS — R94.31 ST ELEVATION: ICD-10-CM

## 2017-08-28 DIAGNOSIS — R19.7 DIARRHEA IN ADULT PATIENT: ICD-10-CM

## 2017-08-28 DIAGNOSIS — C20 ADENOCARCINOMA OF RECTUM: ICD-10-CM

## 2017-08-28 DIAGNOSIS — S36.09XA: Primary | ICD-10-CM

## 2017-08-28 LAB
ABO + RH BLD: NORMAL
ALBUMIN SERPL BCP-MCNC: 3 G/DL
ALP SERPL-CCNC: 62 U/L
ALT SERPL W/O P-5'-P-CCNC: 33 U/L
ANION GAP SERPL CALC-SCNC: 8 MMOL/L
ANISOCYTOSIS BLD QL SMEAR: SLIGHT
APTT BLDCRRT: 28.6 SEC
AST SERPL-CCNC: 18 U/L
BASOPHILS # BLD AUTO: 0 K/UL
BASOPHILS NFR BLD: 0 %
BILIRUB SERPL-MCNC: 1.2 MG/DL
BLD GP AB SCN CELLS X3 SERPL QL: NORMAL
BLD PROD TYP BPU: NORMAL
BLOOD UNIT EXPIRATION DATE: NORMAL
BLOOD UNIT TYPE CODE: 600
BLOOD UNIT TYPE CODE: 6200
BLOOD UNIT TYPE: NORMAL
BUN SERPL-MCNC: 18 MG/DL
CALCIUM SERPL-MCNC: 7.6 MG/DL
CHLORIDE SERPL-SCNC: 104 MMOL/L
CK MB SERPL-MCNC: 7.8 NG/ML
CK MB SERPL-RTO: 7.1 %
CK SERPL-CCNC: 110 U/L
CO2 SERPL-SCNC: 26 MMOL/L
CODING SYSTEM: NORMAL
CREAT SERPL-MCNC: 0.7 MG/DL
DIFFERENTIAL METHOD: ABNORMAL
DISPENSE STATUS: NORMAL
EOSINOPHIL # BLD AUTO: 0 K/UL
EOSINOPHIL NFR BLD: 0 %
ERYTHROCYTE [DISTWIDTH] IN BLOOD BY AUTOMATED COUNT: 14.1 %
ERYTHROCYTE [DISTWIDTH] IN BLOOD BY AUTOMATED COUNT: 14.3 %
ERYTHROCYTE [DISTWIDTH] IN BLOOD BY AUTOMATED COUNT: 14.9 %
EST. GFR  (AFRICAN AMERICAN): >60 ML/MIN/1.73 M^2
EST. GFR  (NON AFRICAN AMERICAN): >60 ML/MIN/1.73 M^2
GLUCOSE SERPL-MCNC: 126 MG/DL
HCT VFR BLD AUTO: 23.2 %
HCT VFR BLD AUTO: 24.2 %
HCT VFR BLD AUTO: 25 %
HGB BLD-MCNC: 8.2 G/DL
HGB BLD-MCNC: 8.3 G/DL
HGB BLD-MCNC: 8.6 G/DL
HYPOCHROMIA BLD QL SMEAR: ABNORMAL
INR PPP: 2.2
LACTATE SERPL-SCNC: 3.1 MMOL/L
LACTATE SERPL-SCNC: 3.3 MMOL/L
LACTATE SERPL-SCNC: 3.5 MMOL/L
LYMPHOCYTES # BLD AUTO: 0.5 K/UL
LYMPHOCYTES # BLD AUTO: 0.6 K/UL
LYMPHOCYTES # BLD AUTO: 0.8 K/UL
LYMPHOCYTES NFR BLD: 4.5 %
LYMPHOCYTES NFR BLD: 6.2 %
LYMPHOCYTES NFR BLD: 8.5 %
MAGNESIUM SERPL-MCNC: 2 MG/DL
MCH RBC QN AUTO: 30.1 PG
MCH RBC QN AUTO: 30.7 PG
MCH RBC QN AUTO: 31.2 PG
MCHC RBC AUTO-ENTMCNC: 32.8 G/DL
MCHC RBC AUTO-ENTMCNC: 35.5 G/DL
MCHC RBC AUTO-ENTMCNC: 35.8 G/DL
MCV RBC AUTO: 83 FL
MCV RBC AUTO: 84 FL
MCV RBC AUTO: 86 FL
MONOCYTES # BLD AUTO: 0.1 K/UL
MONOCYTES # BLD AUTO: 0.5 K/UL
MONOCYTES # BLD AUTO: 0.7 K/UL
MONOCYTES NFR BLD: 1.3 %
MONOCYTES NFR BLD: 5 %
MONOCYTES NFR BLD: 6.6 %
NEUTROPHILS # BLD AUTO: 8.5 K/UL
NEUTROPHILS # BLD AUTO: 8.8 K/UL
NEUTROPHILS # BLD AUTO: 9.8 K/UL
NEUTROPHILS NFR BLD: 88.1 %
NEUTROPHILS NFR BLD: 88.3 %
NEUTROPHILS NFR BLD: 90.2 %
NUM UNITS TRANS FFP: NORMAL
NUM UNITS TRANS FFP: NORMAL
OVALOCYTES BLD QL SMEAR: ABNORMAL
PHOSPHATE SERPL-MCNC: 2.4 MG/DL
PLATELET # BLD AUTO: 46 K/UL
PLATELET # BLD AUTO: 57 K/UL
PLATELET # BLD AUTO: 61 K/UL
PLATELET BLD QL SMEAR: ABNORMAL
PMV BLD AUTO: 10 FL
PMV BLD AUTO: 10.3 FL
PMV BLD AUTO: 9 FL
POCT GLUCOSE: 140 MG/DL (ref 70–110)
POTASSIUM SERPL-SCNC: 3.5 MMOL/L
PROCALCITONIN SERPL IA-MCNC: 0.57 NG/ML
PROT SERPL-MCNC: 5.6 G/DL
PROTHROMBIN TIME: 22 SEC
RBC # BLD AUTO: 2.63 M/UL
RBC # BLD AUTO: 2.76 M/UL
RBC # BLD AUTO: 2.8 M/UL
SODIUM SERPL-SCNC: 138 MMOL/L
TRANS ERYTHROCYTES VOL PATIENT: NORMAL ML
TRANS ERYTHROCYTES VOL PATIENT: NORMAL ML
TROPONIN I SERPL DL<=0.01 NG/ML-MCNC: 0.21 NG/ML
TROPONIN I SERPL DL<=0.01 NG/ML-MCNC: 0.26 NG/ML
WBC # BLD AUTO: 10.03 K/UL
WBC # BLD AUTO: 11.09 K/UL
WBC # BLD AUTO: 9.46 K/UL

## 2017-08-28 PROCEDURE — 84484 ASSAY OF TROPONIN QUANT: CPT

## 2017-08-28 PROCEDURE — 83605 ASSAY OF LACTIC ACID: CPT | Mod: 91

## 2017-08-28 PROCEDURE — 20000000 HC ICU ROOM

## 2017-08-28 PROCEDURE — 86900 BLOOD TYPING SEROLOGIC ABO: CPT

## 2017-08-28 PROCEDURE — 85025 COMPLETE CBC W/AUTO DIFF WBC: CPT

## 2017-08-28 PROCEDURE — 85730 THROMBOPLASTIN TIME PARTIAL: CPT

## 2017-08-28 PROCEDURE — 93306 TTE W/DOPPLER COMPLETE: CPT

## 2017-08-28 PROCEDURE — 83735 ASSAY OF MAGNESIUM: CPT

## 2017-08-28 PROCEDURE — 86920 COMPATIBILITY TEST SPIN: CPT

## 2017-08-28 PROCEDURE — P9017 PLASMA 1 DONOR FRZ W/IN 8 HR: HCPCS

## 2017-08-28 PROCEDURE — 63600175 PHARM REV CODE 636 W HCPCS: Performed by: STUDENT IN AN ORGANIZED HEALTH CARE EDUCATION/TRAINING PROGRAM

## 2017-08-28 PROCEDURE — P9021 RED BLOOD CELLS UNIT: HCPCS

## 2017-08-28 PROCEDURE — 93306 TTE W/DOPPLER COMPLETE: CPT | Mod: 26,,, | Performed by: INTERNAL MEDICINE

## 2017-08-28 PROCEDURE — 93005 ELECTROCARDIOGRAM TRACING: CPT

## 2017-08-28 PROCEDURE — A4216 STERILE WATER/SALINE, 10 ML: HCPCS | Performed by: STUDENT IN AN ORGANIZED HEALTH CARE EDUCATION/TRAINING PROGRAM

## 2017-08-28 PROCEDURE — 27000221 HC OXYGEN, UP TO 24 HOURS

## 2017-08-28 PROCEDURE — 83605 ASSAY OF LACTIC ACID: CPT

## 2017-08-28 PROCEDURE — 86901 BLOOD TYPING SEROLOGIC RH(D): CPT

## 2017-08-28 PROCEDURE — 99254 IP/OBS CNSLTJ NEW/EST MOD 60: CPT | Mod: ,,, | Performed by: INTERNAL MEDICINE

## 2017-08-28 PROCEDURE — 84100 ASSAY OF PHOSPHORUS: CPT

## 2017-08-28 PROCEDURE — 80053 COMPREHEN METABOLIC PANEL: CPT

## 2017-08-28 PROCEDURE — 82553 CREATINE MB FRACTION: CPT

## 2017-08-28 PROCEDURE — 85610 PROTHROMBIN TIME: CPT

## 2017-08-28 PROCEDURE — 25000003 PHARM REV CODE 250: Performed by: STUDENT IN AN ORGANIZED HEALTH CARE EDUCATION/TRAINING PROGRAM

## 2017-08-28 PROCEDURE — 84145 PROCALCITONIN (PCT): CPT

## 2017-08-28 PROCEDURE — 99233 SBSQ HOSP IP/OBS HIGH 50: CPT | Mod: 24,,, | Performed by: SURGERY

## 2017-08-28 PROCEDURE — 94761 N-INVAS EAR/PLS OXIMETRY MLT: CPT

## 2017-08-28 PROCEDURE — 93010 ELECTROCARDIOGRAM REPORT: CPT | Mod: ,,, | Performed by: INTERNAL MEDICINE

## 2017-08-28 RX ORDER — HYDROCODONE BITARTRATE AND ACETAMINOPHEN 500; 5 MG/1; MG/1
TABLET ORAL
Status: DISCONTINUED | OUTPATIENT
Start: 2017-08-28 | End: 2017-09-10 | Stop reason: HOSPADM

## 2017-08-28 RX ORDER — MAGNESIUM SULFATE HEPTAHYDRATE 40 MG/ML
4 INJECTION, SOLUTION INTRAVENOUS
Status: DISCONTINUED | OUTPATIENT
Start: 2017-08-28 | End: 2017-08-30

## 2017-08-28 RX ORDER — SODIUM CHLORIDE 0.9 % (FLUSH) 0.9 %
3 SYRINGE (ML) INJECTION EVERY 8 HOURS
Status: DISCONTINUED | OUTPATIENT
Start: 2017-08-28 | End: 2017-09-10 | Stop reason: HOSPADM

## 2017-08-28 RX ORDER — POTASSIUM CHLORIDE 7.45 MG/ML
10 INJECTION INTRAVENOUS
Status: DISCONTINUED | OUTPATIENT
Start: 2017-08-28 | End: 2017-08-30

## 2017-08-28 RX ORDER — SODIUM CHLORIDE 9 MG/ML
INJECTION, SOLUTION INTRAVENOUS CONTINUOUS
Status: DISCONTINUED | OUTPATIENT
Start: 2017-08-28 | End: 2017-09-01

## 2017-08-28 RX ORDER — FAMOTIDINE 10 MG/ML
20 INJECTION INTRAVENOUS DAILY
Status: DISCONTINUED | OUTPATIENT
Start: 2017-08-29 | End: 2017-09-10 | Stop reason: HOSPADM

## 2017-08-28 RX ORDER — LEVETIRACETAM 5 MG/ML
500 INJECTION INTRAVASCULAR EVERY 12 HOURS
Status: DISCONTINUED | OUTPATIENT
Start: 2017-08-28 | End: 2017-09-08

## 2017-08-28 RX ORDER — MORPHINE SULFATE 2 MG/ML
2 INJECTION, SOLUTION INTRAMUSCULAR; INTRAVENOUS EVERY 4 HOURS PRN
Status: DISCONTINUED | OUTPATIENT
Start: 2017-08-28 | End: 2017-09-10 | Stop reason: HOSPADM

## 2017-08-28 RX ORDER — MAGNESIUM SULFATE HEPTAHYDRATE 40 MG/ML
2 INJECTION, SOLUTION INTRAVENOUS
Status: DISCONTINUED | OUTPATIENT
Start: 2017-08-28 | End: 2017-08-30

## 2017-08-28 RX ORDER — ONDANSETRON 2 MG/ML
4 INJECTION INTRAMUSCULAR; INTRAVENOUS EVERY 6 HOURS PRN
Status: DISCONTINUED | OUTPATIENT
Start: 2017-08-28 | End: 2017-09-10 | Stop reason: HOSPADM

## 2017-08-28 RX ADMIN — LEVETIRACETAM 500 MG: 5 INJECTION INTRAVENOUS at 08:08

## 2017-08-28 RX ADMIN — SODIUM CHLORIDE: 0.9 INJECTION, SOLUTION INTRAVENOUS at 04:08

## 2017-08-28 RX ADMIN — SODIUM CHLORIDE, SODIUM LACTATE, POTASSIUM CHLORIDE, AND CALCIUM CHLORIDE 2000 ML: .6; .31; .03; .02 INJECTION, SOLUTION INTRAVENOUS at 10:08

## 2017-08-28 RX ADMIN — Medication 3 ML: at 09:08

## 2017-08-28 RX ADMIN — POTASSIUM CHLORIDE 10 MEQ: 10 INJECTION, SOLUTION INTRAVENOUS at 07:08

## 2017-08-28 RX ADMIN — POTASSIUM CHLORIDE 10 MEQ: 10 INJECTION, SOLUTION INTRAVENOUS at 09:08

## 2017-08-28 RX ADMIN — SODIUM CHLORIDE, SODIUM LACTATE, POTASSIUM CHLORIDE, AND CALCIUM CHLORIDE 1000 ML: .6; .31; .03; .02 INJECTION, SOLUTION INTRAVENOUS at 04:08

## 2017-08-28 RX ADMIN — MORPHINE SULFATE 2 MG: 2 INJECTION, SOLUTION INTRAMUSCULAR; INTRAVENOUS at 08:08

## 2017-08-28 RX ADMIN — SODIUM CHLORIDE, SODIUM LACTATE, POTASSIUM CHLORIDE, AND CALCIUM CHLORIDE 2000 ML: .6; .31; .03; .02 INJECTION, SOLUTION INTRAVENOUS at 06:08

## 2017-08-28 NOTE — NURSING
Pt arrives to  Unit via EMS from outside hospital. Pt alert and oriented x 4; PERRL 3; Pt connected to cardiac monitor and 2L NC ; Swift emptied, team notified of pt arrival.See full assessment in flowsheet.

## 2017-08-28 NOTE — NURSING
Notified Dr. Whitlock at bedside of HR sustained in 130's, pt attempting to have bowel movement, monitor for next five to ten minutes, if no improvement call.

## 2017-08-29 PROBLEM — S36.029A SPLEEN HEMATOMA: Status: ACTIVE | Noted: 2017-08-29

## 2017-08-29 LAB
ANION GAP SERPL CALC-SCNC: 6 MMOL/L
ANISOCYTOSIS BLD QL SMEAR: SLIGHT
ANISOCYTOSIS BLD QL SMEAR: SLIGHT
BASO STIPL BLD QL SMEAR: ABNORMAL
BASOPHILS # BLD AUTO: 0 K/UL
BASOPHILS NFR BLD: 0 %
BLD PROD TYP BPU: NORMAL
BLOOD UNIT EXPIRATION DATE: NORMAL
BLOOD UNIT TYPE CODE: 6200
BLOOD UNIT TYPE: NORMAL
BUN SERPL-MCNC: 13 MG/DL
CALCIUM SERPL-MCNC: 7.2 MG/DL
CHLORIDE SERPL-SCNC: 105 MMOL/L
CO2 SERPL-SCNC: 26 MMOL/L
CODING SYSTEM: NORMAL
CREAT SERPL-MCNC: 0.6 MG/DL
DIFFERENTIAL METHOD: ABNORMAL
DISPENSE STATUS: NORMAL
EOSINOPHIL # BLD AUTO: 0 K/UL
EOSINOPHIL NFR BLD: 0 %
EOSINOPHIL NFR BLD: 0.2 %
ERYTHROCYTE [DISTWIDTH] IN BLOOD BY AUTOMATED COUNT: 14.8 %
ERYTHROCYTE [DISTWIDTH] IN BLOOD BY AUTOMATED COUNT: 14.9 %
ERYTHROCYTE [DISTWIDTH] IN BLOOD BY AUTOMATED COUNT: 15 %
ERYTHROCYTE [DISTWIDTH] IN BLOOD BY AUTOMATED COUNT: 15 %
ERYTHROCYTE [DISTWIDTH] IN BLOOD BY AUTOMATED COUNT: 15.1 %
ERYTHROCYTE [DISTWIDTH] IN BLOOD BY AUTOMATED COUNT: 15.1 %
ERYTHROCYTE [DISTWIDTH] IN BLOOD BY AUTOMATED COUNT: 15.3 %
EST. GFR  (AFRICAN AMERICAN): >60 ML/MIN/1.73 M^2
EST. GFR  (NON AFRICAN AMERICAN): >60 ML/MIN/1.73 M^2
GLUCOSE SERPL-MCNC: 112 MG/DL
HCT VFR BLD AUTO: 21.3 %
HCT VFR BLD AUTO: 23.1 %
HCT VFR BLD AUTO: 24.6 %
HCT VFR BLD AUTO: 24.9 %
HCT VFR BLD AUTO: 24.9 %
HCT VFR BLD AUTO: 25.9 %
HCT VFR BLD AUTO: 26.7 %
HGB BLD-MCNC: 7.6 G/DL
HGB BLD-MCNC: 8.2 G/DL
HGB BLD-MCNC: 8.8 G/DL
HGB BLD-MCNC: 8.9 G/DL
HGB BLD-MCNC: 9 G/DL
HGB BLD-MCNC: 9.4 G/DL
HGB BLD-MCNC: 9.6 G/DL
INR PPP: 1.5
INR PPP: 1.8
LACTATE SERPL-SCNC: 1.4 MMOL/L
LACTATE SERPL-SCNC: 1.7 MMOL/L
LYMPHOCYTES # BLD AUTO: 0.2 K/UL
LYMPHOCYTES # BLD AUTO: 0.2 K/UL
LYMPHOCYTES # BLD AUTO: 0.3 K/UL
LYMPHOCYTES # BLD AUTO: 0.3 K/UL
LYMPHOCYTES # BLD AUTO: 0.4 K/UL
LYMPHOCYTES NFR BLD: 4.1 %
LYMPHOCYTES NFR BLD: 5.2 %
LYMPHOCYTES NFR BLD: 5.9 %
LYMPHOCYTES NFR BLD: 6.2 %
LYMPHOCYTES NFR BLD: 6.6 %
LYMPHOCYTES NFR BLD: 6.7 %
LYMPHOCYTES NFR BLD: 6.7 %
MAGNESIUM SERPL-MCNC: 1.6 MG/DL
MCH RBC QN AUTO: 29.4 PG
MCH RBC QN AUTO: 29.6 PG
MCH RBC QN AUTO: 29.7 PG
MCH RBC QN AUTO: 29.8 PG
MCH RBC QN AUTO: 29.8 PG
MCH RBC QN AUTO: 30.2 PG
MCH RBC QN AUTO: 30.4 PG
MCHC RBC AUTO-ENTMCNC: 35.5 G/DL
MCHC RBC AUTO-ENTMCNC: 35.7 G/DL
MCHC RBC AUTO-ENTMCNC: 35.7 G/DL
MCHC RBC AUTO-ENTMCNC: 35.8 G/DL
MCHC RBC AUTO-ENTMCNC: 36 G/DL
MCHC RBC AUTO-ENTMCNC: 36.1 G/DL
MCHC RBC AUTO-ENTMCNC: 36.3 G/DL
MCV RBC AUTO: 82 FL
MCV RBC AUTO: 82 FL
MCV RBC AUTO: 83 FL
MCV RBC AUTO: 84 FL
MONOCYTES # BLD AUTO: 0.1 K/UL
MONOCYTES # BLD AUTO: 0.2 K/UL
MONOCYTES # BLD AUTO: 0.3 K/UL
MONOCYTES # BLD AUTO: 0.4 K/UL
MONOCYTES # BLD AUTO: 0.4 K/UL
MONOCYTES NFR BLD: 2.9 %
MONOCYTES NFR BLD: 4.3 %
MONOCYTES NFR BLD: 4.6 %
MONOCYTES NFR BLD: 4.7 %
MONOCYTES NFR BLD: 5.4 %
MONOCYTES NFR BLD: 6 %
MONOCYTES NFR BLD: 6 %
NEUTROPHILS # BLD AUTO: 4.1 K/UL
NEUTROPHILS # BLD AUTO: 4.3 K/UL
NEUTROPHILS # BLD AUTO: 5.1 K/UL
NEUTROPHILS # BLD AUTO: 5.2 K/UL
NEUTROPHILS # BLD AUTO: 5.4 K/UL
NEUTROPHILS # BLD AUTO: 5.4 K/UL
NEUTROPHILS # BLD AUTO: 5.9 K/UL
NEUTROPHILS NFR BLD: 87.8 %
NEUTROPHILS NFR BLD: 87.8 %
NEUTROPHILS NFR BLD: 88.6 %
NEUTROPHILS NFR BLD: 88.7 %
NEUTROPHILS NFR BLD: 89.2 %
NEUTROPHILS NFR BLD: 89.5 %
NEUTROPHILS NFR BLD: 89.8 %
NUM UNITS TRANS FFP: NORMAL
PHOSPHATE SERPL-MCNC: 1.6 MG/DL
PLATELET # BLD AUTO: 28 K/UL
PLATELET # BLD AUTO: 46 K/UL
PLATELET # BLD AUTO: 50 K/UL
PLATELET # BLD AUTO: 53 K/UL
PLATELET # BLD AUTO: 56 K/UL
PLATELET # BLD AUTO: 60 K/UL
PLATELET # BLD AUTO: 69 K/UL
PLATELET BLD QL SMEAR: ABNORMAL
PLATELET BLD QL SMEAR: ABNORMAL
PMV BLD AUTO: 8.7 FL
PMV BLD AUTO: 8.8 FL
PMV BLD AUTO: 8.8 FL
PMV BLD AUTO: 8.9 FL
PMV BLD AUTO: 9 FL
PMV BLD AUTO: 9.1 FL
PMV BLD AUTO: 9.3 FL
POTASSIUM SERPL-SCNC: 3.9 MMOL/L
PROTHROMBIN TIME: 15.7 SEC
PROTHROMBIN TIME: 18.3 SEC
RBC # BLD AUTO: 2.55 M/UL
RBC # BLD AUTO: 2.75 M/UL
RBC # BLD AUTO: 2.97 M/UL
RBC # BLD AUTO: 3.03 M/UL
RBC # BLD AUTO: 3.03 M/UL
RBC # BLD AUTO: 3.09 M/UL
RBC # BLD AUTO: 3.18 M/UL
SODIUM SERPL-SCNC: 137 MMOL/L
TOXIC GRANULES BLD QL SMEAR: PRESENT
TRANS ERYTHROCYTES VOL PATIENT: NORMAL ML
TRANS ERYTHROCYTES VOL PATIENT: NORMAL ML
TRANS PLATPHERESIS VOL PATIENT: NORMAL ML
TRANS PLATPHERESIS VOL PATIENT: NORMAL ML
TROPONIN I SERPL DL<=0.01 NG/ML-MCNC: 0.15 NG/ML
WBC # BLD AUTO: 4.6 K/UL
WBC # BLD AUTO: 4.77 K/UL
WBC # BLD AUTO: 5.77 K/UL
WBC # BLD AUTO: 5.83 K/UL
WBC # BLD AUTO: 6.06 K/UL
WBC # BLD AUTO: 6.15 K/UL
WBC # BLD AUTO: 6.79 K/UL

## 2017-08-29 PROCEDURE — 63600175 PHARM REV CODE 636 W HCPCS: Performed by: STUDENT IN AN ORGANIZED HEALTH CARE EDUCATION/TRAINING PROGRAM

## 2017-08-29 PROCEDURE — 25000003 PHARM REV CODE 250: Performed by: STUDENT IN AN ORGANIZED HEALTH CARE EDUCATION/TRAINING PROGRAM

## 2017-08-29 PROCEDURE — 87086 URINE CULTURE/COLONY COUNT: CPT

## 2017-08-29 PROCEDURE — 80048 BASIC METABOLIC PNL TOTAL CA: CPT

## 2017-08-29 PROCEDURE — S0028 INJECTION, FAMOTIDINE, 20 MG: HCPCS | Performed by: STUDENT IN AN ORGANIZED HEALTH CARE EDUCATION/TRAINING PROGRAM

## 2017-08-29 PROCEDURE — 85610 PROTHROMBIN TIME: CPT | Mod: 91

## 2017-08-29 PROCEDURE — 87040 BLOOD CULTURE FOR BACTERIA: CPT

## 2017-08-29 PROCEDURE — 84484 ASSAY OF TROPONIN QUANT: CPT

## 2017-08-29 PROCEDURE — 85610 PROTHROMBIN TIME: CPT

## 2017-08-29 PROCEDURE — A4216 STERILE WATER/SALINE, 10 ML: HCPCS | Performed by: STUDENT IN AN ORGANIZED HEALTH CARE EDUCATION/TRAINING PROGRAM

## 2017-08-29 PROCEDURE — P9035 PLATELET PHERES LEUKOREDUCED: HCPCS

## 2017-08-29 PROCEDURE — 20000000 HC ICU ROOM

## 2017-08-29 PROCEDURE — 99223 1ST HOSP IP/OBS HIGH 75: CPT | Mod: ,,, | Performed by: SURGERY

## 2017-08-29 PROCEDURE — P9021 RED BLOOD CELLS UNIT: HCPCS

## 2017-08-29 PROCEDURE — 84100 ASSAY OF PHOSPHORUS: CPT

## 2017-08-29 PROCEDURE — 25500020 PHARM REV CODE 255: Performed by: SURGERY

## 2017-08-29 PROCEDURE — P9017 PLASMA 1 DONOR FRZ W/IN 8 HR: HCPCS

## 2017-08-29 PROCEDURE — 85025 COMPLETE CBC W/AUTO DIFF WBC: CPT | Mod: 91

## 2017-08-29 PROCEDURE — 83735 ASSAY OF MAGNESIUM: CPT

## 2017-08-29 PROCEDURE — 94761 N-INVAS EAR/PLS OXIMETRY MLT: CPT

## 2017-08-29 PROCEDURE — 83605 ASSAY OF LACTIC ACID: CPT

## 2017-08-29 RX ORDER — DIPHENHYDRAMINE HYDROCHLORIDE 50 MG/ML
12.5 INJECTION INTRAMUSCULAR; INTRAVENOUS ONCE
Status: COMPLETED | OUTPATIENT
Start: 2017-08-29 | End: 2017-08-29

## 2017-08-29 RX ORDER — CALCIUM CARBONATE 200(500)MG
500 TABLET,CHEWABLE ORAL DAILY PRN
Status: DISCONTINUED | OUTPATIENT
Start: 2017-08-29 | End: 2017-08-29

## 2017-08-29 RX ORDER — NYSTATIN 100000 [USP'U]/ML
500000 SUSPENSION ORAL
Status: DISCONTINUED | OUTPATIENT
Start: 2017-08-29 | End: 2017-09-10 | Stop reason: HOSPADM

## 2017-08-29 RX ORDER — ACETAMINOPHEN 10 MG/ML
1000 INJECTION, SOLUTION INTRAVENOUS ONCE
Status: COMPLETED | OUTPATIENT
Start: 2017-08-29 | End: 2017-08-29

## 2017-08-29 RX ORDER — CALCIUM CARBONATE 200(500)MG
500 TABLET,CHEWABLE ORAL EVERY 6 HOURS PRN
Status: DISCONTINUED | OUTPATIENT
Start: 2017-08-29 | End: 2017-09-10 | Stop reason: HOSPADM

## 2017-08-29 RX ORDER — POLYETHYLENE GLYCOL 3350 17 G/17G
17 POWDER, FOR SOLUTION ORAL DAILY
Status: DISCONTINUED | OUTPATIENT
Start: 2017-08-29 | End: 2017-09-02

## 2017-08-29 RX ADMIN — FAMOTIDINE 20 MG: 10 INJECTION, SOLUTION INTRAVENOUS at 08:08

## 2017-08-29 RX ADMIN — LEVETIRACETAM 500 MG: 5 INJECTION INTRAVENOUS at 08:08

## 2017-08-29 RX ADMIN — POTASSIUM PHOSPHATE, MONOBASIC AND POTASSIUM PHOSPHATE, DIBASIC 20 MMOL: 224; 236 INJECTION, SOLUTION, CONCENTRATE INTRAVENOUS at 06:08

## 2017-08-29 RX ADMIN — CALCIUM CARBONATE (ANTACID) CHEW TAB 500 MG 500 MG: 500 CHEW TAB at 01:08

## 2017-08-29 RX ADMIN — SODIUM CHLORIDE, SODIUM LACTATE, POTASSIUM CHLORIDE, AND CALCIUM CHLORIDE 500 ML: .6; .31; .03; .02 INJECTION, SOLUTION INTRAVENOUS at 11:08

## 2017-08-29 RX ADMIN — Medication 3 ML: at 06:08

## 2017-08-29 RX ADMIN — POLYETHYLENE GLYCOL 3350 17 G: 17 POWDER, FOR SOLUTION ORAL at 10:08

## 2017-08-29 RX ADMIN — SODIUM CHLORIDE, SODIUM LACTATE, POTASSIUM CHLORIDE, AND CALCIUM CHLORIDE 1000 ML: .6; .31; .03; .02 INJECTION, SOLUTION INTRAVENOUS at 12:08

## 2017-08-29 RX ADMIN — DIPHENHYDRAMINE HYDROCHLORIDE 12.5 MG: 50 INJECTION, SOLUTION INTRAMUSCULAR; INTRAVENOUS at 10:08

## 2017-08-29 RX ADMIN — ACETAMINOPHEN 1000 MG: 10 INJECTION, SOLUTION INTRAVENOUS at 01:08

## 2017-08-29 RX ADMIN — MAGNESIUM SULFATE IN WATER 2 G: 40 INJECTION, SOLUTION INTRAVENOUS at 05:08

## 2017-08-29 RX ADMIN — Medication 3 ML: at 02:08

## 2017-08-29 RX ADMIN — IOHEXOL 100 ML: 350 INJECTION, SOLUTION INTRAVENOUS at 06:08

## 2017-08-29 RX ADMIN — MORPHINE SULFATE 2 MG: 2 INJECTION, SOLUTION INTRAMUSCULAR; INTRAVENOUS at 08:08

## 2017-08-29 RX ADMIN — MORPHINE SULFATE 2 MG: 2 INJECTION, SOLUTION INTRAMUSCULAR; INTRAVENOUS at 12:08

## 2017-08-29 RX ADMIN — Medication 3 ML: at 10:08

## 2017-08-29 RX ADMIN — NYSTATIN 500000 UNITS: 500000 SUSPENSION ORAL at 06:08

## 2017-08-29 RX ADMIN — PHYTONADIONE 10 MG: 10 INJECTION, EMULSION INTRAMUSCULAR; INTRAVENOUS; SUBCUTANEOUS at 06:08

## 2017-08-29 RX ADMIN — CALCIUM CARBONATE (ANTACID) CHEW TAB 500 MG 500 MG: 500 CHEW TAB at 08:08

## 2017-08-29 NOTE — ASSESSMENT & PLAN NOTE
51 yo M with spontaneous intraperitoneal bleed likely 2/2 anticoagulation.  CTA with no definitive contrast extravasation.    NPO, IV fluids  q4 H/H  Transfuse as needed  Vitamin K for coumadin reversal, goal INR 1-1.5  CTA if bleeds again  If no bleeding with normalized INR, plan to discontinue pts anticoagulation and have IVC filter placed for h/o PE  Hold lovenox

## 2017-08-29 NOTE — ASSESSMENT & PLAN NOTE
Hold anticoagulation  Vitamin K administration this AM to normalize INR  Will likely need IVC filter placement

## 2017-08-29 NOTE — PROGRESS NOTES
[facility]  General Surgery  Progress Note    Subjective:     History of Present Illness:  No notes on file    Post-Op Info:  * No surgery found *         Interval History: Tachycardia and hypotension overnight with drop in H/H.  Transfused 4uPRBC, 2u FFP, and 1 six pack of platelets for presumed splenic rebleed.  CTA this AM showed increased intraperitoneal blood with layering near spleen but no active contrast extravasation.  Pt responded appropriately to most recent 2u PRBC transfusion.  H/H now 9/27.  INR 1.8.  Platelets 56.    Medications:  Continuous Infusions:   sodium chloride 0.9% 100 mL/hr at 08/29/17 0700     Scheduled Meds:   famotidine (PF)  20 mg Intravenous Daily    levetiracetam IVPB  500 mg Intravenous Q12H    potassium phosphate IVPB  20 mmol Intravenous Once    sodium chloride 0.9%  3 mL Intravenous Q8H     PRN Meds:sodium chloride, magnesium sulfate IVPB, magnesium sulfate IVPB, morphine, ondansetron, potassium chloride, potassium chloride, potassium chloride, sodium phosphate IVPB, sodium phosphate IVPB, sodium phosphate IVPB     Review of patient's allergies indicates:   Allergen Reactions    No known drug allergies      Objective:     Vital Signs (Most Recent):  Temp: 97.9 °F (36.6 °C) (08/29/17 0700)  Pulse: 110 (08/29/17 0700)  Resp: 19 (08/29/17 0700)  BP: (!) 146/75 (08/29/17 0700)  SpO2: 99 % (08/29/17 0700) Vital Signs (24h Range):  Temp:  [97.4 °F (36.3 °C)-98.4 °F (36.9 °C)] 97.9 °F (36.6 °C)  Pulse:  [] 110  Resp:  [15-38] 19  SpO2:  [93 %-100 %] 99 %  BP: (104-164)/() 146/75     Weight: 127.8 kg (281 lb 12 oz)  Body mass index is 35.22 kg/m².    Intake/Output - Last 3 Shifts       08/27 0700 - 08/28 0659 08/28 0700 - 08/29 0659 08/29 0700 - 08/30 0659    I.V. (mL/kg)  6190 (48.4)     Blood  2020     IV Piggyback  1100     Total Intake(mL/kg)  9310 (72.8)     Urine (mL/kg/hr)  1155 75 (0.4)    Total Output   1155 75    Net   +8155 -75                  Physical Exam    Significant Labs:  CBC:   Recent Labs  Lab 08/29/17  0543   WBC 5.83   RBC 3.09*   HGB 9.4*   HCT 25.9*   PLT 56*   MCV 84   MCH 30.4   MCHC 36.3*     CMP:   Recent Labs  Lab 08/28/17  1557 08/29/17  0245   * 112*   CALCIUM 7.6* 7.2*   ALBUMIN 3.0*  --    PROT 5.6*  --     137   K 3.5 3.9   CO2 26 26    105   BUN 18 13   CREATININE 0.7 0.6   ALKPHOS 62  --    ALT 33  --    AST 18  --    BILITOT 1.2*  --      Coagulation:   Recent Labs  Lab 08/28/17  1556 08/29/17  0405   LABPROT 22.0* 18.3*   INR 2.2* 1.8*   APTT 28.6  --        Significant Diagnostics:    CTA abd/pelvis:    1. Significant volume of hemoperitoneum throughout the abdomen and pelvis. There is somewhat more focal perisplenic collection about the enlarged spleen with associated fluid-fluid level concerning for possible splenic source of hemoperitoneum. There is a questionable hyperattenuating focus within the splenic hilum, although this does not appear to communicate with the splenic parenchyma or splenic vasculature, however small focus of active extravasation would be difficult to exclude on this limited single phase examination.     2. Multiple bilateral pulmonary masses in this patient with known pulmonary metastatic disease, better evaluated on prior dedicated chest CT of 8/22/2017.    3. Flattened configuration of the IVC concerning for hypovolemia.    4. Mild diffuse gaseous distention of the small and large bowel without definite transition point possibly related to underlying ileus. Postsurgical change of prior lower anterior resection.    5. Multiple stable appearing thoracolumbar compression deformities.    Assessment/Plan:     Pulmonary embolism without acute cor pulmonale    Hold anticoagulation  Vitamin K administration this AM to normalize INR  Will likely need IVC filter placement        Thrombocytopenia    Transfuse platelets PRN        * Ruptured spleen    51 yo M with spontaneous  intraperitoneal bleed likely 2/2 anticoagulation.  CTA with no definitive contrast extravasation.    NPO, IV fluids  q4 H/H  Transfuse as needed  Vitamin K for coumadin reversal, goal INR 1-1.5  CTA if bleeds again  If no bleeding with normalized INR, plan to discontinue pts anticoagulation and have IVC filter placed for h/o PE  Hold lovenox            Zohaib Maldonado MD  General Surgery  [facility]    I have personally performed a detailed history and physical examination on this patient. My findings are summarized in the resident's note included in the record.  Will correct anticoagulation and risk further thrombotic complications in order to demonstrate whether there is a need for splenic embolization  Balancing out risks and benefits and coordinating care among a variety of services will be challenging in this difficult case

## 2017-08-29 NOTE — PROGRESS NOTES
Progress Note  Surgical Intensive Care    Admit Date: 8/28/2017  Post-operative Day:    Hospital Day: 2    SUBJECTIVE:     Follow-up For:  Splenic bleed    HPI:  Mr. 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 However he progressed on chemotherapy and was found to have brain metastases for which he is now s/p right hemicraniectomy with resection and subsequent gamma knife radiation. He also has a history of previous PE treated with coumadin in the past. Recently, he was having worsening SOB and GRECO and was transferred to [facility] from [place] for care, at which time he was diagnosed with a recurrent PE and placed on coumadin once again. Of note, at the time of this hospitalization he was noted to have significantly distended large bowel, for which he was worked up with a BE, which was found to be normal. He was discharged home on 8/25 with coumadin and a lovenox bridge. However, at home he was not feeling well and was found to have worsening abdominal pain as well as significant diaphoreses by his wife and subsequently re-presented to the ER at OSH. A CT abdomen/pelvis with contrast was performed showing significant hemoperitoneum with a drop in his H/H. He was transferred here for higher level care. Of note at the OSH he was transfused 2 units of PRBC and FFP.    Interval history: Patient received 4 units pRBCs, 3 units FFP, 2 doses platelets and 6L LR overnight. CT scan showed hemoperitoneum with no extravasation of contrast around the spleen. Tachycardic overnight. Maintaining O2 sats>95% on room air.    Continuous Infusions:   sodium chloride 0.9% 100 mL/hr at 08/29/17 1200     Scheduled Meds:   famotidine (PF)  20 mg Intravenous Daily    levetiracetam IVPB  500 mg Intravenous Q12H    sodium chloride 0.9%  3 mL Intravenous Q8H     PRN Meds:sodium chloride, magnesium sulfate IVPB, magnesium sulfate IVPB,  morphine, ondansetron, potassium chloride, potassium chloride, potassium chloride, sodium phosphate IVPB, sodium phosphate IVPB, sodium phosphate IVPB    Review of patient's allergies indicates:   Allergen Reactions    No known drug allergies        OBJECTIVE:     Vital Signs (Most Recent)  Temp: 98.2 °F (36.8 °C) (08/29/17 1100)  Pulse: 105 (08/29/17 1200)  Resp: 20 (08/29/17 1200)  BP: 133/83 (08/29/17 1200)  SpO2: 95 % (08/29/17 1200)    Vital Signs Range (Last 24H):  Temp:  [97.4 °F (36.3 °C)-98.4 °F (36.9 °C)]   Pulse:  []   Resp:  [13-38]   BP: (104-164)/()   SpO2:  [93 %-100 %]     I & O (Last 24H):  Intake/Output Summary (Last 24 hours) at 08/29/17 1250  Last data filed at 08/29/17 1100   Gross per 24 hour   Intake             9310 ml   Output             1590 ml   Net             7720 ml     Ventilator Data (Last 24H):          Hemodynamic Parameters (Last 24H):       Physical Exam:  General: well developed, well nourished  Lungs:  normal respiratory effort and wheezes bilaterally  Cardiovascular: Heart: regular rate and rhythm, S1, S2 normal, no murmur, click, rub or gallop. Chest Wall: no tenderness. Extremities: no cyanosis or edema, or clubbing. Pulses: 2+ and symmetric.  Abdomen/Rectal: Abdomen: soft, mild distention, some tenderness to palpation throughout, bowel sounds normal; no masses  Skin: Skin color, texture, turgor normal. No rashes or lesions    Laboratory (Last 24H):  CBC:    Recent Labs  Lab 08/29/17  0845   WBC 4.60   HGB 8.8*   HCT 24.6*   PLT 50*     CMP:    Recent Labs  Lab 08/28/17  1557 08/29/17  0245   CALCIUM 7.6* 7.2*   ALBUMIN 3.0*  --    PROT 5.6*  --     137   K 3.5 3.9   CO2 26 26    105   BUN 18 13   CREATININE 0.7 0.6   ALKPHOS 62  --    ALT 33  --    AST 18  --    BILITOT 1.2*  --        Chest X-Ray: No results found in the last 24 hours.    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    Plan:    Neuro:   - awake, alert, orientedx3  - acetaminophen 1g IV     Pulmonary:   - maintaining O2 sats on room air    Cardiac:  - BP stable off vasopressor support  - tachycardic overnight improved with blood transfusions  - troponins trending down 0.21-->0.146    Renal:   - BUN/Cr 13/0.6    Infectious Disease:   - lactate trending down 3.3-->1.7  - WBC WNL (5.77)    Hematology/Oncology:  - H/H continues to drift down 9.4-->8.8  - received 4 units pRBCs, 3 units FFP, 2 doses platelets, 6L LR  - CT scan negative for arterial splenic bleed, showed hemoperitoneum especially around spleen    Endocrine:  - none, glucose WNL    Fluids/Electrolytes/Nutrition/GI:   - received 6L LR overnight    Pain Management:   - acetaminophen 1g IV    Dispo:  - continue ICU care, likely stepdown tomorrow     Micheline Gomez MD PGY-1  [phone]  08/29/2017

## 2017-08-29 NOTE — PLAN OF CARE
Problem: Patient Care Overview  Goal: Plan of Care Review  Hx: Stage 4 CRC w/ bilateral lung mets s/p resection 2013, multiple right brain mets s/p gamma knife radiation and right hemicraniectomy 2013, recurrent PE (started in 2013), coumadin therapy for PE (held since 7/31),     Dx: splenic bleed, PE    7/25: discharged home from [facility] on coumadin to lovenox bridge for PE    8/28: Presented to [facility] ED with worsening abdominal pain, CT abdomen/pelvis w/contrast showed significant hemoperitoneum with associated drop in H/H. Transfused 2 units FFP and 2 units PRBC and transferred to [facility].    8/28: Arrived to SICU 6084. Sinus tach 140s with ST depression on EKG, 2D echo performed at bedside, 6L LR given, 2 units PRBC and 2 units FFP administered. Urine and blood cultures sent.     q4h CBC, lactic acid    Patient tachycardic at beginning of shift but now SR-ST. Received 4 units PRBC, 3 FFP and 2 platelets with limited response in H/H. CTA and IR embolization ordered this morning. Marginal UO. Patient AAOx4. 4L NC, SpO2 100%. Plan of care reviewed with patient and wife at the bedside. Turned q2h, skin cdi.

## 2017-08-29 NOTE — PROGRESS NOTES
Dr. Lynne and Dr. Dawkins at the bedside. Patient appears ST 140s on the monitor, anterior 12 lead EKG shows ST depression in leads V2-V4, posterior 12 lead EKG shows ST depression in V2-V6, possibly demand ischemia. Dr. Dawkins performed 2D echo at the bedside. Patient is receiving 2L LR at this time and will receive 2 units FFP and 2 units PRBC. Denies chest pain.

## 2017-08-29 NOTE — H&P
multiple R brain mets s/p 8/11 gamma knife at [facility] and 2013 PE (previously on coumadin; held 7/31) transfered from [facility]for recurrent PE & large bowel obstruction.  Patient reports that since his 8/11 gamma knife he has been progressively more short of breath.  During this interval he has also been having worsening abdominal distention & pain with associated increased flatulance but with + BM's.  Two days ago his wife noticed that his GRECO had progressed to a point similar to 2013 when he was Dx w/ a PE.  That evening she also noticed he was having increased work of breathing and decided to take him to [facility] ED.  In the ED he had a chest CT notable for a near occlusive segmental acute PE in the RML & abdominal CT suggestive of large bowel obstruction.  In light of his recent procedures he was [place] [facility] [place].      Mr. Trujillo is a 53 yo gentleman with PMHx of stage 4 CRC w/ BL lung mets s/p 2013 resection & 7/2017 multiple R brain mets s/p  gamma knife at [facility] (8/11) and 2013 PE (previously on coumadin; held 7/31/17) transfered from [facility] for recurrent PE & large bowel obstruction. Patient was admitted and evaluated by neurosurgery, who OK'd reinitiation of coumadin for PE. Coumadin was held for potential sigmoidoscopy following barium enema. Barium enema showed no evidence of bowel obstruction and sidmoidoscopy was not indicated at this time. Patient was restarted on Lovenox and Coumadin on 8/25/2017 and discharged to home.      [facility]  General Surgery  History & Physical    Patient Name: Claude Penn III  MRN: [id number]  Admission Date: 8/28/2017  Attending Physician: Nitin Traore MD   Primary Care Provider: Dallas Agarwal MD    Patient information was obtained from patient, transfer notes, and ER records.     Subjective:     Chief Complaint/Reason for Admission: Abdominal  pain, not feeling well.    History of Present Illness:  Mr. 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 However he progressed on chemotherapy and was found to have brain metastases for which he is now s/p right hemicraniectomy with resection and subsequent gamma knife radiation. He also has a history of previous PE treated with coumadin in the past. Recently, he was having worsening SOB and GRECO and was transferred to [facility] from [place] for care, at which time he was diagnosed with a recurrent PE and placed on coumadin once again. Of note, at the time of this hospitalization he was noted to have significantly distended large bowel, for which he was worked up with a BE, which was found to be normal. He was discharged home on 8/25 with coumadin and a lovenox bridge. However, at home he was not feeling well and was found to have worsening abdominal pain as well as significant diaphoreses by his wife and subsequently re-presented to the ER at OSH. A CT abdomen/pelvis with contrast was performed showing significant hemoperitoneum with a drop in his H/H. He was transferred here for higher level care. Of note at the OSH he was transfused 2 units of PRBC and FFP.    No current facility-administered medications on file prior to encounter.      Current Outpatient Prescriptions on File Prior to Encounter   Medication Sig    calcium citrate-vitamin D3 315-200 mg (CITRACAL+D) 315-200 mg-unit per tablet Take 2 tablets by mouth 2 (two) times daily.    cetirizine (ZYRTEC) 10 MG tablet Take 10 mg by mouth once daily.    dexamethasone (DECADRON) 4 MG Tab Take 1 tablet (4 mg total) by mouth every 6 (six) hours.    diphenoxylate-atropine 2.5-0.025 mg (LOMOTIL) 2.5-0.025 mg per tablet Take 1 tablet by mouth 4 (four) times daily as needed for Diarrhea.    enoxaparin (LOVENOX) 120 mg/0.8 mL Syrg Inject 0.7 mLs (110 mg total)  into the skin every 12 (twelve) hours.    levetiracetam (KEPPRA) 500 MG Tab Take 1 tablet (500 mg total) by mouth 2 (two) times daily.    nystatin (MYCOSTATIN) 100,000 unit/mL suspension Take 5 mLs (500,000 Units total) by mouth 4 (four) times daily.    pantoprazole (PROTONIX) 40 MG tablet Take 1 tablet (40 mg total) by mouth once daily.    promethazine (PHENERGAN) 25 MG tablet Take 25 mg by mouth every 4 (four) hours as needed.     warfarin (COUMADIN) 6 MG tablet Take 1 tablet (6 mg total) by mouth Daily.       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   HENT: Negative.    Eyes: Negative.    Respiratory: Negative for chest tightness and shortness of breath.    Cardiovascular: Negative for chest pain.   Gastrointestinal: Negative for abdominal distention, diarrhea, nausea and vomiting.   Endocrine: Negative.    Genitourinary: Negative.    Musculoskeletal: Negative.    Skin: Negative.    Allergic/Immunologic: Negative.    Neurological: Negative.    Hematological: Negative.    Psychiatric/Behavioral: Negative.      Objective:     Vital Signs (Most Recent):  Temp: 97.6 °F (36.4 °C) (08/28/17 1900)  Pulse: (!) 137 (08/28/17 2015)  Resp: (!) 26 (08/28/17 2015)  BP: (!) 127/91 (08/28/17 2000)  SpO2: 100 %  (08/28/17 2015) Vital Signs (24h Range):  Temp:  [97.6 °F (36.4 °C)-97.7 °F (36.5 °C)] 97.6 °F (36.4 °C)  Pulse:  [127-146] 137  Resp:  [22-26] 26  SpO2:  [98 %-100 %] 100 %  BP: (112-164)/(63-92) 127/91     Weight: 119.5 kg (263 lb 7.2 oz)  Body mass index is 32.93 kg/m².    Physical Exam   Constitutional: He is oriented to person, place, and time. He appears distressed.   HENT:   Head: Normocephalic and atraumatic.   Eyes: Pupils are equal, round, and reactive to light. No scleral icterus.   Neck: No tracheal deviation present.   Cardiovascular: Regular rhythm.    Pulmonary/Chest: Effort normal. No respiratory distress.   Abdominal: Soft. He exhibits distension. There is no tenderness. There is no guarding.   Neurological: He is alert and oriented to person, place, and time. No cranial nerve deficit.   Skin: Skin is warm. He is diaphoretic. No erythema.   Psychiatric: He has a normal mood and affect. His behavior is normal.       Significant Labs:  CBC:   Recent Labs  Lab 08/28/17  1848   WBC 11.09   RBC 2.63*   HGB 8.2*   HCT 25.0*   PLT 61*   MCV 83   MCH 31.2*   MCHC 32.8     CMP:   Recent Labs  Lab 08/28/17  1557   *   CALCIUM 7.6*   ALBUMIN 3.0*   PROT 5.6*      K 3.5   CO2 26      BUN 18   CREATININE 0.7   ALKPHOS 62   ALT 33   AST 18   BILITOT 1.2*     Cardiac markers:   Recent Labs  Lab 08/28/17  1855   CPKMB 7.8*   TROPONINI 0.255*       Significant Diagnostics:  CT: I have reviewed all pertinent results/findings within the past 24 hours and my personal findings are:  spontaneous splenic bleeding, hemoperitoneum    Assessment/Plan:     Active Diagnoses:    Diagnosis Date Noted POA    Ruptured spleen [S36.09XA] 08/28/2017 Yes      Problems Resolved During this Admission:    Diagnosis Date Noted Date Resolved POA     VTE Risk Mitigation     None        Claude Penn III is a 52 y.o. male with a significant past medical hx of metastatic colorectal cancer, notably to lungs and brain, who is  anticoagulated for a PE, who presents as a transfer from [facility] for spontaneous splenic bleed.     Admit to general surgery  Place in SICU  q4 cbc  q4 lactic acid  Resuscitate appropriately  NPO  Hold coumadin and lovenox  Reverse coagulopathy       Jesse Johnson MD  General Surgery  [facility]    I have personally performed a detailed history and physical examination on this patient. My findings are summarized in the resident's note included in the record.

## 2017-08-29 NOTE — PROGRESS NOTES
Patient transported to and from CT with two ICU RNs. VSS before, during and after transport. Portable monitor and oxygen with patient entire time.

## 2017-08-29 NOTE — SUBJECTIVE & OBJECTIVE
Interval History: Tachycardia and hypotension overnight with drop in H/H.  Transfused 4uPRBC, 2u FFP, and 1 six pack of platelets for presumed splenic rebleed.  CTA this AM showed increased intraperitoneal blood with layering near spleen but no active contrast extravasation.  Pt responded appropriately to most recent 2u PRBC transfusion.  H/H now 9/27.  INR 1.8.  Platelets 56.    Medications:  Continuous Infusions:   sodium chloride 0.9% 100 mL/hr at 08/29/17 0700     Scheduled Meds:   famotidine (PF)  20 mg Intravenous Daily    levetiracetam IVPB  500 mg Intravenous Q12H    potassium phosphate IVPB  20 mmol Intravenous Once    sodium chloride 0.9%  3 mL Intravenous Q8H     PRN Meds:sodium chloride, magnesium sulfate IVPB, magnesium sulfate IVPB, morphine, ondansetron, potassium chloride, potassium chloride, potassium chloride, sodium phosphate IVPB, sodium phosphate IVPB, sodium phosphate IVPB     Review of patient's allergies indicates:   Allergen Reactions    No known drug allergies      Objective:     Vital Signs (Most Recent):  Temp: 97.9 °F (36.6 °C) (08/29/17 0700)  Pulse: 110 (08/29/17 0700)  Resp: 19 (08/29/17 0700)  BP: (!) 146/75 (08/29/17 0700)  SpO2: 99 % (08/29/17 0700) Vital Signs (24h Range):  Temp:  [97.4 °F (36.3 °C)-98.4 °F (36.9 °C)] 97.9 °F (36.6 °C)  Pulse:  [] 110  Resp:  [15-38] 19  SpO2:  [93 %-100 %] 99 %  BP: (104-164)/() 146/75     Weight: 127.8 kg (281 lb 12 oz)  Body mass index is 35.22 kg/m².    Intake/Output - Last 3 Shifts       08/27 0700 - 08/28 0659 08/28 0700 - 08/29 0659 08/29 0700 - 08/30 0659    I.V. (mL/kg)  6190 (48.4)     Blood  2020     IV Piggyback  1100     Total Intake(mL/kg)  9310 (72.8)     Urine (mL/kg/hr)  1155 75 (0.4)    Total Output   1155 75    Net   +8155 -75                 Physical Exam    Significant Labs:  CBC:   Recent Labs  Lab 08/29/17  0543   WBC 5.83   RBC 3.09*   HGB 9.4*   HCT 25.9*   PLT 56*   MCV 84   MCH 30.4   MCHC 36.3*      CMP:   Recent Labs  Lab 08/28/17  1557 08/29/17  0245   * 112*   CALCIUM 7.6* 7.2*   ALBUMIN 3.0*  --    PROT 5.6*  --     137   K 3.5 3.9   CO2 26 26    105   BUN 18 13   CREATININE 0.7 0.6   ALKPHOS 62  --    ALT 33  --    AST 18  --    BILITOT 1.2*  --      Coagulation:   Recent Labs  Lab 08/28/17  1556 08/29/17  0405   LABPROT 22.0* 18.3*   INR 2.2* 1.8*   APTT 28.6  --        Significant Diagnostics:    CTA abd/pelvis:    1. Significant volume of hemoperitoneum throughout the abdomen and pelvis. There is somewhat more focal perisplenic collection about the enlarged spleen with associated fluid-fluid level concerning for possible splenic source of hemoperitoneum. There is a questionable hyperattenuating focus within the splenic hilum, although this does not appear to communicate with the splenic parenchyma or splenic vasculature, however small focus of active extravasation would be difficult to exclude on this limited single phase examination.     2. Multiple bilateral pulmonary masses in this patient with known pulmonary metastatic disease, better evaluated on prior dedicated chest CT of 8/22/2017.    3. Flattened configuration of the IVC concerning for hypovolemia.    4. Mild diffuse gaseous distention of the small and large bowel without definite transition point possibly related to underlying ileus. Postsurgical change of prior lower anterior resection.    5. Multiple stable appearing thoracolumbar compression deformities.

## 2017-08-29 NOTE — PLAN OF CARE
"Patient lives in a 1 story house w/spouse. Spouse at . Not medically stable for discharge. ICU care for ruptured spleen. Patient was on Coumadin & Lovenox bridge when discharged on 8/25;currently anti-coagulents on hold. Needs TBD. CM will continue to follow.        08/29/17 1520   Discharge Assessment   Assessment Type Discharge Planning Assessment   Confirmed/corrected address and phone number on facesheet? Yes   Assessment information obtained from? Patient;Medical Record;Other  (Spouse)   Expected Length of Stay (days) (? TBD)   Communicated expected length of stay with patient/caregiver no  (Per MD)   Prior to hospitilization cognitive status: Alert/Oriented;No Deficits   Prior to hospitalization functional status: Independent   Current cognitive status: Alert/Oriented;No Deficits   Current Functional Status: Independent;Needs Assistance   Facility Arrived From: ([facility] )   Lives With spouse   Able to Return to Prior Arrangements yes   Is patient able to care for self after discharge? Yes   Who are your caregiver(s) and their phone number(s)? (Spouse: Sandra RITTER [phone]. )   Patient's perception of discharge disposition other (comments)  (? TBD/PT/OT needs to be ordered)   Readmission Within The Last 30 Days unable to assess  (recently discharged 8/25/17 )   Patient currently being followed by outpatient case management? No   Patient currently receives any other outside agency services? No   Equipment Currently Used at Home none   Do you have any problems affording any of your prescribed medications? No   Is the patient taking medications as prescribed? yes   Does the patient have transportation home? Yes   Transportation Available car;family or friend will provide   Dialysis Name and Scheduled days (N/A)   Does the patient receive services at the Coumadin Clinic? No  (Spouse states,"He goes to [facility] for weekly lab work for his Coumadin. Dr. Collins manages.")   Discharge Plan A " Home with family   Discharge Plan B Home with family;Home Health   Patient/Family In Agreement With Plan unable to assess

## 2017-08-29 NOTE — H&P
[facility]  History & Physical  Surgical Intensive Care      SUBJECTIVE:     Chief Complaint/Reason for Admission: Abdominal pain    History of Present Illness:  Patient is a 52 y.o. 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 Patient was admitted and evaluated by neurosurgery, who OK'd reinitiation of coumadin for PE. Coumadin was held for potential sigmoidoscopy following barium enema. Barium enema showed no evidence of bowel obstruction and sidmoidoscopy was not indicated at this time. Patient was restarted on Lovenox and Coumadin on 8/25/2017 and discharged to home  At home he was not feeling well and was found to have worsening abdominal pain as well as significant diaphoreses by his wife and subsequently re-presented to the ER at OSH. A CT abdomen/pelvis with contrast was performed showing significant hemoperitoneum with a drop in his H/H. He was transferred here for higher level care. Of note at the OSH he was transfused 2 units of PRBC and FFP.  Pt presented to SICU alert and oriented.  He was HDS, but tachycardic.  Denies significant pain complaints.         PTA Medications   Medication Sig    calcium citrate-vitamin D3 315-200 mg (CITRACAL+D) 315-200 mg-unit per tablet Take 2 tablets by mouth 2 (two) times daily.    cetirizine (ZYRTEC) 10 MG tablet Take 10 mg by mouth once daily.    dexamethasone (DECADRON) 4 MG Tab Take 1 tablet (4 mg total) by mouth every 6 (six) hours.    diphenoxylate-atropine 2.5-0.025 mg (LOMOTIL) 2.5-0.025 mg per tablet Take 1 tablet by mouth 4 (four) times daily as needed for Diarrhea.    enoxaparin (LOVENOX) 120 mg/0.8 mL Syrg Inject 0.7 mLs (110 mg total) into the skin every 12 (twelve) hours.    levetiracetam (KEPPRA) 500 MG  Tab Take 1 tablet (500 mg total) by mouth 2 (two) times daily.    nystatin (MYCOSTATIN) 100,000 unit/mL suspension Take 5 mLs (500,000 Units total) by mouth 4 (four) times daily.    pantoprazole (PROTONIX) 40 MG tablet Take 1 tablet (40 mg total) by mouth once daily.    promethazine (PHENERGAN) 25 MG tablet Take 25 mg by mouth every 4 (four) hours as needed.     warfarin (COUMADIN) 6 MG tablet Take 1 tablet (6 mg total) by mouth Daily.       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  Social History   Substance Use Topics    Smoking status: Never Smoker    Smokeless tobacco: Former User    Alcohol use 1.2 oz/week     2 Cans of beer per week        Review of Systems:  Review of Systems   Constitutional: Positive for malaise/fatigue and weight loss. Negative for chills.   HENT: Negative for hearing loss and sore throat.    Eyes: Negative for double vision and photophobia.   Respiratory: Negative for cough and hemoptysis.    Cardiovascular: Negative for chest pain and palpitations.   Gastrointestinal: Positive for abdominal pain and vomiting. Negative for diarrhea and nausea.   Genitourinary: Negative for flank pain and hematuria.   Musculoskeletal: Negative for back pain, falls and neck pain.   Skin: Negative for itching and rash.   Neurological: Positive for seizures (Questionable; Poss brief activity). Negative for dizziness.   Psychiatric/Behavioral: Negative for depression and suicidal ideas.         OBJECTIVE:     Vital Signs (Most Recent):  Temp: 97.5 °F (36.4 °C) (08/28/17  2102)  Pulse: (!) 134 (08/28/17 2115)  Resp: 18 (08/28/17 2115)  BP: 132/75 (08/28/17 2115)  SpO2: 100 % (08/28/17 2115)    Ventilator Data (Last 24H):          Hemodynamic Parameters (Last 24H):       Physical Exam:  General: NAD  Neuro: AAOx3  HEENT: NCAT, MMM, Oropharynx clear  Cardio: S1 and S2, RRR  Resp: Moving air appropriately, breathing even and unlabored  Abd: Distension, Soft, NT, No guarding or rigidity  Ext: Warm and well perfused      Lines/Drains:       Peripheral IV - Single Lumen 08/28/17 Left Other (Active)   Site Assessment Clean;Dry;Intact;No redness;No swelling 8/28/2017  3:07 PM   Line Status No blood return;Flushed;Saline locked 8/28/2017  3:07 PM   Dressing Status Clean;Dry;Intact 8/28/2017  3:07 PM   Reason Not Rotated Not due 8/28/2017  3:07 PM   Number of days: 0            Peripheral IV - Single Lumen 08/28/17 Left Antecubital (Active)   Site Assessment Clean;Dry;Intact;No redness;No swelling 8/28/2017  3:07 PM   Line Status Blood return noted;Flushed;Saline locked 8/28/2017  3:07 PM   Dressing Status Clean;Dry;Intact 8/28/2017  3:07 PM   Reason Not Rotated Not due 8/28/2017  3:07 PM   Number of days: 0       Laboratory:  CBC:   Recent Labs  Lab 08/28/17  1848   WBC 11.09   RBC 2.63*   HGB 8.2*   HCT 25.0*   PLT 61*   MCV 83   MCH 31.2*   MCHC 32.8       CMP:   Recent Labs  Lab 08/28/17  1557   *   CALCIUM 7.6*   ALBUMIN 3.0*   PROT 5.6*      K 3.5   CO2 26      BUN 18   CREATININE 0.7   ALKPHOS 62   ALT 33   AST 18   BILITOT 1.2*       Coagulation:   Recent Labs  Lab 08/28/17  1556   INR 2.2*   APTT 28.6       Cardiac markers:   Recent Labs  Lab 08/28/17  1855   CPKMB 7.8*   TROPONINI 0.255*       ABGs: No results for input(s): PH, PCO2, PO2, HCO3, POCSATURATED, BE in the last 168 hours.      Chest X-Ray:    Diagnostic Results:      ASSESSMENT/PLAN:       Neuro:  - Sedation - None  - Pain control - Morphine prn    Resp:  - Stable on NC    CV:  - HDS; Tachycardia  - No  pressor requirement  - Pt with Troponin elevation; Cardiology consulted  - Eval heart for poss. Right heart strain in setting of PE    Heme/ID:  - H/H 8.6/24.2 - Previously 15/41.7 on 8/23  - Lactates 3.1->3.5->3.3  - With likely splenic bleed - No obvious etiology - Denies falls or trauma  - Cont to trend CBC and lactics q4h; Transfuse as necessary  - Pt received 2 units PRBCs and FFPs before arrival  - Anticoagulation held in setting of splenic bleed    Renal:  - Swift in place  - Strict I/Os  - BUN Cr 18/0.7    FEN/GI:  - NPO  - Replace lytes PRN    Endo:  - No issues    PPX:  - Famotidine    Dispo:  - Continue ICU care    Louie Whitlock MD, PGY-3  Surgery Intensive Care Unit

## 2017-08-29 NOTE — CONSULTS
Consult Note  Cardiology Consults    Consult Requested by:  Dr. Jesse Lynne MD  Reason for Consult:  R/o ACS vs troponemia in the setting of acute blood loss vs RV strain in the setting of PE  SUBJECTIVE:     History of Present Illness:  Patient is a 52 y.o. 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 Originally the patient was recently admitted here (after again being transferred from [place] originally for PE management and large bowel obstruction given his hx of cancer. Of note, at the time of this hospitalization he was noted to have significantly distended large bowel, for which he was worked up with a Barium Enema, which was found to be normal. He was discharged home on 8/25 with coumadin and a lovenox bridge. After discharge the patient worsened, [facility]'s ED complaining of abdominal pain and and significant diaphoresis. In the ED there. A CT abdomen/pelvis with contrast was performed showing significant hemoperitoneum with a drop in his H/H . Currently the primary team is transfusing as needed with his drop in H/H from 15 to 8. It is postulated that he has a splenic bleed.     Upon arrival an EKG was done which showed ST segment depressions in V2-V3 and V4. There was concern from the surgical ICU team that the patient was having ACS. Cardiology was consulted for these changes seen on EKG in addition to potential RV strain and workup of his positive troponin of 0.25.    Currently the patient complains of SOB in the laying down position and no chest pain. The patient's family denies any hx of heart disease.         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  Social History   Substance Use Topics    Smoking status: Never Smoker    Smokeless tobacco: Former User    Alcohol use 1.2 oz/week     2 Cans of beer per week       Review of Systems:  Constitutional: positive for fatigue, sweats and weight loss  Eyes: no visual changes  ENT: no nasal congestion or sore throat  Respiratory: positive for dyspnea on exertion  Cardiovascular: no chest pain or palpitations  Gastrointestinal: positive for abdominal pain and indigestion  Musculoskeletal: no arthralgias or myalgias  Neurological: no seizures or tremors    OBJECTIVE:     Vital Signs:  Temp:  [97.6 °F (36.4 °C)-97.7 °F (36.5 °C)]   Pulse:  [127-146]   Resp:  [22-26]   BP: (112-164)/(63-92)   SpO2:  [98 %-100 %]     Physical Exam:  General: well nourished, no distress  HENT: Head:normocephalic, atraumatic. Ears:not examined. Nose: no discharge. Throat: no throat erythema.  Eyes: conjunctivae/corneas clear. PERRL.   Neck: supple, symmetrical, trachea midline, no JVD  Lungs:  clear to auscultation bilaterally and normal respiratory effort  Cardiovascular: Heart: tachycardic no added murmurs or sounds. Chest Wall: no tenderness. Extremities: no cyanosis or edema, or clubbing. Pulses: 2+ and symmetric.  Abdomen/Rectal: Abdomen: distended no tenderness noted and no guarding.   Skin: cool lower extermites and pale  Musculoskeletal: no pain noted in his lower extremities. ROM not tested  Neurologic: AO x 3 and no cranial deficits noted. Grossly normal    Laboratory:  CBC:   Recent Labs  Lab 08/28/17  1848 08/28/17  2130   WBC 11.09 9.46   RBC 2.63* 2.76*   HGB 8.2* 8.3*   HCT 25.0* 23.2*   PLT 61*  --    MCV 83 84    MCH 31.2* 30.1   MCHC 32.8 35.8     CMP:   Recent Labs  Lab 08/28/17  1557   *   CALCIUM 7.6*   ALBUMIN 3.0*   PROT 5.6*      K 3.5   CO2 26      BUN 18   CREATININE 0.7   ALKPHOS 62   ALT 33   AST 18   BILITOT 1.2*     LFTs:   Recent Labs  Lab 08/28/17  1557   ALT 33   AST 18   ALKPHOS 62   BILITOT 1.2*   PROT 5.6*   ALBUMIN 3.0*     Coagulation:   Recent Labs  Lab 08/28/17  1556   LABPROT 22.0*   INR 2.2*   APTT 28.6     Cardiac markers:   Recent Labs  Lab 08/28/17  1855   CPKMB 7.8*   TROPONINI 0.255*     ABGs: No results for input(s): PH, PCO2, PO2, HCO3, POCSATURATED, BE in the last 168 hours.  Microbiology Results (last 7 days)     ** No results found for the last 168 hours. **        Specimen (12h ago through future)    None          Recent Labs  Lab 08/23/17  0918   COLORU Yellow   SPECGRAV >1.030*   PHUR 6.0   PROTEINUA Negative   NITRITE Negative   LEUKOCYTESUR Negative   UROBILINOGEN 1.0       Diagnostic Results:  ECG: Original EKG shows V2, V3, V4 ST depressions and TWI in lead I @18:48            Repeated EKG 20:19 posterior leads placed no ST segment elevation in leads V2-V3            Repeated EKG at 20:23 shows mild ST segment depression in V2 to V6  Note that all EKG's are sinus tachycardic.       X-Ray: Reviewed    ASSESSMENT/PLAN:     Patient is a 52 y.o. 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 No evidence of RV strain seen on bedside Echo (Formal study done), RV is not enlarged (2.7 cm RViD). TAPSE of 1.2 cm and TR max of 1.2. In addition normal LVEF seen. However, given that the patient is here for concern for a splenic bleed even if patient had RV strain if would contraindicated to do catheter-directed TPA  due to this risk. Would treat conservatively for now and stabilize the patient's acute blood loss      Recommendations:  - Agree with reversal of INR in light of a bleed  - In addition agree with Hb transfusion to above hb>10.   - Would trend one more troponin. However, given risk of bleeding treating for ACS is an issue given that his splenic bleed would preclude anti-platelet therapy and anti-coagulation.  - Pending 2D formal read    Will discuss with staff, Dr. Scales,    Mariangel Dawkins MD, PGY-4  Cardiology fellow,  Pager [phone]

## 2017-08-29 NOTE — PLAN OF CARE
Problem: Patient Care Overview  Goal: Individualization & Mutuality  Outcome: Ongoing (interventions implemented as appropriate)  VSS throughout shift. On room air. Trending H/H q4h, remains stable. Urine output adequate. On NS @ 100. OOB in chair. Plan to maybe step down bryce. Family @ bedside. Plan of care reviewed. Questions and concerns addressed. Will continue to monitor.

## 2017-08-30 LAB
ANION GAP SERPL CALC-SCNC: 4 MMOL/L
BACTERIA UR CULT: NO GROWTH
BASOPHILS # BLD AUTO: 0 K/UL
BASOPHILS NFR BLD: 0 %
BUN SERPL-MCNC: 13 MG/DL
CALCIUM SERPL-MCNC: 7.9 MG/DL
CHLORIDE SERPL-SCNC: 103 MMOL/L
CO2 SERPL-SCNC: 29 MMOL/L
CREAT SERPL-MCNC: 0.6 MG/DL
DIFFERENTIAL METHOD: ABNORMAL
EOSINOPHIL # BLD AUTO: 0 K/UL
EOSINOPHIL NFR BLD: 0 %
EOSINOPHIL NFR BLD: 0.2 %
EOSINOPHIL NFR BLD: 0.2 %
ERYTHROCYTE [DISTWIDTH] IN BLOOD BY AUTOMATED COUNT: 15.2 %
ERYTHROCYTE [DISTWIDTH] IN BLOOD BY AUTOMATED COUNT: 15.4 %
ERYTHROCYTE [DISTWIDTH] IN BLOOD BY AUTOMATED COUNT: 15.5 %
EST. GFR  (AFRICAN AMERICAN): >60 ML/MIN/1.73 M^2
EST. GFR  (NON AFRICAN AMERICAN): >60 ML/MIN/1.73 M^2
GLUCOSE SERPL-MCNC: 115 MG/DL
HCT VFR BLD AUTO: 24.6 %
HCT VFR BLD AUTO: 25.4 %
HCT VFR BLD AUTO: 25.4 %
HGB BLD-MCNC: 9 G/DL
HGB BLD-MCNC: 9.1 G/DL
HGB BLD-MCNC: 9.2 G/DL
LACTATE SERPL-SCNC: 1.2 MMOL/L
LYMPHOCYTES # BLD AUTO: 0.3 K/UL
LYMPHOCYTES # BLD AUTO: 0.3 K/UL
LYMPHOCYTES # BLD AUTO: 0.4 K/UL
LYMPHOCYTES NFR BLD: 5.5 %
LYMPHOCYTES NFR BLD: 5.6 %
LYMPHOCYTES NFR BLD: 6.1 %
MAGNESIUM SERPL-MCNC: 2.2 MG/DL
MCH RBC QN AUTO: 29.8 PG
MCH RBC QN AUTO: 30.1 PG
MCH RBC QN AUTO: 30.1 PG
MCHC RBC AUTO-ENTMCNC: 35.8 G/DL
MCHC RBC AUTO-ENTMCNC: 36.2 G/DL
MCHC RBC AUTO-ENTMCNC: 36.6 G/DL
MCV RBC AUTO: 82 FL
MCV RBC AUTO: 83 FL
MCV RBC AUTO: 83 FL
MITRAL VALVE MOBILITY: NORMAL
MONOCYTES # BLD AUTO: 0.2 K/UL
MONOCYTES # BLD AUTO: 0.2 K/UL
MONOCYTES # BLD AUTO: 0.3 K/UL
MONOCYTES NFR BLD: 2.8 %
MONOCYTES NFR BLD: 3.4 %
MONOCYTES NFR BLD: 4.8 %
NEUTROPHILS # BLD AUTO: 4.6 K/UL
NEUTROPHILS # BLD AUTO: 5 K/UL
NEUTROPHILS # BLD AUTO: 5.2 K/UL
NEUTROPHILS NFR BLD: 89.1 %
NEUTROPHILS NFR BLD: 90.4 %
NEUTROPHILS NFR BLD: 90.6 %
PHOSPHATE SERPL-MCNC: 1.5 MG/DL
PLATELET # BLD AUTO: 59 K/UL
PLATELET # BLD AUTO: 67 K/UL
PLATELET # BLD AUTO: 73 K/UL
PMV BLD AUTO: 8.8 FL
PMV BLD AUTO: 9 FL
PMV BLD AUTO: 9 FL
POTASSIUM SERPL-SCNC: 3.6 MMOL/L
RBC # BLD AUTO: 2.99 M/UL
RBC # BLD AUTO: 3.05 M/UL
RBC # BLD AUTO: 3.06 M/UL
RETIRED EF AND QEF - SEE NOTES: 60 (ref 55–65)
SODIUM SERPL-SCNC: 136 MMOL/L
WBC # BLD AUTO: 5.03 K/UL
WBC # BLD AUTO: 5.6 K/UL
WBC # BLD AUTO: 5.75 K/UL

## 2017-08-30 PROCEDURE — 97162 PT EVAL MOD COMPLEX 30 MIN: CPT

## 2017-08-30 PROCEDURE — 63600175 PHARM REV CODE 636 W HCPCS: Performed by: SURGERY

## 2017-08-30 PROCEDURE — 25000003 PHARM REV CODE 250: Performed by: STUDENT IN AN ORGANIZED HEALTH CARE EDUCATION/TRAINING PROGRAM

## 2017-08-30 PROCEDURE — 97165 OT EVAL LOW COMPLEX 30 MIN: CPT

## 2017-08-30 PROCEDURE — 20600001 HC STEP DOWN PRIVATE ROOM

## 2017-08-30 PROCEDURE — 25000003 PHARM REV CODE 250

## 2017-08-30 PROCEDURE — A4216 STERILE WATER/SALINE, 10 ML: HCPCS | Performed by: STUDENT IN AN ORGANIZED HEALTH CARE EDUCATION/TRAINING PROGRAM

## 2017-08-30 PROCEDURE — 63600175 PHARM REV CODE 636 W HCPCS: Performed by: STUDENT IN AN ORGANIZED HEALTH CARE EDUCATION/TRAINING PROGRAM

## 2017-08-30 PROCEDURE — 80048 BASIC METABOLIC PNL TOTAL CA: CPT

## 2017-08-30 PROCEDURE — 97116 GAIT TRAINING THERAPY: CPT

## 2017-08-30 PROCEDURE — 99254 IP/OBS CNSLTJ NEW/EST MOD 60: CPT | Mod: ,,, | Performed by: INTERNAL MEDICINE

## 2017-08-30 PROCEDURE — 83735 ASSAY OF MAGNESIUM: CPT

## 2017-08-30 PROCEDURE — 85025 COMPLETE CBC W/AUTO DIFF WBC: CPT

## 2017-08-30 PROCEDURE — 83605 ASSAY OF LACTIC ACID: CPT

## 2017-08-30 PROCEDURE — 84100 ASSAY OF PHOSPHORUS: CPT

## 2017-08-30 PROCEDURE — S0028 INJECTION, FAMOTIDINE, 20 MG: HCPCS | Performed by: STUDENT IN AN ORGANIZED HEALTH CARE EDUCATION/TRAINING PROGRAM

## 2017-08-30 PROCEDURE — 25000003 PHARM REV CODE 250: Performed by: SURGERY

## 2017-08-30 PROCEDURE — 97535 SELF CARE MNGMENT TRAINING: CPT

## 2017-08-30 RX ORDER — POTASSIUM CHLORIDE 20 MEQ/15ML
40 SOLUTION ORAL
Status: DISCONTINUED | OUTPATIENT
Start: 2017-08-30 | End: 2017-09-03

## 2017-08-30 RX ORDER — SODIUM,POTASSIUM PHOSPHATES 280-250MG
2 POWDER IN PACKET (EA) ORAL
Status: DISCONTINUED | OUTPATIENT
Start: 2017-08-30 | End: 2017-09-03

## 2017-08-30 RX ORDER — LANOLIN ALCOHOL/MO/W.PET/CERES
800 CREAM (GRAM) TOPICAL
Status: DISCONTINUED | OUTPATIENT
Start: 2017-08-30 | End: 2017-09-03

## 2017-08-30 RX ORDER — POTASSIUM CHLORIDE 20 MEQ/15ML
60 SOLUTION ORAL
Status: DISCONTINUED | OUTPATIENT
Start: 2017-08-30 | End: 2017-09-03

## 2017-08-30 RX ORDER — ENOXAPARIN SODIUM 100 MG/ML
40 INJECTION SUBCUTANEOUS EVERY 24 HOURS
Status: DISCONTINUED | OUTPATIENT
Start: 2017-08-30 | End: 2017-09-01

## 2017-08-30 RX ORDER — DIPHENHYDRAMINE HYDROCHLORIDE 50 MG/ML
12.5 INJECTION INTRAMUSCULAR; INTRAVENOUS ONCE
Status: COMPLETED | OUTPATIENT
Start: 2017-08-30 | End: 2017-08-30

## 2017-08-30 RX ORDER — METOPROLOL TARTRATE 25 MG/1
50 TABLET, FILM COATED ORAL 2 TIMES DAILY
Status: DISCONTINUED | OUTPATIENT
Start: 2017-08-30 | End: 2017-09-10 | Stop reason: HOSPADM

## 2017-08-30 RX ADMIN — NYSTATIN 500000 UNITS: 500000 SUSPENSION ORAL at 04:08

## 2017-08-30 RX ADMIN — ENOXAPARIN SODIUM 40 MG: 100 INJECTION SUBCUTANEOUS at 04:08

## 2017-08-30 RX ADMIN — ONDANSETRON 4 MG: 2 INJECTION INTRAMUSCULAR; INTRAVENOUS at 06:08

## 2017-08-30 RX ADMIN — Medication 3 ML: at 03:08

## 2017-08-30 RX ADMIN — CALCIUM CARBONATE (ANTACID) CHEW TAB 500 MG 500 MG: 500 CHEW TAB at 04:08

## 2017-08-30 RX ADMIN — MORPHINE SULFATE 2 MG: 2 INJECTION, SOLUTION INTRAMUSCULAR; INTRAVENOUS at 01:08

## 2017-08-30 RX ADMIN — POTASSIUM & SODIUM PHOSPHATES POWDER PACK 280-160-250 MG 2 PACKET: 280-160-250 PACK at 06:08

## 2017-08-30 RX ADMIN — POTASSIUM & SODIUM PHOSPHATES POWDER PACK 280-160-250 MG 2 PACKET: 280-160-250 PACK at 08:08

## 2017-08-30 RX ADMIN — MORPHINE SULFATE 2 MG: 2 INJECTION, SOLUTION INTRAMUSCULAR; INTRAVENOUS at 07:08

## 2017-08-30 RX ADMIN — NYSTATIN 500000 UNITS: 500000 SUSPENSION ORAL at 08:08

## 2017-08-30 RX ADMIN — MORPHINE SULFATE 2 MG: 2 INJECTION, SOLUTION INTRAMUSCULAR; INTRAVENOUS at 09:08

## 2017-08-30 RX ADMIN — FAMOTIDINE 20 MG: 10 INJECTION, SOLUTION INTRAVENOUS at 08:08

## 2017-08-30 RX ADMIN — LEVETIRACETAM 500 MG: 5 INJECTION INTRAVENOUS at 09:08

## 2017-08-30 RX ADMIN — POLYETHYLENE GLYCOL 3350 17 G: 17 POWDER, FOR SOLUTION ORAL at 08:08

## 2017-08-30 RX ADMIN — METOPROLOL TARTRATE 50 MG: 50 TABLET, FILM COATED ORAL at 04:08

## 2017-08-30 RX ADMIN — POTASSIUM & SODIUM PHOSPHATES POWDER PACK 280-160-250 MG 2 PACKET: 280-160-250 PACK at 03:08

## 2017-08-30 RX ADMIN — NYSTATIN 500000 UNITS: 500000 SUSPENSION ORAL at 12:08

## 2017-08-30 RX ADMIN — LEVETIRACETAM 500 MG: 5 INJECTION INTRAVENOUS at 08:08

## 2017-08-30 RX ADMIN — MORPHINE SULFATE 2 MG: 2 INJECTION, SOLUTION INTRAMUSCULAR; INTRAVENOUS at 06:08

## 2017-08-30 RX ADMIN — Medication 3 ML: at 10:08

## 2017-08-30 RX ADMIN — POTASSIUM CHLORIDE 40 MEQ: 20 SOLUTION ORAL at 06:08

## 2017-08-30 RX ADMIN — DIPHENHYDRAMINE HYDROCHLORIDE 12.5 MG: 50 INJECTION, SOLUTION INTRAMUSCULAR; INTRAVENOUS at 01:08

## 2017-08-30 NOTE — PT/OT/SLP EVAL
Physical Therapy  Evaluation    Claude Penn III   MRN: [id number]   Admitting Diagnosis: Ruptured spleen    PT Received On: 08/30/17  PT Start Time: 0850     PT Stop Time: 0918    PT Total Time (min): 28 min       Billable Minutes:  Evaluation 18 min and Gait [id number] min    Diagnosis: Ruptured spleen  Pt was transferred from [facility]. Pt has metastatic CA to lungs and brain.     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      Past Surgical History:   Procedure Laterality Date    HERNIA REPAIR      ILEOSTOMY REVISION      rectal tumor      removed       Referring physician: Zohaib Dietz  Date referred to PT: 8/29/17    General Precautions: Standard, fall  Orthopedic Precautions:     Braces:         Do you have any cultural, spiritual, Zoroastrianism conflicts, given your current situation?: none    Patient History:  Lives With: spouse (pt works full-time in construction and lives with his homemaker wife who will assist after discharge.)  Living Arrangements: house (1 story, 4 steps with no rail)  Equipment Currently Used at Home:  (rollator, BSC, built in shower with bench, SC)  DME owned (not currently used): none    Previous Level of Function:  Ambulation Skills: independent  Transfer Skills: independent    Subjective:  Communicated with nurse prior to session.    Chief Complaint: pt c/o back pain during treatment.   Patient goals: to get better and go home.     Pain/Comfort  Pain Rating 1: 8/10  Location 1:  (back)  Pain Rating Post-Intervention 1: 8/10      Objective:   Patient found with: pulse ox (continuous), telemetry, blood pressure cuff, peripheral IV     Cognitive Exam:  Oriented to: Person, Place, Time and Situation    Follows Commands/attention: Follows multistep  commands  Communication: clear/fluent  Safety  awareness/insight to disability: intact    Physical Exam:  Lower Extremity Range of Motion:  Right Lower Extremity: WFL  Left Lower Extremity: WFL    Lower Extremity Strength:  Right Lower Extremity: WFL  Left Lower Extremity: WFL       Functional Mobility:  Bed Mobility:  Rolling/Turning to Left: Stand by assistance (with HOB elevated)  Supine to Sit: Stand by Assistance    Transfers:  Sit <> Stand Assistance: Minimum Assistance  Sit <> Stand Assistive Device: No Assistive Device    Gait: pt sat at sink to perform ADLS with OT.   Gait Distance: 8 ft  pt HR increased to 156 after gait training and returned to 134 with sitting.   Assistance 1: Minimum assistance  Gait Assistive Device: Hand held assist  Gait Pattern: 2-point gait      AM-PAC 6 CLICK MOBILITY  How much help from another person does this patient currently need?   1 = Unable, Total/Dependent Assistance  2 = A lot, Maximum/Moderate Assistance  3 = A little, Minimum/Contact Guard/Supervision  4 = None, Modified Mar Lin/Independent    Turning over in bed (including adjusting bedclothes, sheets and blankets)?: 3  Sitting down on and standing up from a chair with arms (e.g., wheelchair, bedside commode, etc.): 3  Moving from lying on back to sitting on the side of the bed?: 3  Moving to and from a bed to a chair (including a wheelchair)?: 3  Need to walk in hospital room?: 3  Climbing 3-5 steps with a railing?: 1  Total Score: 16     AM-PAC Raw Score CMS G-Code Modifier Level of Impairment Assistance   6 % Total / Unable   7 - 9 CM 80 - 100% Maximal Assist   10 - 14 CL 60 - 80% Moderate Assist   15 - 19 CK 40 - 60% Moderate Assist   20 - 22 CJ 20 - 40% Minimal Assist   23 CI 1-20% SBA / CGA   24 CH 0% Independent/ Mod I     Patient left up in chair with all lines intact, call button in reach and wife present.    Assessment:   Claude Penn III is a 52 y.o. male with a medical diagnosis of Ruptured spleen and presents with decreased tolerance to  activities, decreased transfers, mobility and decreased distance ambulated. Pt would benefit from cont PT to address deficits and will need HHPT and DME to be determined closer to discharge. Pt will benefit from skilled PT 5x/wk to return pt to Independent status.    Rehab identified problem list/impairments: Rehab identified problem list/impairments: weakness, impaired endurance, impaired functional mobilty, gait instability, decreased lower extremity function, impaired balance    Rehab potential is good.    Activity tolerance: Good    Discharge recommendations: Discharge Facility/Level Of Care Needs: home health PT     Barriers to discharge: Barriers to Discharge: None    Equipment recommendations: Equipment Needed After Discharge:  (will determine DME needs closer to discharge)     GOALS:    Physical Therapy Goals        Problem: Physical Therapy Goal    Goal Priority Disciplines Outcome Goal Variances Interventions   Physical Therapy Goal     PT/OT, PT      Description:  Goals to be met by: 17    Patient will increase functional independence with mobility by performin. Supine to sit with Set-up Hinds - not met  2. Sit to stand transfer with Supervision - not met  3. Gait  x 150 feet with Supervision using AD if needed.- not met   4. Ascend/descend 4 stair with no Handrails Contact Guard Assistance - not met  5. Lower extremity exercise program x15 reps per handout, with supervision - not met                      PLAN:    Patient to be seen 5 x/week to address the above listed problems via gait training, therapeutic activities, therapeutic exercises  Plan of Care expires: 17  Plan of Care reviewed with: spouse, patient          Maia PAN Violette, PT  2017

## 2017-08-30 NOTE — CONSULTS
Consult Note    Inpatient consult to Hematology/Oncology  Consult performed by: DEVONTE FALCON  Consult ordered by: RAYSHAWN PETERSEN        SUBJECTIVE:     History of Present Illness:  Patient is a 52 y.o. male with h/o metastatic colon cancer s/p craniotomy on 7/31/17 for metastasectomy, PE on anticoagualtion,  [place] [facility] [place] [facility] where the patient presented for complaint of feeling cold, and clammy.  PT was found to be anemic and underwent CT of the abdomen showing a hemoperitoneum and possible splenic rupture.  Of note the patient was recently admitted to [facility] for complaint of SOB and diangnosed with a PE and SBO transferred to [facility] [place] and eventually discharged on lovenox bridge to coumadin on 8/25/17.  Since admission the patient underwent CTA showing no obvious active extravasation of blood intraabdominally.  The patient has required 4 units of PRBC's, 2 units of platelets, and 3 units of FFP.  Currently the patient complains of SOB on occasion, back pain, abdominal pain, and constipation.  The patient denies fever, chills, CP, N/V, diarrhea.    ONC: He was originally diagnosed with rectal ca and mets to the lung in 2013. Has followed at [facility] is additional to locally. He received CRT followed by LAR ypT3N0. KRAS and TP53 mutant, NASIR. He then received FOLFOX then FOLFIRI/avastin followed by maintenance 5FU/alicja then FOLFOX alicja followed by FOLFIRI.  Pt admitted recently on 7/27/17 for HA and confusion found to have mets to the brain s/p right craniotomy with resection of tumor on 7/31/17, s/p 8/11 gamma knife at [facility] and 2013 PE (previously on coumadin; held 7/31).    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  Social History   Substance Use Topics    Smoking status: Never Smoker    Smokeless tobacco: Former User    Alcohol use 1.2 oz/week     2 Cans of beer per week     Review of Systems   Constitutional: Negative for chills and fever.   HENT: Negative for congestion and sore throat.    Eyes: Negative for blurred vision and pain.   Respiratory: Positive for shortness of breath. Negative for cough and sputum production.    Cardiovascular: Negative for chest pain, palpitations and leg swelling.   Gastrointestinal: Positive for abdominal pain and constipation. Negative for diarrhea, nausea and vomiting.   Genitourinary: Negative for dysuria and urgency.   Musculoskeletal: Positive for back pain. Negative for myalgias.   Skin: Negative for itching and rash.   Neurological: Negative for dizziness, focal weakness and headaches.     OBJECTIVE:     Vital Signs:  Temp:  [98.4 °F (36.9 °C)-99.6 °F (37.6 °C)]   Pulse:  [116-128]   Resp:  [17-24]   BP: (101-152)/()   SpO2:  [94 %-99 %]     Physical Exam   Constitutional: He is oriented to person, place, and time. He appears well-developed and well-nourished. No distress.   HENT:   Head: Normocephalic and atraumatic.   Mouth/Throat: Oropharynx is clear and moist. No oropharyngeal exudate.   Eyes: EOM are normal. Pupils are equal, round, and reactive to light. Right eye exhibits no discharge. Left eye exhibits no discharge. No scleral icterus.   Neck: Normal range of motion. No JVD present.   Cardiovascular: Normal rate, regular rhythm, normal heart sounds and intact distal pulses.  Exam reveals no gallop and no friction rub.    No murmur heard.  Pulmonary/Chest: Effort normal and breath sounds normal. No respiratory distress. He has no  wheezes. He has no rales. He exhibits no tenderness.   Abdominal: Soft. Bowel sounds are normal. He exhibits distension. He exhibits no mass. There is tenderness. There is no rebound.   Musculoskeletal: Normal range of motion. He exhibits no edema or tenderness.   Neurological: He is alert and oriented to person, place, and time. No cranial nerve deficit. Coordination normal.   Skin: Skin is warm and dry. No rash noted. He is not diaphoretic. No erythema.   Psychiatric: He has a normal mood and affect. His behavior is normal.     Laboratory:  Recent Results (from the past 24 hour(s))   CBC auto differential    Collection Time: 08/29/17  4:00 PM   Result Value Ref Range    WBC 6.15 3.90 - 12.70 K/uL    RBC 3.18 (L) 4.60 - 6.20 M/uL    Hemoglobin 9.6 (L) 14.0 - 18.0 g/dL    Hematocrit 26.7 (L) 40.0 - 54.0 %    MCV 84 82 - 98 fL    MCH 30.2 27.0 - 31.0 pg    MCHC 36.0 32.0 - 36.0 g/dL    RDW 15.1 (H) 11.5 - 14.5 %    Platelets 69 (L) 150 - 350 K/uL    MPV 8.8 (L) 9.2 - 12.9 fL    Gran # 5.4 1.8 - 7.7 K/uL    Lymph # 0.4 (L) 1.0 - 4.8 K/uL    Mono # 0.3 0.3 - 1.0 K/uL    Eos # 0.0 0.0 - 0.5 K/uL    Baso # 0.00 0.00 - 0.20 K/uL    Gran% 87.8 (H) 38.0 - 73.0 %    Lymph% 6.7 (L) 18.0 - 48.0 %    Mono% 4.7 4.0 - 15.0 %    Eosinophil% 0.0 0.0 - 8.0 %    Basophil% 0.0 0.0 - 1.9 %    Differential Method Automated    CBC auto differential    Collection Time: 08/29/17  7:31 PM   Result Value Ref Range    WBC 6.06 3.90 - 12.70 K/uL    RBC 3.03 (L) 4.60 - 6.20 M/uL    Hemoglobin 9.0 (L) 14.0 - 18.0 g/dL    Hematocrit 24.9 (L) 40.0 - 54.0 %    MCV 82 82 - 98 fL    MCH 29.7 27.0 - 31.0 pg    MCHC 36.1 (H) 32.0 - 36.0 g/dL    RDW 15.3 (H) 11.5 - 14.5 %    Platelets 60 (L) 150 - 350 K/uL    MPV 9.0 (L) 9.2 - 12.9 fL    Gran # 5.4 1.8 - 7.7 K/uL    Lymph # 0.4 (L) 1.0 - 4.8 K/uL    Mono # 0.3 0.3 - 1.0 K/uL    Eos # 0.0 0.0 - 0.5 K/uL    Baso # 0.00 0.00 - 0.20 K/uL    Gran% 88.6 (H) 38.0 - 73.0 %    Lymph% 6.6 (L) 18.0 - 48.0 %     Mono% 4.3 4.0 - 15.0 %    Eosinophil% 0.2 0.0 - 8.0 %    Basophil% 0.0 0.0 - 1.9 %    Differential Method Automated    Basic metabolic panel    Collection Time: 08/30/17  3:23 AM   Result Value Ref Range    Sodium 136 136 - 145 mmol/L    Potassium 3.6 3.5 - 5.1 mmol/L    Chloride 103 95 - 110 mmol/L    CO2 29 23 - 29 mmol/L    Glucose 115 (H) 70 - 110 mg/dL    BUN, Bld 13 6 - 20 mg/dL    Creatinine 0.6 0.5 - 1.4 mg/dL    Calcium 7.9 (L) 8.7 - 10.5 mg/dL    Anion Gap 4 (L) 8 - 16 mmol/L    eGFR if African American >60.0 >60 mL/min/1.73 m^2    eGFR if non African American >60.0 >60 mL/min/1.73 m^2   Lactic acid, plasma    Collection Time: 08/30/17  3:23 AM   Result Value Ref Range    Lactate (Lactic Acid) 1.2 0.5 - 2.2 mmol/L   CBC auto differential    Collection Time: 08/30/17  3:23 AM   Result Value Ref Range    WBC 5.60 3.90 - 12.70 K/uL    RBC 2.99 (L) 4.60 - 6.20 M/uL    Hemoglobin 9.0 (L) 14.0 - 18.0 g/dL    Hematocrit 24.6 (L) 40.0 - 54.0 %    MCV 82 82 - 98 fL    MCH 30.1 27.0 - 31.0 pg    MCHC 36.6 (H) 32.0 - 36.0 g/dL    RDW 15.2 (H) 11.5 - 14.5 %    Platelets 59 (L) 150 - 350 K/uL    MPV 9.0 (L) 9.2 - 12.9 fL    Gran # 5.0 1.8 - 7.7 K/uL    Lymph # 0.3 (L) 1.0 - 4.8 K/uL    Mono # 0.3 0.3 - 1.0 K/uL    Eos # 0.0 0.0 - 0.5 K/uL    Baso # 0.00 0.00 - 0.20 K/uL    Gran% 89.1 (H) 38.0 - 73.0 %    Lymph% 5.5 (L) 18.0 - 48.0 %    Mono% 4.8 4.0 - 15.0 %    Eosinophil% 0.2 0.0 - 8.0 %    Basophil% 0.0 0.0 - 1.9 %    Differential Method Automated    Magnesium    Collection Time: 08/30/17  3:23 AM   Result Value Ref Range    Magnesium 2.2 1.6 - 2.6 mg/dL   Phosphorus    Collection Time: 08/30/17  3:23 AM   Result Value Ref Range    Phosphorus 1.5 (L) 2.7 - 4.5 mg/dL   CBC auto differential    Collection Time: 08/30/17 10:32 AM   Result Value Ref Range    WBC 5.75 3.90 - 12.70 K/uL    RBC 3.06 (L) 4.60 - 6.20 M/uL    Hemoglobin 9.2 (L) 14.0 - 18.0 g/dL    Hematocrit 25.4 (L) 40.0 - 54.0 %    MCV 83 82 - 98 fL    MCH  30.1 27.0 - 31.0 pg    MCHC 36.2 (H) 32.0 - 36.0 g/dL    RDW 15.4 (H) 11.5 - 14.5 %    Platelets 67 (L) 150 - 350 K/uL    MPV 8.8 (L) 9.2 - 12.9 fL    Gran # 5.2 1.8 - 7.7 K/uL    Lymph # 0.4 (L) 1.0 - 4.8 K/uL    Mono # 0.2 (L) 0.3 - 1.0 K/uL    Eos # 0.0 0.0 - 0.5 K/uL    Baso # 0.00 0.00 - 0.20 K/uL    Gran% 90.4 (H) 38.0 - 73.0 %    Lymph% 6.1 (L) 18.0 - 48.0 %    Mono% 2.8 (L) 4.0 - 15.0 %    Eosinophil% 0.2 0.0 - 8.0 %    Basophil% 0.0 0.0 - 1.9 %    Differential Method Automated          Diagnostic Results:  CT Head 8/28/17  Persistent but decreased size of right frontal lobe mass and associated vasogenic edema.  No acute abnormality.    CT Abdomen 8/28/17  Moderate perihepatic, perisplenic, paracolic gutter and right pelvis hemoperitoneum.  Splenic contour irregularity with focally hyperdense perisplenic fluid suggestive of sentinel clot.  This raises the concern for splenic rupture/bleed.    CTA 8/29/17:  1. Significant volume of hemoperitoneum throughout the abdomen and pelvis. There is somewhat more focal perisplenic collection about the enlarged spleen with associated fluid-fluid level concerning for possible splenic source of hemoperitoneum. There is a questionable hyperattenuating focus within the splenic hilum, although this does not appear to communicate with the splenic parenchyma or splenic vasculature, however small focus of active extravasation would be difficult to exclude on this limited single phase examination.     2. Multiple bilateral pulmonary masses in this patient with known pulmonary metastatic disease, better evaluated on prior dedicated chest CT of 8/22/2017.    3. Flattened configuration of the IVC concerning for hypovolemia.    4. Mild diffuse gaseous distention of the small and large bowel without definite transition point possibly related to underlying ileus. Postsurgical change of prior lower anterior resection.    5. Multiple stable appearing thoracolumbar compression  deformities.     ASSESSMENT/PLAN:     Pt is a 53 yo M with h/o metastatic colon cancer, PE who presented to the hospital with intraabdominal bleeding suspected to be secondary to a splenic infarct/rupture.    Plan:     Pulmonary Embolism - given the patient's h/o of PE, continued pro thrombotic state secondary to metastatic cancer, and current internal bleeding from possible splenic infarct/rupture, I would recommend placement of a retrievable IVC filter at this time.  -The patient will need to be assessed in the future for potential resumption of anticoagulation.  -Would not leave the IVC filter in place any longer than 3 months as the filter itself will place the patient at risk for clotting.  -This was discussed with the patient and his family.    -Discussed with staff.    Rai Tom MD PGY-V  Hematology and Oncology  Pager:[phone]    STAFF NOTE:  I have personally taken the history and examined this patient and agree with Dr. Tom's Note as stated above.

## 2017-08-30 NOTE — PT/OT/SLP EVAL
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 Pt was independent PTA and working when he felt as he could. 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 Pt was not using AE/DME  Equipment Currently Used at Home: cane, straight, bedside commode, rollator, shower chair        Subjective:  Communicated with nsg prior to session.  Pt reports having back pain from being in bed.     Pain/Comfort  Pain Rating 1: 0/10    Objective:   Pt found supine in bed agreeable to therapy. Pt on RA with saturation remaining >94% with activity. HR did increased to 156 BPM with activity    Cognitive Exam:  Pt is awake and oriented. Pt follows commands. Pt with appropriate mood/affect.       Physical Exam:  Pt is right hand dominant and demo WFL UE strength, coordination and sensation.     Functional Mobility:  Bed Mobility:  Supine to Sit: Stand by Assistance (with HOB elevated. )    Transfers:  Sit <> Stand Assistance: Minimum Assistance  Sit <> Stand Assistive Device: No Assistive Device  Bed <> Chair Transfer Assistance: Minimum Assistance  Bed <> Chair Assistive Device: No Assistive Device      Activities of Daily  "Living:     UE Dressing Level of Assistance: Minimum assistance  LE Dressing Level of Assistance: Total assistance  Grooming Position: Seated  Grooming Level of Assistance: Stand by assistance       Pt demo Fair+ sitting and standing balance. Extended rest breaks required throughout all transitions due to reports SOB. Oxygen saturation remained WFL; however, pt with HR elevating to 156 BPM following short ambulation to sink. Pt required extended seated rest break with OT monitoring vital signs before he was able to complete the g/h task seated.   Education provided to pt/spouse re: OT POC, importance of OOB activity and current functional endurance limitations requiring staff to assist with mobility rather than spouse. Pt/spouse verb understanding.     AM-PAC 6 CLICK ADL  How much help from another person does this patient currently need?  1 = Unable, Total/Dependent Assistance  2 = A lot, Maximum/Moderate Assistance  3 = A little, Minimum/Contact Guard/Supervision  4 = None, Modified Baxter/Independent    Putting on and taking off regular lower body clothing? : 1  Bathing (including washing, rinsing, drying)?: 2  Toileting, which includes using toilet, bedpan, or urinal? : 2  Putting on and taking off regular upper body clothing?: 2  Taking care of personal grooming such as brushing teeth?: 3  Eating meals?: 4  Total Score: 14    AM-PAC Raw Score CMS "G-Code Modifier Level of Impairment Assistance   6 % Total / Unable   7 - 9 CM 80 - 100% Maximal Assist   10-14 CL 60 - 80% Moderate Assist   15 - 19 CK 40 - 60% Moderate Assist   20 - 22 CJ 20 - 40% Minimal Assist   23 CI 1-20% SBA / CGA   24 CH 0% Independent/ Mod I       Patient left up in chair with all lines intact, call button in reach and spouse present    Assessment:  Claude Penn III is a 52 y.o. male with a medical diagnosis of Ruptured spleen and tolerated session fairly well. Pt with good effort and cooperation but remains limited with " functional endurance and completion of functional mobility and ADL skills. Pt to benefit from cont OT to address stated goals. .    Rehab identified problem list/impairments: Rehab identified problem list/impairments: weakness, impaired functional mobilty, impaired endurance, impaired balance, impaired self care skills, gait instability    Rehab potential is good.    Activity tolerance: Fair due to tachycardia and SOB    Discharge recommendations: Discharge Facility/Level Of Care Needs: home with home health         GOALS:    Occupational Therapy Goals        Problem: Occupational Therapy Goal    Goal Priority Disciplines Outcome Interventions   Occupational Therapy Goal     OT, PT/OT     Description:  Goals to be met by:  2 weeks 9/13/17     Patient will increase functional independence with ADLs by performing:    UE Dressing with Supervision.  LE Dressing with Supervision.  Grooming while standing with Supervision.  Toileting from toilet with Supervision for hygiene and clothing management.   Stand pivot transfers with Supervision.  Toilet transfer to toilet with Supervision.                      PLAN:  Patient to be seen 5 x/week to address the above listed problems via self-care/home management, therapeutic activities, therapeutic exercises  Plan of Care expires:    Plan of Care reviewed with: patient, spouse         CLYDE Herman  08/30/2017

## 2017-08-30 NOTE — PROGRESS NOTES
Swift removed at 2300 last night. Notified MD of bladder scan 167cc. At 0700 this AM. Orders to  Re bladder scan patient in a few hours.

## 2017-08-30 NOTE — PLAN OF CARE
Problem: Occupational Therapy Goal  Goal: Occupational Therapy Goal  Goals to be met by:  2 weeks 9/13/17     Patient will increase functional independence with ADLs by performing:    UE Dressing with Supervision.  LE Dressing with Supervision.  Grooming while standing with Supervision.  Toileting from toilet with Supervision for hygiene and clothing management.   Stand pivot transfers with Supervision.  Toilet transfer to toilet with Supervision.    Goals and POC established today.

## 2017-08-30 NOTE — PROGRESS NOTES
Progress Note  Surgical Intensive Care    Admit Date: 8/28/2017  Post-operative Day:    Hospital Day: 3    SUBJECTIVE:     Follow-up For:  Splenic bleed    HPI:  Mr. Trujillo is a 53 yo gentleman w/ h/o Stage 4 CRC w/ BL lung mets s/p resection in 2013 at [facility], with multiple rounds of subsequent chemotherapy. However he progressed on chemotherapy and was found to have brain metastases for which he is now s/p right hemicraniectomy with resection and subsequent gamma knife radiation. He also has a history of previous PE treated with coumadin in the past. Recently, he was having worsening SOB and GRECO and was transferred to [facility] from [place] for care, at which time he was diagnosed with a recurrent PE and placed on coumadin once again. Of note, at the time of this hospitalization he was noted to have significantly distended large bowel, for which he was worked up with a BE, which was found to be normal. He was discharged home on 8/25 with coumadin and a lovenox bridge. However, at home he was not feeling well and was found to have worsening abdominal pain as well as significant diaphoreses by his wife and subsequently re-presented to the ER at OSH. A CT abdomen/pelvis with contrast was performed showing significant hemoperitoneum with a drop in his H/H. He was transferred here for higher level care. Of note at the OSH he was transfused 2 units of PRBC and FFP.    Interval history: No acute events overnight. H/H stable at 9.0, no blood given overnight. Swift removed last night, has not voided yet. Bladder scan at 0700 showed 167mL, rescan later this morning. Patient states he has continued abdominal pain in LUQ.    Continuous Infusions:   sodium chloride 0.9% 100 mL/hr at 08/30/17 0900     Scheduled Meds:   famotidine (PF)  20 mg Intravenous Daily    levetiracetam IVPB  500 mg Intravenous Q12H    nystatin  500,000 Units Oral QID (WM & HS)    polyethylene glycol  17 g Oral Daily    sodium chloride 0.9%  3  mL Intravenous Q8H     PRN Meds:sodium chloride, calcium carbonate, magnesium oxide, magnesium oxide, morphine, ondansetron, potassium chloride 10%, potassium chloride 10%, potassium chloride 10%, potassium, sodium phosphates, potassium, sodium phosphates, potassium, sodium phosphates    Review of patient's allergies indicates:   Allergen Reactions    No known drug allergies        OBJECTIVE:     Vital Signs (Most Recent)  Temp: 99.6 °F (37.6 °C) (08/30/17 0700)  Pulse: (!) 122 (08/30/17 0935)  Resp: (!) 21 (08/30/17 0935)  BP: (!) 129/94 (08/30/17 0935)  SpO2: 95 % (08/30/17 0935)    Vital Signs Range (Last 24H):  Temp:  [98.3 °F (36.8 °C)-99.6 °F (37.6 °C)]   Pulse:  [101-129]   Resp:  [14-24]   BP: (101-160)/()   SpO2:  [94 %-100 %]     I & O (Last 24H):    Intake/Output Summary (Last 24 hours) at 08/30/17 1128  Last data filed at 08/30/17 0500   Gross per 24 hour   Intake             2844 ml   Output              410 ml   Net             2434 ml        Physical Exam:  General: well developed, well nourished  Lungs:  normal respiratory effort, no wheezes  Cardiovascular: Heart: regular rate and rhythm, S1, S2 normal, no murmur, click, rub or gallop. Chest Wall: no tenderness. Extremities: no cyanosis or edema, or clubbing.   Abdomen/Rectal: Abdomen: soft, mild distention, some tenderness to palpation throughout  Skin: Skin color, texture, turgor normal. No rashes or lesions    Laboratory (Last 24H):  CBC:    Recent Labs  Lab 08/30/17  1032   WBC 5.75   HGB 9.2*   HCT 25.4*   PLT 67*     CMP:    Recent Labs  Lab 08/30/17  0323   CALCIUM 7.9*      K 3.6   CO2 29      BUN 13   CREATININE 0.6       Diagnostic Results:  CT: hemoperitoneum, no extravasation of contrast around spleen    ASSESSMENT/PLAN:    Mr. Trujillo is a 53 yo gentleman w/ h/o Stage 4 CRC w/ BL lung mets s/p resection in 2013 at [facility], CT abdomen/pelvis with contrast was performed showing significant hemoperitoneum with a drop in  his H/H concerning for splenic bleed.    Plan:    Neuro:   - awake, alert, orientedx3    Pulmonary:   - maintaining O2 sats on room air    Cardiac:  - BP stable off vasopressor support  - tachycardic continued overnight     Renal:   - BUN/Cr stable at 13/0.6  - saleh removed last night, has not voided since. Bladder scan showed 167mL at 0700, pending rescan this morning    Infectious Disease:   - lactate trending down 1.7-->1.4-->1.2  - WBC WNL (5.75)  - blood cultures no growth to date    Hematology/Oncology:  - H/H stable at 9.2/25.4 from 9.0/24.6  - CT scan negative for arterial splenic bleed, showed hemoperitoneum especially around spleen    Endocrine:  - none, glucose WNL    Fluids/Electrolytes/Nutrition/GI:   - NS 100ml/hr  - regular diet  - stool softeners (polyethylene glycol 17g daily)  - replace electrolytes PRN    Pain Management:   - morphine IV PRN 2mg q4    Dispo:  - continue ICU care, likely stepdown today per primary team    Micheline Gomez MD PGY-1  [phone]  08/30/2017

## 2017-08-30 NOTE — PLAN OF CARE
Problem: Physical Therapy Goal  Goal: Physical Therapy Goal  Goals to be met by: 17    Patient will increase functional independence with mobility by performin. Supine to sit with Set-up Hooker - not met  2. Sit to stand transfer with Supervision - not met  3. Gait  x 150 feet with Supervision using AD if needed.- not met   4. Ascend/descend 4 stair with no Handrails Contact Guard Assistance - not met  5. Lower extremity exercise program x15 reps per handout, with supervision - not met    eval completed and goals appropriate. Maia Oakes, PT 2017

## 2017-08-30 NOTE — PHYSICIAN QUERY
"PT Name: Claude Penn III  MR #: [id number]    Physician Query Form - Hematology Clarification      CDS/: Meenu Foster RN                 Contact information:[email or web address]    This form is a permanent document in the medical record.      Query Date: August 30, 2017    By submitting this query, we are merely seeking further clarification of documentation. Please utilize your independent clinical judgment when addressing the question(s) below.    The Medical record contains the following:   Indicators  Supporting Clinical Findings Location in Medical Record    "Anemia" documented     x H & H = Hgb 8.6>>7.6>>9.6  Hct 24.2>>21.3>>26.7 Lab 8/28-8/29    BP =                     HR=      "GI bleeding" documented     x Acute bleeding (Non GI site) Ruptured spleen Gen Surg PN 8/29   x Transfusion(s) Transfused 4uPRBC, 2u FFP, and 1 six pack of platelets for presumed splenic rebleed Gen Surg PN 8/29   x Treatment: Would treat conservatively for now and stabilize the patient's acute blood loss    Agree with reversal of INR in light of a bleed  In addition agree with Hb transfusion to above hb>10. Cards CN 8/28    Other:        Provider, please specify diagnosis or diagnoses associated with above clinical findings.    [  ] Acute blood loss anemia expected post-operatively  [X  ] Acute blood loss anemia  [  ] Other Hematological Diagnosis (please specify): _________________________________    [  ] Clinically Undetermined       Please document in your progress notes daily for the duration of treatment, until resolved, and include in your discharge summary.                                                                                                      "

## 2017-08-30 NOTE — SUBJECTIVE & OBJECTIVE
Interval History: No changes overnight.  H/H and thrombocytopenia stable.  Continued abdominal and back pain but controlled.  Has not voided since saleh removed.  Tolerating clear liquids.    Medications:  Continuous Infusions:   sodium chloride 0.9% 100 mL/hr at 08/30/17 0900     Scheduled Meds:   famotidine (PF)  20 mg Intravenous Daily    levetiracetam IVPB  500 mg Intravenous Q12H    nystatin  500,000 Units Oral QID (WM & HS)    polyethylene glycol  17 g Oral Daily    sodium chloride 0.9%  3 mL Intravenous Q8H     PRN Meds:sodium chloride, calcium carbonate, magnesium oxide, magnesium oxide, morphine, ondansetron, potassium chloride 10%, potassium chloride 10%, potassium chloride 10%, potassium, sodium phosphates, potassium, sodium phosphates, potassium, sodium phosphates     Review of patient's allergies indicates:   Allergen Reactions    No known drug allergies      Objective:     Vital Signs (Most Recent):  Temp: 99.6 °F (37.6 °C) (08/30/17 0700)  Pulse: (!) 122 (08/30/17 0935)  Resp: (!) 21 (08/30/17 0935)  BP: (!) 129/94 (08/30/17 0935)  SpO2: 95 % (08/30/17 0935) Vital Signs (24h Range):  Temp:  [98.3 °F (36.8 °C)-99.6 °F (37.6 °C)] 99.6 °F (37.6 °C)  Pulse:  [101-129] 122  Resp:  [14-24] 21  SpO2:  [94 %-100 %] 95 %  BP: (101-160)/() 129/94     Weight: 127.8 kg (281 lb 12 oz)  Body mass index is 35.22 kg/m².    Intake/Output - Last 3 Shifts       08/28 0700 - 08/29 0659 08/29 0700 - 08/30 0659 08/30 0700 - 08/31 0659    I.V. (mL/kg) 6190 (48.4) 2344 (18.3)     Blood 2020      IV Piggyback 1100 500     Total Intake(mL/kg) 9310 (72.8) 2844 (22.3)     Urine (mL/kg/hr) 1155 845 (0.3)     Total Output 1155 845      Net +8155 +1999                   Physical Exam  NAD, AAOx3  Tachycardic, regular rhythm  CTAB  Abd S/ND/Mildly TTP    Significant Labs:  CBC:   Recent Labs  Lab 08/30/17  1032   WBC 5.75   RBC 3.06*   HGB 9.2*   HCT 25.4*   PLT 67*   MCV 83   MCH 30.1   MCHC 36.2*     CMP:   Recent  Labs  Lab 08/28/17  1557  08/30/17  0323   *  < > 115*   CALCIUM 7.6*  < > 7.9*   ALBUMIN 3.0*  --   --    PROT 5.6*  --   --      < > 136   K 3.5  < > 3.6   CO2 26  < > 29     < > 103   BUN 18  < > 13   CREATININE 0.7  < > 0.6   ALKPHOS 62  --   --    ALT 33  --   --    AST 18  --   --    BILITOT 1.2*  --   --    < > = values in this interval not displayed.  Coagulation:   Recent Labs  Lab 08/28/17  1556  08/29/17  0845   LABPROT 22.0*  < > 15.7*   INR 2.2*  < > 1.5*   APTT 28.6  --   --    < > = values in this interval not displayed.

## 2017-08-30 NOTE — PROGRESS NOTES
[facility]  General Surgery  Progress Note    Subjective:     History of Present Illness:  No notes on file    Post-Op Info:  * No surgery found *         Interval History: No changes overnight.  H/H and thrombocytopenia stable.  Continued abdominal and back pain but controlled.  Has not voided since saleh removed.  Tolerating clear liquids.    Medications:  Continuous Infusions:   sodium chloride 0.9% 100 mL/hr at 08/30/17 0900     Scheduled Meds:   famotidine (PF)  20 mg Intravenous Daily    levetiracetam IVPB  500 mg Intravenous Q12H    nystatin  500,000 Units Oral QID (WM & HS)    polyethylene glycol  17 g Oral Daily    sodium chloride 0.9%  3 mL Intravenous Q8H     PRN Meds:sodium chloride, calcium carbonate, magnesium oxide, magnesium oxide, morphine, ondansetron, potassium chloride 10%, potassium chloride 10%, potassium chloride 10%, potassium, sodium phosphates, potassium, sodium phosphates, potassium, sodium phosphates     Review of patient's allergies indicates:   Allergen Reactions    No known drug allergies      Objective:     Vital Signs (Most Recent):  Temp: 99.6 °F (37.6 °C) (08/30/17 0700)  Pulse: (!) 122 (08/30/17 0935)  Resp: (!) 21 (08/30/17 0935)  BP: (!) 129/94 (08/30/17 0935)  SpO2: 95 % (08/30/17 0935) Vital Signs (24h Range):  Temp:  [98.3 °F (36.8 °C)-99.6 °F (37.6 °C)] 99.6 °F (37.6 °C)  Pulse:  [101-129] 122  Resp:  [14-24] 21  SpO2:  [94 %-100 %] 95 %  BP: (101-160)/() 129/94     Weight: 127.8 kg (281 lb 12 oz)  Body mass index is 35.22 kg/m².    Intake/Output - Last 3 Shifts       08/28 0700 - 08/29 0659 08/29 0700 - 08/30 0659 08/30 0700 - 08/31 0659    I.V. (mL/kg) 6190 (48.4) 2344 (18.3)     Blood 2020      IV Piggyback 1100 500     Total Intake(mL/kg) 9310 (72.8) 2844 (22.3)     Urine (mL/kg/hr) 1155 845 (0.3)     Total Output 1155 845      Net +8155 +1999                   Physical Exam  NAD, AAOx3  Tachycardic, regular rhythm  CTAB  Abd S/ND/Mildly  TTP    Significant Labs:  CBC:   Recent Labs  Lab 08/30/17  1032   WBC 5.75   RBC 3.06*   HGB 9.2*   HCT 25.4*   PLT 67*   MCV 83   MCH 30.1   MCHC 36.2*     CMP:   Recent Labs  Lab 08/28/17  1557  08/30/17  0323   *  < > 115*   CALCIUM 7.6*  < > 7.9*   ALBUMIN 3.0*  --   --    PROT 5.6*  --   --      < > 136   K 3.5  < > 3.6   CO2 26  < > 29     < > 103   BUN 18  < > 13   CREATININE 0.7  < > 0.6   ALKPHOS 62  --   --    ALT 33  --   --    AST 18  --   --    BILITOT 1.2*  --   --    < > = values in this interval not displayed.  Coagulation:   Recent Labs  Lab 08/28/17  1556  08/29/17  0845   LABPROT 22.0*  < > 15.7*   INR 2.2*  < > 1.5*   APTT 28.6  --   --    < > = values in this interval not displayed.      Assessment/Plan:     Pulmonary embolism without acute cor pulmonale    Hold anticoagulation  Will likely need IVC filter placement        Thrombocytopenia    Transfuse platelets PRN        * Ruptured spleen    53 yo M with spontaneous intraperitoneal bleed likely 2/2 anticoagulation.  CTA with no definitive contrast extravasation.    Clear liquid diet, IV fluids  q8 H/H  Transfuse as needed  CTA if bleeds again  If no bleeding with normalized INR, plan to discontinue pts anticoagulation and have IVC filter placed for h/o PE  Prophylactic lovenox  PT/OT  OOBTC  Step down to [facility]            Zohaib Maldonado MD  General Surgery  [facility]

## 2017-08-31 LAB
ANION GAP SERPL CALC-SCNC: 7 MMOL/L
BASOPHILS # BLD AUTO: 0 K/UL
BASOPHILS NFR BLD: 0 %
BUN SERPL-MCNC: 20 MG/DL
CALCIUM SERPL-MCNC: 8 MG/DL
CHLORIDE SERPL-SCNC: 105 MMOL/L
CO2 SERPL-SCNC: 26 MMOL/L
CREAT SERPL-MCNC: 0.6 MG/DL
DIFFERENTIAL METHOD: ABNORMAL
EOSINOPHIL # BLD AUTO: 0 K/UL
EOSINOPHIL NFR BLD: 0.2 %
ERYTHROCYTE [DISTWIDTH] IN BLOOD BY AUTOMATED COUNT: 15.7 %
EST. GFR  (AFRICAN AMERICAN): >60 ML/MIN/1.73 M^2
EST. GFR  (NON AFRICAN AMERICAN): >60 ML/MIN/1.73 M^2
GLUCOSE SERPL-MCNC: 93 MG/DL
HCT VFR BLD AUTO: 26.7 %
HGB BLD-MCNC: 9.4 G/DL
LYMPHOCYTES # BLD AUTO: 0.5 K/UL
LYMPHOCYTES NFR BLD: 11.1 %
MCH RBC QN AUTO: 29.8 PG
MCHC RBC AUTO-ENTMCNC: 35.2 G/DL
MCV RBC AUTO: 85 FL
MONOCYTES # BLD AUTO: 0.3 K/UL
MONOCYTES NFR BLD: 5.4 %
NEUTROPHILS # BLD AUTO: 3.8 K/UL
NEUTROPHILS NFR BLD: 82.9 %
PLATELET # BLD AUTO: 92 K/UL
PMV BLD AUTO: 9.2 FL
POTASSIUM SERPL-SCNC: 3.9 MMOL/L
RBC # BLD AUTO: 3.15 M/UL
SODIUM SERPL-SCNC: 138 MMOL/L
WBC # BLD AUTO: 4.61 K/UL

## 2017-08-31 PROCEDURE — 25000003 PHARM REV CODE 250: Performed by: SURGERY

## 2017-08-31 PROCEDURE — 25000242 PHARM REV CODE 250 ALT 637 W/ HCPCS: Performed by: STUDENT IN AN ORGANIZED HEALTH CARE EDUCATION/TRAINING PROGRAM

## 2017-08-31 PROCEDURE — 99900035 HC TECH TIME PER 15 MIN (STAT)

## 2017-08-31 PROCEDURE — A4216 STERILE WATER/SALINE, 10 ML: HCPCS | Performed by: STUDENT IN AN ORGANIZED HEALTH CARE EDUCATION/TRAINING PROGRAM

## 2017-08-31 PROCEDURE — 97530 THERAPEUTIC ACTIVITIES: CPT

## 2017-08-31 PROCEDURE — 94761 N-INVAS EAR/PLS OXIMETRY MLT: CPT

## 2017-08-31 PROCEDURE — 25000003 PHARM REV CODE 250: Performed by: STUDENT IN AN ORGANIZED HEALTH CARE EDUCATION/TRAINING PROGRAM

## 2017-08-31 PROCEDURE — 36415 COLL VENOUS BLD VENIPUNCTURE: CPT

## 2017-08-31 PROCEDURE — 63600175 PHARM REV CODE 636 W HCPCS: Performed by: STUDENT IN AN ORGANIZED HEALTH CARE EDUCATION/TRAINING PROGRAM

## 2017-08-31 PROCEDURE — S0028 INJECTION, FAMOTIDINE, 20 MG: HCPCS | Performed by: STUDENT IN AN ORGANIZED HEALTH CARE EDUCATION/TRAINING PROGRAM

## 2017-08-31 PROCEDURE — 94640 AIRWAY INHALATION TREATMENT: CPT

## 2017-08-31 PROCEDURE — 06H03DZ INSERTION OF INTRALUMINAL DEVICE INTO INFERIOR VENA CAVA, PERCUTANEOUS APPROACH: ICD-10-PCS | Performed by: INTERNAL MEDICINE

## 2017-08-31 PROCEDURE — 85025 COMPLETE CBC W/AUTO DIFF WBC: CPT

## 2017-08-31 PROCEDURE — 63600175 PHARM REV CODE 636 W HCPCS: Performed by: RADIOLOGY

## 2017-08-31 PROCEDURE — 97116 GAIT TRAINING THERAPY: CPT

## 2017-08-31 PROCEDURE — 25500020 PHARM REV CODE 255: Performed by: SURGERY

## 2017-08-31 PROCEDURE — 20600001 HC STEP DOWN PRIVATE ROOM

## 2017-08-31 PROCEDURE — 80048 BASIC METABOLIC PNL TOTAL CA: CPT

## 2017-08-31 PROCEDURE — 27000221 HC OXYGEN, UP TO 24 HOURS

## 2017-08-31 RX ORDER — IPRATROPIUM BROMIDE AND ALBUTEROL SULFATE 2.5; .5 MG/3ML; MG/3ML
3 SOLUTION RESPIRATORY (INHALATION)
Status: DISCONTINUED | OUTPATIENT
Start: 2017-08-31 | End: 2017-09-09

## 2017-08-31 RX ORDER — MIDAZOLAM HYDROCHLORIDE 1 MG/ML
INJECTION, SOLUTION INTRAMUSCULAR; INTRAVENOUS CODE/TRAUMA/SEDATION MEDICATION
Status: COMPLETED | OUTPATIENT
Start: 2017-08-31 | End: 2017-08-31

## 2017-08-31 RX ORDER — TAMSULOSIN HYDROCHLORIDE 0.4 MG/1
0.4 CAPSULE ORAL DAILY
Status: DISCONTINUED | OUTPATIENT
Start: 2017-08-31 | End: 2017-09-10 | Stop reason: HOSPADM

## 2017-08-31 RX ORDER — IODIXANOL 320 MG/ML
150 INJECTION, SOLUTION INTRAVASCULAR
Status: COMPLETED | OUTPATIENT
Start: 2017-08-31 | End: 2017-08-31

## 2017-08-31 RX ORDER — IPRATROPIUM BROMIDE AND ALBUTEROL SULFATE 2.5; .5 MG/3ML; MG/3ML
3 SOLUTION RESPIRATORY (INHALATION) ONCE
Status: COMPLETED | OUTPATIENT
Start: 2017-08-31 | End: 2017-08-31

## 2017-08-31 RX ORDER — FENTANYL CITRATE 50 UG/ML
INJECTION, SOLUTION INTRAMUSCULAR; INTRAVENOUS CODE/TRAUMA/SEDATION MEDICATION
Status: COMPLETED | OUTPATIENT
Start: 2017-08-31 | End: 2017-08-31

## 2017-08-31 RX ADMIN — IPRATROPIUM BROMIDE AND ALBUTEROL SULFATE 3 ML: .5; 3 SOLUTION RESPIRATORY (INHALATION) at 01:08

## 2017-08-31 RX ADMIN — IPRATROPIUM BROMIDE AND ALBUTEROL SULFATE 3 ML: .5; 3 SOLUTION RESPIRATORY (INHALATION) at 08:08

## 2017-08-31 RX ADMIN — METOPROLOL TARTRATE 50 MG: 50 TABLET, FILM COATED ORAL at 10:08

## 2017-08-31 RX ADMIN — NYSTATIN 500000 UNITS: 500000 SUSPENSION ORAL at 10:08

## 2017-08-31 RX ADMIN — FENTANYL CITRATE 50 MCG: 50 INJECTION, SOLUTION INTRAMUSCULAR; INTRAVENOUS at 03:08

## 2017-08-31 RX ADMIN — TAMSULOSIN HYDROCHLORIDE 0.4 MG: 0.4 CAPSULE ORAL at 01:08

## 2017-08-31 RX ADMIN — MORPHINE SULFATE 2 MG: 2 INJECTION, SOLUTION INTRAMUSCULAR; INTRAVENOUS at 06:08

## 2017-08-31 RX ADMIN — Medication 3 ML: at 10:08

## 2017-08-31 RX ADMIN — SODIUM CHLORIDE: 0.9 INJECTION, SOLUTION INTRAVENOUS at 11:08

## 2017-08-31 RX ADMIN — NYSTATIN 500000 UNITS: 500000 SUSPENSION ORAL at 06:08

## 2017-08-31 RX ADMIN — NYSTATIN 500000 UNITS: 500000 SUSPENSION ORAL at 01:08

## 2017-08-31 RX ADMIN — Medication 3 ML: at 01:08

## 2017-08-31 RX ADMIN — MORPHINE SULFATE 2 MG: 2 INJECTION, SOLUTION INTRAMUSCULAR; INTRAVENOUS at 01:08

## 2017-08-31 RX ADMIN — LEVETIRACETAM 500 MG: 5 INJECTION INTRAVENOUS at 10:08

## 2017-08-31 RX ADMIN — MIDAZOLAM HYDROCHLORIDE 1 MG: 1 INJECTION, SOLUTION INTRAMUSCULAR; INTRAVENOUS at 03:08

## 2017-08-31 RX ADMIN — Medication 3 ML: at 06:08

## 2017-08-31 RX ADMIN — IODIXANOL 40 ML: 320 INJECTION, SOLUTION INTRAVASCULAR at 04:08

## 2017-08-31 RX ADMIN — CALCIUM CARBONATE (ANTACID) CHEW TAB 500 MG 500 MG: 500 CHEW TAB at 06:08

## 2017-08-31 RX ADMIN — FAMOTIDINE 20 MG: 10 INJECTION, SOLUTION INTRAVENOUS at 10:08

## 2017-08-31 RX ADMIN — FENTANYL CITRATE 25 MCG: 50 INJECTION, SOLUTION INTRAMUSCULAR; INTRAVENOUS at 04:08

## 2017-08-31 RX ADMIN — CALCIUM CARBONATE (ANTACID) CHEW TAB 500 MG 500 MG: 500 CHEW TAB at 10:08

## 2017-08-31 RX ADMIN — MIDAZOLAM HYDROCHLORIDE 0.5 MG: 1 INJECTION, SOLUTION INTRAMUSCULAR; INTRAVENOUS at 04:08

## 2017-08-31 NOTE — NURSING
Spoke to Dr. Lynne re: DC saleh order. Discussed pt received flomax x1 this AM, made him aware of pt's urine a pink color. States he wants to attempt void trial with pt. Also discussed pt's lovenox order and IVF filter placement. States to hold lovenox dose tonight, OK to restart tomorrow. States pt OK to resume clear liquid diet on arrival to floor post IVC filter placement. WCTM.

## 2017-08-31 NOTE — PLAN OF CARE
Problem: Physical Therapy Goal  Goal: Physical Therapy Goal  Goals to be met by: 17    Patient will increase functional independence with mobility by performin. Supine to sit with Set-up [place] - not met  2. Sit to stand transfer with Supervision - not met  3. Gait  x 150 feet with Supervision using AD if needed.- not met   4. Ascend/descend 4 stair with no Handrails Contact Guard Assistance - not met  5. Lower extremity exercise program x15 reps per handout, with supervision - not met     Outcome: Ongoing (interventions implemented as appropriate)  Pt. Progressing towards goals

## 2017-08-31 NOTE — PROGRESS NOTES
[facility]  General Surgery  Progress Note    Subjective:     History of Present Illness:  No notes on file    Post-Op Info:  * No surgery found *         Interval History: No acute events overnight. Pt seen and examined at the bedside. Vital signs stable. No new complaints or concerns.     Medications:  Continuous Infusions:   sodium chloride 0.9% 75 mL/hr at 08/30/17 1800     Scheduled Meds:   enoxaparin  40 mg Subcutaneous Daily    famotidine (PF)  20 mg Intravenous Daily    levetiracetam IVPB  500 mg Intravenous Q12H    metoprolol tartrate  50 mg Oral BID    nystatin  500,000 Units Oral QID (WM & HS)    polyethylene glycol  17 g Oral Daily    sodium chloride 0.9%  3 mL Intravenous Q8H     PRN Meds:sodium chloride, calcium carbonate, magnesium oxide, magnesium oxide, morphine, ondansetron, potassium chloride 10%, potassium chloride 10%, potassium chloride 10%, potassium, sodium phosphates, potassium, sodium phosphates, potassium, sodium phosphates     Review of patient's allergies indicates:   Allergen Reactions    No known drug allergies      Objective:     Vital Signs (Most Recent):  Temp: 97.7 °F (36.5 °C) (08/31/17 0419)  Pulse: 102 (08/31/17 0419)  Resp: 16 (08/31/17 0419)  BP: 122/89 (08/31/17 0419)  SpO2: (!) 93 % (08/31/17 0419) Vital Signs (24h Range):  Temp:  [97.7 °F (36.5 °C)-98.6 °F (37 °C)] 97.7 °F (36.5 °C)  Pulse:  [102-139] 102  Resp:  [15-24] 16  SpO2:  [86 %-99 %] 93 %  BP: (111-144)/() 122/89     Weight: 127.8 kg (281 lb 12 oz)  Body mass index is 35.22 kg/m².    Intake/Output - Last 3 Shifts       08/29 0700 - 08/30 0659 08/30 0700 - 08/31 0659 08/31 0700 - 09/01 0659    P.O.  0     I.V. (mL/kg) 2344 (18.3) 1278 (10)     Blood       IV Piggyback 500      Total Intake(mL/kg) 2844 (22.3) 1278 (10)     Urine (mL/kg/hr) 845 (0.3) 855 (0.3)     Total Output 845 855      Net +1999 +423                 Interval History: No changes overnight.  H/H and  thrombocytopenia stable.  Continued abdominal and back pain but controlled.  Has not voided since saleh removed.  Tolerating clear liquids.    Medications:  Continuous Infusions:   sodium chloride 0.9% 75 mL/hr at 08/30/17 1800     Scheduled Meds:   enoxaparin  40 mg Subcutaneous Daily    famotidine (PF)  20 mg Intravenous Daily    levetiracetam IVPB  500 mg Intravenous Q12H    metoprolol tartrate  50 mg Oral BID    nystatin  500,000 Units Oral QID (WM & HS)    polyethylene glycol  17 g Oral Daily    sodium chloride 0.9%  3 mL Intravenous Q8H     PRN Meds:sodium chloride, calcium carbonate, magnesium oxide, magnesium oxide, morphine, ondansetron, potassium chloride 10%, potassium chloride 10%, potassium chloride 10%, potassium, sodium phosphates, potassium, sodium phosphates, potassium, sodium phosphates     Review of patient's allergies indicates:   Allergen Reactions    No known drug allergies      Objective:     Vital Signs (Most Recent):  Temp: 97.7 °F (36.5 °C) (08/31/17 0419)  Pulse: 102 (08/31/17 0419)  Resp: 16 (08/31/17 0419)  BP: 122/89 (08/31/17 0419)  SpO2: (!) 93 % (08/31/17 0419) Vital Signs (24h Range):  Temp:  [97.7 °F (36.5 °C)-98.6 °F (37 °C)] 97.7 °F (36.5 °C)  Pulse:  [102-139] 102  Resp:  [15-24] 16  SpO2:  [86 %-99 %] 93 %  BP: (111-144)/() 122/89     Weight: 127.8 kg (281 lb 12 oz)  Body mass index is 35.22 kg/m².    Intake/Output - Last 3 Shifts       08/29 0700 - 08/30 0659 08/30 0700 - 08/31 0659 08/31 0700 - 09/01 0659    P.O.  0     I.V. (mL/kg) 2344 (18.3) 1278 (10)     Blood       IV Piggyback 500      Total Intake(mL/kg) 2844 (22.3) 1278 (10)     Urine (mL/kg/hr) 845 (0.3) 855 (0.3)     Total Output 845 855      Net +1999 +423                   Physical Exam  NAD, AAOx3  Tachycardic, regular rhythm  CTAB  Abd S/ND/Mildly TTP    Significant Labs:  CBC:     Recent Labs  Lab 08/31/17  0353   WBC 4.61   RBC 3.15*   HGB 9.4*   HCT 26.7*   PLT 92*   MCV 85   MCH 29.8   MCHC  35.2     CMP:   Recent Labs  Lab 08/28/17  1557  08/30/17  0323   *  < > 115*   CALCIUM 7.6*  < > 7.9*   ALBUMIN 3.0*  --   --    PROT 5.6*  --   --      < > 136   K 3.5  < > 3.6   CO2 26  < > 29     < > 103   BUN 18  < > 13   CREATININE 0.7  < > 0.6   ALKPHOS 62  --   --    ALT 33  --   --    AST 18  --   --    BILITOT 1.2*  --   --    < > = values in this interval not displayed.  Coagulation:   Recent Labs  Lab 08/28/17  1556  08/29/17  0845   LABPROT 22.0*  < > 15.7*   INR 2.2*  < > 1.5*   APTT 28.6  --   --    < > = values in this interval not displayed.      Physical Exam   Constitutional: He is oriented to person, place, and time. He appears well-developed and well-nourished. No distress.   HENT:   Head: Normocephalic and atraumatic.   Eyes: Pupils are equal, round, and reactive to light. No scleral icterus.   Neck: No tracheal deviation present.   Cardiovascular: Normal rate.    Pulmonary/Chest: Effort normal. No respiratory distress.   Abdominal: Soft. He exhibits no distension.   Neurological: He is alert and oriented to person, place, and time. No cranial nerve deficit.     NAD, AAOx3  Tachycardic, regular rhythm  CTAB  Abd S/ND/Mildly TTP    Significant Labs:  CBC:     Recent Labs  Lab 08/31/17  0353   WBC 4.61   RBC 3.15*   HGB 9.4*   HCT 26.7*   PLT 92*   MCV 85   MCH 29.8   MCHC 35.2     CMP:   Recent Labs  Lab 08/28/17  1557  08/30/17  0323   *  < > 115*   CALCIUM 7.6*  < > 7.9*   ALBUMIN 3.0*  --   --    PROT 5.6*  --   --      < > 136   K 3.5  < > 3.6   CO2 26  < > 29     < > 103   BUN 18  < > 13   CREATININE 0.7  < > 0.6   ALKPHOS 62  --   --    ALT 33  --   --    AST 18  --   --    BILITOT 1.2*  --   --    < > = values in this interval not displayed.  Coagulation:   Recent Labs  Lab 08/28/17  1556  08/29/17  0845   LABPROT 22.0*  < > 15.7*   INR 2.2*  < > 1.5*   APTT 28.6  --   --    < > = values in this interval not displayed.  Interval History: No changes  overnight.  H/H and thrombocytopenia stable.  Continued abdominal and back pain but controlled.  Has not voided since saleh removed.  Tolerating clear liquids.    Medications:  Continuous Infusions:   sodium chloride 0.9% 75 mL/hr at 08/30/17 1800     Scheduled Meds:   enoxaparin  40 mg Subcutaneous Daily    famotidine (PF)  20 mg Intravenous Daily    levetiracetam IVPB  500 mg Intravenous Q12H    metoprolol tartrate  50 mg Oral BID    nystatin  500,000 Units Oral QID (WM & HS)    polyethylene glycol  17 g Oral Daily    sodium chloride 0.9%  3 mL Intravenous Q8H     PRN Meds:sodium chloride, calcium carbonate, magnesium oxide, magnesium oxide, morphine, ondansetron, potassium chloride 10%, potassium chloride 10%, potassium chloride 10%, potassium, sodium phosphates, potassium, sodium phosphates, potassium, sodium phosphates     Review of patient's allergies indicates:   Allergen Reactions    No known drug allergies      Objective:     Vital Signs (Most Recent):  Temp: 97.7 °F (36.5 °C) (08/31/17 0419)  Pulse: 102 (08/31/17 0419)  Resp: 16 (08/31/17 0419)  BP: 122/89 (08/31/17 0419)  SpO2: (!) 93 % (08/31/17 0419) Vital Signs (24h Range):  Temp:  [97.7 °F (36.5 °C)-98.6 °F (37 °C)] 97.7 °F (36.5 °C)  Pulse:  [102-139] 102  Resp:  [15-24] 16  SpO2:  [86 %-99 %] 93 %  BP: (111-144)/() 122/89     Weight: 127.8 kg (281 lb 12 oz)  Body mass index is 35.22 kg/m².    Intake/Output - Last 3 Shifts       08/29 0700 - 08/30 0659 08/30 0700 - 08/31 0659 08/31 0700 - 09/01 0659    P.O.  0     I.V. (mL/kg) 2344 (18.3) 1278 (10)     Blood       IV Piggyback 500      Total Intake(mL/kg) 2844 (22.3) 1278 (10)     Urine (mL/kg/hr) 845 (0.3) 855 (0.3)     Total Output 845 855      Net +1999 +423                   Physical Exam  NAD, AAOx3  Tachycardic, regular rhythm  CTAB  Abd S/ND/Mildly TTP    Significant Labs:  CBC:     Recent Labs  Lab 08/31/17  0353   WBC 4.61   RBC 3.15*   HGB 9.4*   HCT 26.7*   PLT 92*   MCV 85    MCH 29.8   MCHC 35.2     CMP:   Recent Labs  Lab 08/28/17  1557  08/30/17  0323   *  < > 115*   CALCIUM 7.6*  < > 7.9*   ALBUMIN 3.0*  --   --    PROT 5.6*  --   --      < > 136   K 3.5  < > 3.6   CO2 26  < > 29     < > 103   BUN 18  < > 13   CREATININE 0.7  < > 0.6   ALKPHOS 62  --   --    ALT 33  --   --    AST 18  --   --    BILITOT 1.2*  --   --    < > = values in this interval not displayed.  Coagulation:   Recent Labs  Lab 08/28/17  1556  08/29/17  0845   LABPROT 22.0*  < > 15.7*   INR 2.2*  < > 1.5*   APTT 28.6  --   --    < > = values in this interval not displayed.  Interval History: No changes overnight.  H/H and thrombocytopenia stable.  Continued abdominal and back pain but controlled.  Has not voided since saleh removed.  Tolerating clear liquids.    Medications:  Continuous Infusions:   sodium chloride 0.9% 75 mL/hr at 08/30/17 1800     Scheduled Meds:   enoxaparin  40 mg Subcutaneous Daily    famotidine (PF)  20 mg Intravenous Daily    levetiracetam IVPB  500 mg Intravenous Q12H    metoprolol tartrate  50 mg Oral BID    nystatin  500,000 Units Oral QID (WM & HS)    polyethylene glycol  17 g Oral Daily    sodium chloride 0.9%  3 mL Intravenous Q8H     PRN Meds:sodium chloride, calcium carbonate, magnesium oxide, magnesium oxide, morphine, ondansetron, potassium chloride 10%, potassium chloride 10%, potassium chloride 10%, potassium, sodium phosphates, potassium, sodium phosphates, potassium, sodium phosphates     Review of patient's allergies indicates:   Allergen Reactions    No known drug allergies      Objective:     Vital Signs (Most Recent):  Temp: 97.7 °F (36.5 °C) (08/31/17 0419)  Pulse: 102 (08/31/17 0419)  Resp: 16 (08/31/17 0419)  BP: 122/89 (08/31/17 0419)  SpO2: (!) 93 % (08/31/17 0419) Vital Signs (24h Range):  Temp:  [97.7 °F (36.5 °C)-98.6 °F (37 °C)] 97.7 °F (36.5 °C)  Pulse:  [102-139] 102  Resp:  [15-24] 16  SpO2:  [86 %-99 %] 93 %  BP: (111-144)/()  122/89     Weight: 127.8 kg (281 lb 12 oz)  Body mass index is 35.22 kg/m².    Intake/Output - Last 3 Shifts       08/29 0700 - 08/30 0659 08/30 0700 - 08/31 0659 08/31 0700 - 09/01 0659    P.O.  0     I.V. (mL/kg) 2344 (18.3) 1278 (10)     Blood       IV Piggyback 500      Total Intake(mL/kg) 2844 (22.3) 1278 (10)     Urine (mL/kg/hr) 845 (0.3) 855 (0.3)     Total Output 845 855      Net +1999 +423                   Physical Exam  NAD, AAOx3  Tachycardic, regular rhythm  CTAB  Abd S/ND/Mildly TTP    Significant Labs:  CBC:     Recent Labs  Lab 08/31/17  0353   WBC 4.61   RBC 3.15*   HGB 9.4*   HCT 26.7*   PLT 92*   MCV 85   MCH 29.8   MCHC 35.2     CMP:   Recent Labs  Lab 08/28/17  1557  08/30/17  0323   *  < > 115*   CALCIUM 7.6*  < > 7.9*   ALBUMIN 3.0*  --   --    PROT 5.6*  --   --      < > 136   K 3.5  < > 3.6   CO2 26  < > 29     < > 103   BUN 18  < > 13   CREATININE 0.7  < > 0.6   ALKPHOS 62  --   --    ALT 33  --   --    AST 18  --   --    BILITOT 1.2*  --   --    < > = values in this interval not displayed.  Coagulation:   Recent Labs  Lab 08/28/17  1556  08/29/17  0845   LABPROT 22.0*  < > 15.7*   INR 2.2*  < > 1.5*   APTT 28.6  --   --    < > = values in this interval not displayed.      Assessment/Plan:     Pulmonary embolism without acute cor pulmonale    Hold anticoagulation  Will likely need IVC filter placement        Thrombocytopenia    Transfuse platelets PRN        * Ruptured spleen    53 yo M with spontaneous intraperitoneal bleed likely 2/2 anticoagulation.  CTA with no definitive contrast extravasation.    Clear liquid diet, IV fluids  Daily h/h  Transfuse as needed  CTA if bleeds again  If no bleeding with normalized INR, plan to discontinue pts anticoagulation and have IVC filter placed for h/o PE  Prophylactic lovenox  PT/OT  OOBTC  Transfer to oncology            Jesse Johnson MD  General Surgery  [facility]

## 2017-08-31 NOTE — NURSING
Pt c/o wheezing, sats low 90s on RA, placed on 2L O2 per pt request, RT made aware, states she will be in to see pt. GERALDINE.

## 2017-08-31 NOTE — PT/OT/SLP PROGRESS
Physical Therapy  Treatment    Claude Penn III   MRN: [id number]   Admitting Diagnosis: Ruptured spleen    PT Received On: 08/31/17  PT Start Time: 1351     PT Stop Time: 1416    PT Total Time (min): 25 min       Billable Minutes:  Gait [id number] and Therapeutic Activity 10    Treatment Type: Treatment  PT/PTA: PT             General Precautions: Standard, fall  Orthopedic Precautions: N/A   Braces:      Do you have any cultural, spiritual, Taoism conflicts, given your current situation?: none    Subjective:  Communicated with nursing prior to session.      Pain/Comfort  Pain Rating 1:  (pt did not rate)    Objective:   Patient found with: peripheral IV, oxygen, telemetry, saleh catheter (port a cath)    Functional Mobility:  Bed Mobility:   Rolling/Turning to Left: Stand by assistance, With side rail  Scooting/Bridging: Stand by Assistance  Supine to Sit: Stand by Assistance, With side rail  Sit to Supine: Stand by Assistance, With siderail    Transfers:  Sit <> Stand Assistance: Contact Guard Assistance  Sit <> Stand Assistive Device: Rolling Walker  Bed <> Chair Technique: Stand Pivot  Bed <> Chair Assistance: Stand By Assistance  Bed <> Chair Assistive Device: Rolling Walker    Gait:   Gait Distance: 200'  Assistance 1: Stand by Assistance  Gait Assistive Device: Rolling walker (portable oxygen @ 3L)  Gait Pattern: swing-through gait  Gait Deviation(s): decreased ezra, decreased stride length    Stairs:      Balance:   Static Sit: FAIR+: Able to take MINIMAL challenges from all directions  Dynamic Sit: FAIR+: Maintains balance through MINIMAL excursions of active trunk motion  Static Stand: FAIR+: Takes MINIMAL challenges from all directions  Dynamic stand: FAIR+: Needs CLOSE SUPERVISION during gait and is able to right self with minor LOB     Therapeutic Activities and Exercises:  Discussed pt.'s progress, goals, LE therex, and PT POC.     AM-PAC 6 CLICK MOBILITY  How much help from another person does  this patient currently need?   1 = Unable, Total/Dependent Assistance  2 = A lot, Maximum/Moderate Assistance  3 = A little, Minimum/Contact Guard/Supervision  4 = None, Modified [place]/Independent    Turning over in bed (including adjusting bedclothes, sheets and blankets)?: 3  Sitting down on and standing up from a chair with arms (e.g., wheelchair, bedside commode, etc.): 3  Moving from lying on back to sitting on the side of the bed?: 3  Moving to and from a bed to a chair (including a wheelchair)?: 3  Need to walk in hospital room?: 3  Climbing 3-5 steps with a railing?: 1  Total Score: 16    AM-PAC Raw Score CMS G-Code Modifier Level of Impairment Assistance   6 % Total / Unable   7 - 9 CM 80 - 100% Maximal Assist   10 - 14 CL 60 - 80% Moderate Assist   15 - 19 CK 40 - 60% Moderate Assist   20 - 22 CJ 20 - 40% Minimal Assist   23 CI 1-20% SBA / CGA   24 CH 0% Independent/ Mod I     Patient left supine with all lines intact and call button in reach.    Assessment:  Claude Penn III is a 52 y.o. male with a medical diagnosis of Ruptured spleen and presents with deconditioning. Pt. cooperative and tolerated treatment well. Pt. progressing with mobility. Pt. would benefit from continued PT to address functional deficits.    Rehab identified problem list/impairments: Rehab identified problem list/impairments: weakness, impaired endurance, impaired functional mobilty, gait instability, impaired balance    Rehab potential is fair.    Activity tolerance: Fair    Discharge recommendations: Discharge Facility/Level Of Care Needs: home health PT     Barriers to discharge: Barriers to Discharge: None    Equipment recommendations: Equipment Needed After Discharge:  (TBD)     GOALS:    Physical Therapy Goals        Problem: Physical Therapy Goal    Goal Priority Disciplines Outcome Goal Variances Interventions   Physical Therapy Goal     PT/OT, PT Ongoing (interventions implemented as appropriate)      Description:  Goals to be met by: 17    Patient will increase functional independence with mobility by performin. Supine to sit with Set-up [place] - not met  2. Sit to stand transfer with Supervision - not met  3. Gait  x 150 feet with Supervision using AD if needed.- not met   4. Ascend/descend 4 stair with no Handrails Contact Guard Assistance - not met  5. Lower extremity exercise program x15 reps per handout, with supervision - not met                      PLAN:    Patient to be seen 5 x/week  to address the above listed problems via gait training, therapeutic activities, therapeutic exercises  Plan of Care expires: 17  Plan of Care reviewed with: patient         Jesse GONZALEZ Jordan, PT  2017

## 2017-08-31 NOTE — CONSULTS
Radiology Consult    Claude Penn III is a 52 y.o. male with a history of PE and subsequently had spontaneous splenic bleed while anticoagulated. He can now not receive anticoagulation and needs an IVC filter placed.     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 Lynne.    Imaging reviewed with Radiology staff, Dr. Lincoln.     Procedure: Place IVC filter.    Scheduled Meds:    albuterol-ipratropium 2.5mg-0.5mg/3mL  3 mL Nebulization Q4H WAKE    enoxaparin  40 mg Subcutaneous Daily    famotidine (PF)  20 mg Intravenous Daily    levetiracetam IVPB  500 mg Intravenous Q12H    metoprolol tartrate  50 mg Oral BID    nystatin  500,000 Units Oral QID (WM & HS)    polyethylene glycol  17 g Oral Daily    sodium chloride 0.9%  3 mL Intravenous Q8H    tamsulosin  0.4 mg Oral Daily     Continuous Infusions:    sodium chloride 0.9% 75 mL/hr at 08/30/17 1800     PRN Meds:sodium chloride, calcium carbonate, magnesium oxide, magnesium oxide, morphine, ondansetron, potassium chloride 10%, potassium chloride 10%, potassium chloride 10%, potassium, sodium phosphates, potassium, sodium phosphates, potassium, sodium phosphates    Allergies:   Review of patient's allergies indicates:   Allergen Reactions    No known drug allergies        Labs:    Recent Labs  Lab 08/29/17  0845   INR 1.5*       Recent Labs  Lab 08/31/17  0353   WBC 4.61   HGB 9.4*   HCT 26.7*   MCV 85   PLT 92*      Recent Labs  Lab 08/28/17  1557  08/30/17  0323 08/31/17  1102   *  < > 115* 93     < > 136 138   K 3.5  < > 3.6 3.9     < > 103 105   CO2 26  < > 29 26   BUN 18  < > 13 20   CREATININE 0.7  < >  0.6 0.6   CALCIUM 7.6*  < > 7.9* 8.0*   MG 2.0  < > 2.2  --    ALT 33  --   --   --    AST 18  --   --   --    ALBUMIN 3.0*  --   --   --    BILITOT 1.2*  --   --   --    < > = values in this interval not displayed.      Vitals (Most Recent):  Temp: 99 °F (37.2 °C) (08/31/17 1100)  Pulse: 90 (08/31/17 1344)  Resp: 18 (08/31/17 1344)  BP: 132/76 (08/31/17 1100)  SpO2: 97 % (08/31/17 1344)    Plan:   1. NPO after midnight.  2. Hold anticoagulants.  3. IVF filter placement scheduled for 8/31/17.    Christian Saravia MD  PGY - 2  Department of Radiology

## 2017-08-31 NOTE — PROCEDURES
Radiology Post-Procedure Note    Pre Op Diagnosis: History of pulmonary embolism with subsequent spontaneous splenic bleed.     Post Op Diagnosis: : Same.     Procedure: IVC filter placement    Procedure performed by: Sheng Lincoln MD    Written Informed Consent Obtained: Yes    Specimen Removed: NO    Estimated Blood Loss: Minimal    Findings: After written informed consent and sterile prep and drape, the right femoral vein was cannulated and a catheter was placed into the IVC.  Contrast injection under fluoroscopy revealed anatomy suitable for placement of IVC filter immediately inferior to the renal veins.  The filter was placed at this level under fluoroscopic surveillance and post placement venogram demonstrated adequate position and function.  Please see full procedural report in Imaging for further details.       Patient tolerated procedure well.    Christian Saravia MD  PGY - 2  Department of Radiology

## 2017-08-31 NOTE — NURSING
Pt to floor from IR for IVF filter placement. Dressing to R groin CDI, sm-mod amd sangiunous fluid noted to have accumulated medial to dressing in groin area. Drainage cleaned, no hematoma noted, pt denies sensation changes. Dr. Chase MD with surgery, made aware. Also made him aware that saleh has not yet been DC'd, plan is to remove when pt no longer on bedrest. WCTM.    1800: Spoke to Dr. Stone, made him aware pt's wife with concerns re: DCing saleh this evening. Wife states pt was unable to void yesterday so saleh was replaced last night, and this morning MDs discussed leaving catheter in several more days. Dr. Stone states OK to leave saleh in place until MDs round in AM. Will pass on to NOC RN.     1815: IVF filter site assessed, no further sanguinous drainage noted to site. Dressing CDI. No hematoma noted at site, pt denies sensation changes. WC

## 2017-08-31 NOTE — NURSING
Spoke to Dr. Lynne re: POC for pt. Made him aware pt has saleh catheter in, states will start pt on flomax, saleh will be DCd after several doses of flomax. Made him aware of pt's wheezes on auscultation and requiring O2 overnight, states will order duoneb. Discussed pt's dietary restriction, states can start on clear liquids. Discussed pain medications, states to continue on IV at this time, see how he tolerates PO liquids prior to starting him on PO pain meds. Made him aware pt does not have CBC ordered for AM. States he will order. WCTM.

## 2017-08-31 NOTE — H&P
Inpatient Radiology Pre-procedure Note    History of Present Illness:  Claude Penn III is a 52 y.o. male who presents for IVC filter placement. Patient has a history of PE and was actively anticoagulated when he had a spontaneous splenic bleed.       Admission H&P reviewed.  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 Authorizing Provider   calcium citrate-vitamin D3 315-200 mg (CITRACAL+D) 315-200 mg-unit per tablet Take 2 tablets by mouth 2 (two) times daily.    Historical Provider, MD   cetirizine (ZYRTEC) 10 MG tablet Take 10 mg by mouth once daily.    Historical Provider, MD   dexamethasone (DECADRON) 4 MG Tab Take 1 tablet (4 mg total) by mouth every 6 (six) hours. 8/3/17 9/2/17  Salo Cormier PA-C   diphenoxylate-atropine 2.5-0.025 mg (LOMOTIL) 2.5-0.025 mg per tablet Take 1 tablet by mouth 4 (four) times daily as needed for Diarrhea.    Historical Provider, MD   enoxaparin (LOVENOX) 120 mg/0.8 mL Syrg Inject 0.7 mLs (110 mg total) into the skin every 12 (twelve) hours. 8/25/17   Josue Strong MD   levetiracetam (KEPPRA) 500 MG Tab Take 1 tablet (500 mg total) by mouth 2 (two) times daily. 8/25/17 8/25/18  Josue Strong MD   nystatin (MYCOSTATIN) 100,000 unit/mL suspension Take 5 mLs (500,000 Units total) by mouth 4 (four) times daily. 8/25/17   Josue Strong MD   pantoprazole (PROTONIX) 40 MG tablet Take 1 tablet (40 mg total) by mouth once daily. 8/3/17 8/3/18  Salo T. Cormier, PA-C   promethazine (PHENERGAN) 25 MG tablet Take 25 mg by mouth  every 4 (four) hours as needed.     Historical Provider, MD   warfarin (COUMADIN) 6 MG tablet Take 1 tablet (6 mg total) by mouth Daily. 8/25/17 8/25/18  Josue Strong MD     Scheduled Meds:    albuterol-ipratropium 2.5mg-0.5mg/3mL  3 mL Nebulization Q4H WAKE    enoxaparin  40 mg Subcutaneous Daily    famotidine (PF)  20 mg Intravenous Daily    levetiracetam IVPB  500 mg Intravenous Q12H    metoprolol tartrate  50 mg Oral BID    nystatin  500,000 Units Oral QID (WM & HS)    polyethylene glycol  17 g Oral Daily    sodium chloride 0.9%  3 mL Intravenous Q8H    tamsulosin  0.4 mg Oral Daily     Continuous Infusions:    sodium chloride 0.9% 75 mL/hr at 08/30/17 1800     PRN Meds:sodium chloride, calcium carbonate, magnesium oxide, magnesium oxide, morphine, ondansetron, potassium chloride 10%, potassium chloride 10%, potassium chloride 10%, potassium, sodium phosphates, potassium, sodium phosphates, potassium, sodium phosphates  Anticoagulants/Antiplatelets: no anticoagulation    Allergies:   Review of patient's allergies indicates:   Allergen Reactions    No known drug allergies      Sedation Hx: have not been any systemic reactions    Labs:    Recent Labs  Lab 08/29/17  0845   INR 1.5*       Recent Labs  Lab 08/31/17  0353   WBC 4.61   HGB 9.4*   HCT 26.7*   MCV 85   PLT 92*      Recent Labs  Lab 08/28/17  1557  08/30/17  0323 08/31/17  1102   *  < > 115* 93     < > 136 138   K 3.5  < > 3.6 3.9     < > 103 105   CO2 26  < > 29 26   BUN 18  < > 13 20   CREATININE 0.7  < > 0.6 0.6   CALCIUM 7.6*  < > 7.9* 8.0*   MG 2.0  < > 2.2  --    ALT 33  --   --   --    AST 18  --   --   --    ALBUMIN 3.0*  --   --   --    BILITOT 1.2*  --   --   --    < > = values in this interval not displayed.      Vitals:  Temp: 99 °F (37.2 °C) (08/31/17 1100)  Pulse: 90 (08/31/17 1344)  Resp: 18 (08/31/17 1344)  BP: 132/76 (08/31/17 1100)  SpO2: 97 % (08/31/17 1344)     Physical Exam:  ASA: 2  Mallampati:  2    General: no acute distress  Mental Status: alert and oriented to person, place and time  HEENT: normocephalic, atraumatic  Chest: unlabored breathing  Abdomen: nondistended  Extremity: moves all extremities    Plan: Place IVC filter  Sedation Plan: Moderate.     Christian Saravia MD  PGY - 2  Department of Radiology

## 2017-08-31 NOTE — ASSESSMENT & PLAN NOTE
53 yo M with spontaneous intraperitoneal bleed likely 2/2 anticoagulation.  CTA with no definitive contrast extravasation.    Clear liquid diet, IV fluids  Daily h/h  Transfuse as needed  CTA if bleeds again  If no bleeding with normalized INR, plan to discontinue pts anticoagulation and have IVC filter placed for h/o PE  Prophylactic lovenox  PT/OT  OOBTC  Transfer to oncology

## 2017-09-01 LAB
ABO + RH BLD: NORMAL
ANION GAP SERPL CALC-SCNC: 7 MMOL/L
ANISOCYTOSIS BLD QL SMEAR: SLIGHT
ANISOCYTOSIS BLD QL SMEAR: SLIGHT
APTT BLDCRRT: 21.4 SEC
BASOPHILS # BLD AUTO: 0 K/UL
BASOPHILS # BLD AUTO: ABNORMAL K/UL
BASOPHILS NFR BLD: 0 %
BLD GP AB SCN CELLS X3 SERPL QL: NORMAL
BLD PROD TYP BPU: NORMAL
BLD PROD TYP BPU: NORMAL
BLOOD UNIT EXPIRATION DATE: NORMAL
BLOOD UNIT EXPIRATION DATE: NORMAL
BLOOD UNIT TYPE CODE: 6200
BLOOD UNIT TYPE CODE: 6200
BLOOD UNIT TYPE: NORMAL
BLOOD UNIT TYPE: NORMAL
BUN SERPL-MCNC: 16 MG/DL
CALCIUM SERPL-MCNC: 8 MG/DL
CHLORIDE SERPL-SCNC: 106 MMOL/L
CO2 SERPL-SCNC: 26 MMOL/L
CODING SYSTEM: NORMAL
CODING SYSTEM: NORMAL
CREAT SERPL-MCNC: 0.5 MG/DL
DIFFERENTIAL METHOD: ABNORMAL
DISPENSE STATUS: NORMAL
DISPENSE STATUS: NORMAL
EOSINOPHIL # BLD AUTO: 0 K/UL
EOSINOPHIL # BLD AUTO: ABNORMAL K/UL
EOSINOPHIL NFR BLD: 0 %
EOSINOPHIL NFR BLD: 1 %
EOSINOPHIL NFR BLD: 1 %
ERYTHROCYTE [DISTWIDTH] IN BLOOD BY AUTOMATED COUNT: 15.6 %
ERYTHROCYTE [DISTWIDTH] IN BLOOD BY AUTOMATED COUNT: 16 %
ERYTHROCYTE [DISTWIDTH] IN BLOOD BY AUTOMATED COUNT: 16 %
EST. GFR  (AFRICAN AMERICAN): >60 ML/MIN/1.73 M^2
EST. GFR  (NON AFRICAN AMERICAN): >60 ML/MIN/1.73 M^2
FIBRINOGEN PPP-MCNC: 411 MG/DL
GLUCOSE SERPL-MCNC: 80 MG/DL
HCT VFR BLD AUTO: 21.9 %
HCT VFR BLD AUTO: 21.9 %
HCT VFR BLD AUTO: 24.2 %
HGB BLD-MCNC: 7.3 G/DL
HGB BLD-MCNC: 7.4 G/DL
HGB BLD-MCNC: 8.1 G/DL
HYPOCHROMIA BLD QL SMEAR: ABNORMAL
HYPOCHROMIA BLD QL SMEAR: ABNORMAL
INR PPP: 0.9
LYMPHOCYTES # BLD AUTO: 0.3 K/UL
LYMPHOCYTES # BLD AUTO: ABNORMAL K/UL
LYMPHOCYTES NFR BLD: 11 %
LYMPHOCYTES NFR BLD: 14.8 %
LYMPHOCYTES NFR BLD: 5 %
MAGNESIUM SERPL-MCNC: 1.8 MG/DL
MCH RBC QN AUTO: 29.7 PG
MCH RBC QN AUTO: 29.9 PG
MCH RBC QN AUTO: 30 PG
MCHC RBC AUTO-ENTMCNC: 33.3 G/DL
MCHC RBC AUTO-ENTMCNC: 33.5 G/DL
MCHC RBC AUTO-ENTMCNC: 33.8 G/DL
MCV RBC AUTO: 89 FL
MONOCYTES # BLD AUTO: 0.1 K/UL
MONOCYTES # BLD AUTO: ABNORMAL K/UL
MONOCYTES NFR BLD: 2 %
MONOCYTES NFR BLD: 6 %
MONOCYTES NFR BLD: 6.2 %
NEUTROPHILS # BLD AUTO: 1.6 K/UL
NEUTROPHILS NFR BLD: 78 %
NEUTROPHILS NFR BLD: 79 %
NEUTROPHILS NFR BLD: 92 %
NEUTS BAND NFR BLD MANUAL: 1 %
NEUTS BAND NFR BLD MANUAL: 3 %
OVALOCYTES BLD QL SMEAR: ABNORMAL
OVALOCYTES BLD QL SMEAR: ABNORMAL
PHOSPHATE SERPL-MCNC: 1.7 MG/DL
PLATELET # BLD AUTO: 44 K/UL
PLATELET # BLD AUTO: 44 K/UL
PLATELET # BLD AUTO: 47 K/UL
PLATELET BLD QL SMEAR: ABNORMAL
PMV BLD AUTO: 8.9 FL
PMV BLD AUTO: 9.2 FL
PMV BLD AUTO: 9.5 FL
POIKILOCYTOSIS BLD QL SMEAR: SLIGHT
POIKILOCYTOSIS BLD QL SMEAR: SLIGHT
POLYCHROMASIA BLD QL SMEAR: ABNORMAL
POLYCHROMASIA BLD QL SMEAR: ABNORMAL
POTASSIUM SERPL-SCNC: 4.1 MMOL/L
PROTHROMBIN TIME: 10.2 SEC
RBC # BLD AUTO: 2.46 M/UL
RBC # BLD AUTO: 2.47 M/UL
RBC # BLD AUTO: 2.71 M/UL
SODIUM SERPL-SCNC: 139 MMOL/L
TOXIC GRANULES BLD QL SMEAR: PRESENT
TRANS ERYTHROCYTES VOL PATIENT: NORMAL ML
TRANS PLATPHERESIS VOL PATIENT: NORMAL ML
WBC # BLD AUTO: 1.34 K/UL
WBC # BLD AUTO: 1.9 K/UL
WBC # BLD AUTO: 2.1 K/UL

## 2017-09-01 PROCEDURE — S0028 INJECTION, FAMOTIDINE, 20 MG: HCPCS | Performed by: STUDENT IN AN ORGANIZED HEALTH CARE EDUCATION/TRAINING PROGRAM

## 2017-09-01 PROCEDURE — P9035 PLATELET PHERES LEUKOREDUCED: HCPCS

## 2017-09-01 PROCEDURE — 85027 COMPLETE CBC AUTOMATED: CPT | Mod: 91

## 2017-09-01 PROCEDURE — A4216 STERILE WATER/SALINE, 10 ML: HCPCS | Performed by: STUDENT IN AN ORGANIZED HEALTH CARE EDUCATION/TRAINING PROGRAM

## 2017-09-01 PROCEDURE — 20600001 HC STEP DOWN PRIVATE ROOM

## 2017-09-01 PROCEDURE — 85025 COMPLETE CBC W/AUTO DIFF WBC: CPT

## 2017-09-01 PROCEDURE — 25000003 PHARM REV CODE 250: Performed by: STUDENT IN AN ORGANIZED HEALTH CARE EDUCATION/TRAINING PROGRAM

## 2017-09-01 PROCEDURE — 94760 N-INVAS EAR/PLS OXIMETRY 1: CPT

## 2017-09-01 PROCEDURE — P9021 RED BLOOD CELLS UNIT: HCPCS

## 2017-09-01 PROCEDURE — 86900 BLOOD TYPING SEROLOGIC ABO: CPT

## 2017-09-01 PROCEDURE — 83735 ASSAY OF MAGNESIUM: CPT

## 2017-09-01 PROCEDURE — 84100 ASSAY OF PHOSPHORUS: CPT

## 2017-09-01 PROCEDURE — 99900035 HC TECH TIME PER 15 MIN (STAT)

## 2017-09-01 PROCEDURE — 86901 BLOOD TYPING SEROLOGIC RH(D): CPT

## 2017-09-01 PROCEDURE — 85007 BL SMEAR W/DIFF WBC COUNT: CPT | Mod: 91

## 2017-09-01 PROCEDURE — 85610 PROTHROMBIN TIME: CPT

## 2017-09-01 PROCEDURE — 94640 AIRWAY INHALATION TREATMENT: CPT

## 2017-09-01 PROCEDURE — 36415 COLL VENOUS BLD VENIPUNCTURE: CPT

## 2017-09-01 PROCEDURE — 63600175 PHARM REV CODE 636 W HCPCS: Performed by: STUDENT IN AN ORGANIZED HEALTH CARE EDUCATION/TRAINING PROGRAM

## 2017-09-01 PROCEDURE — 25000003 PHARM REV CODE 250: Performed by: SURGERY

## 2017-09-01 PROCEDURE — 86920 COMPATIBILITY TEST SPIN: CPT

## 2017-09-01 PROCEDURE — 85384 FIBRINOGEN ACTIVITY: CPT

## 2017-09-01 PROCEDURE — 27000221 HC OXYGEN, UP TO 24 HOURS

## 2017-09-01 PROCEDURE — 36430 TRANSFUSION BLD/BLD COMPNT: CPT

## 2017-09-01 PROCEDURE — 97535 SELF CARE MNGMENT TRAINING: CPT

## 2017-09-01 PROCEDURE — 85730 THROMBOPLASTIN TIME PARTIAL: CPT

## 2017-09-01 PROCEDURE — 80048 BASIC METABOLIC PNL TOTAL CA: CPT

## 2017-09-01 PROCEDURE — 99232 SBSQ HOSP IP/OBS MODERATE 35: CPT | Mod: ,,, | Performed by: INTERNAL MEDICINE

## 2017-09-01 PROCEDURE — 25000242 PHARM REV CODE 250 ALT 637 W/ HCPCS: Performed by: STUDENT IN AN ORGANIZED HEALTH CARE EDUCATION/TRAINING PROGRAM

## 2017-09-01 RX ORDER — SODIUM CHLORIDE 9 MG/ML
INJECTION, SOLUTION INTRAVENOUS CONTINUOUS
Status: ACTIVE | OUTPATIENT
Start: 2017-09-01 | End: 2017-09-02

## 2017-09-01 RX ORDER — ACETAMINOPHEN 325 MG/1
650 TABLET ORAL
Status: COMPLETED | OUTPATIENT
Start: 2017-09-01 | End: 2017-09-01

## 2017-09-01 RX ORDER — HYDROCODONE BITARTRATE AND ACETAMINOPHEN 500; 5 MG/1; MG/1
TABLET ORAL
Status: DISCONTINUED | OUTPATIENT
Start: 2017-09-01 | End: 2017-09-10 | Stop reason: HOSPADM

## 2017-09-01 RX ORDER — DIPHENHYDRAMINE HCL 25 MG
25 CAPSULE ORAL
Status: COMPLETED | OUTPATIENT
Start: 2017-09-01 | End: 2017-09-01

## 2017-09-01 RX ADMIN — METOPROLOL TARTRATE 50 MG: 50 TABLET, FILM COATED ORAL at 09:09

## 2017-09-01 RX ADMIN — IPRATROPIUM BROMIDE AND ALBUTEROL SULFATE 3 ML: .5; 3 SOLUTION RESPIRATORY (INHALATION) at 08:09

## 2017-09-01 RX ADMIN — CALCIUM CARBONATE (ANTACID) CHEW TAB 500 MG 500 MG: 500 CHEW TAB at 08:09

## 2017-09-01 RX ADMIN — NYSTATIN 500000 UNITS: 500000 SUSPENSION ORAL at 09:09

## 2017-09-01 RX ADMIN — IPRATROPIUM BROMIDE AND ALBUTEROL SULFATE 3 ML: .5; 3 SOLUTION RESPIRATORY (INHALATION) at 12:09

## 2017-09-01 RX ADMIN — CALCIUM CARBONATE (ANTACID) CHEW TAB 500 MG 500 MG: 500 CHEW TAB at 06:09

## 2017-09-01 RX ADMIN — NYSTATIN 500000 UNITS: 500000 SUSPENSION ORAL at 08:09

## 2017-09-01 RX ADMIN — DIPHENHYDRAMINE HYDROCHLORIDE 25 MG: 25 CAPSULE ORAL at 01:09

## 2017-09-01 RX ADMIN — IPRATROPIUM BROMIDE AND ALBUTEROL SULFATE 3 ML: .5; 3 SOLUTION RESPIRATORY (INHALATION) at 09:09

## 2017-09-01 RX ADMIN — ACETAMINOPHEN 650 MG: 325 TABLET ORAL at 01:09

## 2017-09-01 RX ADMIN — FAMOTIDINE 20 MG: 10 INJECTION, SOLUTION INTRAVENOUS at 09:09

## 2017-09-01 RX ADMIN — IPRATROPIUM BROMIDE AND ALBUTEROL SULFATE 3 ML: .5; 3 SOLUTION RESPIRATORY (INHALATION) at 04:09

## 2017-09-01 RX ADMIN — LEVETIRACETAM 500 MG: 5 INJECTION INTRAVENOUS at 08:09

## 2017-09-01 RX ADMIN — METOPROLOL TARTRATE 50 MG: 50 TABLET, FILM COATED ORAL at 08:09

## 2017-09-01 RX ADMIN — Medication 3 ML: at 02:09

## 2017-09-01 RX ADMIN — LEVETIRACETAM 500 MG: 5 INJECTION INTRAVENOUS at 09:09

## 2017-09-01 RX ADMIN — TAMSULOSIN HYDROCHLORIDE 0.4 MG: 0.4 CAPSULE ORAL at 09:09

## 2017-09-01 RX ADMIN — SODIUM CHLORIDE: 0.9 INJECTION, SOLUTION INTRAVENOUS at 09:09

## 2017-09-01 RX ADMIN — NYSTATIN 500000 UNITS: 500000 SUSPENSION ORAL at 06:09

## 2017-09-01 NOTE — ASSESSMENT & PLAN NOTE
- spontaneous bleed likely 2/2 anticoagulation  - no definitive contrast extravasation on CTA   - no surgical intervention per surgery  - s/p IVC filter placement 8/31/17  - coags repeated today are wnl  - continue to monitor serial CBCs and transfuse as indicated  - repeat CTA if bleed   - continue to monitor

## 2017-09-01 NOTE — PLAN OF CARE
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 ED with worsening abdominal pain, CT abdomen/pelvis w/contrast showed significant hemoperitoneum with associated drop in H/H. Transfused 2 units FFP and 2 units PRBC and transferred to [facility] [place] [facility].    8/28: Arrived to SICU 6084. Sinus tach 140s with ST depression on EKG, 2D echo performed at bedside, 6L LR given, 4 units PRBC, 2 platelets, and 3 units FFP administered. Urine and blood cultures sent.     Nursing:  + CBC q4h    VS stable. Swift catheter was d/c'd at approx. 0630. Pt had loose BMs. Swift catheter with lokesh, concentrated urine. Right groin dressing looks intact, no swelling noted. Telemetry= 's. Will continue to monitor.

## 2017-09-01 NOTE — PT/OT/SLP PROGRESS
"Occupational Therapy  Treatment    Claude Penn III   MRN: [id number]   Admitting Diagnosis: Ruptured spleen    OT Date of Treatment: 09/01/17       Billable Minutes:  Self Care/Home Management 40    General Precautions: Standard, fall  Orthopedic Precautions: N/A  Braces: N/A    Subjective:  Communicated with NSG prior to session.  "I don't think I can stay standing."  Pain/Comfort  Pain Rating 1:  (did not rate)  Pain Rating Post-Intervention 1:  (did not rate)    Objective:  Patient found with: peripheral IV     Functional Mobility:  Transfers:   Sit <> Stand Assistance: Minimum Assistance from toilet, Min (A) from b/s chair x1 trail, CGA from b/s chair x2nd trail  Sit <> Stand Assistive Device: Rolling Walker  Bed <> Chair Technique:  (functional mobility)  Bed <> Chair Transfer Assistance: Minimum Assistance  Bed <> Chair Assistive Device: Rolling Walker  Toilet Transfer Technique:  (functional mobility)  Toilet Transfer Assistance: Minimum Assistance  Toilet Transfer Assistive Device: Rolling Walker    Activities of Daily Living:  UE Dressing Level of Assistance: Minimum assistance  LE Dressing Level of Assistance: Maximum assistance  Grooming Position: Standing  Grooming Level of Assistance: Minimum assistance  Toileting Where Assessed: Toilet  Toileting Level of Assistance: Moderate assistance      AM-PAC 6 CLICK ADL   How much help from another person does this patient currently need?   1 = Unable, Total/Dependent Assistance  2 = A lot, Maximum/Moderate Assistance  3 = A little, Minimum/Contact Guard/Supervision  4 = None, Modified [place]/Independent    Putting on and taking off regular lower body clothing? : 2  Bathing (including washing, rinsing, drying)?: 2  Toileting, which includes using toilet, bedpan, or urinal? : 2  Putting on and taking off regular upper body clothing?: 3  Taking care of personal grooming such as brushing teeth?: 3  Eating meals?: 4  Total Score: 16     AM-PAC Raw Score CMS " ""G-Code Modifier Level of Impairment Assistance   6 % Total / Unable   7 - 8 CM 80 - 100% Maximal Assist   9-13 CL 60 - 80% Moderate Assist   14 - 19 CK 40 - 60% Moderate Assist   20 - 22 CJ 20 - 40% Minimal Assist   23 CI 1-20% SBA / CGA   24 CH 0% Independent/ Mod I     Patient left up in chair with all lines intact, call button in reach and wife present    ASSESSMENT:  Claude Penn III is a 52 y.o. male with a medical diagnosis of Ruptured spleen. Pt req's intermittent (A) for sit to stand t/f's and standing balance.    Rehab identified problem list/impairments: Rehab identified problem list/impairments: weakness, impaired endurance, impaired self care skills, impaired functional mobilty, gait instability, impaired balance, decreased safety awareness    Rehab potential is good.    Activity tolerance: Fair    Discharge recommendations: Discharge Facility/Level Of Care Needs: home with home health     Barriers to discharge: Barriers to Discharge: None    Equipment recommendations:  (TBD)     GOALS:    Occupational Therapy Goals        Problem: Occupational Therapy Goal    Goal Priority Disciplines Outcome Interventions   Occupational Therapy Goal     OT, PT/OT Ongoing (interventions implemented as appropriate)    Description:  Goals to be met by:  2 weeks 9/13/17     Patient will increase functional independence with ADLs by performing:    UE Dressing with Supervision.  LE Dressing with Supervision.  Grooming while standing with Supervision.  Toileting from toilet with Supervision for hygiene and clothing management.   Stand pivot transfers with Supervision.  Toilet transfer to toilet with Supervision.                  Plan:  Patient to be seen 5 x/week to address the above listed problems via self-care/home management, therapeutic activities, therapeutic exercises  Plan of Care reviewed with: patient, spouse  CLYDE Yi  09/01/2017  "

## 2017-09-01 NOTE — SUBJECTIVE & OBJECTIVE
Interval History:   Patient seen and examined this AM. Patient reports doing well this AM. Patient had NAEON. Patient reports mild improvement in back pain and SOB. He does note diarrhea, 6BMs over last day per chart review. No abdo pains but feels gassy. No other complaints this morning.     Oncology Treatment Plan:   [No treatment plan]    Medications:  Continuous Infusions:   sodium chloride 0.9% 75 mL/hr at 08/31/17 2301     Scheduled Meds:   albuterol-ipratropium 2.5mg-0.5mg/3mL  3 mL Nebulization Q4H WAKE    enoxaparin  40 mg Subcutaneous Daily    famotidine (PF)  20 mg Intravenous Daily    levetiracetam IVPB  500 mg Intravenous Q12H    metoprolol tartrate  50 mg Oral BID    nystatin  500,000 Units Oral QID (WM & HS)    polyethylene glycol  17 g Oral Daily    sodium chloride 0.9%  3 mL Intravenous Q8H    tamsulosin  0.4 mg Oral Daily     PRN Meds:sodium chloride, calcium carbonate, magnesium oxide, magnesium oxide, morphine, ondansetron, potassium chloride 10%, potassium chloride 10%, potassium chloride 10%, potassium, sodium phosphates, potassium, sodium phosphates, potassium, sodium phosphates     Review of Systems   Constitutional: Positive for fatigue. Negative for fever.   HENT: Negative for sore throat and trouble swallowing.    Respiratory: Positive for shortness of breath. Negative for cough.    Cardiovascular: Negative for chest pain and palpitations.   Gastrointestinal: Positive for abdominal distention and diarrhea. Negative for abdominal pain.   Genitourinary: Positive for difficulty urinating. Negative for hematuria.   Neurological: Negative for dizziness and headaches.     Objective:     Vital Signs (Most Recent):  Temp: 98 °F (36.7 °C) (09/01/17 0800)  Pulse: 96 (09/01/17 0919)  Resp: 14 (09/01/17 0919)  BP: 121/88 (09/01/17 0800)  SpO2: (!) 94 % (09/01/17 0919) Vital Signs (24h Range):  Temp:  [97 °F (36.1 °C)-98.8 °F (37.1 °C)] 98 °F (36.7 °C)  Pulse:  [] 96  Resp:  [14-20]  14  SpO2:  [92 %-100 %] 94 %  BP: (113-153)/(68-95) 121/88     Weight: 127.8 kg (281 lb 12 oz)  Body mass index is 35.22 kg/m².  Body surface area is 2.6 meters squared.      Intake/Output Summary (Last 24 hours) at 09/01/17 1135  Last data filed at 09/01/17 0600   Gross per 24 hour   Intake          1726.25 ml   Output              725 ml   Net          1001.25 ml       Physical Exam   Constitutional: He is oriented to person, place, and time. He appears well-developed and well-nourished. No distress.   HENT:   Head: Normocephalic and atraumatic.   Right Ear: External ear normal.   Left Ear: External ear normal.   Eyes: EOM are normal. Pupils are equal, round, and reactive to light. Right eye exhibits no discharge. Left eye exhibits no discharge.   Neck: Normal range of motion. Neck supple. No tracheal deviation present.   Cardiovascular: Normal rate and regular rhythm.    Pulmonary/Chest: Effort normal and breath sounds normal. No respiratory distress.   Abdominal: Soft. Bowel sounds are normal. He exhibits distension. There is no tenderness. There is no guarding.   Musculoskeletal: Normal range of motion. He exhibits edema. He exhibits no deformity.   Neurological: He is alert and oriented to person, place, and time.   Skin: Skin is warm and dry.       Significant Labs:   CBC:   Recent Labs  Lab 08/31/17  0353 09/01/17  0600 09/01/17  0953   WBC 4.61 1.90* 2.10*   HGB 9.4* 7.4* 7.3*   HCT 26.7* 21.9* 21.9*   PLT 92* 47* 44*    and CMP:   Recent Labs  Lab 08/31/17  1102 09/01/17  0600    139   K 3.9 4.1    106   CO2 26 26   GLU 93 80   BUN 20 16   CREATININE 0.6 0.5   CALCIUM 8.0* 8.0*   ANIONGAP 7* 7*   EGFRNONAA >60.0 >60.0       Diagnostic Results:    CTA A/P 8/29/17:  1. Significant volume of hemoperitoneum throughout the abdomen and pelvis. There is somewhat more focal perisplenic collection about the enlarged spleen with associated fluid-fluid level concerning for possible splenic source of  hemoperitoneum. There is a questionable hyperattenuating focus within the splenic hilum, although this does not appear to communicate with the splenic parenchyma or splenic vasculature, however small focus of active extravasation would be difficult to exclude on this limited single phase examination.     2. Multiple bilateral pulmonary masses in this patient with known pulmonary metastatic disease, better evaluated on prior dedicated chest CT of 8/22/2017.    3. Flattened configuration of the IVC concerning for hypovolemia.    4. Mild diffuse gaseous distention of the small and large bowel without definite transition point possibly related to underlying ileus. Postsurgical change of prior lower anterior resection.    5. Multiple stable appearing thoracolumbar compression deformities.

## 2017-09-01 NOTE — PLAN OF CARE
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 Transfused 2 units FFP and 2 units PRBC and transferred to [facility] [place].    8/28: Arrived to SICU 6084. Sinus tach 140s with ST depression on EKG, 2D echo performed at bedside, 6L LR given, 4 units PRBC, 2 platelets, and 3 units FFP administered. Urine and blood cultures sent.     Nursing:  + CBC q4h    Outcome: Ongoing (interventions implemented as appropriate)  POC reviewed with pt and wife, pt and wife verbalize understanding. Pt aaox4, intermittently forgetful. Pt with c/o pain to abdomen intermittently through the shift. Medicated with morphine per prn order. Pt with generalized weakness, COBURN. Pt on tele, ST in the 100s-110s. Pt sats in low 90s on RA this AM, noted to have wheezes on auscultation, pt requesting to be placed on O2, placed on 2L, sats upper 90s. MD made aware of wheezing, nebs ordered. Pt intermittently GRECO. Pt NPO, advanced to clear liquids after IR procedure. IVF infusing into L chest port. IVs to L upper arm and L AC SL. Pt with saleh catheter in place, draining concentrated pink urine, urine becoming more dark yellow/orange at end of shift. Pt with multiple loose BMs today. Pt OOB to BR with walker and assist of 1. Pt maintained on bedrest for 2 hours post IVF filter placement per IR RN, 2 hours up ~1830. IVC filter site to R groin CDI, no hematoma noted. Pt currently resting in bed, wife at bedside, call bell within reach, will continue to monitor.

## 2017-09-01 NOTE — ASSESSMENT & PLAN NOTE
- followed by Dr. Collins in [place]  - recently noted to have progression of disease  - will need to follow up with primary oncologist after discharge to discuss treatment options

## 2017-09-01 NOTE — PROGRESS NOTES
[facility]  Hematology/Oncology  Progress Note    Patient Name: Claude Penn III  Admission Date: 8/28/2017  Hospital Length of Stay: 4 days  Code Status: Full Code     Subjective:     HPI: please see H&P    Interval History:   Patient seen and examined this AM. Assumed care from surgery this AM. Patient reports doing well this AM. Patient had NAEON. Patient reports mild improvement in back pain and SOB. He does note diarrhea, 6BMs over last day per chart review. No abdo pains but feels gassy. No other complaints this morning.     Oncology Treatment Plan:   [No treatment plan]    Medications:  Continuous Infusions:   sodium chloride 0.9% 75 mL/hr at 08/31/17 2301     Scheduled Meds:   albuterol-ipratropium 2.5mg-0.5mg/3mL  3 mL Nebulization Q4H WAKE    enoxaparin  40 mg Subcutaneous Daily    famotidine (PF)  20 mg Intravenous Daily    levetiracetam IVPB  500 mg Intravenous Q12H    metoprolol tartrate  50 mg Oral BID    nystatin  500,000 Units Oral QID (WM & HS)    polyethylene glycol  17 g Oral Daily    sodium chloride 0.9%  3 mL Intravenous Q8H    tamsulosin  0.4 mg Oral Daily     PRN Meds:sodium chloride, calcium carbonate, magnesium oxide, magnesium oxide, morphine, ondansetron, potassium chloride 10%, potassium chloride 10%, potassium chloride 10%, potassium, sodium phosphates, potassium, sodium phosphates, potassium, sodium phosphates     Review of Systems   Constitutional: Positive for fatigue. Negative for fever.   HENT: Negative for sore throat and trouble swallowing.    Respiratory: Positive for shortness of breath. Negative for cough.    Cardiovascular: Negative for chest pain and palpitations.   Gastrointestinal: Positive for abdominal distention and diarrhea. Negative for abdominal pain.   Genitourinary: Positive for difficulty urinating. Negative for hematuria.   Neurological: Negative for dizziness and headaches.     Objective:     Vital Signs (Most Recent):  Temp: 98 °F  (36.7 °C) (09/01/17 0800)  Pulse: 96 (09/01/17 0919)  Resp: 14 (09/01/17 0919)  BP: 121/88 (09/01/17 0800)  SpO2: (!) 94 % (09/01/17 0919) Vital Signs (24h Range):  Temp:  [97 °F (36.1 °C)-98.8 °F (37.1 °C)] 98 °F (36.7 °C)  Pulse:  [] 96  Resp:  [14-20] 14  SpO2:  [92 %-100 %] 94 %  BP: (113-153)/(68-95) 121/88     Weight: 127.8 kg (281 lb 12 oz)  Body mass index is 35.22 kg/m².  Body surface area is 2.6 meters squared.      Intake/Output Summary (Last 24 hours) at 09/01/17 1135  Last data filed at 09/01/17 0600   Gross per 24 hour   Intake          1726.25 ml   Output              725 ml   Net          1001.25 ml       Physical Exam   Constitutional: He is oriented to person, place, and time. He appears well-developed and well-nourished. No distress.   HENT:   Head: Normocephalic and atraumatic.   Right Ear: External ear normal.   Left Ear: External ear normal.   Eyes: EOM are normal. Pupils are equal, round, and reactive to light. Right eye exhibits no discharge. Left eye exhibits no discharge.   Neck: Normal range of motion. Neck supple. No tracheal deviation present.   Cardiovascular: Normal rate and regular rhythm.    Pulmonary/Chest: Effort normal and breath sounds normal. No respiratory distress.   Abdominal: Soft. Bowel sounds are normal. He exhibits distension. There is no tenderness. There is no guarding.   Musculoskeletal: Normal range of motion. He exhibits edema. He exhibits no deformity.   Neurological: He is alert and oriented to person, place, and time.   Skin: Skin is warm and dry.       Significant Labs:   CBC:   Recent Labs  Lab 08/31/17  0353 09/01/17  0600 09/01/17  0953   WBC 4.61 1.90* 2.10*   HGB 9.4* 7.4* 7.3*   HCT 26.7* 21.9* 21.9*   PLT 92* 47* 44*    and CMP:   Recent Labs  Lab 08/31/17  1102 09/01/17  0600    139   K 3.9 4.1    106   CO2 26 26   GLU 93 80   BUN 20 16   CREATININE 0.6 0.5   CALCIUM 8.0* 8.0*   ANIONGAP 7* 7*   EGFRNONAA >60.0 >60.0       Diagnostic  Results:    CTA A/P 8/29/17:  1. Significant volume of hemoperitoneum throughout the abdomen and pelvis. There is somewhat more focal perisplenic collection about the enlarged spleen with associated fluid-fluid level concerning for possible splenic source of hemoperitoneum. There is a questionable hyperattenuating focus within the splenic hilum, although this does not appear to communicate with the splenic parenchyma or splenic vasculature, however small focus of active extravasation would be difficult to exclude on this limited single phase examination.     2. Multiple bilateral pulmonary masses in this patient with known pulmonary metastatic disease, better evaluated on prior dedicated chest CT of 8/22/2017.    3. Flattened configuration of the IVC concerning for hypovolemia.    4. Mild diffuse gaseous distention of the small and large bowel without definite transition point possibly related to underlying ileus. Postsurgical change of prior lower anterior resection.    5. Multiple stable appearing thoracolumbar compression deformities.     Assessment/Plan:     * Ruptured spleen    - spontaneous bleed likely 2/2 anticoagulation  - no definitive contrast extravasation on CTA   - no surgical intervention per surgery  - s/p IVC filter placement 8/31/17  - coags repeated today are wnl  - continue to monitor serial CBCs and transfuse as indicated  - transfuse 1u pRBC and 1pack of PLT today  - repeat CTA if bleed   - continue to monitor            Spleen hematoma    -see above        Pulmonary embolism without acute cor pulmonale    - anticoagulation stopped 2/2 intraperitoneal bleed  - IVC filter placed 8/31/17  - continue to monitor and will need to address need for long term anticoagulation as out-patient        Adenocarcinoma of rectum    - followed by Dr. Collins in [place]  - recently noted to have progression of disease  - will need to follow up with primary oncologist after discharge to discuss treatment  options          DISPO: Pending clinical improvement. PT/OT ultimately recommending home with home health once stabilized from medical standpoint     Ranjit Norwood MD  Hematology/Oncology  [facility]

## 2017-09-02 PROBLEM — R14.0 ABDOMINAL BLOATING: Status: ACTIVE | Noted: 2017-09-02

## 2017-09-02 LAB
ALBUMIN SERPL BCP-MCNC: 2.5 G/DL
ALP SERPL-CCNC: 70 U/L
ALT SERPL W/O P-5'-P-CCNC: 26 U/L
ANION GAP SERPL CALC-SCNC: 7 MMOL/L
AST SERPL-CCNC: 24 U/L
BASOPHILS # BLD AUTO: 0 K/UL
BASOPHILS # BLD AUTO: 0 K/UL
BASOPHILS NFR BLD: 0 %
BASOPHILS NFR BLD: 0 %
BILIRUB SERPL-MCNC: 1.2 MG/DL
BUN SERPL-MCNC: 14 MG/DL
C DIFF GDH STL QL: NEGATIVE
C DIFF TOX A+B STL QL IA: NEGATIVE
CALCIUM SERPL-MCNC: 8 MG/DL
CHLORIDE SERPL-SCNC: 107 MMOL/L
CO2 SERPL-SCNC: 26 MMOL/L
CREAT SERPL-MCNC: 0.6 MG/DL
DIFFERENTIAL METHOD: ABNORMAL
DIFFERENTIAL METHOD: ABNORMAL
EOSINOPHIL # BLD AUTO: 0 K/UL
EOSINOPHIL # BLD AUTO: 0 K/UL
EOSINOPHIL NFR BLD: 0.5 %
EOSINOPHIL NFR BLD: 0.6 %
ERYTHROCYTE [DISTWIDTH] IN BLOOD BY AUTOMATED COUNT: 16 %
ERYTHROCYTE [DISTWIDTH] IN BLOOD BY AUTOMATED COUNT: 16.7 %
EST. GFR  (AFRICAN AMERICAN): >60 ML/MIN/1.73 M^2
EST. GFR  (NON AFRICAN AMERICAN): >60 ML/MIN/1.73 M^2
GLUCOSE SERPL-MCNC: 97 MG/DL
HCT VFR BLD AUTO: 24.4 %
HCT VFR BLD AUTO: 26.6 %
HGB BLD-MCNC: 8.3 G/DL
HGB BLD-MCNC: 9 G/DL
LYMPHOCYTES # BLD AUTO: 0.1 K/UL
LYMPHOCYTES # BLD AUTO: 0.2 K/UL
LYMPHOCYTES NFR BLD: 6.4 %
LYMPHOCYTES NFR BLD: 8.8 %
MAGNESIUM SERPL-MCNC: 1.9 MG/DL
MCH RBC QN AUTO: 30.2 PG
MCH RBC QN AUTO: 30.4 PG
MCHC RBC AUTO-ENTMCNC: 33.8 G/DL
MCHC RBC AUTO-ENTMCNC: 34 G/DL
MCV RBC AUTO: 89 FL
MCV RBC AUTO: 90 FL
MONOCYTES # BLD AUTO: 0.1 K/UL
MONOCYTES # BLD AUTO: 0.2 K/UL
MONOCYTES NFR BLD: 6.9 %
MONOCYTES NFR BLD: 9.9 %
NEUTROPHILS # BLD AUTO: 1.4 K/UL
NEUTROPHILS # BLD AUTO: 1.7 K/UL
NEUTROPHILS NFR BLD: 83.1 %
NEUTROPHILS NFR BLD: 83.3 %
PHOSPHATE SERPL-MCNC: 1.7 MG/DL
PLATELET # BLD AUTO: 51 K/UL
PLATELET # BLD AUTO: 55 K/UL
PMV BLD AUTO: 9.2 FL
PMV BLD AUTO: 9.6 FL
POTASSIUM SERPL-SCNC: 3.6 MMOL/L
PROT SERPL-MCNC: 5.2 G/DL
RBC # BLD AUTO: 2.75 M/UL
RBC # BLD AUTO: 2.96 M/UL
SODIUM SERPL-SCNC: 140 MMOL/L
WBC # BLD AUTO: 1.71 K/UL
WBC # BLD AUTO: 2.04 K/UL

## 2017-09-02 PROCEDURE — 80053 COMPREHEN METABOLIC PANEL: CPT

## 2017-09-02 PROCEDURE — S0028 INJECTION, FAMOTIDINE, 20 MG: HCPCS | Performed by: STUDENT IN AN ORGANIZED HEALTH CARE EDUCATION/TRAINING PROGRAM

## 2017-09-02 PROCEDURE — 25000003 PHARM REV CODE 250: Performed by: STUDENT IN AN ORGANIZED HEALTH CARE EDUCATION/TRAINING PROGRAM

## 2017-09-02 PROCEDURE — 87449 NOS EACH ORGANISM AG IA: CPT

## 2017-09-02 PROCEDURE — 25000242 PHARM REV CODE 250 ALT 637 W/ HCPCS: Performed by: STUDENT IN AN ORGANIZED HEALTH CARE EDUCATION/TRAINING PROGRAM

## 2017-09-02 PROCEDURE — 94799 UNLISTED PULMONARY SVC/PX: CPT

## 2017-09-02 PROCEDURE — 99232 SBSQ HOSP IP/OBS MODERATE 35: CPT | Mod: ,,, | Performed by: INTERNAL MEDICINE

## 2017-09-02 PROCEDURE — A4216 STERILE WATER/SALINE, 10 ML: HCPCS | Performed by: STUDENT IN AN ORGANIZED HEALTH CARE EDUCATION/TRAINING PROGRAM

## 2017-09-02 PROCEDURE — 27000221 HC OXYGEN, UP TO 24 HOURS

## 2017-09-02 PROCEDURE — 84100 ASSAY OF PHOSPHORUS: CPT

## 2017-09-02 PROCEDURE — 63600175 PHARM REV CODE 636 W HCPCS: Performed by: STUDENT IN AN ORGANIZED HEALTH CARE EDUCATION/TRAINING PROGRAM

## 2017-09-02 PROCEDURE — 94640 AIRWAY INHALATION TREATMENT: CPT

## 2017-09-02 PROCEDURE — 85025 COMPLETE CBC W/AUTO DIFF WBC: CPT

## 2017-09-02 PROCEDURE — 94761 N-INVAS EAR/PLS OXIMETRY MLT: CPT

## 2017-09-02 PROCEDURE — 25000003 PHARM REV CODE 250: Performed by: SURGERY

## 2017-09-02 PROCEDURE — 36415 COLL VENOUS BLD VENIPUNCTURE: CPT

## 2017-09-02 PROCEDURE — 86644 CMV ANTIBODY: CPT

## 2017-09-02 PROCEDURE — 83735 ASSAY OF MAGNESIUM: CPT

## 2017-09-02 PROCEDURE — 20600001 HC STEP DOWN PRIVATE ROOM

## 2017-09-02 RX ORDER — SIMETHICONE 80 MG
1 TABLET,CHEWABLE ORAL
Status: DISCONTINUED | OUTPATIENT
Start: 2017-09-02 | End: 2017-09-10 | Stop reason: HOSPADM

## 2017-09-02 RX ORDER — SIMETHICONE 80 MG
1 TABLET,CHEWABLE ORAL 3 TIMES DAILY PRN
Status: DISCONTINUED | OUTPATIENT
Start: 2017-09-02 | End: 2017-09-02

## 2017-09-02 RX ADMIN — LOPERAMIDE HYDROCHLORIDE 2 MG: 1 SOLUTION ORAL at 10:09

## 2017-09-02 RX ADMIN — SODIUM CHLORIDE: 0.9 INJECTION, SOLUTION INTRAVENOUS at 03:09

## 2017-09-02 RX ADMIN — FAMOTIDINE 20 MG: 10 INJECTION, SOLUTION INTRAVENOUS at 09:09

## 2017-09-02 RX ADMIN — MORPHINE SULFATE 2 MG: 2 INJECTION, SOLUTION INTRAMUSCULAR; INTRAVENOUS at 06:09

## 2017-09-02 RX ADMIN — IPRATROPIUM BROMIDE AND ALBUTEROL SULFATE 3 ML: .5; 3 SOLUTION RESPIRATORY (INHALATION) at 08:09

## 2017-09-02 RX ADMIN — METOPROLOL TARTRATE 50 MG: 50 TABLET, FILM COATED ORAL at 09:09

## 2017-09-02 RX ADMIN — IPRATROPIUM BROMIDE AND ALBUTEROL SULFATE 3 ML: .5; 3 SOLUTION RESPIRATORY (INHALATION) at 12:09

## 2017-09-02 RX ADMIN — NYSTATIN 500000 UNITS: 500000 SUSPENSION ORAL at 08:09

## 2017-09-02 RX ADMIN — NYSTATIN 500000 UNITS: 500000 SUSPENSION ORAL at 09:09

## 2017-09-02 RX ADMIN — SIMETHICONE CHEW TAB 80 MG 80 MG: 80 TABLET ORAL at 11:09

## 2017-09-02 RX ADMIN — TAMSULOSIN HYDROCHLORIDE 0.4 MG: 0.4 CAPSULE ORAL at 09:09

## 2017-09-02 RX ADMIN — LEVETIRACETAM 500 MG: 5 INJECTION INTRAVENOUS at 09:09

## 2017-09-02 RX ADMIN — IPRATROPIUM BROMIDE AND ALBUTEROL SULFATE 3 ML: .5; 3 SOLUTION RESPIRATORY (INHALATION) at 04:09

## 2017-09-02 RX ADMIN — SIMETHICONE CHEW TAB 80 MG 80 MG: 80 TABLET ORAL at 07:09

## 2017-09-02 RX ADMIN — Medication 3 ML: at 10:09

## 2017-09-02 RX ADMIN — CALCIUM CARBONATE (ANTACID) CHEW TAB 500 MG 500 MG: 500 CHEW TAB at 05:09

## 2017-09-02 RX ADMIN — METOPROLOL TARTRATE 50 MG: 50 TABLET, FILM COATED ORAL at 08:09

## 2017-09-02 RX ADMIN — NYSTATIN 500000 UNITS: 500000 SUSPENSION ORAL at 05:09

## 2017-09-02 RX ADMIN — ONDANSETRON 4 MG: 2 INJECTION INTRAMUSCULAR; INTRAVENOUS at 11:09

## 2017-09-02 RX ADMIN — CALCIUM CARBONATE (ANTACID) CHEW TAB 500 MG 500 MG: 500 CHEW TAB at 06:09

## 2017-09-02 RX ADMIN — ALUMINUM HYDROXIDE, MAGNESIUM HYDROXIDE, AND SIMETHICONE 50 ML: 200; 200; 20 SUSPENSION ORAL at 12:09

## 2017-09-02 NOTE — PROGRESS NOTES
[facility]  Hematology/Oncology  Progress Note    Patient Name: Claude Penn III  Admission Date: 8/28/2017  Hospital Length of Stay: 5 days  Code Status: Full Code     Subjective:     Interval History:   Patient seen and examined at bedside this AM w/ family member present in the room. States that he was uncomfortable throughout the course of the night due to bloating/abominal distention and pain radiating to the back. Improved after PRN pain medication and he was able to get to sleep in the early morning.  States that he is still experiencing diarrhea ( 3 loose BM since 0000).  No other complaints this morning.     Oncology Treatment Plan:   [No treatment plan]    Medications:  Continuous Infusions:   sodium chloride 0.9% 125 mL/hr at 09/02/17 0300     Scheduled Meds:   albuterol-ipratropium 2.5mg-0.5mg/3mL  3 mL Nebulization Q4H WAKE    famotidine (PF)  20 mg Intravenous Daily    levetiracetam IVPB  500 mg Intravenous Q12H    metoprolol tartrate  50 mg Oral BID    nystatin  500,000 Units Oral QID (WM & HS)    sodium chloride 0.9%  3 mL Intravenous Q8H    tamsulosin  0.4 mg Oral Daily     PRN Meds:sodium chloride, sodium chloride, sodium chloride, calcium carbonate, magnesium oxide, magnesium oxide, morphine, ondansetron, potassium chloride 10%, potassium chloride 10%, potassium chloride 10%, potassium, sodium phosphates, potassium, sodium phosphates, potassium, sodium phosphates     Review of Systems   Constitutional: Positive for fatigue. Negative for fever.   HENT: Negative for sore throat and trouble swallowing.    Respiratory: Positive for shortness of breath. Negative for cough.    Cardiovascular: Negative for chest pain and palpitations.   Gastrointestinal: Positive for abdominal distention and diarrhea. Negative for abdominal pain, constipation and nausea.   Genitourinary: Positive for difficulty urinating. Negative for hematuria.   Musculoskeletal: Positive for back pain.    Neurological: Negative for dizziness and headaches.     Objective:     Vital Signs (Most Recent):  Temp: 97.6 °F (36.4 °C) (09/02/17 0527)  Pulse: 88 (09/02/17 0700)  Resp: 20 (09/02/17 0527)  BP: 130/85 (09/02/17 0527)  SpO2: 100 % (09/02/17 0527) Vital Signs (24h Range):  Temp:  [97.3 °F (36.3 °C)-98.3 °F (36.8 °C)] 97.6 °F (36.4 °C)  Pulse:  [] 88  Resp:  [14-20] 20  SpO2:  [94 %-100 %] 100 %  BP: (115-135)/(75-91) 130/85     Weight: 127.8 kg (281 lb 12 oz)  Body mass index is 35.22 kg/m².  Body surface area is 2.6 meters squared.      Intake/Output Summary (Last 24 hours) at 09/02/17 0817  Last data filed at 09/02/17 0600   Gross per 24 hour   Intake          3674.59 ml   Output              425 ml   Net          3249.59 ml       Physical Exam   Constitutional: He is oriented to person, place, and time. He appears well-developed and well-nourished. No distress.   HENT:   Head: Normocephalic and atraumatic.   Right Ear: External ear normal.   Left Ear: External ear normal.   Eyes: EOM are normal. Pupils are equal, round, and reactive to light. Right eye exhibits no discharge. Left eye exhibits no discharge.   Neck: Normal range of motion. Neck supple. No tracheal deviation present.   Cardiovascular: Normal rate and regular rhythm.    Pulmonary/Chest: Effort normal and breath sounds normal. No respiratory distress. He has no wheezes. He exhibits no tenderness.   Abdominal: Soft. Bowel sounds are normal. He exhibits distension. There is tenderness (LUQ, mild discomfort ). There is no guarding.   Musculoskeletal: Normal range of motion. He exhibits edema (1+ non pitting LE b/l). He exhibits no deformity.   Neurological: He is alert and oriented to person, place, and time.   Skin: Skin is warm and dry.       Significant Labs:   CBC:     Recent Labs  Lab 09/01/17 0953 09/01/17 1916 09/02/17  0408   WBC 2.10* 1.34* 1.71*   HGB 7.3* 8.1* 8.3*   HCT 21.9* 24.2* 24.4*   PLT 44* 44* 51*    and CMP:     Recent  Labs  Lab 08/31/17  1102 09/01/17  0600 09/02/17  0408    139 140   K 3.9 4.1 3.6    106 107   CO2 26 26 26   GLU 93 80 97   BUN 20 16 14   CREATININE 0.6 0.5 0.6   CALCIUM 8.0* 8.0* 8.0*   PROT  --   --  5.2*   ALBUMIN  --   --  2.5*   BILITOT  --   --  1.2*   ALKPHOS  --   --  70   AST  --   --  24   ALT  --   --  26   ANIONGAP 7* 7* 7*   EGFRNONAA >60.0 >60.0 >60.0       Diagnostic Results:    CTA A/P 8/29/17:  1. Significant volume of hemoperitoneum throughout the abdomen and pelvis. There is somewhat more focal perisplenic collection about the enlarged spleen with associated fluid-fluid level concerning for possible splenic source of hemoperitoneum. There is a questionable hyperattenuating focus within the splenic hilum, although this does not appear to communicate with the splenic parenchyma or splenic vasculature, however small focus of active extravasation would be difficult to exclude on this limited single phase examination.     2. Multiple bilateral pulmonary masses in this patient with known pulmonary metastatic disease, better evaluated on prior dedicated chest CT of 8/22/2017.    3. Flattened configuration of the IVC concerning for hypovolemia.    4. Mild diffuse gaseous distention of the small and large bowel without definite transition point possibly related to underlying ileus. Postsurgical change of prior lower anterior resection.    5. Multiple stable appearing thoracolumbar compression deformities.     Assessment/Plan:     * Ruptured spleen    - spontaneous bleed likely 2/2 anticoagulation  - no definitive contrast extravasation on CTA   - no surgical intervention per surgery  - s/p IVC filter placement 8/31/17  - coags repeated today are wnl  - continue to monitor serial CBCs and transfuse as indicated; additional CBC to follow Hgb this afternoon          Spleen hematoma    -see above        Abdominal bloating    - Started on simethicone TID  - C.diff cultures pending in the setting  of diarrhea and abdominal bloating             Pulmonary embolism without acute cor pulmonale    - anticoagulation stopped 2/2 intraperitoneal bleed  - IVC filter placed 8/31/17  - continue to monitor and will need to address need for long term anticoagulation as out-patient        Adenocarcinoma of rectum    - followed by Dr. Collins in [place]  - recently noted to have progression of disease  - will need to follow up with primary oncologist after discharge to discuss treatment options            DISPOSITION: Hgb and Plt improved after transfusion. Continue pain control. C.diff labs pending in the setting of diarrhea. Discharge pending clinical improvement and PT/OT recommendations for home health.    Cammie Moser MD  Hematology/Oncology  [facility]

## 2017-09-02 NOTE — ASSESSMENT & PLAN NOTE
- spontaneous bleed likely 2/2 anticoagulation  - no definitive contrast extravasation on CTA   - no surgical intervention per surgery  - s/p IVC filter placement 8/31/17  - coags repeated today are wnl  - continue to monitor serial CBCs and transfuse as indicated; additional CBC to follow Hgb this afternoon

## 2017-09-02 NOTE — PLAN OF CARE
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 Transfused 2 units FFP and 2 units PRBC and transferred to [facility] [place].    8/28: Arrived to SICU 6084. Sinus tach 140s with ST depression on EKG, 2D echo performed at bedside, 6L LR given, 4 units PRBC, 2 platelets, and 3 units FFP administered. Urine and blood cultures sent.     Nursing:  + CBC q4h    Outcome: Ongoing (interventions implemented as appropriate)  VS stable. No complaints overnight. Will continue to monitor. C/o abdominal distention this AM, radiating back  Pain. Morphine IVP given.  Stool for C-diff was sent last night.

## 2017-09-02 NOTE — PLAN OF CARE
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 Transfused 2 units FFP and 2 units PRBC and transferred to [facility] [place] [place].    8/28: Arrived to SICU 6084. Sinus tach 140s with ST depression on EKG, 2D echo performed at bedside, 6L LR given, 4 units PRBC, 2 platelets, and 3 units FFP administered. Urine and blood cultures sent.     Nursing:  + CBC q4h    Outcome: Ongoing (interventions implemented as appropriate)  Plan of Care reviewed w/ pt. Tachycardic up to 100 this shift, all other VSS on 2L NC. C/o SOB when ambulating to bathroom. R groin dressing CDI. Advanced to full liquid diet this AM, tolerating small amt PO intake. Continues w/ loose BMs today. Critically low WBC this AM, H/H and PLT trending down, received 1unit PRBCs and 1unit platelets this shift, repeat labs pending. Saleh d/c'ed this AM, bladder scan for 493ccs, saleh replaced this evening.

## 2017-09-02 NOTE — ASSESSMENT & PLAN NOTE
- anticoagulation stopped 2/2 intraperitoneal bleed  - IVC filter placed 8/31/17  - continue to monitor and will need to address need for long term anticoagulation as out-patient

## 2017-09-02 NOTE — ASSESSMENT & PLAN NOTE
- Started on simethicone TID  - C.diff cultures pending in the setting of diarrhea and abdominal bloating

## 2017-09-02 NOTE — PLAN OF CARE
Problem: Patient Care Overview  Goal: Plan of Care Review  Hx: Stage 4 CRC w/ bilateral lung mets s/p resection 2013, multiple right brain mets s/p gamma knife radiation and right hemicraniectomy 2013, recurrent PE (started in 2013), coumadin therapy for PE (held since 7/31),     Dx: splenic bleed, PE    7/25: discharged home from [facility] on coumadin to lovenox bridge for PE    8/28: Presented to [facility] ED with worsening abdominal pain, CT abdomen/pelvis w/contrast showed significant hemoperitoneum with associated drop in H/H. Transfused 2 units FFP and 2 units PRBC and transferred to [facility].    8/28: Arrived to SICU 6084. Sinus tach 140s with ST depression on EKG, 2D echo performed at bedside, 6L LR given, 4 units PRBC, 2 platelets, and 3 units FFP administered. Urine and blood cultures sent.     Nursing:  + CBC q4h    Outcome: Ongoing (interventions implemented as appropriate)  POC reviewed with pt, verbalized understanding. Pt AAOx4, VSS. Pt up with standby assist, ambulating halls with walker. Pt c/o intermittent nausea, IV zofran given PRN. IVFs d/c'd, pt tolerating full liquid diet. Pt remains free of any falls/injury. Call light within reach,family at bedside. Will continue to monitor.

## 2017-09-02 NOTE — SUBJECTIVE & OBJECTIVE
Interval History:   Patient seen and examined at bedside this AM w/ family member present in the room. States that he was uncomfortable throughout the course of the night due to bloating/abominal distention and pain radiating to the back. Improved after PRN pain medication and he was able to get to sleep in the early morning.  States that he is still experiencing diarrhea ( 3 loose BM since 0000).  No other complaints this morning.     Oncology Treatment Plan:   [No treatment plan]    Medications:  Continuous Infusions:   sodium chloride 0.9% 125 mL/hr at 09/02/17 0300     Scheduled Meds:   albuterol-ipratropium 2.5mg-0.5mg/3mL  3 mL Nebulization Q4H WAKE    famotidine (PF)  20 mg Intravenous Daily    levetiracetam IVPB  500 mg Intravenous Q12H    metoprolol tartrate  50 mg Oral BID    nystatin  500,000 Units Oral QID (WM & HS)    sodium chloride 0.9%  3 mL Intravenous Q8H    tamsulosin  0.4 mg Oral Daily     PRN Meds:sodium chloride, sodium chloride, sodium chloride, calcium carbonate, magnesium oxide, magnesium oxide, morphine, ondansetron, potassium chloride 10%, potassium chloride 10%, potassium chloride 10%, potassium, sodium phosphates, potassium, sodium phosphates, potassium, sodium phosphates     Review of Systems   Constitutional: Positive for fatigue. Negative for fever.   HENT: Negative for sore throat and trouble swallowing.    Respiratory: Positive for shortness of breath. Negative for cough.    Cardiovascular: Negative for chest pain and palpitations.   Gastrointestinal: Positive for abdominal distention and diarrhea. Negative for abdominal pain, constipation and nausea.   Genitourinary: Positive for difficulty urinating. Negative for hematuria.   Musculoskeletal: Positive for back pain.   Neurological: Negative for dizziness and headaches.     Objective:     Vital Signs (Most Recent):  Temp: 97.6 °F (36.4 °C) (09/02/17 0527)  Pulse: 88 (09/02/17 0700)  Resp: 20 (09/02/17 0527)  BP: 130/85  (09/02/17 0527)  SpO2: 100 % (09/02/17 0527) Vital Signs (24h Range):  Temp:  [97.3 °F (36.3 °C)-98.3 °F (36.8 °C)] 97.6 °F (36.4 °C)  Pulse:  [] 88  Resp:  [14-20] 20  SpO2:  [94 %-100 %] 100 %  BP: (115-135)/(75-91) 130/85     Weight: 127.8 kg (281 lb 12 oz)  Body mass index is 35.22 kg/m².  Body surface area is 2.6 meters squared.      Intake/Output Summary (Last 24 hours) at 09/02/17 0817  Last data filed at 09/02/17 0600   Gross per 24 hour   Intake          3674.59 ml   Output              425 ml   Net          3249.59 ml       Physical Exam   Constitutional: He is oriented to person, place, and time. He appears well-developed and well-nourished. No distress.   HENT:   Head: Normocephalic and atraumatic.   Right Ear: External ear normal.   Left Ear: External ear normal.   Eyes: EOM are normal. Pupils are equal, round, and reactive to light. Right eye exhibits no discharge. Left eye exhibits no discharge.   Neck: Normal range of motion. Neck supple. No tracheal deviation present.   Cardiovascular: Normal rate and regular rhythm.    Pulmonary/Chest: Effort normal and breath sounds normal. No respiratory distress. He has no wheezes. He exhibits no tenderness.   Abdominal: Soft. Bowel sounds are normal. He exhibits distension. There is tenderness (LUQ, mild discomfort ). There is no guarding.   Musculoskeletal: Normal range of motion. He exhibits edema (1+ non pitting LE b/l). He exhibits no deformity.   Neurological: He is alert and oriented to person, place, and time.   Skin: Skin is warm and dry.       Significant Labs:   CBC:     Recent Labs  Lab 09/01/17  0953 09/01/17  1916 09/02/17  0408   WBC 2.10* 1.34* 1.71*   HGB 7.3* 8.1* 8.3*   HCT 21.9* 24.2* 24.4*   PLT 44* 44* 51*    and CMP:     Recent Labs  Lab 08/31/17  1102 09/01/17  0600 09/02/17  0408    139 140   K 3.9 4.1 3.6    106 107   CO2 26 26 26   GLU 93 80 97   BUN 20 16 14   CREATININE 0.6 0.5 0.6   CALCIUM 8.0* 8.0* 8.0*   PROT   --   --  5.2*   ALBUMIN  --   --  2.5*   BILITOT  --   --  1.2*   ALKPHOS  --   --  70   AST  --   --  24   ALT  --   --  26   ANIONGAP 7* 7* 7*   EGFRNONAA >60.0 >60.0 >60.0       Diagnostic Results:    CTA A/P 8/29/17:  1. Significant volume of hemoperitoneum throughout the abdomen and pelvis. There is somewhat more focal perisplenic collection about the enlarged spleen with associated fluid-fluid level concerning for possible splenic source of hemoperitoneum. There is a questionable hyperattenuating focus within the splenic hilum, although this does not appear to communicate with the splenic parenchyma or splenic vasculature, however small focus of active extravasation would be difficult to exclude on this limited single phase examination.     2. Multiple bilateral pulmonary masses in this patient with known pulmonary metastatic disease, better evaluated on prior dedicated chest CT of 8/22/2017.    3. Flattened configuration of the IVC concerning for hypovolemia.    4. Mild diffuse gaseous distention of the small and large bowel without definite transition point possibly related to underlying ileus. Postsurgical change of prior lower anterior resection.    5. Multiple stable appearing thoracolumbar compression deformities.

## 2017-09-03 LAB
ALBUMIN SERPL BCP-MCNC: 2.6 G/DL
ALP SERPL-CCNC: 74 U/L
ALT SERPL W/O P-5'-P-CCNC: 27 U/L
ANION GAP SERPL CALC-SCNC: 11 MMOL/L
ANISOCYTOSIS BLD QL SMEAR: SLIGHT
AST SERPL-CCNC: 25 U/L
BACTERIA BLD CULT: NORMAL
BACTERIA BLD CULT: NORMAL
BASOPHILS # BLD AUTO: 0 K/UL
BASOPHILS NFR BLD: 0 %
BILIRUB SERPL-MCNC: 1.2 MG/DL
BUN SERPL-MCNC: 11 MG/DL
CALCIUM SERPL-MCNC: 7.9 MG/DL
CHLORIDE SERPL-SCNC: 106 MMOL/L
CO2 SERPL-SCNC: 25 MMOL/L
CREAT SERPL-MCNC: 0.6 MG/DL
DIFFERENTIAL METHOD: ABNORMAL
EOSINOPHIL # BLD AUTO: 0 K/UL
EOSINOPHIL NFR BLD: 1 %
ERYTHROCYTE [DISTWIDTH] IN BLOOD BY AUTOMATED COUNT: 17.3 %
EST. GFR  (AFRICAN AMERICAN): >60 ML/MIN/1.73 M^2
EST. GFR  (NON AFRICAN AMERICAN): >60 ML/MIN/1.73 M^2
GLUCOSE SERPL-MCNC: 102 MG/DL
HCT VFR BLD AUTO: 25.5 %
HGB BLD-MCNC: 8.7 G/DL
HYPOCHROMIA BLD QL SMEAR: ABNORMAL
LYMPHOCYTES # BLD AUTO: 0.2 K/UL
LYMPHOCYTES NFR BLD: 11.6 %
MAGNESIUM SERPL-MCNC: 1.9 MG/DL
MCH RBC QN AUTO: 30.1 PG
MCHC RBC AUTO-ENTMCNC: 34.1 G/DL
MCV RBC AUTO: 88 FL
MONOCYTES # BLD AUTO: 0.1 K/UL
MONOCYTES NFR BLD: 5.1 %
NEUTROPHILS # BLD AUTO: 1.6 K/UL
NEUTROPHILS NFR BLD: 82.3 %
OVALOCYTES BLD QL SMEAR: ABNORMAL
PHOSPHATE SERPL-MCNC: 1.4 MG/DL
PLATELET # BLD AUTO: 51 K/UL
PLATELET BLD QL SMEAR: ABNORMAL
PMV BLD AUTO: 9.7 FL
POIKILOCYTOSIS BLD QL SMEAR: SLIGHT
POLYCHROMASIA BLD QL SMEAR: ABNORMAL
POTASSIUM SERPL-SCNC: 3 MMOL/L
PROT SERPL-MCNC: 5.1 G/DL
RBC # BLD AUTO: 2.89 M/UL
SODIUM SERPL-SCNC: 142 MMOL/L
WBC # BLD AUTO: 1.98 K/UL

## 2017-09-03 PROCEDURE — 63600175 PHARM REV CODE 636 W HCPCS: Performed by: STUDENT IN AN ORGANIZED HEALTH CARE EDUCATION/TRAINING PROGRAM

## 2017-09-03 PROCEDURE — 25000003 PHARM REV CODE 250: Performed by: STUDENT IN AN ORGANIZED HEALTH CARE EDUCATION/TRAINING PROGRAM

## 2017-09-03 PROCEDURE — 27000221 HC OXYGEN, UP TO 24 HOURS

## 2017-09-03 PROCEDURE — 85025 COMPLETE CBC W/AUTO DIFF WBC: CPT

## 2017-09-03 PROCEDURE — 80053 COMPREHEN METABOLIC PANEL: CPT

## 2017-09-03 PROCEDURE — 99232 SBSQ HOSP IP/OBS MODERATE 35: CPT | Mod: ,,, | Performed by: INTERNAL MEDICINE

## 2017-09-03 PROCEDURE — 83735 ASSAY OF MAGNESIUM: CPT

## 2017-09-03 PROCEDURE — 20600001 HC STEP DOWN PRIVATE ROOM

## 2017-09-03 PROCEDURE — 94640 AIRWAY INHALATION TREATMENT: CPT

## 2017-09-03 PROCEDURE — 36415 COLL VENOUS BLD VENIPUNCTURE: CPT

## 2017-09-03 PROCEDURE — 25000242 PHARM REV CODE 250 ALT 637 W/ HCPCS: Performed by: STUDENT IN AN ORGANIZED HEALTH CARE EDUCATION/TRAINING PROGRAM

## 2017-09-03 PROCEDURE — 25000003 PHARM REV CODE 250: Performed by: INTERNAL MEDICINE

## 2017-09-03 PROCEDURE — 94799 UNLISTED PULMONARY SVC/PX: CPT

## 2017-09-03 PROCEDURE — A4216 STERILE WATER/SALINE, 10 ML: HCPCS | Performed by: STUDENT IN AN ORGANIZED HEALTH CARE EDUCATION/TRAINING PROGRAM

## 2017-09-03 PROCEDURE — 99900035 HC TECH TIME PER 15 MIN (STAT)

## 2017-09-03 PROCEDURE — S0028 INJECTION, FAMOTIDINE, 20 MG: HCPCS | Performed by: INTERNAL MEDICINE

## 2017-09-03 PROCEDURE — 84100 ASSAY OF PHOSPHORUS: CPT

## 2017-09-03 PROCEDURE — 25000003 PHARM REV CODE 250: Performed by: SURGERY

## 2017-09-03 PROCEDURE — S0028 INJECTION, FAMOTIDINE, 20 MG: HCPCS | Performed by: STUDENT IN AN ORGANIZED HEALTH CARE EDUCATION/TRAINING PROGRAM

## 2017-09-03 RX ORDER — FAMOTIDINE 10 MG/ML
20 INJECTION INTRAVENOUS ONCE
Status: COMPLETED | OUTPATIENT
Start: 2017-09-03 | End: 2017-09-03

## 2017-09-03 RX ORDER — SODIUM,POTASSIUM PHOSPHATES 280-250MG
2 POWDER IN PACKET (EA) ORAL EVERY 4 HOURS
Status: COMPLETED | OUTPATIENT
Start: 2017-09-03 | End: 2017-09-03

## 2017-09-03 RX ORDER — POTASSIUM CHLORIDE 20 MEQ/1
60 TABLET, EXTENDED RELEASE ORAL
Status: COMPLETED | OUTPATIENT
Start: 2017-09-03 | End: 2017-09-03

## 2017-09-03 RX ADMIN — LOPERAMIDE HYDROCHLORIDE 2 MG: 1 SOLUTION ORAL at 07:09

## 2017-09-03 RX ADMIN — NYSTATIN 500000 UNITS: 500000 SUSPENSION ORAL at 06:09

## 2017-09-03 RX ADMIN — LEVETIRACETAM 500 MG: 5 INJECTION INTRAVENOUS at 08:09

## 2017-09-03 RX ADMIN — IPRATROPIUM BROMIDE AND ALBUTEROL SULFATE 3 ML: .5; 3 SOLUTION RESPIRATORY (INHALATION) at 11:09

## 2017-09-03 RX ADMIN — Medication 3 ML: at 09:09

## 2017-09-03 RX ADMIN — Medication 3 ML: at 06:09

## 2017-09-03 RX ADMIN — SIMETHICONE CHEW TAB 80 MG 80 MG: 80 TABLET ORAL at 07:09

## 2017-09-03 RX ADMIN — NYSTATIN 500000 UNITS: 500000 SUSPENSION ORAL at 12:09

## 2017-09-03 RX ADMIN — POTASSIUM & SODIUM PHOSPHATES POWDER PACK 280-160-250 MG 2 PACKET: 280-160-250 PACK at 11:09

## 2017-09-03 RX ADMIN — Medication 3 ML: at 02:09

## 2017-09-03 RX ADMIN — LOPERAMIDE HYDROCHLORIDE 2 MG: 1 SOLUTION ORAL at 01:09

## 2017-09-03 RX ADMIN — IPRATROPIUM BROMIDE AND ALBUTEROL SULFATE 3 ML: .5; 3 SOLUTION RESPIRATORY (INHALATION) at 05:09

## 2017-09-03 RX ADMIN — NYSTATIN 500000 UNITS: 500000 SUSPENSION ORAL at 09:09

## 2017-09-03 RX ADMIN — NYSTATIN 500000 UNITS: 500000 SUSPENSION ORAL at 05:09

## 2017-09-03 RX ADMIN — POTASSIUM & SODIUM PHOSPHATES POWDER PACK 280-160-250 MG 2 PACKET: 280-160-250 PACK at 05:09

## 2017-09-03 RX ADMIN — LOPERAMIDE HYDROCHLORIDE 2 MG: 1 SOLUTION ORAL at 08:09

## 2017-09-03 RX ADMIN — SIMETHICONE CHEW TAB 80 MG 80 MG: 80 TABLET ORAL at 08:09

## 2017-09-03 RX ADMIN — POTASSIUM & SODIUM PHOSPHATES POWDER PACK 280-160-250 MG 2 PACKET: 280-160-250 PACK at 04:09

## 2017-09-03 RX ADMIN — IPRATROPIUM BROMIDE AND ALBUTEROL SULFATE 3 ML: .5; 3 SOLUTION RESPIRATORY (INHALATION) at 09:09

## 2017-09-03 RX ADMIN — LEVETIRACETAM 500 MG: 5 INJECTION INTRAVENOUS at 09:09

## 2017-09-03 RX ADMIN — METOPROLOL TARTRATE 50 MG: 50 TABLET, FILM COATED ORAL at 08:09

## 2017-09-03 RX ADMIN — FAMOTIDINE 20 MG: 10 INJECTION, SOLUTION INTRAVENOUS at 12:09

## 2017-09-03 RX ADMIN — POTASSIUM CHLORIDE 60 MEQ: 1500 TABLET, EXTENDED RELEASE ORAL at 11:09

## 2017-09-03 RX ADMIN — FAMOTIDINE 20 MG: 10 INJECTION, SOLUTION INTRAVENOUS at 08:09

## 2017-09-03 RX ADMIN — POTASSIUM & SODIUM PHOSPHATES POWDER PACK 280-160-250 MG 2 PACKET: 280-160-250 PACK at 08:09

## 2017-09-03 RX ADMIN — CALCIUM CARBONATE (ANTACID) CHEW TAB 500 MG 500 MG: 500 CHEW TAB at 02:09

## 2017-09-03 RX ADMIN — SIMETHICONE CHEW TAB 80 MG 80 MG: 80 TABLET ORAL at 04:09

## 2017-09-03 RX ADMIN — METOPROLOL TARTRATE 50 MG: 50 TABLET, FILM COATED ORAL at 09:09

## 2017-09-03 RX ADMIN — POTASSIUM CHLORIDE 60 MEQ: 1500 TABLET, EXTENDED RELEASE ORAL at 08:09

## 2017-09-03 RX ADMIN — TAMSULOSIN HYDROCHLORIDE 0.4 MG: 0.4 CAPSULE ORAL at 08:09

## 2017-09-03 NOTE — PROGRESS NOTES
[facility]  Hematology/Oncology  Progress Note    Patient Name: Claude Penn III  Admission Date: 8/28/2017  Hospital Length of Stay: 6 days  Code Status: Full Code     Subjective:     Interval History:   Patient seen and examined at bedside this AM w/ family member present in the room. Reports that he has been up and about, ambulating the halls w/ walker. Continued to have diarrhea; imodium to address that.  Overall appears to be marginally doing better and will continue to encourage him to ambulate throughout the day.   No nee complaints/concerns at this time; pain is well controlled w/ current regimen.     Oncology Treatment Plan:   [No treatment plan]    Medications:  Continuous Infusions:     Scheduled Meds:   albuterol-ipratropium 2.5mg-0.5mg/3mL  3 mL Nebulization Q4H WAKE    famotidine (PF)  20 mg Intravenous Daily    levetiracetam IVPB  500 mg Intravenous Q12H    metoprolol tartrate  50 mg Oral BID    nystatin  500,000 Units Oral QID (WM & HS)    potassium, sodium phosphates  2 packet Oral Q4H    simethicone  1 tablet Oral TID PC    sodium chloride 0.9%  3 mL Intravenous Q8H    tamsulosin  0.4 mg Oral Daily     PRN Meds:sodium chloride, sodium chloride, sodium chloride, calcium carbonate, loperamide, morphine, ondansetron     Review of Systems   Constitutional: Positive for fatigue. Negative for fever.   HENT: Negative for sore throat and trouble swallowing.    Respiratory: Positive for shortness of breath. Negative for cough.    Cardiovascular: Negative for chest pain and palpitations.   Gastrointestinal: Positive for abdominal distention and diarrhea. Negative for abdominal pain, constipation and nausea.   Genitourinary: Negative for difficulty urinating and hematuria.   Musculoskeletal: Positive for back pain.   Neurological: Negative for dizziness and headaches.     Objective:     Vital Signs (Most Recent):  Temp: 97.6 °F (36.4 °C) (09/03/17 0753)  Pulse: 88 (09/03/17  1151)  Resp: 20 (09/03/17 1110)  BP: 110/80 (09/03/17 0753)  SpO2: 98 % (09/03/17 1110) Vital Signs (24h Range):  Temp:  [97.6 °F (36.4 °C)-99 °F (37.2 °C)] 97.6 °F (36.4 °C)  Pulse:  [] 88  Resp:  [18-20] 20  SpO2:  [92 %-100 %] 98 %  BP: (110-144)/(77-92) 110/80     Weight: 127.8 kg (281 lb 12 oz)  Body mass index is 35.22 kg/m².  Body surface area is 2.6 meters squared.      Intake/Output Summary (Last 24 hours) at 09/03/17 1159  Last data filed at 09/03/17 0600   Gross per 24 hour   Intake              540 ml   Output              930 ml   Net             -390 ml       Physical Exam   Constitutional: He is oriented to person, place, and time. He appears well-developed and well-nourished. No distress.   HENT:   Head: Normocephalic and atraumatic.   Right Ear: External ear normal.   Left Ear: External ear normal.   Eyes: EOM are normal. Pupils are equal, round, and reactive to light. Right eye exhibits no discharge. Left eye exhibits no discharge.   Neck: Normal range of motion. Neck supple. No tracheal deviation present.   Cardiovascular: Normal rate and regular rhythm.    Pulmonary/Chest: Effort normal and breath sounds normal. No respiratory distress. He has no wheezes. He exhibits no tenderness.   Abdominal: Soft. Bowel sounds are normal. He exhibits distension. There is tenderness (LUQ, mild discomfort ). There is no guarding.   Musculoskeletal: Normal range of motion. He exhibits edema (1+ pitting, b/l LE and UE). He exhibits no deformity.   Neurological: He is alert and oriented to person, place, and time.   Skin: Skin is warm and dry.       Significant Labs:   CBC:     Recent Labs  Lab 09/02/17  0408 09/02/17  1537 09/03/17  0411   WBC 1.71* 2.04* 1.98*   HGB 8.3* 9.0* 8.7*   HCT 24.4* 26.6* 25.5*   PLT 51* 55* 51*    and CMP:     Recent Labs  Lab 09/02/17  0408 09/03/17  0411    142   K 3.6 3.0*    106   CO2 26 25   GLU 97 102   BUN 14 11   CREATININE 0.6 0.6   CALCIUM 8.0* 7.9*   PROT  5.2* 5.1*   ALBUMIN 2.5* 2.6*   BILITOT 1.2* 1.2*   ALKPHOS 70 74   AST 24 25   ALT 26 27   ANIONGAP 7* 11   EGFRNONAA >60.0 >60.0       Diagnostic Results:    CTA A/P 8/29/17:  1. Significant volume of hemoperitoneum throughout the abdomen and pelvis. There is somewhat more focal perisplenic collection about the enlarged spleen with associated fluid-fluid level concerning for possible splenic source of hemoperitoneum. There is a questionable hyperattenuating focus within the splenic hilum, although this does not appear to communicate with the splenic parenchyma or splenic vasculature, however small focus of active extravasation would be difficult to exclude on this limited single phase examination.     2. Multiple bilateral pulmonary masses in this patient with known pulmonary metastatic disease, better evaluated on prior dedicated chest CT of 8/22/2017.    3. Flattened configuration of the IVC concerning for hypovolemia.    4. Mild diffuse gaseous distention of the small and large bowel without definite transition point possibly related to underlying ileus. Postsurgical change of prior lower anterior resection.    5. Multiple stable appearing thoracolumbar compression deformities.     Assessment/Plan:     * Ruptured spleen    - spontaneous bleed likely 2/2 anticoagulation  - no definitive contrast extravasation on CTA   - no surgical intervention per surgery  - s/p IVC filter placement 8/31/17  - coags repeated today are wnl  - continue to monitor serial CBCs and transfuse as indicated  - Hg holding steady at 8.7; will continue to monitor CBC and assess for additional transfusion requirements           Spleen hematoma    -see above        Abdominal bloating    - Started on simethicone TID  - C.diff negative  - Requiring imodium for continuous diarrhea  - will monitor as pt is more ambulatory and will likely improve w/ the transition to general diet.              Pulmonary embolism without acute cor pulmonale    -  anticoagulation stopped 2/2 intraperitoneal bleed  - IVC filter placed 8/31/17  - continue to monitor and will need to address need for long term anticoagulation as out-patient        Thrombocytopenia    - Plts stable at 51 ( 55 on 09/02)  - Will transfuse if fall below 20; no actively bleeding at this time         Adenocarcinoma of rectum    - followed by Dr. Collins in [place]  - recently noted to have progression of disease  - will need to follow up with primary oncologist after discharge to discuss treatment options            DISPOSITION: Will require outpatient oncology follow-up pending clinical improvement during this admission. Electrolyte replacement as needed.     Cammie Moser MD  Hematology/Oncology  [facility]

## 2017-09-03 NOTE — ASSESSMENT & PLAN NOTE
- spontaneous bleed likely 2/2 anticoagulation  - no definitive contrast extravasation on CTA   - no surgical intervention per surgery  - s/p IVC filter placement 8/31/17  - coags repeated today are wnl  - continue to monitor serial CBCs and transfuse as indicated  - Hg holding steady at 8.7; will continue to monitor CBC and assess for additional transfusion requirements

## 2017-09-03 NOTE — PLAN OF CARE
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 Transfused 2 units FFP and 2 units PRBC and transferred to [facility].    8/28: Arrived to SICU 6084. Sinus tach 140s with ST depression on EKG, 2D echo performed at bedside, 6L LR given, 4 units PRBC, 2 platelets, and 3 units FFP administered. Urine and blood cultures sent.     Nursing:  + CBC q4h    Outcome: Ongoing (interventions implemented as appropriate)  Plan of care reviewed with pt. Pt AAOx's 4. Vital signs stable. Pt on a regular diet and tolerating well. Pt ambulates with a walker and remains free from falls. Pt has had a few episodes of loose stool. No complaints of pain.  Right groin dressig intact.  Currently on room air. Bed in low and locked position, with call light in reach. No acute events at this time. TM

## 2017-09-03 NOTE — SUBJECTIVE & OBJECTIVE
Interval History:   Patient seen and examined at bedside this AM w/ family member present in the room. Reports that he has been up and about, ambulating the halls w/ walker. Continued to have diarrhea; imodium to address that.  Overall appears to be marginally doing better and will continue to encourage him to ambulate throughout the day.   No nee complaints/concerns at this time; pain is well controlled w/ current regimen.     Oncology Treatment Plan:   [No treatment plan]    Medications:  Continuous Infusions:     Scheduled Meds:   albuterol-ipratropium 2.5mg-0.5mg/3mL  3 mL Nebulization Q4H WAKE    famotidine (PF)  20 mg Intravenous Daily    levetiracetam IVPB  500 mg Intravenous Q12H    metoprolol tartrate  50 mg Oral BID    nystatin  500,000 Units Oral QID (WM & HS)    potassium, sodium phosphates  2 packet Oral Q4H    simethicone  1 tablet Oral TID PC    sodium chloride 0.9%  3 mL Intravenous Q8H    tamsulosin  0.4 mg Oral Daily     PRN Meds:sodium chloride, sodium chloride, sodium chloride, calcium carbonate, loperamide, morphine, ondansetron     Review of Systems   Constitutional: Positive for fatigue. Negative for fever.   HENT: Negative for sore throat and trouble swallowing.    Respiratory: Positive for shortness of breath. Negative for cough.    Cardiovascular: Negative for chest pain and palpitations.   Gastrointestinal: Positive for abdominal distention and diarrhea. Negative for abdominal pain, constipation and nausea.   Genitourinary: Negative for difficulty urinating and hematuria.   Musculoskeletal: Positive for back pain.   Neurological: Negative for dizziness and headaches.     Objective:     Vital Signs (Most Recent):  Temp: 97.6 °F (36.4 °C) (09/03/17 0753)  Pulse: 88 (09/03/17 1151)  Resp: 20 (09/03/17 1110)  BP: 110/80 (09/03/17 0753)  SpO2: 98 % (09/03/17 1110) Vital Signs (24h Range):  Temp:  [97.6 °F (36.4 °C)-99 °F (37.2 °C)] 97.6 °F (36.4 °C)  Pulse:  [] 88  Resp:  [18-20]  20  SpO2:  [92 %-100 %] 98 %  BP: (110-144)/(77-92) 110/80     Weight: 127.8 kg (281 lb 12 oz)  Body mass index is 35.22 kg/m².  Body surface area is 2.6 meters squared.      Intake/Output Summary (Last 24 hours) at 09/03/17 1159  Last data filed at 09/03/17 0600   Gross per 24 hour   Intake              540 ml   Output              930 ml   Net             -390 ml       Physical Exam   Constitutional: He is oriented to person, place, and time. He appears well-developed and well-nourished. No distress.   HENT:   Head: Normocephalic and atraumatic.   Right Ear: External ear normal.   Left Ear: External ear normal.   Eyes: EOM are normal. Pupils are equal, round, and reactive to light. Right eye exhibits no discharge. Left eye exhibits no discharge.   Neck: Normal range of motion. Neck supple. No tracheal deviation present.   Cardiovascular: Normal rate and regular rhythm.    Pulmonary/Chest: Effort normal and breath sounds normal. No respiratory distress. He has no wheezes. He exhibits no tenderness.   Abdominal: Soft. Bowel sounds are normal. He exhibits distension. There is tenderness (LUQ, mild discomfort ). There is no guarding.   Musculoskeletal: Normal range of motion. He exhibits edema (1+ pitting, b/l LE and UE). He exhibits no deformity.   Neurological: He is alert and oriented to person, place, and time.   Skin: Skin is warm and dry.       Significant Labs:   CBC:     Recent Labs  Lab 09/02/17  0408 09/02/17  1537 09/03/17  0411   WBC 1.71* 2.04* 1.98*   HGB 8.3* 9.0* 8.7*   HCT 24.4* 26.6* 25.5*   PLT 51* 55* 51*    and CMP:     Recent Labs  Lab 09/02/17  0408 09/03/17  0411    142   K 3.6 3.0*    106   CO2 26 25   GLU 97 102   BUN 14 11   CREATININE 0.6 0.6   CALCIUM 8.0* 7.9*   PROT 5.2* 5.1*   ALBUMIN 2.5* 2.6*   BILITOT 1.2* 1.2*   ALKPHOS 70 74   AST 24 25   ALT 26 27   ANIONGAP 7* 11   EGFRNONAA >60.0 >60.0       Diagnostic Results:    CTA A/P 8/29/17:  1. Significant volume of  hemoperitoneum throughout the abdomen and pelvis. There is somewhat more focal perisplenic collection about the enlarged spleen with associated fluid-fluid level concerning for possible splenic source of hemoperitoneum. There is a questionable hyperattenuating focus within the splenic hilum, although this does not appear to communicate with the splenic parenchyma or splenic vasculature, however small focus of active extravasation would be difficult to exclude on this limited single phase examination.     2. Multiple bilateral pulmonary masses in this patient with known pulmonary metastatic disease, better evaluated on prior dedicated chest CT of 8/22/2017.    3. Flattened configuration of the IVC concerning for hypovolemia.    4. Mild diffuse gaseous distention of the small and large bowel without definite transition point possibly related to underlying ileus. Postsurgical change of prior lower anterior resection.    5. Multiple stable appearing thoracolumbar compression deformities.

## 2017-09-03 NOTE — ASSESSMENT & PLAN NOTE
- Started on simethicone TID  - C.diff negative  - Requiring imodium for continuous diarrhea  - will monitor as pt is more ambulatory and will likely improve w/ the transition to general diet.

## 2017-09-03 NOTE — PLAN OF CARE
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 Transfused 2 units FFP and 2 units PRBC and transferred to [facility] [place].    8/28: Arrived to SICU 6084. Sinus tach 140s with ST depression on EKG, 2D echo performed at bedside, 6L LR given, 4 units PRBC, 2 platelets, and 3 units FFP administered. Urine and blood cultures sent.     Nursing:  + CBC q4h    Outcome: Ongoing (interventions implemented as appropriate)  POC reviewed and understood by patient. Patient's AAOx4. Patient's heartburn managed by PRN Calcium Carbonate and Pepcid. MD Calabrese ordered Imodium for patient's frequent watery stools. Swift catheter secured and remained intact throughout the shift. RT chest port accessed, flushed and blood return checked. Patient's on tele been running NSR-Sinus Tachy. Patient tolerated full liquid diet without complaints of N/V. Patient's VSS. Patient's on 2L NC sat 94. Family at bedside. Call light WNR. Bed in lowest position. Will continue to monitor.

## 2017-09-03 NOTE — ASSESSMENT & PLAN NOTE
- Plts stable at 51 ( 55 on 09/02)  - Will transfuse if fall below 20; no actively bleeding at this time

## 2017-09-04 PROBLEM — E83.39 HYPOPHOSPHATEMIA: Status: ACTIVE | Noted: 2017-09-04

## 2017-09-04 LAB
ALBUMIN SERPL BCP-MCNC: 2.6 G/DL
ALP SERPL-CCNC: 71 U/L
ALT SERPL W/O P-5'-P-CCNC: 30 U/L
ANION GAP SERPL CALC-SCNC: 8 MMOL/L
ANISOCYTOSIS BLD QL SMEAR: SLIGHT
AST SERPL-CCNC: 28 U/L
BASOPHILS # BLD AUTO: 0 K/UL
BASOPHILS NFR BLD: 0 %
BILIRUB SERPL-MCNC: 1.7 MG/DL
BUN SERPL-MCNC: 10 MG/DL
CALCIUM SERPL-MCNC: 7.8 MG/DL
CHLORIDE SERPL-SCNC: 107 MMOL/L
CO2 SERPL-SCNC: 26 MMOL/L
CREAT SERPL-MCNC: 0.6 MG/DL
DIFFERENTIAL METHOD: ABNORMAL
EOSINOPHIL # BLD AUTO: 0 K/UL
EOSINOPHIL NFR BLD: 0.8 %
ERYTHROCYTE [DISTWIDTH] IN BLOOD BY AUTOMATED COUNT: 17.8 %
EST. GFR  (AFRICAN AMERICAN): >60 ML/MIN/1.73 M^2
EST. GFR  (NON AFRICAN AMERICAN): >60 ML/MIN/1.73 M^2
GLUCOSE SERPL-MCNC: 96 MG/DL
HCT VFR BLD AUTO: 28.1 %
HGB BLD-MCNC: 9.5 G/DL
HYPOCHROMIA BLD QL SMEAR: ABNORMAL
LYMPHOCYTES # BLD AUTO: 0.3 K/UL
LYMPHOCYTES NFR BLD: 10.4 %
MAGNESIUM SERPL-MCNC: 1.7 MG/DL
MCH RBC QN AUTO: 30.4 PG
MCHC RBC AUTO-ENTMCNC: 33.8 G/DL
MCV RBC AUTO: 90 FL
MONOCYTES # BLD AUTO: 0.2 K/UL
MONOCYTES NFR BLD: 7.6 %
NEUTROPHILS # BLD AUTO: 2 K/UL
NEUTROPHILS NFR BLD: 81.2 %
PHOSPHATE SERPL-MCNC: 1.9 MG/DL
PHOSPHATE SERPL-MCNC: <1 MG/DL
PLATELET # BLD AUTO: 46 K/UL
PLATELET BLD QL SMEAR: ABNORMAL
PMV BLD AUTO: 9.2 FL
POTASSIUM SERPL-SCNC: 3.9 MMOL/L
PROT SERPL-MCNC: 5.3 G/DL
RBC # BLD AUTO: 3.12 M/UL
SODIUM SERPL-SCNC: 141 MMOL/L
TOXIC GRANULES BLD QL SMEAR: PRESENT
WBC # BLD AUTO: 2.3 K/UL

## 2017-09-04 PROCEDURE — S0028 INJECTION, FAMOTIDINE, 20 MG: HCPCS | Performed by: STUDENT IN AN ORGANIZED HEALTH CARE EDUCATION/TRAINING PROGRAM

## 2017-09-04 PROCEDURE — 84100 ASSAY OF PHOSPHORUS: CPT | Mod: 91

## 2017-09-04 PROCEDURE — 63600175 PHARM REV CODE 636 W HCPCS: Performed by: STUDENT IN AN ORGANIZED HEALTH CARE EDUCATION/TRAINING PROGRAM

## 2017-09-04 PROCEDURE — 25000003 PHARM REV CODE 250: Performed by: STUDENT IN AN ORGANIZED HEALTH CARE EDUCATION/TRAINING PROGRAM

## 2017-09-04 PROCEDURE — 84100 ASSAY OF PHOSPHORUS: CPT

## 2017-09-04 PROCEDURE — 80053 COMPREHEN METABOLIC PANEL: CPT

## 2017-09-04 PROCEDURE — 27000221 HC OXYGEN, UP TO 24 HOURS

## 2017-09-04 PROCEDURE — A4216 STERILE WATER/SALINE, 10 ML: HCPCS | Performed by: STUDENT IN AN ORGANIZED HEALTH CARE EDUCATION/TRAINING PROGRAM

## 2017-09-04 PROCEDURE — 36415 COLL VENOUS BLD VENIPUNCTURE: CPT

## 2017-09-04 PROCEDURE — 25000003 PHARM REV CODE 250: Performed by: SURGERY

## 2017-09-04 PROCEDURE — 25000242 PHARM REV CODE 250 ALT 637 W/ HCPCS: Performed by: STUDENT IN AN ORGANIZED HEALTH CARE EDUCATION/TRAINING PROGRAM

## 2017-09-04 PROCEDURE — 99232 SBSQ HOSP IP/OBS MODERATE 35: CPT | Mod: ,,, | Performed by: INTERNAL MEDICINE

## 2017-09-04 PROCEDURE — 20600001 HC STEP DOWN PRIVATE ROOM

## 2017-09-04 PROCEDURE — 94640 AIRWAY INHALATION TREATMENT: CPT

## 2017-09-04 PROCEDURE — 83735 ASSAY OF MAGNESIUM: CPT

## 2017-09-04 PROCEDURE — 85025 COMPLETE CBC W/AUTO DIFF WBC: CPT

## 2017-09-04 RX ORDER — SODIUM,POTASSIUM PHOSPHATES 280-250MG
2 POWDER IN PACKET (EA) ORAL
Status: DISPENSED | OUTPATIENT
Start: 2017-09-04 | End: 2017-09-08

## 2017-09-04 RX ORDER — DIPHENOXYLATE HCL/ATROPINE 2.5-.025/5
5 LIQUID (ML) ORAL EVERY 12 HOURS
Status: DISCONTINUED | OUTPATIENT
Start: 2017-09-04 | End: 2017-09-05

## 2017-09-04 RX ADMIN — IPRATROPIUM BROMIDE AND ALBUTEROL SULFATE 3 ML: .5; 3 SOLUTION RESPIRATORY (INHALATION) at 03:09

## 2017-09-04 RX ADMIN — NYSTATIN 500000 UNITS: 500000 SUSPENSION ORAL at 11:09

## 2017-09-04 RX ADMIN — IPRATROPIUM BROMIDE AND ALBUTEROL SULFATE 3 ML: .5; 3 SOLUTION RESPIRATORY (INHALATION) at 08:09

## 2017-09-04 RX ADMIN — POTASSIUM & SODIUM PHOSPHATES POWDER PACK 280-160-250 MG 2 PACKET: 280-160-250 PACK at 08:09

## 2017-09-04 RX ADMIN — DEXTROSE MONOHYDRATE 45 MMOL: 5 INJECTION, SOLUTION INTRAVENOUS at 06:09

## 2017-09-04 RX ADMIN — POTASSIUM & SODIUM PHOSPHATES POWDER PACK 280-160-250 MG 2 PACKET: 280-160-250 PACK at 05:09

## 2017-09-04 RX ADMIN — METOPROLOL TARTRATE 50 MG: 50 TABLET, FILM COATED ORAL at 08:09

## 2017-09-04 RX ADMIN — SIMETHICONE CHEW TAB 80 MG 80 MG: 80 TABLET ORAL at 08:09

## 2017-09-04 RX ADMIN — LEVETIRACETAM 500 MG: 5 INJECTION INTRAVENOUS at 08:09

## 2017-09-04 RX ADMIN — Medication 3 ML: at 06:09

## 2017-09-04 RX ADMIN — NYSTATIN 500000 UNITS: 500000 SUSPENSION ORAL at 05:09

## 2017-09-04 RX ADMIN — MAGNESIUM SULFATE HEPTAHYDRATE 1 G: 500 INJECTION, SOLUTION INTRAMUSCULAR; INTRAVENOUS at 11:09

## 2017-09-04 RX ADMIN — POTASSIUM & SODIUM PHOSPHATES POWDER PACK 280-160-250 MG 2 PACKET: 280-160-250 PACK at 11:09

## 2017-09-04 RX ADMIN — Medication 3 ML: at 02:09

## 2017-09-04 RX ADMIN — LOPERAMIDE HYDROCHLORIDE 2 MG: 1 SOLUTION ORAL at 01:09

## 2017-09-04 RX ADMIN — SIMETHICONE CHEW TAB 80 MG 80 MG: 80 TABLET ORAL at 01:09

## 2017-09-04 RX ADMIN — ONDANSETRON 4 MG: 2 INJECTION INTRAMUSCULAR; INTRAVENOUS at 05:09

## 2017-09-04 RX ADMIN — Medication 3 ML: at 09:09

## 2017-09-04 RX ADMIN — DIPHENOXYLATE HYDROCHLORIDE AND ATROPINE SULFATE 5 ML: 2.5; .025 SOLUTION ORAL at 05:09

## 2017-09-04 RX ADMIN — NYSTATIN 500000 UNITS: 500000 SUSPENSION ORAL at 06:09

## 2017-09-04 RX ADMIN — IPRATROPIUM BROMIDE AND ALBUTEROL SULFATE 3 ML: .5; 3 SOLUTION RESPIRATORY (INHALATION) at 11:09

## 2017-09-04 RX ADMIN — LOPERAMIDE HYDROCHLORIDE 2 MG: 1 SOLUTION ORAL at 08:09

## 2017-09-04 RX ADMIN — TAMSULOSIN HYDROCHLORIDE 0.4 MG: 0.4 CAPSULE ORAL at 08:09

## 2017-09-04 RX ADMIN — FAMOTIDINE 20 MG: 10 INJECTION, SOLUTION INTRAVENOUS at 08:09

## 2017-09-04 RX ADMIN — CALCIUM CARBONATE (ANTACID) CHEW TAB 500 MG 500 MG: 500 CHEW TAB at 12:09

## 2017-09-04 NOTE — ASSESSMENT & PLAN NOTE
- spontaneous bleed likely 2/2 anticoagulation  - no definitive contrast extravasation on CTA   - no surgical intervention per surgery  - s/p IVC filter placement 8/31/17  - continue to monitor serial CBCs and transfuse as indicated  - Hg improving to 9.5; will continue to monitor CBC and assess for additional transfusion requirements

## 2017-09-04 NOTE — ASSESSMENT & PLAN NOTE
- Plts stable at 46 ( 51 on 09/03)  - Will transfuse if fall below 20; not actively bleeding at this time as H/H improving

## 2017-09-04 NOTE — PLAN OF CARE
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 Transfused 2 units FFP and 2 units PRBC and transferred to [facility] [place].    8/28: Arrived to SICU 6084. Sinus tach 140s with ST depression on EKG, 2D echo performed at bedside, 6L LR given, 4 units PRBC, 2 platelets, and 3 units FFP administered. Urine and blood cultures sent.     Nursing:  + CBC q4h    Outcome: Ongoing (interventions implemented as appropriate)  POC reviewed and understood by patient. Patient's AAOx4. Patient's heartburn managed by PRN Calcium Carbonate and Simethicone. Swift catheter secured and remained intact throughout the shift. RT chest port accessed, flushed and blood return checked. Patient's on tele been running NSR. Patient tolerated regular diet without complaints of N/V. Patient had small BM during the night.  Patient's VSS. Patient's on 2L NC sat 98. Family at bedside. Call light WNR. Bed in lowest position. Will continue to monitor.

## 2017-09-04 NOTE — SUBJECTIVE & OBJECTIVE
"Interval History:   Patient seen and examined at bedside this AM w/ family member present in the room. Reports that he has been up and about, ambulating the halls w/ walker, especially when he starts to feel bloated/distended. States that he has been tolerating general diet well, but reports decreased PO intake due to the food " not tasting right". Encouraged to choose menu items that he is more likely to enjoy.  Diarrhea is improving and the stools are starting to be more formed. Continues to endorse b/l edema and states that his current weight of 280 lbs is a change from his usual 240 lbs.   No other complaints at this time.    Oncology Treatment Plan:   [No treatment plan]    Medications:  Continuous Infusions:     Scheduled Meds:   albuterol-ipratropium 2.5mg-0.5mg/3mL  3 mL Nebulization Q4H WAKE    famotidine (PF)  20 mg Intravenous Daily    levetiracetam IVPB  500 mg Intravenous Q12H    metoprolol tartrate  50 mg Oral BID    nystatin  500,000 Units Oral QID (WM & HS)    potassium phosphate IVPB  45 mmol Intravenous Once    potassium, sodium phosphates  2 packet Oral QID (WM & HS)    simethicone  1 tablet Oral TID PC    sodium chloride 0.9%  3 mL Intravenous Q8H    tamsulosin  0.4 mg Oral Daily     PRN Meds:sodium chloride, sodium chloride, sodium chloride, calcium carbonate, loperamide, morphine, ondansetron     Review of Systems   Constitutional: Positive for fatigue. Negative for fever.   HENT: Negative for sore throat and trouble swallowing.    Respiratory: Positive for shortness of breath. Negative for cough.    Cardiovascular: Negative for chest pain and palpitations.   Gastrointestinal: Positive for abdominal distention (improved) and diarrhea (not as loose). Negative for abdominal pain, constipation and nausea.   Genitourinary: Negative for difficulty urinating and hematuria.   Musculoskeletal: Negative for back pain.   Neurological: Negative for dizziness and headaches.     Objective: "     Vital Signs (Most Recent):  Temp: 97.4 °F (36.3 °C) (09/04/17 0725)  Pulse: 92 (09/04/17 0725)  Resp: 16 (09/04/17 0725)  BP: 135/87 (09/04/17 0725)  SpO2: 95 % (09/04/17 0725) Vital Signs (24h Range):  Temp:  [97.4 °F (36.3 °C)-99 °F (37.2 °C)] 97.4 °F (36.3 °C)  Pulse:  [] 92  Resp:  [16-20] 16  SpO2:  [94 %-99 %] 95 %  BP: (120-135)/(79-87) 135/87     Weight: 127.8 kg (281 lb 12 oz)  Body mass index is 35.22 kg/m².  Body surface area is 2.6 meters squared.      Intake/Output Summary (Last 24 hours) at 09/04/17 0831  Last data filed at 09/04/17 0500   Gross per 24 hour   Intake              720 ml   Output              850 ml   Net             -130 ml       Physical Exam   Constitutional: He is oriented to person, place, and time. He appears well-developed and well-nourished. No distress.   HENT:   Head: Normocephalic and atraumatic.   Right Ear: External ear normal.   Left Ear: External ear normal.   Eyes: EOM are normal. Pupils are equal, round, and reactive to light. Right eye exhibits no discharge. Left eye exhibits no discharge.   Neck: Normal range of motion. Neck supple. No tracheal deviation present.   Cardiovascular: Normal rate and regular rhythm.    Pulmonary/Chest: Effort normal and breath sounds normal. No respiratory distress. He has no wheezes. He exhibits no tenderness.   Abdominal: Soft. Bowel sounds are normal. He exhibits distension (improving ). There is tenderness (LUQ, mild discomfort, improving ). There is no guarding.   Musculoskeletal: Normal range of motion. He exhibits edema (2+ pitting, b/l LE and UE). He exhibits no deformity.   Neurological: He is alert and oriented to person, place, and time.   Skin: Skin is warm and dry.   Nursing note and vitals reviewed.      Significant Labs:   CBC:     Recent Labs  Lab 09/02/17  1537 09/03/17  0411 09/04/17  0444   WBC 2.04* 1.98* 2.30*   HGB 9.0* 8.7* 9.5*   HCT 26.6* 25.5* 28.1*   PLT 55* 51* 46*    and CMP:     Recent Labs  Lab  09/03/17  0411 09/04/17  0444    141   K 3.0* 3.9    107   CO2 25 26    96   BUN 11 10   CREATININE 0.6 0.6   CALCIUM 7.9* 7.8*   PROT 5.1* 5.3*   ALBUMIN 2.6* 2.6*   BILITOT 1.2* 1.7*   ALKPHOS 74 71   AST 25 28   ALT 27 30   ANIONGAP 11 8   EGFRNONAA >60.0 >60.0       Diagnostic Results:    CTA A/P 8/29/17:  1. Significant volume of hemoperitoneum throughout the abdomen and pelvis. There is somewhat more focal perisplenic collection about the enlarged spleen with associated fluid-fluid level concerning for possible splenic source of hemoperitoneum. There is a questionable hyperattenuating focus within the splenic hilum, although this does not appear to communicate with the splenic parenchyma or splenic vasculature, however small focus of active extravasation would be difficult to exclude on this limited single phase examination.     2. Multiple bilateral pulmonary masses in this patient with known pulmonary metastatic disease, better evaluated on prior dedicated chest CT of 8/22/2017.    3. Flattened configuration of the IVC concerning for hypovolemia.    4. Mild diffuse gaseous distention of the small and large bowel without definite transition point possibly related to underlying ileus. Postsurgical change of prior lower anterior resection.    5. Multiple stable appearing thoracolumbar compression deformities.

## 2017-09-04 NOTE — PROGRESS NOTES
"[facility]  Hematology/Oncology  Progress Note    Patient Name: Claude Penn III  Admission Date: 8/28/2017  Hospital Length of Stay: 7 days  Code Status: Full Code     Subjective:     Interval History:   Patient seen and examined at bedside this AM w/ family member present in the room. Reports that he has been up and about, ambulating the halls w/ walker, especially when he starts to feel bloated/distended. States that he has been tolerating general diet well, but reports decreased PO intake due to the food " not tasting right". Encouraged to choose menu items that he is more likely to enjoy.  Diarrhea is improving and the stools are starting to be more formed. Continues to endorse b/l edema and states that his current weight of 280 lbs is a change from his usual 240 lbs.   No other complaints at this time.    Oncology Treatment Plan:   [No treatment plan]    Medications:  Continuous Infusions:     Scheduled Meds:   albuterol-ipratropium 2.5mg-0.5mg/3mL  3 mL Nebulization Q4H WAKE    famotidine (PF)  20 mg Intravenous Daily    levetiracetam IVPB  500 mg Intravenous Q12H    metoprolol tartrate  50 mg Oral BID    nystatin  500,000 Units Oral QID (WM & HS)    potassium phosphate IVPB  45 mmol Intravenous Once    potassium, sodium phosphates  2 packet Oral QID (WM & HS)    simethicone  1 tablet Oral TID PC    sodium chloride 0.9%  3 mL Intravenous Q8H    tamsulosin  0.4 mg Oral Daily     PRN Meds:sodium chloride, sodium chloride, sodium chloride, calcium carbonate, loperamide, morphine, ondansetron     Review of Systems   Constitutional: Positive for fatigue. Negative for fever.   HENT: Negative for sore throat and trouble swallowing.    Respiratory: Positive for shortness of breath. Negative for cough.    Cardiovascular: Negative for chest pain and palpitations.   Gastrointestinal: Positive for abdominal distention (improved) and diarrhea (not as loose). Negative for abdominal pain, " constipation and nausea.   Genitourinary: Negative for difficulty urinating and hematuria.   Musculoskeletal: Negative for back pain.   Neurological: Negative for dizziness and headaches.     Objective:     Vital Signs (Most Recent):  Temp: 97.4 °F (36.3 °C) (09/04/17 0725)  Pulse: 92 (09/04/17 0725)  Resp: 16 (09/04/17 0725)  BP: 135/87 (09/04/17 0725)  SpO2: 95 % (09/04/17 0725) Vital Signs (24h Range):  Temp:  [97.4 °F (36.3 °C)-99 °F (37.2 °C)] 97.4 °F (36.3 °C)  Pulse:  [] 92  Resp:  [16-20] 16  SpO2:  [94 %-99 %] 95 %  BP: (120-135)/(79-87) 135/87     Weight: 127.8 kg (281 lb 12 oz)  Body mass index is 35.22 kg/m².  Body surface area is 2.6 meters squared.      Intake/Output Summary (Last 24 hours) at 09/04/17 0831  Last data filed at 09/04/17 0500   Gross per 24 hour   Intake              720 ml   Output              850 ml   Net             -130 ml       Physical Exam   Constitutional: He is oriented to person, place, and time. He appears well-developed and well-nourished. No distress.   HENT:   Head: Normocephalic and atraumatic.   Right Ear: External ear normal.   Left Ear: External ear normal.   Eyes: EOM are normal. Pupils are equal, round, and reactive to light. Right eye exhibits no discharge. Left eye exhibits no discharge.   Neck: Normal range of motion. Neck supple. No tracheal deviation present.   Cardiovascular: Normal rate and regular rhythm.    Pulmonary/Chest: Effort normal and breath sounds normal. No respiratory distress. He has no wheezes. He exhibits no tenderness.   Abdominal: Soft. Bowel sounds are normal. He exhibits distension (improving ). There is tenderness (LUQ, mild discomfort, improving ). There is no guarding.   Musculoskeletal: Normal range of motion. He exhibits edema (2+ pitting, b/l LE and UE). He exhibits no deformity.   Neurological: He is alert and oriented to person, place, and time.   Skin: Skin is warm and dry.   Nursing note and vitals reviewed.      Significant  Labs:   CBC:     Recent Labs  Lab 09/02/17  1537 09/03/17  0411 09/04/17  0444   WBC 2.04* 1.98* 2.30*   HGB 9.0* 8.7* 9.5*   HCT 26.6* 25.5* 28.1*   PLT 55* 51* 46*    and CMP:     Recent Labs  Lab 09/03/17  0411 09/04/17  0444    141   K 3.0* 3.9    107   CO2 25 26    96   BUN 11 10   CREATININE 0.6 0.6   CALCIUM 7.9* 7.8*   PROT 5.1* 5.3*   ALBUMIN 2.6* 2.6*   BILITOT 1.2* 1.7*   ALKPHOS 74 71   AST 25 28   ALT 27 30   ANIONGAP 11 8   EGFRNONAA >60.0 >60.0       Diagnostic Results:    CTA A/P 8/29/17:  1. Significant volume of hemoperitoneum throughout the abdomen and pelvis. There is somewhat more focal perisplenic collection about the enlarged spleen with associated fluid-fluid level concerning for possible splenic source of hemoperitoneum. There is a questionable hyperattenuating focus within the splenic hilum, although this does not appear to communicate with the splenic parenchyma or splenic vasculature, however small focus of active extravasation would be difficult to exclude on this limited single phase examination.     2. Multiple bilateral pulmonary masses in this patient with known pulmonary metastatic disease, better evaluated on prior dedicated chest CT of 8/22/2017.    3. Flattened configuration of the IVC concerning for hypovolemia.    4. Mild diffuse gaseous distention of the small and large bowel without definite transition point possibly related to underlying ileus. Postsurgical change of prior lower anterior resection.    5. Multiple stable appearing thoracolumbar compression deformities.     Assessment/Plan:     * Ruptured spleen    - spontaneous bleed likely 2/2 anticoagulation  - no definitive contrast extravasation on CTA   - no surgical intervention per surgery  - s/p IVC filter placement 8/31/17  - continue to monitor serial CBCs and transfuse as indicated  - Hg improving to 9.5; will continue to monitor CBC and assess for additional transfusion requirements            Spleen hematoma    -see above        Hypophosphatemia    - Phos down to <1.0 on AM labs despite oral replacement the day prior  - Will replete w/ 45 mEq of IV potassium phosphate as well as shceudle 2 packets of Neutra-Phos WM+QHS for oral repletion.  - Will repeat labs this afternoon to monitor phos levels         Abdominal bloating    - Started on simethicone TID  - C.diff negative  - Requiring imodium for continuous diarrhea  - will monitor as pt is more ambulatory and will likely improve w/ the transition to general diet.; noted mild improvement in his symptoms            Pulmonary embolism without acute cor pulmonale    - anticoagulation stopped 2/2 intraperitoneal bleed  - IVC filter placed 8/31/17  - continue to monitor and will need to address need for long term anticoagulation as out-patient        Thrombocytopenia    - Plts stable at 46 ( 51 on 09/03)  - Will transfuse if fall below 20; not actively bleeding at this time as H/H improving         Adenocarcinoma of rectum    - followed by Dr. Collins in [place]  - recently noted to have progression of disease  - will need to follow up with primary oncologist after discharge to discuss treatment options             DISPOSITION: Pt showing mild-moderate improvement. Will continue to medically stabilize the patient, replete his electrolytes, monitor his H/H and work towards normalizing his GI function; pending discharge.     Cammie Moser MD  Hematology/Oncology  [facility]

## 2017-09-04 NOTE — ASSESSMENT & PLAN NOTE
- Phos down to <1.0 on AM labs despite oral replacement the day prior  - Will replete w/ 45 mEq of IV potassium phosphate as well as shceudle 2 packets of Neutra-Phos WM+QHS for oral repletion.  - Will repeat labs this afternoon to monitor phos levels

## 2017-09-04 NOTE — ASSESSMENT & PLAN NOTE
- Started on simethicone TID  - C.diff negative  - Requiring imodium for continuous diarrhea  - will monitor as pt is more ambulatory and will likely improve w/ the transition to general diet.; noted mild improvement in his symptoms

## 2017-09-05 PROBLEM — R19.7 DIARRHEA IN ADULT PATIENT: Status: ACTIVE | Noted: 2017-09-05

## 2017-09-05 PROBLEM — R60.0 EDEMA EXTREMITIES: Status: ACTIVE | Noted: 2017-09-05

## 2017-09-05 LAB
ALBUMIN SERPL BCP-MCNC: 2.5 G/DL
ALP SERPL-CCNC: 63 U/L
ALT SERPL W/O P-5'-P-CCNC: 24 U/L
ANION GAP SERPL CALC-SCNC: 8 MMOL/L
ANISOCYTOSIS BLD QL SMEAR: SLIGHT
AST SERPL-CCNC: 28 U/L
BASOPHILS # BLD AUTO: 0 K/UL
BASOPHILS NFR BLD: 0 %
BILIRUB SERPL-MCNC: 1.6 MG/DL
BUN SERPL-MCNC: 9 MG/DL
CALCIUM SERPL-MCNC: 7.6 MG/DL
CHLORIDE SERPL-SCNC: 105 MMOL/L
CO2 SERPL-SCNC: 27 MMOL/L
CREAT SERPL-MCNC: 0.5 MG/DL
DIFFERENTIAL METHOD: ABNORMAL
EOSINOPHIL # BLD AUTO: 0 K/UL
EOSINOPHIL NFR BLD: 0.5 %
ERYTHROCYTE [DISTWIDTH] IN BLOOD BY AUTOMATED COUNT: 18.2 %
EST. GFR  (AFRICAN AMERICAN): >60 ML/MIN/1.73 M^2
EST. GFR  (NON AFRICAN AMERICAN): >60 ML/MIN/1.73 M^2
GLUCOSE SERPL-MCNC: 112 MG/DL
HCT VFR BLD AUTO: 26.6 %
HGB BLD-MCNC: 9 G/DL
LYMPHOCYTES # BLD AUTO: 0.3 K/UL
LYMPHOCYTES NFR BLD: 16.3 %
MAGNESIUM SERPL-MCNC: 1.8 MG/DL
MCH RBC QN AUTO: 29.9 PG
MCHC RBC AUTO-ENTMCNC: 33.8 G/DL
MCV RBC AUTO: 88 FL
MONOCYTES # BLD AUTO: 0.1 K/UL
MONOCYTES NFR BLD: 4.7 %
NEUTROPHILS # BLD AUTO: 1.5 K/UL
NEUTROPHILS NFR BLD: 78.5 %
PHOSPHATE SERPL-MCNC: 2.6 MG/DL
PLATELET # BLD AUTO: 40 K/UL
PLATELET BLD QL SMEAR: ABNORMAL
PMV BLD AUTO: 9.6 FL
POLYCHROMASIA BLD QL SMEAR: ABNORMAL
POTASSIUM SERPL-SCNC: 3.6 MMOL/L
PROT SERPL-MCNC: 5 G/DL
RBC # BLD AUTO: 3.01 M/UL
SODIUM SERPL-SCNC: 140 MMOL/L
WBC # BLD AUTO: 1.9 K/UL

## 2017-09-05 PROCEDURE — 63600175 PHARM REV CODE 636 W HCPCS: Performed by: STUDENT IN AN ORGANIZED HEALTH CARE EDUCATION/TRAINING PROGRAM

## 2017-09-05 PROCEDURE — 85025 COMPLETE CBC W/AUTO DIFF WBC: CPT

## 2017-09-05 PROCEDURE — 25000003 PHARM REV CODE 250: Performed by: STUDENT IN AN ORGANIZED HEALTH CARE EDUCATION/TRAINING PROGRAM

## 2017-09-05 PROCEDURE — 84100 ASSAY OF PHOSPHORUS: CPT

## 2017-09-05 PROCEDURE — A4216 STERILE WATER/SALINE, 10 ML: HCPCS | Performed by: STUDENT IN AN ORGANIZED HEALTH CARE EDUCATION/TRAINING PROGRAM

## 2017-09-05 PROCEDURE — 94799 UNLISTED PULMONARY SVC/PX: CPT

## 2017-09-05 PROCEDURE — 94640 AIRWAY INHALATION TREATMENT: CPT

## 2017-09-05 PROCEDURE — 25000242 PHARM REV CODE 250 ALT 637 W/ HCPCS: Performed by: STUDENT IN AN ORGANIZED HEALTH CARE EDUCATION/TRAINING PROGRAM

## 2017-09-05 PROCEDURE — 99900035 HC TECH TIME PER 15 MIN (STAT)

## 2017-09-05 PROCEDURE — 25000003 PHARM REV CODE 250: Performed by: SURGERY

## 2017-09-05 PROCEDURE — 83735 ASSAY OF MAGNESIUM: CPT

## 2017-09-05 PROCEDURE — 94664 DEMO&/EVAL PT USE INHALER: CPT

## 2017-09-05 PROCEDURE — S0028 INJECTION, FAMOTIDINE, 20 MG: HCPCS | Performed by: STUDENT IN AN ORGANIZED HEALTH CARE EDUCATION/TRAINING PROGRAM

## 2017-09-05 PROCEDURE — 36415 COLL VENOUS BLD VENIPUNCTURE: CPT

## 2017-09-05 PROCEDURE — 80053 COMPREHEN METABOLIC PANEL: CPT

## 2017-09-05 PROCEDURE — 97116 GAIT TRAINING THERAPY: CPT

## 2017-09-05 PROCEDURE — 20600001 HC STEP DOWN PRIVATE ROOM

## 2017-09-05 PROCEDURE — 94761 N-INVAS EAR/PLS OXIMETRY MLT: CPT

## 2017-09-05 RX ORDER — FUROSEMIDE 10 MG/ML
20 INJECTION INTRAMUSCULAR; INTRAVENOUS ONCE
Status: COMPLETED | OUTPATIENT
Start: 2017-09-05 | End: 2017-09-05

## 2017-09-05 RX ORDER — POTASSIUM CHLORIDE 20 MEQ/15ML
40 SOLUTION ORAL 2 TIMES DAILY
Status: COMPLETED | OUTPATIENT
Start: 2017-09-05 | End: 2017-09-05

## 2017-09-05 RX ORDER — DIPHENOXYLATE HCL/ATROPINE 2.5-.025/5
5 LIQUID (ML) ORAL 4 TIMES DAILY
Status: DISCONTINUED | OUTPATIENT
Start: 2017-09-06 | End: 2017-09-10 | Stop reason: HOSPADM

## 2017-09-05 RX ADMIN — POTASSIUM CHLORIDE 40 MEQ: 20 SOLUTION ORAL at 10:09

## 2017-09-05 RX ADMIN — NYSTATIN 500000 UNITS: 500000 SUSPENSION ORAL at 10:09

## 2017-09-05 RX ADMIN — FUROSEMIDE 20 MG: 10 INJECTION, SOLUTION INTRAVENOUS at 11:09

## 2017-09-05 RX ADMIN — DIPHENOXYLATE HYDROCHLORIDE AND ATROPINE SULFATE 5 ML: 2.5; .025 SOLUTION ORAL at 08:09

## 2017-09-05 RX ADMIN — POTASSIUM & SODIUM PHOSPHATES POWDER PACK 280-160-250 MG 2 PACKET: 280-160-250 PACK at 06:09

## 2017-09-05 RX ADMIN — IPRATROPIUM BROMIDE AND ALBUTEROL SULFATE 3 ML: .5; 3 SOLUTION RESPIRATORY (INHALATION) at 04:09

## 2017-09-05 RX ADMIN — POTASSIUM & SODIUM PHOSPHATES POWDER PACK 280-160-250 MG 2 PACKET: 280-160-250 PACK at 02:09

## 2017-09-05 RX ADMIN — SIMETHICONE CHEW TAB 80 MG 80 MG: 80 TABLET ORAL at 02:09

## 2017-09-05 RX ADMIN — FAMOTIDINE 20 MG: 10 INJECTION, SOLUTION INTRAVENOUS at 08:09

## 2017-09-05 RX ADMIN — METOPROLOL TARTRATE 50 MG: 50 TABLET, FILM COATED ORAL at 10:09

## 2017-09-05 RX ADMIN — POTASSIUM & SODIUM PHOSPHATES POWDER PACK 280-160-250 MG 2 PACKET: 280-160-250 PACK at 08:09

## 2017-09-05 RX ADMIN — Medication 3 ML: at 02:09

## 2017-09-05 RX ADMIN — METOPROLOL TARTRATE 50 MG: 50 TABLET, FILM COATED ORAL at 08:09

## 2017-09-05 RX ADMIN — CALCIUM CARBONATE (ANTACID) CHEW TAB 500 MG 500 MG: 500 CHEW TAB at 05:09

## 2017-09-05 RX ADMIN — LEVETIRACETAM 500 MG: 5 INJECTION INTRAVENOUS at 08:09

## 2017-09-05 RX ADMIN — MORPHINE SULFATE 2 MG: 2 INJECTION, SOLUTION INTRAMUSCULAR; INTRAVENOUS at 05:09

## 2017-09-05 RX ADMIN — SIMETHICONE CHEW TAB 80 MG 80 MG: 80 TABLET ORAL at 10:09

## 2017-09-05 RX ADMIN — SIMETHICONE CHEW TAB 80 MG 80 MG: 80 TABLET ORAL at 11:09

## 2017-09-05 RX ADMIN — LEVETIRACETAM 500 MG: 5 INJECTION INTRAVENOUS at 10:09

## 2017-09-05 RX ADMIN — TAMSULOSIN HYDROCHLORIDE 0.4 MG: 0.4 CAPSULE ORAL at 08:09

## 2017-09-05 RX ADMIN — DIPHENOXYLATE HYDROCHLORIDE AND ATROPINE SULFATE 5 ML: 2.5; .025 SOLUTION ORAL at 11:09

## 2017-09-05 RX ADMIN — MORPHINE SULFATE 2 MG: 2 INJECTION, SOLUTION INTRAMUSCULAR; INTRAVENOUS at 12:09

## 2017-09-05 RX ADMIN — LOPERAMIDE HYDROCHLORIDE 2 MG: 1 SOLUTION ORAL at 11:09

## 2017-09-05 RX ADMIN — IPRATROPIUM BROMIDE AND ALBUTEROL SULFATE 3 ML: .5; 3 SOLUTION RESPIRATORY (INHALATION) at 09:09

## 2017-09-05 RX ADMIN — Medication 3 ML: at 10:09

## 2017-09-05 RX ADMIN — CALCIUM CARBONATE (ANTACID) CHEW TAB 500 MG 500 MG: 500 CHEW TAB at 12:09

## 2017-09-05 RX ADMIN — NYSTATIN 500000 UNITS: 500000 SUSPENSION ORAL at 08:09

## 2017-09-05 RX ADMIN — NYSTATIN 500000 UNITS: 500000 SUSPENSION ORAL at 06:09

## 2017-09-05 RX ADMIN — POTASSIUM CHLORIDE 40 MEQ: 20 SOLUTION ORAL at 11:09

## 2017-09-05 RX ADMIN — POTASSIUM & SODIUM PHOSPHATES POWDER PACK 280-160-250 MG 2 PACKET: 280-160-250 PACK at 10:09

## 2017-09-05 RX ADMIN — IPRATROPIUM BROMIDE AND ALBUTEROL SULFATE 3 ML: .5; 3 SOLUTION RESPIRATORY (INHALATION) at 08:09

## 2017-09-05 RX ADMIN — Medication 3 ML: at 05:09

## 2017-09-05 RX ADMIN — NYSTATIN 500000 UNITS: 500000 SUSPENSION ORAL at 11:09

## 2017-09-05 RX ADMIN — IPRATROPIUM BROMIDE AND ALBUTEROL SULFATE 3 ML: .5; 3 SOLUTION RESPIRATORY (INHALATION) at 01:09

## 2017-09-05 RX ADMIN — LOPERAMIDE HYDROCHLORIDE 2 MG: 1 SOLUTION ORAL at 08:09

## 2017-09-05 NOTE — PT/OT/SLP PROGRESS
Physical Therapy  Treatment    Claude Penn III   MRN: [id number]   Admitting Diagnosis: Ruptured spleen    PT Received On: 09/05/17  PT Start Time: 1347     PT Stop Time: 1403    PT Total Time (min): 16 min       Billable Minutes:  Gait [id number]    Treatment Type: Treatment  PT/PTA: PT             General Precautions: Standard, fall  Orthopedic Precautions: N/A   Braces:      Do you have any cultural, spiritual, Sikh conflicts, given your current situation?: none    Subjective:  Communicated with nursing prior to session.      Pain/Comfort  Pain Rating 1: 0/10  Pain Rating Post-Intervention 1: 0/10    Objective:   Patient found with: peripheral IV    Functional Mobility:  Bed Mobility:   Rolling/Turning to Left:  (pt. found in bedside chair)    Transfers:  Sit <> Stand Assistance: Contact Guard Assistance  Sit <> Stand Assistive Device: Rolling Walker  Bed <> Chair Technique: Stand Pivot  Bed <> Chair Assistance: Stand By Assistance  Bed <> Chair Assistive Device: Rolling Walker    Gait:   Gait Distance: 600'  Assistance 1: Stand by Assistance  Gait Assistive Device: Rolling walker  Gait Pattern: swing-through gait  Gait Deviation(s): decreased ezra    Stairs:      Balance:   Static Sit: FAIR+: Able to take MINIMAL challenges from all directions  Dynamic Sit: FAIR+: Maintains balance through MINIMAL excursions of active trunk motion  Static Stand: FAIR+: Takes MINIMAL challenges from all directions  Dynamic stand: FAIR+: Needs CLOSE SUPERVISION during gait and is able to right self with minor LOB     Therapeutic Activities and Exercises:  Discussed pt.'s progress     AM-PAC 6 CLICK MOBILITY  How much help from another person does this patient currently need?   1 = Unable, Total/Dependent Assistance  2 = A lot, Maximum/Moderate Assistance  3 = A little, Minimum/Contact Guard/Supervision  4 = None, Modified [place]/Independent    Turning over in bed (including adjusting bedclothes, sheets and blankets)?:  3  Sitting down on and standing up from a chair with arms (e.g., wheelchair, bedside commode, etc.): 3  Moving from lying on back to sitting on the side of the bed?: 3  Moving to and from a bed to a chair (including a wheelchair)?: 3  Need to walk in hospital room?: 3  Climbing 3-5 steps with a railing?: 2  Total Score: 17    AM-PAC Raw Score CMS G-Code Modifier Level of Impairment Assistance   6 % Total / Unable   7 - 9 CM 80 - 100% Maximal Assist   10 - 14 CL 60 - 80% Moderate Assist   15 - 19 CK 40 - 60% Moderate Assist   20 - 22 CJ 20 - 40% Minimal Assist   23 CI 1-20% SBA / CGA   24 CH 0% Independent/ Mod I     Patient left up in chair with all lines intact and call button in reach.    Assessment:  Claude Penn III is a 52 y.o. male with a medical diagnosis of Ruptured spleen and presents with increased gait distance. Pt. cooperative and tolerated treatment well. Pt. progressing with mobility. Pt. would benefit from continued PT to address functional deficits.    Rehab identified problem list/impairments: Rehab identified problem list/impairments: weakness, impaired endurance, impaired functional mobilty, gait instability, impaired balance    Rehab potential is good.    Activity tolerance: Good    Discharge recommendations: Discharge Facility/Level Of Care Needs: home health PT     Barriers to discharge: Barriers to Discharge: None    Equipment recommendations: Equipment Needed After Discharge:  (TBD)     GOALS:    Physical Therapy Goals        Problem: Physical Therapy Goal    Goal Priority Disciplines Outcome Goal Variances Interventions   Physical Therapy Goal     PT/OT, PT Ongoing (interventions implemented as appropriate)     Description:  Goals to be met by: 17    Patient will increase functional independence with mobility by performin. Supine to sit with Set-up [place] - not met  2. Sit to stand transfer with Supervision - not met  3. Gait  x 150 feet with Supervision using AD if  needed.- not met   4. Ascend/descend 4 stair with no Handrails Contact Guard Assistance - not met  5. Lower extremity exercise program x15 reps per handout, with supervision - not met                      PLAN:    Patient to be seen 5 x/week  to address the above listed problems via gait training, therapeutic activities, therapeutic exercises  Plan of Care expires: 09/28/17  Plan of Care reviewed with: patient         Jesse GONZALEZ Jordan, PT  09/05/2017

## 2017-09-05 NOTE — HPI
52yoWM with history of stage IV colon cancer with mets to bilateral lungs and brain, s/p resection in 2013 at [facility], placed on chemo but progressed to having brain mets requiring hemicraniectomy. Also underwent gamma knife radiation. He also has a history of previous PE treated with coumadin in the past. Recently, he was having worsening SOB and GRECO and was transferred to [facility] from [place] for care, at which time he was diagnosed with a recurrent PE and placed on coumadin once again. Of note, at the time of this hospitalization he was noted to have significantly distended large bowel, for which he was worked up with a BE, which was found to be normal. He was discharged home on 8/25 with coumadin and a lovenox bridge. However, at home he was not feeling well and was found to have worsening abdominal pain as well as significant diaphoresis by his wife and subsequently re-presented to the ER [facility]. A CT abdomen/pelvis with contrast was performed showing significant hemoperitoneum with a drop in his H/H. He was transferred here for higher level care.

## 2017-09-05 NOTE — CONSULTS
[facility]  Colorectal Surgery  Consult Note    Patient Name: Claude Penn III  MRN: [id number]  Admission Date: 2017  Hospital Length of Stay: 8 days  Attending Physician: Alfonso Domingo MD  Primary Care Provider: Dallas Agawral MD    Inpatient consult to Colorectal Surgery  Consult performed by: ALINA LARA  Consult ordered by: MOLLY CALLAHAN        Subjective:     Chief Complaint/Reason for Admission: Diarrhea    History of Present Illness:  52yoWM with history of stage IV colon cancer with mets to bilateral lungs and brain, s/p resection in  at [facility], placed on chemo but progressed to having brain mets requiring hemicraniectomy. Also underwent gamma knife radiation. He also has a history of previous PE treated with coumadin in the past. Recently, he was having worsening SOB and GRECO and was transferred to [facility] from [place] for care, at which time he was diagnosed with a recurrent PE and placed on coumadin once again. Of note, at the time of this hospitalization he was noted to have significantly distended large bowel, for which he was worked up with a BE, which was found to be normal. He was discharged home on  with coumadin and a lovenox bridge. However, at home he was not feeling well and was found to have worsening abdominal pain as well as significant diaphoresis by his wife and subsequently re-presented to the ER at [facility]. A CT abdomen/pelvis with contrast was performed showing significant hemoperitoneum with a drop in his H/H. He was transferred here for higher level care.     PTA Medications   Medication Sig    calcium citrate-vitamin D3 315-200 mg (CITRACAL+D) 315-200 mg-unit per tablet Take 2 tablets by mouth 2 (two) times daily.    cetirizine (ZYRTEC) 10 MG tablet Take 10 mg by mouth once daily.    [] dexamethasone (DECADRON) 4 MG Tab Take 1 tablet (4 mg total) by mouth every 6 (six) hours.    diphenoxylate-atropine 2.5-0.025 mg (LOMOTIL) 2.5-0.025 mg  per tablet Take 1 tablet by mouth 4 (four) times daily as needed for Diarrhea.    enoxaparin (LOVENOX) 120 mg/0.8 mL Syrg Inject 0.7 mLs (110 mg total) into the skin every 12 (twelve) hours.    levetiracetam (KEPPRA) 500 MG Tab Take 1 tablet (500 mg total) by mouth 2 (two) times daily.    nystatin (MYCOSTATIN) 100,000 unit/mL suspension Take 5 mLs (500,000 Units total) by mouth 4 (four) times daily.    pantoprazole (PROTONIX) 40 MG tablet Take 1 tablet (40 mg total) by mouth once daily.    promethazine (PHENERGAN) 25 MG tablet Take 25 mg by mouth every 4 (four) hours as needed.     warfarin (COUMADIN) 6 MG tablet Take 1 tablet (6 mg total) by mouth Daily.       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     Past Surgical History:   Procedure Laterality Date    HERNIA REPAIR      ILEOSTOMY REVISION      rectal tumor      removed     Family History     None        Social History Main Topics    Smoking status: Never Smoker    Smokeless tobacco: Former User    Alcohol use 1.2 oz/week     2 Cans of beer per week    Drug use: No    Sexual activity: No     Review of Systems  Objective:     Vital Signs (Most Recent):  Temp: 96.4 °F (35.8 °C) (09/05/17 1700)  Pulse: 84 (09/05/17 1700)  Resp: 16 (09/05/17 1700)  BP: 123/80 (09/05/17 1700)  SpO2: 95 % (09/05/17 1700) Vital Signs (24h Range):  Temp:  [96.4 °F (35.8 °C)-98.4 °F (36.9 °C)] 96.4 °F (35.8 °C)  Pulse:  [] 84  Resp:  [15-18] 16  SpO2:  [92 %-100 %] 95 %  BP: (123-139)/(80-95) 123/80     Weight: 128.2 kg (282 lb 10.1 oz)  Body mass index is 35.33 kg/m².    Physical Exam   Constitutional: He appears well-developed and well-nourished. No distress.   Eyes: EOM are normal.   Neck: Normal  range of motion.   Cardiovascular: Normal rate and regular rhythm.    Pulmonary/Chest:   SOB while laying in bed, nasal cannula on   Abdominal:   Distended but soft, NTTP, no rebound or guarding.   Neurological: He is alert.   Conflicting stories between him and his family in terms of bowel movements.   Skin: He is not diaphoretic.     Significant Labs:  BMP (Last 3 Results):   Recent Labs  Lab 09/03/17 0411 09/04/17 0444 09/05/17 0445    96 112*    141 140   K 3.0* 3.9 3.6    107 105   CO2 25 26 27   BUN 11 10 9   CREATININE 0.6 0.6 0.5   CALCIUM 7.9* 7.8* 7.6*   MG 1.9 1.7 1.8     CBC (Last 3 Results):   Recent Labs  Lab 09/03/17 0411 09/04/17 0444 09/05/17 0445   WBC 1.98* 2.30* 1.90*   RBC 2.89* 3.12* 3.01*   HGB 8.7* 9.5* 9.0*   HCT 25.5* 28.1* 26.6*   PLT 51* 46* 40*   MCV 88 90 88   MCH 30.1 30.4 29.9   MCHC 34.1 33.8 33.8     Assessment/Plan:     Adenocarcinoma of rectum    S/p LAR with DLI and subsequent reversal at [facility] in 2013. Saw Dr. Chilel in 5/2016 after being found to have pelvic abscess, IR-drained, removed. Now with diarrhea s/p spontaneous splenic bleed after anticoagulation for recurrent PE. Persistent bilat pulm dz, no chemo for the last 3 months.    -C diff is negative. Advised getting further stool studies/cultures. Portion of this is likely due to irritation from massive hemoperitoneum. Poor appetite likely also related to this. Advised to increase lomotil and imodium if need to better control diarrhea. Encouraged PO regular diet. Replete electrolytes as needed.   -He has apparently had several flex sigs since surgery. I dont see any procedure reports for this. Unsure if he's had colonoscopy, in part because it wouldn't really change his management given persistent pulmonary disease. GG enema performed on 8/24 which showed no obstruction.  -We will continue to follow along. Dr. Chilel to see tomorrow.            Thank you for your consult. I will follow-up with  patient. Please contact us if you have any additional questions.    Dallin Rizzo MD  Colorectal Surgery  [facility]

## 2017-09-05 NOTE — ASSESSMENT & PLAN NOTE
- b/l Le, pitting, tender to palpation likely 2/2 to combination of immobility as well as fluid overload  - 20 mg IVP Lasix ordered; will give additional K replacement to prevent hypokalemia w/ loop diuretic use   - Will monitor for improvement

## 2017-09-05 NOTE — PT/OT/SLP PROGRESS
Occupational Therapy      Claude Penn III  MRN: [id number]    Patient not seen today secondary to  (attempted to see pt in AM, however pt refusing 2/2 increased fatgie. Re-attmempted in the PM, however pt in the bathroom and requesting therapy to return ). Will follow-up according to POC.    Barby Vann OT  9/5/2017

## 2017-09-05 NOTE — PLAN OF CARE
Problem: Physical Therapy Goal  Goal: Physical Therapy Goal  Goals to be met by: 17    Patient will increase functional independence with mobility by performin. Supine to sit with Set-up Archer - not met  2. Sit to stand transfer with Supervision - not met  3. Gait  x 150 feet with Supervision using AD if needed.- not met   4. Ascend/descend 4 stair with no Handrails Contact Guard Assistance - not met  5. Lower extremity exercise program x15 reps per handout, with supervision - not met     Outcome: Ongoing (interventions implemented as appropriate)  Pt. Progressing towards goals

## 2017-09-05 NOTE — HOSPITAL COURSE
Since admission, he was stabilized by holding anticoagulation and giving blood products. Transferred to floor from ICU. IVCF placed on 8/31. He continued to improve but has since developed loose watery BM's. C diff negative. Put on low dose imodium and lomotil. We are consulted for this. This evening he states he feels like his bowel movements are less often. Still complains of some abdominal bloating. Poor appetite due to nothing tasting good to him.

## 2017-09-05 NOTE — PLAN OF CARE
Problem: Patient Care Overview  Goal: Plan of Care Review  Hx: Stage 4 CRC w/ bilateral lung mets s/p resection 2013, multiple right brain mets s/p gamma knife radiation and right hemicraniectomy 2013, recurrent PE (started in 2013), coumadin therapy for PE (held since 7/31),     Dx: splenic bleed, PE    7/25: discharged home from [facility] on coumadin to lovenox bridge for PE    8/28: Presented to [facility] ED with worsening abdominal pain, CT abdomen/pelvis w/contrast showed significant hemoperitoneum with associated drop in H/H. Transfused 2 units FFP and 2 units PRBC and transferred to [facility].    8/28: Arrived to SICU 6084. Sinus tach 140s with ST depression on EKG, 2D echo performed at bedside, 6L LR given, 4 units PRBC, 2 platelets, and 3 units FFP administered. Urine and blood cultures sent.     Nursing:  + CBC q4h    Outcome: Ongoing (interventions implemented as appropriate)  POC reviewed with patient who verbalized understanding. AAOX4. Pt O2 sats stable on RA. Port intact and patent. Swift intact and patent with adequate output. Pt tolerating diet well with no nausea.  Pt worked well with therapy today and walked down jefferson X2. Pt on tele running NSR/tach. Pt denies pain. Pt remained free from falls throughout the shift. VSS. Will continue to monitor.

## 2017-09-05 NOTE — SUBJECTIVE & OBJECTIVE
Interval History:   Patient seen and examined at bedside this AM w/ family member present in the room. Ambulating the halls w/ walker, especially when he starts to feel bloated/distended. Still reports decreased PO intake.  Continues to have diarrhea, but less frequent, slightly more formed.  Continued to complain about LE edema that is now causing him pain.  Did require morphine IV for abdominal pain/bloating that has resolved.   No other complaints overnight.   Oncology Treatment Plan:   [No treatment plan]    Medications:  Continuous Infusions:     Scheduled Meds:   albuterol-ipratropium 2.5mg-0.5mg/3mL  3 mL Nebulization Q4H WAKE    diphenoxylate-atropine 2.5-0.025 mg/5 ml  5 mL Oral Q12H    famotidine (PF)  20 mg Intravenous Daily    levetiracetam IVPB  500 mg Intravenous Q12H    loperamide  2 mg Oral Q12H    metoprolol tartrate  50 mg Oral BID    nystatin  500,000 Units Oral QID (WM & HS)    potassium, sodium phosphates  2 packet Oral QID (WM & HS)    simethicone  1 tablet Oral TID PC    sodium chloride 0.9%  3 mL Intravenous Q8H    tamsulosin  0.4 mg Oral Daily     PRN Meds:sodium chloride, sodium chloride, sodium chloride, calcium carbonate, morphine, ondansetron     Review of Systems   Constitutional: Positive for fatigue (improving ). Negative for fever.   HENT: Negative for sore throat and trouble swallowing.    Respiratory: Positive for shortness of breath. Negative for cough.    Cardiovascular: Negative for chest pain and palpitations.   Gastrointestinal: Positive for abdominal distention (improved) and diarrhea (less frequent). Negative for abdominal pain, constipation and nausea.   Genitourinary: Positive for hematuria. Negative for difficulty urinating.   Musculoskeletal: Negative for back pain.   Neurological: Negative for dizziness and headaches.   All other systems reviewed and are negative.    Objective:     Vital Signs (Most Recent):  Temp: 97.6 °F (36.4 °C) (09/05/17 0744)  Pulse: 87  (09/05/17 0744)  Resp: 16 (09/05/17 0744)  BP: 131/88 (09/05/17 0744)  SpO2: 100 % (09/05/17 0744) Vital Signs (24h Range):  Temp:  [97 °F (36.1 °C)-98.4 °F (36.9 °C)] 97.6 °F (36.4 °C)  Pulse:  [] 87  Resp:  [14-16] 16  SpO2:  [95 %-100 %] 100 %  BP: (118-139)/(75-95) 131/88     Weight: 128.2 kg (282 lb 10.1 oz)  Body mass index is 35.33 kg/m².  Body surface area is 2.6 meters squared.      Intake/Output Summary (Last 24 hours) at 09/05/17 0848  Last data filed at 09/05/17 0525   Gross per 24 hour   Intake              810 ml   Output             1300 ml   Net             -490 ml       Physical Exam   Constitutional: He is oriented to person, place, and time. He appears well-developed and well-nourished. No distress.   HENT:   Head: Normocephalic and atraumatic.   Right Ear: External ear normal.   Left Ear: External ear normal.   Eyes: EOM are normal. Pupils are equal, round, and reactive to light. Right eye exhibits no discharge. Left eye exhibits no discharge.   Neck: Normal range of motion. Neck supple. No tracheal deviation present.   Cardiovascular: Normal rate and regular rhythm.    Pulmonary/Chest: Effort normal and breath sounds normal. No respiratory distress. He has no wheezes. He exhibits no tenderness.   Abdominal: Soft. Bowel sounds are normal. He exhibits distension (improving ). There is tenderness (LUQ, mild discomfort, improving ). There is no guarding.   Musculoskeletal: Normal range of motion. He exhibits edema (2+ pitting, b/l LE and UE, tender to palpation, LUE swelling) and tenderness (LUE and b/l LE). He exhibits no deformity.   Neurological: He is alert and oriented to person, place, and time.   Skin: Skin is warm and dry.   Nursing note and vitals reviewed.      Significant Labs:   CBC:     Recent Labs  Lab 09/04/17  0444 09/05/17  0445   WBC 2.30* 1.90*   HGB 9.5* 9.0*   HCT 28.1* 26.6*   PLT 46* 40*    and CMP:     Recent Labs  Lab 09/04/17 0444 09/05/17  0445    140   K 3.9  3.6    105   CO2 26 27   GLU 96 112*   BUN 10 9   CREATININE 0.6 0.5   CALCIUM 7.8* 7.6*   PROT 5.3* 5.0*   ALBUMIN 2.6* 2.5*   BILITOT 1.7* 1.6*   ALKPHOS 71 63   AST 28 28   ALT 30 24   ANIONGAP 8 8   EGFRNONAA >60.0 >60.0       Diagnostic Results:    CTA A/P 8/29/17:  1. Significant volume of hemoperitoneum throughout the abdomen and pelvis. There is somewhat more focal perisplenic collection about the enlarged spleen with associated fluid-fluid level concerning for possible splenic source of hemoperitoneum. There is a questionable hyperattenuating focus within the splenic hilum, although this does not appear to communicate with the splenic parenchyma or splenic vasculature, however small focus of active extravasation would be difficult to exclude on this limited single phase examination.     2. Multiple bilateral pulmonary masses in this patient with known pulmonary metastatic disease, better evaluated on prior dedicated chest CT of 8/22/2017.    3. Flattened configuration of the IVC concerning for hypovolemia.    4. Mild diffuse gaseous distention of the small and large bowel without definite transition point possibly related to underlying ileus. Postsurgical change of prior lower anterior resection.    5. Multiple stable appearing thoracolumbar compression deformities.

## 2017-09-05 NOTE — ASSESSMENT & PLAN NOTE
- Phos down to <1.0 on AM labs despite oral replacement the day prior; improved to 2.6 on 09/05  - 45 mEq of IV potassium phosphate administered on 09/04  - Will continue 2 packets of Neutra-Phos WM+QHS for oral repletion.  - Will continue to monitor

## 2017-09-05 NOTE — SUBJECTIVE & OBJECTIVE
PTA Medications   Medication Sig    calcium citrate-vitamin D3 315-200 mg (CITRACAL+D) 315-200 mg-unit per tablet Take 2 tablets by mouth 2 (two) times daily.    cetirizine (ZYRTEC) 10 MG tablet Take 10 mg by mouth once daily.    [] dexamethasone (DECADRON) 4 MG Tab Take 1 tablet (4 mg total) by mouth every 6 (six) hours.    diphenoxylate-atropine 2.5-0.025 mg (LOMOTIL) 2.5-0.025 mg per tablet Take 1 tablet by mouth 4 (four) times daily as needed for Diarrhea.    enoxaparin (LOVENOX) 120 mg/0.8 mL Syrg Inject 0.7 mLs (110 mg total) into the skin every 12 (twelve) hours.    levetiracetam (KEPPRA) 500 MG Tab Take 1 tablet (500 mg total) by mouth 2 (two) times daily.    nystatin (MYCOSTATIN) 100,000 unit/mL suspension Take 5 mLs (500,000 Units total) by mouth 4 (four) times daily.    pantoprazole (PROTONIX) 40 MG tablet Take 1 tablet (40 mg total) by mouth once daily.    promethazine (PHENERGAN) 25 MG tablet Take 25 mg by mouth every 4 (four) hours as needed.     warfarin (COUMADIN) 6 MG tablet Take 1 tablet (6 mg total) by mouth Daily.       Review of patient's allergies indicates:   Allergen Reactions    No known drug allergies        Past Medical History:   Diagnosis Date    11p partial monosomy syndrome 2016    Cancer     Hernia of anterior abdominal wall 2015    Perirectal abscess 2016    Thyroglossal duct cyst 2013     Past Surgical History:   Procedure Laterality Date    HERNIA REPAIR      ILEOSTOMY REVISION      rectal tumor      removed     Family History     None        Social History Main Topics    Smoking status: Never Smoker    Smokeless tobacco: Former User    Alcohol use 1.2 oz/week     2 Cans of beer per week    Drug use: No    Sexual activity: No     Review of Systems  Objective:     Vital Signs (Most Recent):  Temp: 96.4 °F (35.8 °C) (17 1700)  Pulse: 84 (17 1700)  Resp: 16 (17 1700)  BP: 123/80 (17 1700)  SpO2: 95 % (17  1700) Vital Signs (24h Range):  Temp:  [96.4 °F (35.8 °C)-98.4 °F (36.9 °C)] 96.4 °F (35.8 °C)  Pulse:  [] 84  Resp:  [15-18] 16  SpO2:  [92 %-100 %] 95 %  BP: (123-139)/(80-95) 123/80     Weight: 128.2 kg (282 lb 10.1 oz)  Body mass index is 35.33 kg/m².    Physical Exam   Constitutional: He appears well-developed and well-nourished. No distress.   Eyes: EOM are normal.   Neck: Normal range of motion.   Cardiovascular: Normal rate and regular rhythm.    Pulmonary/Chest:   SOB while laying in bed, nasal cannula on   Abdominal:   Distended but soft, NTTP, no rebound or guarding.   Neurological: He is alert.   Conflicting stories between him and his family in terms of bowel movements.   Skin: He is not diaphoretic.     Significant Labs:  BMP (Last 3 Results):   Recent Labs  Lab 09/03/17 0411 09/04/17 0444 09/05/17 0445    96 112*    141 140   K 3.0* 3.9 3.6    107 105   CO2 25 26 27   BUN 11 10 9   CREATININE 0.6 0.6 0.5   CALCIUM 7.9* 7.8* 7.6*   MG 1.9 1.7 1.8     CBC (Last 3 Results):   Recent Labs  Lab 09/03/17 0411 09/04/17 0444 09/05/17 0445   WBC 1.98* 2.30* 1.90*   RBC 2.89* 3.12* 3.01*   HGB 8.7* 9.5* 9.0*   HCT 25.5* 28.1* 26.6*   PLT 51* 46* 40*   MCV 88 90 88   MCH 30.1 30.4 29.9   MCHC 34.1 33.8 33.8

## 2017-09-05 NOTE — ASSESSMENT & PLAN NOTE
- Plts stable at 40 ( 46 on 09/04)  - Will transfuse if fall below 20; not actively bleeding at this time as H/H improving  - Will continue to hold enoxaparin

## 2017-09-05 NOTE — ASSESSMENT & PLAN NOTE
- spontaneous bleed likely 2/2 anticoagulation  - no definitive contrast extravasation on CTA   - no surgical intervention per surgery  - s/p IVC filter placement 8/31/17  - continue to monitor serial CBCs and transfuse as indicated  - Hg improving to 9.0; will continue to monitor CBC and assess for additional transfusion requirements

## 2017-09-05 NOTE — PLAN OF CARE
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 Transfused 2 units FFP and 2 units PRBC and transferred to [facility]    8/28: Arrived to SICU 6084. Sinus tach 140s with ST depression on EKG, 2D echo performed at bedside, 6L LR given, 4 units PRBC, 2 platelets, and 3 units FFP administered. Urine and blood cultures sent.     Nursing:  + CBC q4h    Plan of care reviewed with patient and spouse.  Patient remains alert and oriented x4.  Ambulatory in room and to bathroom with standby assist and walker.  Swift patent and draining concentrated dark/red colored urine.  Patient reports loose BM x1 this shift.  Complains of heart burn and acid reflux this shift; relieved with PRN Tums.  Pain controlled with IV Morphine as ordered.  Generalized edema; wedge place underneath heels with BLE elevated.  No falls or injuries this shift.  Tolerating Regular diet.  Wife remains at bedside.  Call light within reach.  No s/s acute distress noted.

## 2017-09-05 NOTE — ASSESSMENT & PLAN NOTE
- Present since admission  - C.diff negative  - Mildly responsive to imodium and lomotil  - Pt transitioned to general diet several days ago   - Will explore other options to help resolve this issue

## 2017-09-05 NOTE — ASSESSMENT & PLAN NOTE
S/p LAR with DLI and subsequent reversal at [facility] in 2013. Saw Dr. Chilel in 5/2016 after being found to have pelvic abscess, IR-drained, removed. Now with diarrhea s/p spontaneous splenic bleed after anticoagulation for recurrent PE. Persistent bilat pulm dz, no chemo for the last 3 months.    -C diff is negative. Advised getting further stool studies/cultures. Portion of this is likely due to irritation from massive hemoperitoneum. Poor appetite likely also related to this. Advised to increase lomotil and imodium if need to better control diarrhea. Encouraged PO regular diet. Replete electrolytes as needed.   -He has apparently had several flex sigs since surgery. I dont see any procedure reports for this. Unsure if he's had colonoscopy, in part because it wouldn't really change his management given persistent pulmonary disease. GG enema performed on 8/24 which showed no obstruction.  -We will continue to follow along. Dr. Chilel to see tomorrow.

## 2017-09-05 NOTE — PROGRESS NOTES
[facility]  Hematology/Oncology  Progress Note    Patient Name: Claude Penn III  Admission Date: 8/28/2017  Hospital Length of Stay: 8 days  Code Status: Full Code     Subjective:     Interval History:   Patient seen and examined at bedside this AM w/ family member present in the room. Ambulating the halls w/ walker, especially when he starts to feel bloated/distended. Still reports decreased PO intake.  Continues to have diarrhea, but less frequent, slightly more formed.  Continued to complain about LE edema that is now causing him pain.  Did require morphine IV for abdominal pain/bloating that has resolved.   No other complaints overnight.   Oncology Treatment Plan:   [No treatment plan]    Medications:  Continuous Infusions:     Scheduled Meds:   albuterol-ipratropium 2.5mg-0.5mg/3mL  3 mL Nebulization Q4H WAKE    diphenoxylate-atropine 2.5-0.025 mg/5 ml  5 mL Oral Q12H    famotidine (PF)  20 mg Intravenous Daily    levetiracetam IVPB  500 mg Intravenous Q12H    loperamide  2 mg Oral Q12H    metoprolol tartrate  50 mg Oral BID    nystatin  500,000 Units Oral QID (WM & HS)    potassium, sodium phosphates  2 packet Oral QID (WM & HS)    simethicone  1 tablet Oral TID PC    sodium chloride 0.9%  3 mL Intravenous Q8H    tamsulosin  0.4 mg Oral Daily     PRN Meds:sodium chloride, sodium chloride, sodium chloride, calcium carbonate, morphine, ondansetron     Review of Systems   Constitutional: Positive for fatigue (improving ). Negative for fever.   HENT: Negative for sore throat and trouble swallowing.    Respiratory: Positive for shortness of breath. Negative for cough.    Cardiovascular: Negative for chest pain and palpitations.   Gastrointestinal: Positive for abdominal distention (improved) and diarrhea (less frequent). Negative for abdominal pain, constipation and nausea.   Genitourinary: Positive for hematuria. Negative for difficulty urinating.   Musculoskeletal: Negative for back  pain.   Neurological: Negative for dizziness and headaches.   All other systems reviewed and are negative.    Objective:     Vital Signs (Most Recent):  Temp: 97.6 °F (36.4 °C) (09/05/17 0744)  Pulse: 87 (09/05/17 0744)  Resp: 16 (09/05/17 0744)  BP: 131/88 (09/05/17 0744)  SpO2: 100 % (09/05/17 0744) Vital Signs (24h Range):  Temp:  [97 °F (36.1 °C)-98.4 °F (36.9 °C)] 97.6 °F (36.4 °C)  Pulse:  [] 87  Resp:  [14-16] 16  SpO2:  [95 %-100 %] 100 %  BP: (118-139)/(75-95) 131/88     Weight: 128.2 kg (282 lb 10.1 oz)  Body mass index is 35.33 kg/m².  Body surface area is 2.6 meters squared.      Intake/Output Summary (Last 24 hours) at 09/05/17 0848  Last data filed at 09/05/17 0525   Gross per 24 hour   Intake              810 ml   Output             1300 ml   Net             -490 ml       Physical Exam   Constitutional: He is oriented to person, place, and time. He appears well-developed and well-nourished. No distress.   HENT:   Head: Normocephalic and atraumatic.   Right Ear: External ear normal.   Left Ear: External ear normal.   Eyes: EOM are normal. Pupils are equal, round, and reactive to light. Right eye exhibits no discharge. Left eye exhibits no discharge.   Neck: Normal range of motion. Neck supple. No tracheal deviation present.   Cardiovascular: Normal rate and regular rhythm.    Pulmonary/Chest: Effort normal and breath sounds normal. No respiratory distress. He has no wheezes. He exhibits no tenderness.   Abdominal: Soft. Bowel sounds are normal. He exhibits distension (improving ). There is tenderness (LUQ, mild discomfort, improving ). There is no guarding.   Musculoskeletal: Normal range of motion. He exhibits edema (2+ pitting, b/l LE and UE, tender to palpation, LUE swelling) and tenderness (LUE and b/l LE). He exhibits no deformity.   Neurological: He is alert and oriented to person, place, and time.   Skin: Skin is warm and dry.   Nursing note and vitals reviewed.      Significant Labs:    CBC:     Recent Labs  Lab 09/04/17 0444 09/05/17 0445   WBC 2.30* 1.90*   HGB 9.5* 9.0*   HCT 28.1* 26.6*   PLT 46* 40*    and CMP:     Recent Labs  Lab 09/04/17 0444 09/05/17 0445    140   K 3.9 3.6    105   CO2 26 27   GLU 96 112*   BUN 10 9   CREATININE 0.6 0.5   CALCIUM 7.8* 7.6*   PROT 5.3* 5.0*   ALBUMIN 2.6* 2.5*   BILITOT 1.7* 1.6*   ALKPHOS 71 63   AST 28 28   ALT 30 24   ANIONGAP 8 8   EGFRNONAA >60.0 >60.0       Diagnostic Results:    CTA A/P 8/29/17:  1. Significant volume of hemoperitoneum throughout the abdomen and pelvis. There is somewhat more focal perisplenic collection about the enlarged spleen with associated fluid-fluid level concerning for possible splenic source of hemoperitoneum. There is a questionable hyperattenuating focus within the splenic hilum, although this does not appear to communicate with the splenic parenchyma or splenic vasculature, however small focus of active extravasation would be difficult to exclude on this limited single phase examination.     2. Multiple bilateral pulmonary masses in this patient with known pulmonary metastatic disease, better evaluated on prior dedicated chest CT of 8/22/2017.    3. Flattened configuration of the IVC concerning for hypovolemia.    4. Mild diffuse gaseous distention of the small and large bowel without definite transition point possibly related to underlying ileus. Postsurgical change of prior lower anterior resection.    5. Multiple stable appearing thoracolumbar compression deformities.     Assessment/Plan:     * Ruptured spleen    - spontaneous bleed likely 2/2 anticoagulation  - no definitive contrast extravasation on CTA   - no surgical intervention per surgery  - s/p IVC filter placement 8/31/17  - continue to monitor serial CBCs and transfuse as indicated  - Hg improving to 9.0; will continue to monitor CBC and assess for additional transfusion requirements        Spleen hematoma    -see above         Adenocarcinoma of rectum    - followed by Dr. Collins in [place]  - recently noted to have progression of disease  - will need to follow up with primary oncologist after discharge to discuss treatment options        Pulmonary embolism without acute cor pulmonale    - anticoagulation stopped 2/2 intraperitoneal bleed  - IVC filter placed 8/31/17  - continue to monitor and will need to address need for long term anticoagulation as out-patient        Thrombocytopenia    - Plts stable at 40 ( 46 on 09/04)  - Will transfuse if fall below 20; not actively bleeding at this time as H/H improving  - Will continue to hold enoxaparin         Diarrhea in adult patient    - Present since admission  - C.diff negative  - Mildly responsive to imodium and lomotil  - Pt transitioned to general diet several days ago   - Will explore other options to help resolve this issue         Edema extremities    - b/l Le, pitting, tender to palpation likely 2/2 to combination of immobility as well as fluid overload  - 20 mg IVP Lasix ordered; will give additional K replacement to prevent hypokalemia w/ loop diuretic use   - Will monitor for improvement        Hypophosphatemia    - Phos down to <1.0 on AM labs despite oral replacement the day prior; improved to 2.6 on 09/05  - 45 mEq of IV potassium phosphate administered on 09/04  - Will continue 2 packets of Neutra-Phos WM+QHS for oral repletion.  - Will continue to monitor        Abdominal bloating    - Started on simethicone TID  - C.diff negative  - Requiring imodium for continuous diarrhea  - Will monitor as pt is more ambulatory and will likely improve w/ the transition to general diet; noted mild improvement in his symptoms               Cammie Moser MD  Hematology/Oncology  [facility]

## 2017-09-06 ENCOUNTER — TELEPHONE (OUTPATIENT)
Dept: HEMATOLOGY/ONCOLOGY | Facility: CLINIC | Age: 52
End: 2017-09-06

## 2017-09-06 LAB
ALBUMIN SERPL BCP-MCNC: 2.7 G/DL
ALP SERPL-CCNC: 76 U/L
ALT SERPL W/O P-5'-P-CCNC: 27 U/L
ANION GAP SERPL CALC-SCNC: 5 MMOL/L
AST SERPL-CCNC: 30 U/L
BASOPHILS # BLD AUTO: 0 K/UL
BASOPHILS NFR BLD: 0 %
BILIRUB SERPL-MCNC: 2.3 MG/DL
BUN SERPL-MCNC: 10 MG/DL
CALCIUM SERPL-MCNC: 8.1 MG/DL
CHLORIDE SERPL-SCNC: 104 MMOL/L
CO2 SERPL-SCNC: 29 MMOL/L
CREAT SERPL-MCNC: 0.6 MG/DL
DIFFERENTIAL METHOD: ABNORMAL
EOSINOPHIL # BLD AUTO: 0 K/UL
EOSINOPHIL NFR BLD: 0.9 %
ERYTHROCYTE [DISTWIDTH] IN BLOOD BY AUTOMATED COUNT: 18.1 %
EST. GFR  (AFRICAN AMERICAN): >60 ML/MIN/1.73 M^2
EST. GFR  (NON AFRICAN AMERICAN): >60 ML/MIN/1.73 M^2
GLUCOSE SERPL-MCNC: 92 MG/DL
HCT VFR BLD AUTO: 31.7 %
HGB BLD-MCNC: 10.7 G/DL
LYMPHOCYTES # BLD AUTO: 0.4 K/UL
LYMPHOCYTES NFR BLD: 11 %
MAGNESIUM SERPL-MCNC: 1.6 MG/DL
MCH RBC QN AUTO: 30.4 PG
MCHC RBC AUTO-ENTMCNC: 33.8 G/DL
MCV RBC AUTO: 90 FL
MONOCYTES # BLD AUTO: 0.3 K/UL
MONOCYTES NFR BLD: 8.6 %
NEUTROPHILS # BLD AUTO: 2.7 K/UL
NEUTROPHILS NFR BLD: 78.9 %
PHOSPHATE SERPL-MCNC: 2.5 MG/DL
PLATELET # BLD AUTO: 54 K/UL
PMV BLD AUTO: 9.5 FL
POTASSIUM SERPL-SCNC: 4.9 MMOL/L
PROT SERPL-MCNC: 5.8 G/DL
RBC # BLD AUTO: 3.52 M/UL
SODIUM SERPL-SCNC: 138 MMOL/L
WBC # BLD AUTO: 3.36 K/UL
WBC #/AREA STL HPF: NORMAL /[HPF]

## 2017-09-06 PROCEDURE — 89055 LEUKOCYTE ASSESSMENT FECAL: CPT

## 2017-09-06 PROCEDURE — 94640 AIRWAY INHALATION TREATMENT: CPT

## 2017-09-06 PROCEDURE — 99232 SBSQ HOSP IP/OBS MODERATE 35: CPT | Mod: ,,, | Performed by: COLON & RECTAL SURGERY

## 2017-09-06 PROCEDURE — 25000242 PHARM REV CODE 250 ALT 637 W/ HCPCS: Performed by: STUDENT IN AN ORGANIZED HEALTH CARE EDUCATION/TRAINING PROGRAM

## 2017-09-06 PROCEDURE — 63600175 PHARM REV CODE 636 W HCPCS: Performed by: STUDENT IN AN ORGANIZED HEALTH CARE EDUCATION/TRAINING PROGRAM

## 2017-09-06 PROCEDURE — 99231 SBSQ HOSP IP/OBS SF/LOW 25: CPT | Mod: ,,, | Performed by: INTERNAL MEDICINE

## 2017-09-06 PROCEDURE — 94799 UNLISTED PULMONARY SVC/PX: CPT

## 2017-09-06 PROCEDURE — 97116 GAIT TRAINING THERAPY: CPT

## 2017-09-06 PROCEDURE — 87046 STOOL CULTR AEROBIC BACT EA: CPT

## 2017-09-06 PROCEDURE — 85025 COMPLETE CBC W/AUTO DIFF WBC: CPT

## 2017-09-06 PROCEDURE — S0028 INJECTION, FAMOTIDINE, 20 MG: HCPCS | Performed by: STUDENT IN AN ORGANIZED HEALTH CARE EDUCATION/TRAINING PROGRAM

## 2017-09-06 PROCEDURE — 25000003 PHARM REV CODE 250: Performed by: STUDENT IN AN ORGANIZED HEALTH CARE EDUCATION/TRAINING PROGRAM

## 2017-09-06 PROCEDURE — 36415 COLL VENOUS BLD VENIPUNCTURE: CPT

## 2017-09-06 PROCEDURE — 25000003 PHARM REV CODE 250: Performed by: COLON & RECTAL SURGERY

## 2017-09-06 PROCEDURE — 87427 SHIGA-LIKE TOXIN AG IA: CPT | Mod: 59

## 2017-09-06 PROCEDURE — A4216 STERILE WATER/SALINE, 10 ML: HCPCS | Performed by: STUDENT IN AN ORGANIZED HEALTH CARE EDUCATION/TRAINING PROGRAM

## 2017-09-06 PROCEDURE — 84100 ASSAY OF PHOSPHORUS: CPT

## 2017-09-06 PROCEDURE — 80053 COMPREHEN METABOLIC PANEL: CPT

## 2017-09-06 PROCEDURE — 20600001 HC STEP DOWN PRIVATE ROOM

## 2017-09-06 PROCEDURE — 87209 SMEAR COMPLEX STAIN: CPT

## 2017-09-06 PROCEDURE — 83735 ASSAY OF MAGNESIUM: CPT

## 2017-09-06 PROCEDURE — 87045 FECES CULTURE AEROBIC BACT: CPT

## 2017-09-06 PROCEDURE — 97530 THERAPEUTIC ACTIVITIES: CPT

## 2017-09-06 PROCEDURE — 25000003 PHARM REV CODE 250: Performed by: SURGERY

## 2017-09-06 PROCEDURE — 94761 N-INVAS EAR/PLS OXIMETRY MLT: CPT

## 2017-09-06 RX ORDER — FUROSEMIDE 10 MG/ML
20 INJECTION INTRAMUSCULAR; INTRAVENOUS ONCE
Status: COMPLETED | OUTPATIENT
Start: 2017-09-06 | End: 2017-09-06

## 2017-09-06 RX ORDER — SIMETHICONE 80 MG
80 TABLET,CHEWABLE ORAL EVERY 6 HOURS PRN
Qty: 30 TABLET | Refills: 0 | COMMUNITY
Start: 2017-09-06

## 2017-09-06 RX ORDER — FUROSEMIDE 20 MG/1
20 TABLET ORAL 2 TIMES DAILY
Qty: 60 TABLET | Refills: 1 | Status: SHIPPED | OUTPATIENT
Start: 2017-09-06 | End: 2017-09-09

## 2017-09-06 RX ORDER — METOPROLOL TARTRATE 50 MG/1
50 TABLET ORAL 2 TIMES DAILY
Qty: 60 TABLET | Refills: 11 | Status: SHIPPED | OUTPATIENT
Start: 2017-09-06 | End: 2018-09-06

## 2017-09-06 RX ORDER — MAGNESIUM SULFATE HEPTAHYDRATE 40 MG/ML
2 INJECTION, SOLUTION INTRAVENOUS ONCE
Status: COMPLETED | OUTPATIENT
Start: 2017-09-06 | End: 2017-09-06

## 2017-09-06 RX ADMIN — NYSTATIN 500000 UNITS: 500000 SUSPENSION ORAL at 02:09

## 2017-09-06 RX ADMIN — IPRATROPIUM BROMIDE AND ALBUTEROL SULFATE 3 ML: .5; 3 SOLUTION RESPIRATORY (INHALATION) at 08:09

## 2017-09-06 RX ADMIN — IPRATROPIUM BROMIDE AND ALBUTEROL SULFATE 3 ML: .5; 3 SOLUTION RESPIRATORY (INHALATION) at 12:09

## 2017-09-06 RX ADMIN — METOPROLOL TARTRATE 50 MG: 50 TABLET, FILM COATED ORAL at 08:09

## 2017-09-06 RX ADMIN — LOPERAMIDE HYDROCHLORIDE 2 MG: 1 SOLUTION ORAL at 05:09

## 2017-09-06 RX ADMIN — FAMOTIDINE 20 MG: 10 INJECTION, SOLUTION INTRAVENOUS at 08:09

## 2017-09-06 RX ADMIN — POTASSIUM & SODIUM PHOSPHATES POWDER PACK 280-160-250 MG 2 PACKET: 280-160-250 PACK at 09:09

## 2017-09-06 RX ADMIN — POTASSIUM & SODIUM PHOSPHATES POWDER PACK 280-160-250 MG 2 PACKET: 280-160-250 PACK at 02:09

## 2017-09-06 RX ADMIN — Medication 3 ML: at 06:09

## 2017-09-06 RX ADMIN — NYSTATIN 500000 UNITS: 500000 SUSPENSION ORAL at 08:09

## 2017-09-06 RX ADMIN — POTASSIUM & SODIUM PHOSPHATES POWDER PACK 280-160-250 MG 2 PACKET: 280-160-250 PACK at 08:09

## 2017-09-06 RX ADMIN — DIPHENOXYLATE HYDROCHLORIDE AND ATROPINE SULFATE 5 ML: 2.5; .025 SOLUTION ORAL at 05:09

## 2017-09-06 RX ADMIN — FUROSEMIDE 20 MG: 10 INJECTION, SOLUTION INTRAVENOUS at 10:09

## 2017-09-06 RX ADMIN — SIMETHICONE CHEW TAB 80 MG 80 MG: 80 TABLET ORAL at 07:09

## 2017-09-06 RX ADMIN — DIPHENOXYLATE HYDROCHLORIDE AND ATROPINE SULFATE 5 ML: 2.5; .025 SOLUTION ORAL at 02:09

## 2017-09-06 RX ADMIN — SIMETHICONE CHEW TAB 80 MG 80 MG: 80 TABLET ORAL at 09:09

## 2017-09-06 RX ADMIN — LOPERAMIDE HYDROCHLORIDE 2 MG: 1 SOLUTION ORAL at 06:09

## 2017-09-06 RX ADMIN — POTASSIUM & SODIUM PHOSPHATES POWDER PACK 280-160-250 MG 2 PACKET: 280-160-250 PACK at 06:09

## 2017-09-06 RX ADMIN — NYSTATIN 500000 UNITS: 500000 SUSPENSION ORAL at 06:09

## 2017-09-06 RX ADMIN — Medication 3 ML: at 02:09

## 2017-09-06 RX ADMIN — NYSTATIN 500000 UNITS: 500000 SUSPENSION ORAL at 09:09

## 2017-09-06 RX ADMIN — ONDANSETRON 4 MG: 2 INJECTION INTRAMUSCULAR; INTRAVENOUS at 05:09

## 2017-09-06 RX ADMIN — LEVETIRACETAM 500 MG: 5 INJECTION INTRAVENOUS at 08:09

## 2017-09-06 RX ADMIN — MORPHINE SULFATE 2 MG: 2 INJECTION, SOLUTION INTRAMUSCULAR; INTRAVENOUS at 05:09

## 2017-09-06 RX ADMIN — METOPROLOL TARTRATE 50 MG: 50 TABLET, FILM COATED ORAL at 09:09

## 2017-09-06 RX ADMIN — IPRATROPIUM BROMIDE AND ALBUTEROL SULFATE 3 ML: .5; 3 SOLUTION RESPIRATORY (INHALATION) at 04:09

## 2017-09-06 RX ADMIN — MAGNESIUM SULFATE IN WATER 2 G: 40 INJECTION, SOLUTION INTRAVENOUS at 08:09

## 2017-09-06 RX ADMIN — SIMETHICONE CHEW TAB 80 MG 80 MG: 80 TABLET ORAL at 02:09

## 2017-09-06 RX ADMIN — LEVETIRACETAM 500 MG: 5 INJECTION INTRAVENOUS at 09:09

## 2017-09-06 RX ADMIN — DIPHENOXYLATE HYDROCHLORIDE AND ATROPINE SULFATE 5 ML: 2.5; .025 SOLUTION ORAL at 06:09

## 2017-09-06 RX ADMIN — TAMSULOSIN HYDROCHLORIDE 0.4 MG: 0.4 CAPSULE ORAL at 08:09

## 2017-09-06 RX ADMIN — IPRATROPIUM BROMIDE AND ALBUTEROL SULFATE 3 ML: .5; 3 SOLUTION RESPIRATORY (INHALATION) at 07:09

## 2017-09-06 RX ADMIN — LOPERAMIDE HYDROCHLORIDE 2 MG: 1 SOLUTION ORAL at 02:09

## 2017-09-06 RX ADMIN — Medication 3 ML: at 10:09

## 2017-09-06 NOTE — PLAN OF CARE
Problem: Physical Therapy Goal  Goal: Physical Therapy Goal  Goals to be met by: 17    Patient will increase functional independence with mobility by performin. Supine to sit with Set-up [place] - not met  2. Sit to stand transfer with Supervision - not met  3. Gait  x 150 feet with Supervision using AD if needed.- not met   4. Ascend/descend 4 stair with no Handrails Contact Guard Assistance - not met  5. Lower extremity exercise program x15 reps per handout, with supervision - not met     Outcome: Ongoing (interventions implemented as appropriate)  Pt. progressing towards goals

## 2017-09-06 NOTE — HOSPITAL COURSE
Pt arrived via EMS from outside hospital on 08/28 after a CT abdomen/pelvis with contrast was performed in the ED showing significant hemoperitoneum with a drop in his H/H, attributed to a spontaneous splenic bleed while on anticoagulation. Pt was transfused 4uPRBC, 3u FFP, and 2 platelets. No active hemoextravasation was demonstrated on repeat CT at [facility].  In the setting of the recent bleed and a continuous need for anticoagulation, an IVC filter was placed on 08/31/2017.  Pt had difficulty w/ saleh d/c and had required reinsertion of saleh several times. Saleh AZ'ed on 09/05, as pt became more mobile and he has been voiding appropriately.  Primary concern at this time is continuous diarrhea that has not been responsive to medical management. Colorectal Surgery has been consulted in order to address this issue as they are familiar with this patient.  On 09/08 started cholestyramine 4g oral BID for his continuous diarrhea; Boost started w/ meals for calorie supplementation.   Continues to exhibit ROB; Lasix + Compression stockings.  UA obtained on 09/07 dirty; preliminary culture shows GRAM NEGATIVE VERONICA, LACTOSE > 100,000 cfu/ml Identification and susceptibility pending; started on Ciprofloxacin for 5 days

## 2017-09-06 NOTE — ASSESSMENT & PLAN NOTE
- Phos down to <1.0 on AM labs despite oral replacement the day prior; improved to 2.5 on 09/06  - 45 mEq of IV potassium phosphate administered on 09/04  - Will continue 2 packets of Neutra-Phos WM+QHS for oral repletion.  - Will continue to monitor

## 2017-09-06 NOTE — TELEPHONE ENCOUNTER
Returned call to patient. Discussed that Dr. Collins is no longer a physician at this organization, and if patient would like to be seen by Hem/Onc, he can ask his inpatient hospitalist to add consult. Patient states Dr. Domingo is seeing him inpatient.

## 2017-09-06 NOTE — HPI
Patient is a 52 y.o. male with h/o metastatic colon cancer s/p craniotomy on 7/31/17 for metastasectomy, PE on anticoagualtion,  presented to [facility] [place] from [facility] where the patient presented for complaint of feeling cold, and clammy.  PT was found to be anemic and underwent CT of the abdomen showing a hemoperitoneum and possible splenic rupture.  Of note the patient was recently admitted to [facility] for complaint of SOB and diangnosed with a PE and SBO [place] [facility] [place] and eventually discharged on lovenox bridge to coumadin on 8/25/17.  Since admission the patient underwent CTA showing no obvious active extravasation of blood intraabdominally.  The patient has required 4 units of PRBC's, 2 units of platelets, and 3 units of FFP.  Currently the patient complains of SOB on occasion, back pain, abdominal pain, and constipation.  The patient denies fever, chills, CP, N/V, diarrhea.     ONC: He was originally diagnosed with rectal ca and mets to the lung in 2013. Has followed at [facility] is additional to locally. He received CRT followed by LAR [id number]. KRAS and TP53 mutant, NASIR. He then received FOLFOX then FOLFIRI/avastin followed by maintenance 5FU/alicja then FOLFOX alicja followed by FOLFIRI.  Pt admitted recently on 7/27/17 for HA and confusion found to have mets to the brain s/p right craniotomy with resection of tumor on 7/31/17, s/p 8/11 gamma knife at [facility] and 2013 PE (previously on coumadin; held 7/31).

## 2017-09-06 NOTE — PLAN OF CARE
Problem: Patient Care Overview  Goal: Plan of Care Review    Outcome: Ongoing (interventions implemented as appropriate)  During shift patient A and O x 4, appropriate and cooperative with care. No c/o pain throughout shift. All VSS stable during shift. [place] dc'd on previous shift. Patient voiding x 4 in urinal and toilet without issue. BM x 3 during shift. Up ambulating in room and hallway. Free from falls/injury during shift. Family at bedside throughout shift.

## 2017-09-06 NOTE — SUBJECTIVE & OBJECTIVE
Interval History:   Patient seen and examined at bedside this AM w/ family member present in the room. Ambulating the halls w/ walker, especially when he starts to feel bloated/distended. Attempting to work on his PO intake; states that its minimally improving.   Continues to have diarrhea. Able to void normally after the saleh has been d/c'ed  Denies any other complaints at this time aside from LE swelling that has minimally improved after 1xdose 20 mg IV lasix ( 4lbs wt loss recorded after the administration of lasix)  Oncology Treatment Plan:   [No treatment plan]    Medications:  Continuous Infusions:     Scheduled Meds:   albuterol-ipratropium 2.5mg-0.5mg/3mL  3 mL Nebulization Q4H WAKE    diphenoxylate-atropine 2.5-0.025 mg/5 ml  5 mL Oral QID    famotidine (PF)  20 mg Intravenous Daily    levetiracetam IVPB  500 mg Intravenous Q12H    loperamide  2 mg Oral QID    magnesium sulfate IVPB  2 g Intravenous Once    metoprolol tartrate  50 mg Oral BID    nystatin  500,000 Units Oral QID (WM & HS)    potassium, sodium phosphates  2 packet Oral QID (WM & HS)    simethicone  1 tablet Oral TID PC    sodium chloride 0.9%  3 mL Intravenous Q8H    tamsulosin  0.4 mg Oral Daily     PRN Meds:sodium chloride, sodium chloride, sodium chloride, calcium carbonate, morphine, ondansetron     Review of Systems   Constitutional: Positive for fatigue (improving ). Negative for fever.   HENT: Negative for sore throat and trouble swallowing.    Respiratory: Positive for shortness of breath (on exertion ). Negative for cough.    Cardiovascular: Negative for chest pain and palpitations.   Gastrointestinal: Positive for diarrhea (less frequent). Negative for abdominal distention, abdominal pain, constipation and nausea.   Genitourinary: Negative for difficulty urinating and hematuria.   Musculoskeletal: Negative for back pain.   Skin: Negative.    Neurological: Negative for dizziness and headaches.   All other systems  reviewed and are negative.    Objective:     Vital Signs (Most Recent):  Temp: 98.5 °F (36.9 °C) (09/06/17 0742)  Pulse: 86 (09/06/17 0742)  Resp: 18 (09/06/17 0742)  BP: 111/76 (09/06/17 0742)  SpO2: 98 % (09/06/17 0742) Vital Signs (24h Range):  Temp:  [96.4 °F (35.8 °C)-98.5 °F (36.9 °C)] 98.5 °F (36.9 °C)  Pulse:  [] 86  Resp:  [16-20] 18  SpO2:  [92 %-98 %] 98 %  BP: (111-129)/(76-91) 111/76     Weight: 126.5 kg (278 lb 12.4 oz)  Body mass index is 34.84 kg/m².  Body surface area is 2.59 meters squared.      Intake/Output Summary (Last 24 hours) at 09/06/17 0832  Last data filed at 09/05/17 2338   Gross per 24 hour   Intake             1160 ml   Output             1750 ml   Net             -590 ml       Physical Exam   Constitutional: He is oriented to person, place, and time. He appears well-developed and well-nourished. No distress.   HENT:   Head: Normocephalic and atraumatic.   Right Ear: External ear normal.   Left Ear: External ear normal.   Eyes: EOM are normal. Pupils are equal, round, and reactive to light. Right eye exhibits no discharge. Left eye exhibits no discharge.   Neck: Normal range of motion. Neck supple. No tracheal deviation present.   Cardiovascular: Normal rate and regular rhythm.    Pulmonary/Chest: Effort normal and breath sounds normal. No respiratory distress. He has no wheezes. He exhibits no tenderness.   Abdominal: Soft. Bowel sounds are normal. He exhibits distension (minimal ). There is no tenderness. There is no guarding.   Musculoskeletal: Normal range of motion. He exhibits edema (2+ pitting, b/l LE and UE, tender to palpation, LUE swelling) and tenderness (LUE and b/l LE). He exhibits no deformity.   Neurological: He is alert and oriented to person, place, and time.   Skin: Skin is warm and dry.   Nursing note and vitals reviewed.      Significant Labs:   CBC:     Recent Labs  Lab 09/05/17  0445 09/06/17  0537   WBC 1.90* 3.36*   HGB 9.0* 10.7*   HCT 26.6* 31.7*   PLT  40* 54*    and CMP:     Recent Labs  Lab 09/05/17  0445 09/06/17  0537    138   K 3.6 4.9    104   CO2 27 29   * 92   BUN 9 10   CREATININE 0.5 0.6   CALCIUM 7.6* 8.1*   PROT 5.0* 5.8*   ALBUMIN 2.5* 2.7*   BILITOT 1.6* 2.3*   ALKPHOS 63 76   AST 28 30   ALT 24 27   ANIONGAP 8 5*   EGFRNONAA >60.0 >60.0       Diagnostic Results:    CTA A/P 8/29/17:  1. Significant volume of hemoperitoneum throughout the abdomen and pelvis. There is somewhat more focal perisplenic collection about the enlarged spleen with associated fluid-fluid level concerning for possible splenic source of hemoperitoneum. There is a questionable hyperattenuating focus within the splenic hilum, although this does not appear to communicate with the splenic parenchyma or splenic vasculature, however small focus of active extravasation would be difficult to exclude on this limited single phase examination.     2. Multiple bilateral pulmonary masses in this patient with known pulmonary metastatic disease, better evaluated on prior dedicated chest CT of 8/22/2017.    3. Flattened configuration of the IVC concerning for hypovolemia.    4. Mild diffuse gaseous distention of the small and large bowel without definite transition point possibly related to underlying ileus. Postsurgical change of prior lower anterior resection.    5. Multiple stable appearing thoracolumbar compression deformities.

## 2017-09-06 NOTE — ASSESSMENT & PLAN NOTE
- followed by Dr. Collins in [place]  - recently noted to have progression of disease  - will need to follow up with primary oncologist after discharge to discuss treatment options  - Dr. Chilel followed patient on admissions to [facility]; consulted [facility] in the setting of continuous diarrhea; will follow-up w/ them regarding their recommendations

## 2017-09-06 NOTE — PT/OT/SLP PROGRESS
Physical Therapy  Treatment    Claude Penn III   MRN: [id number]   Admitting Diagnosis: Ruptured spleen    PT Received On: 09/06/17  PT Start Time: 1014     PT Stop Time: 1037    PT Total Time (min): 23 min       Billable Minutes:  Gait [id number] and Therapeutic Activity 8    Treatment Type: Treatment  PT/PTA: PT             General Precautions: Standard, fall  Orthopedic Precautions: N/A   Braces:      Do you have any cultural, spiritual, [employer] conflicts, given your current situation?: none    Subjective:  Communicated with nursing prior to session.      Pain/Comfort  Pain Rating 1: 0/10  Pain Rating Post-Intervention 1: 0/10    Objective:   Patient found with:  (port a cath)    Functional Mobility:  Bed Mobility:   Rolling/Turning to Left:  (pt. found in bedside chair)    Transfers:  Sit <> Stand Assistance: Stand By Assistance  Sit <> Stand Assistive Device: Rolling Walker  Bed <> Chair Technique: Stand Pivot  Bed <> Chair Assistance: Stand By Assistance  Bed <> Chair Assistive Device: Rolling Walker    Gait:   Gait Distance: 600'  Assistance 1: Stand by Assistance  Gait Assistive Device: Rolling walker  Gait Pattern: swing-through gait  Gait Deviation(s): decreased ezra    Stairs:      Balance:   Static Sit: FAIR+: Able to take MINIMAL challenges from all directions  Dynamic Sit: FAIR+: Maintains balance through MINIMAL excursions of active trunk motion  Static Stand: FAIR+: Takes MINIMAL challenges from all directions  Dynamic stand: FAIR+: Needs CLOSE SUPERVISION during gait and is able to right self with minor LOB     Therapeutic Activities and Exercises:  Pt. education on UE/LE therex and positioning to decreased edema. Discussed pt.'s discharge planning.     AM-PAC 6 CLICK MOBILITY  How much help from another person does this patient currently need?   1 = Unable, Total/Dependent Assistance  2 = A lot, Maximum/Moderate Assistance  3 = A little, Minimum/Contact Guard/Supervision  4 = None, Modified  Gregory/Independent    Turning over in bed (including adjusting bedclothes, sheets and blankets)?: 3  Sitting down on and standing up from a chair with arms (e.g., wheelchair, bedside commode, etc.): 3  Moving from lying on back to sitting on the side of the bed?: 3  Moving to and from a bed to a chair (including a wheelchair)?: 3  Need to walk in hospital room?: 3  Climbing 3-5 steps with a railing?: 3  Total Score: 18    AM-PAC Raw Score CMS G-Code Modifier Level of Impairment Assistance   6 % Total / Unable   7 - 9 CM 80 - 100% Maximal Assist   10 - 14 CL 60 - 80% Moderate Assist   15 - 19 CK 40 - 60% Moderate Assist   20 - 22 CJ 20 - 40% Minimal Assist   23 CI 1-20% SBA / CGA   24 CH 0% Independent/ Mod I     Patient left up in chair with all lines intact and call button in reach.    Assessment:  Claude Penn III is a 52 y.o. male with a medical diagnosis of Ruptured spleen and presents with fair endurance. Pt. cooperative and tolerated treatment well. Pt. progressing with mobility. Pt. would benefit from continued PT to address functional deficits.    Rehab identified problem list/impairments: Rehab identified problem list/impairments: weakness, impaired endurance, impaired functional mobilty, gait instability, impaired balance    Rehab potential is good.    Activity tolerance: Fair    Discharge recommendations: Discharge Facility/Level Of Care Needs: outpatient PT     Barriers to discharge: Barriers to Discharge: None    Equipment recommendations: Equipment Needed After Discharge: none     GOALS:    Physical Therapy Goals        Problem: Physical Therapy Goal    Goal Priority Disciplines Outcome Goal Variances Interventions   Physical Therapy Goal     PT/OT, PT Ongoing (interventions implemented as appropriate)     Description:  Goals to be met by: 17    Patient will increase functional independence with mobility by performin. Supine to sit with Set-up [place] - not met  2. Sit to  stand transfer with Supervision - not met  3. Gait  x 150 feet with Supervision using AD if needed.- not met   4. Ascend/descend 4 stair with no Handrails Contact Guard Assistance - not met  5. Lower extremity exercise program x15 reps per handout, with supervision - not met                      PLAN:    Patient to be seen 5 x/week  to address the above listed problems via gait training, therapeutic activities, therapeutic exercises  Plan of Care expires: 09/28/17  Plan of Care reviewed with: patient, spouse         Jesse Ortega, PT  09/06/2017

## 2017-09-06 NOTE — ASSESSMENT & PLAN NOTE
S/p LAR with DLI and subsequent reversal at [facility] in 2013. Saw Dr. Chilel in 5/2016 after being found to have pelvic abscess, IR-drained, removed. Now with diarrhea s/p spontaneous splenic bleed after anticoagulation for recurrent PE. Persistent bilat pulm dz, no chemo for the last 3 months.    -C diff is negative. Advised getting further stool studies/cultures. Portion of this is likely due to irritation from massive hemoperitoneum. Poor appetite likely also related to this. Advised to increase lomotil and imodium if need to better control diarrhea. Encouraged PO regular diet. Replete electrolytes as needed.   -He has apparently had several flex sigs since surgery. I dont see any procedure reports for this. Unsure if he's had colonoscopy, in part because it wouldn't really change his management given persistent pulmonary disease. GG enema performed on 8/24 which showed no obstruction.  -We will continue to follow along. Dr. Chilel to see today.

## 2017-09-06 NOTE — ASSESSMENT & PLAN NOTE
- Started on simethicone TID  - C.diff negative  - Requiring imodium/lomotil for continuous diarrhea  - Consult put in for CR surgery; will follow   - Will monitor as pt is more ambulatory and will likely improve w/ the transition to general diet; noted mild improvement in his symptoms

## 2017-09-06 NOTE — ASSESSMENT & PLAN NOTE
- anticoagulation stopped 2/2 intraperitoneal bleed  - IVC filter placed 8/31/17; will require removal of IVC no later than 6 month  - continue to monitor and will need to address need for long term anticoagulation as out-patient

## 2017-09-06 NOTE — ASSESSMENT & PLAN NOTE
- spontaneous bleed likely 2/2 anticoagulation  - no definitive contrast extravasation on CTA   - no surgical intervention per surgery  - s/p IVC filter placement 8/31/17  - continue to monitor serial CBCs and transfuse as indicated  - Hg improving to 10.7; will continue to monitor CBC and assess for additional transfusion requirements

## 2017-09-06 NOTE — PROGRESS NOTES
[facility]  Hematology/Oncology  Progress Note    Patient Name: Claude Penn III  Admission Date: 8/28/2017  Hospital Length of Stay: 9 days  Code Status: Full Code     Subjective:     Interval History:   Patient seen and examined at bedside this AM w/ family member present in the room. Ambulating the halls w/ walker, especially when he starts to feel bloated/distended. Attempting to work on his PO intake; states that its minimally improving.   Continues to have diarrhea. Able to void normally after the saleh has been d/c'ed  Denies any other complaints at this time aside from LE swelling that has minimally improved after 1xdose 20 mg IV lasix ( 4lbs wt loss recorded after the administration of lasix)  Oncology Treatment Plan:   [No treatment plan]    Medications:  Continuous Infusions:     Scheduled Meds:   albuterol-ipratropium 2.5mg-0.5mg/3mL  3 mL Nebulization Q4H WAKE    diphenoxylate-atropine 2.5-0.025 mg/5 ml  5 mL Oral QID    famotidine (PF)  20 mg Intravenous Daily    levetiracetam IVPB  500 mg Intravenous Q12H    loperamide  2 mg Oral QID    magnesium sulfate IVPB  2 g Intravenous Once    metoprolol tartrate  50 mg Oral BID    nystatin  500,000 Units Oral QID (WM & HS)    potassium, sodium phosphates  2 packet Oral QID (WM & HS)    simethicone  1 tablet Oral TID PC    sodium chloride 0.9%  3 mL Intravenous Q8H    tamsulosin  0.4 mg Oral Daily     PRN Meds:sodium chloride, sodium chloride, sodium chloride, calcium carbonate, morphine, ondansetron     Review of Systems   Constitutional: Positive for fatigue (improving ). Negative for fever.   HENT: Negative for sore throat and trouble swallowing.    Respiratory: Positive for shortness of breath (on exertion ). Negative for cough.    Cardiovascular: Negative for chest pain and palpitations.   Gastrointestinal: Positive for diarrhea (less frequent). Negative for abdominal distention, abdominal pain, constipation and nausea.    Genitourinary: Negative for difficulty urinating and hematuria.   Musculoskeletal: Negative for back pain.   Skin: Negative.    Neurological: Negative for dizziness and headaches.   All other systems reviewed and are negative.    Objective:     Vital Signs (Most Recent):  Temp: 98.5 °F (36.9 °C) (09/06/17 0742)  Pulse: 86 (09/06/17 0742)  Resp: 18 (09/06/17 0742)  BP: 111/76 (09/06/17 0742)  SpO2: 98 % (09/06/17 0742) Vital Signs (24h Range):  Temp:  [96.4 °F (35.8 °C)-98.5 °F (36.9 °C)] 98.5 °F (36.9 °C)  Pulse:  [] 86  Resp:  [16-20] 18  SpO2:  [92 %-98 %] 98 %  BP: (111-129)/(76-91) 111/76     Weight: 126.5 kg (278 lb 12.4 oz)  Body mass index is 34.84 kg/m².  Body surface area is 2.59 meters squared.      Intake/Output Summary (Last 24 hours) at 09/06/17 0832  Last data filed at 09/05/17 2338   Gross per 24 hour   Intake             1160 ml   Output             1750 ml   Net             -590 ml       Physical Exam   Constitutional: He is oriented to person, place, and time. He appears well-developed and well-nourished. No distress.   HENT:   Head: Normocephalic and atraumatic.   Right Ear: External ear normal.   Left Ear: External ear normal.   Eyes: EOM are normal. Pupils are equal, round, and reactive to light. Right eye exhibits no discharge. Left eye exhibits no discharge.   Neck: Normal range of motion. Neck supple. No tracheal deviation present.   Cardiovascular: Normal rate and regular rhythm.    Pulmonary/Chest: Effort normal and breath sounds normal. No respiratory distress. He has no wheezes. He exhibits no tenderness.   Abdominal: Soft. Bowel sounds are normal. He exhibits distension (minimal ). There is no tenderness. There is no guarding.   Musculoskeletal: Normal range of motion. He exhibits edema (2+ pitting, b/l LE and UE, tender to palpation, LUE swelling) and tenderness (LUE and b/l LE). He exhibits no deformity.   Neurological: He is alert and oriented to person, place, and time.    Skin: Skin is warm and dry.   Nursing note and vitals reviewed.      Significant Labs:   CBC:     Recent Labs  Lab 09/05/17  0445 09/06/17  0537   WBC 1.90* 3.36*   HGB 9.0* 10.7*   HCT 26.6* 31.7*   PLT 40* 54*    and CMP:     Recent Labs  Lab 09/05/17  0445 09/06/17  0537    138   K 3.6 4.9    104   CO2 27 29   * 92   BUN 9 10   CREATININE 0.5 0.6   CALCIUM 7.6* 8.1*   PROT 5.0* 5.8*   ALBUMIN 2.5* 2.7*   BILITOT 1.6* 2.3*   ALKPHOS 63 76   AST 28 30   ALT 24 27   ANIONGAP 8 5*   EGFRNONAA >60.0 >60.0       Diagnostic Results:    CTA A/P 8/29/17:  1. Significant volume of hemoperitoneum throughout the abdomen and pelvis. There is somewhat more focal perisplenic collection about the enlarged spleen with associated fluid-fluid level concerning for possible splenic source of hemoperitoneum. There is a questionable hyperattenuating focus within the splenic hilum, although this does not appear to communicate with the splenic parenchyma or splenic vasculature, however small focus of active extravasation would be difficult to exclude on this limited single phase examination.     2. Multiple bilateral pulmonary masses in this patient with known pulmonary metastatic disease, better evaluated on prior dedicated chest CT of 8/22/2017.    3. Flattened configuration of the IVC concerning for hypovolemia.    4. Mild diffuse gaseous distention of the small and large bowel without definite transition point possibly related to underlying ileus. Postsurgical change of prior lower anterior resection.    5. Multiple stable appearing thoracolumbar compression deformities.     Assessment/Plan:     * Ruptured spleen    - spontaneous bleed likely 2/2 anticoagulation  - no definitive contrast extravasation on CTA   - no surgical intervention per surgery  - s/p IVC filter placement 8/31/17  - continue to monitor serial CBCs and transfuse as indicated  - Hg improving to 10.7; will continue to monitor CBC and assess for  additional transfusion requirements        Spleen hematoma    -see above        Adenocarcinoma of rectum    - followed by Dr. Collins in [place]  - recently noted to have progression of disease  - will need to follow up with primary oncologist after discharge to discuss treatment options  - Dr. Chilel followed patient on admissions to [facility]; consulted [facility] in the setting of continuous diarrhea; will follow-up w/ them regarding their recommendations         Pulmonary embolism without acute cor pulmonale    - anticoagulation stopped 2/2 intraperitoneal bleed  - IVC filter placed 8/31/17; will require removal of IVC no later than 6 month  - continue to monitor and will need to address need for long term anticoagulation as out-patient        Thrombocytopenia    - Plts improving to 54 (40 on 09/05)  - Will transfuse if fall below 20; not actively bleeding at this time as H/H improving  - Will continue to hold enoxaparin         Diarrhea in adult patient    - Present since admission  - C.diff negative  - Mildly responsive to imodium and lomotil  - Pt transitioned to general diet several days ago   - Will explore other options to help resolve this issue         Edema extremities    - b/l Le, pitting, tender to palpation likely 2/2 to combination of immobility as well as fluid overload  - 20 mg IVP Lasix ordered; will give additional K replacement to prevent hypokalemia w/ loop diuretic use   - 4lbs wt loss after 1 dose of lasix; will consider administering another dose today   - Repeat another 20 IV  Lasix for continuous diuresis         Hypophosphatemia    - Phos down to <1.0 on AM labs despite oral replacement the day prior; improved to 2.5 on 09/06  - 45 mEq of IV potassium phosphate administered on 09/04  - Will continue 2 packets of Neutra-Phos WM+QHS for oral repletion.  - Will continue to monitor        Abdominal bloating    - Started on simethicone TID  - C.diff negative  - Requiring imodium/lomotil  for continuous diarrhea  - Consult put in for [facility] surgery; will follow   - Will monitor as pt is more ambulatory and will likely improve w/ the transition to general diet; noted mild improvement in his symptoms          DISPOSITION: Will follow up with [facility] Surgery recommendations regarding pt's diarrhea. Discharge home w/ home health pending for tomorrow.      Cammie Moser MD  Hematology/Oncology  [facility]

## 2017-09-06 NOTE — PROGRESS NOTES
[facility]  Colorectal Surgery  Progress Note    Patient Name: Claude Penn III  MRN: [id number]  Admission Date: 8/28/2017  Hospital Length of Stay: 9 days  Attending Physician: Alfonso Domingo MD    Subjective:     Interval History: No acute events overnight. Pain is well controlled. He continues to have multiple episodes of non-bloody diarrhea. Tolerating regular diet, no nausea or vomiting. Afebrile.    Post-Op Info:  * No surgery found *          Medications:  Continuous Infusions:   Scheduled Meds:   albuterol-ipratropium 2.5mg-0.5mg/3mL  3 mL Nebulization Q4H WAKE    diphenoxylate-atropine 2.5-0.025 mg/5 ml  5 mL Oral QID    famotidine (PF)  20 mg Intravenous Daily    levetiracetam IVPB  500 mg Intravenous Q12H    loperamide  2 mg Oral QID    magnesium sulfate IVPB  2 g Intravenous Once    metoprolol tartrate  50 mg Oral BID    nystatin  500,000 Units Oral QID (WM & HS)    potassium, sodium phosphates  2 packet Oral QID (WM & HS)    simethicone  1 tablet Oral TID PC    sodium chloride 0.9%  3 mL Intravenous Q8H    tamsulosin  0.4 mg Oral Daily     PRN Meds:   sodium chloride    sodium chloride    sodium chloride    calcium carbonate    morphine    ondansetron        Objective:     Vital Signs (Most Recent):  Temp: 98.5 °F (36.9 °C) (09/06/17 0742)  Pulse: 87 (09/06/17 0842)  Resp: (!) 22 (09/06/17 0842)  BP: 111/76 (09/06/17 0742)  SpO2: 98 % (09/06/17 0842) Vital Signs (24h Range):  Temp:  [96.4 °F (35.8 °C)-98.5 °F (36.9 °C)] 98.5 °F (36.9 °C)  Pulse:  [] 87  Resp:  [16-22] 22  SpO2:  [92 %-98 %] 98 %  BP: (111-129)/(76-91) 111/76     Intake/Output - Last 3 Shifts       09/04 0700 - 09/05 0659 09/05 0700 - 09/06 0659 09/06 0700 - 09/07 0659    P.O. 710 960     I.V. (mL/kg)       IV Piggyback 100 200     Total Intake(mL/kg) 810 (6.3) 1160 (9.2)     Urine (mL/kg/hr) 1300 (0.4) 1750 (0.6)     Emesis/NG output       Other       Stool 0 (0) 0 (0)     Blood       Total  Output 1300 1750      Net -490 -752             Urine Occurrence  2 x     Stool Occurrence 2 x 4 x           Physical Exam   Constitutional: He is oriented to person, place, and time. He appears well-developed and well-nourished. No distress.   HENT:   Head: Normocephalic and atraumatic.   Eyes: EOM are normal. Pupils are equal, round, and reactive to light.   Neck: Normal range of motion. Neck supple.   Cardiovascular: Normal rate and regular rhythm.    Pulmonary/Chest: Effort normal. No respiratory distress.   Abdominal: Soft. He exhibits no distension and no mass. There is no tenderness. There is no rebound and no guarding. No hernia.   Musculoskeletal: Normal range of motion.   Neurological: He is alert and oriented to person, place, and time.   Skin: Skin is warm and dry. He is not diaphoretic.   Psychiatric: He has a normal mood and affect. His behavior is normal. Judgment and thought content normal.   Nursing note and vitals reviewed.      Significant Labs:  CBC (Last 3 Results):   Recent Labs  Lab 09/04/17 0444 09/05/17  0445 09/06/17  0537   WBC 2.30* 1.90* 3.36*   RBC 3.12* 3.01* 3.52*   HGB 9.5* 9.0* 10.7*   HCT 28.1* 26.6* 31.7*   PLT 46* 40* 54*   MCV 90 88 90   MCH 30.4 29.9 30.4   MCHC 33.8 33.8 33.8     CMP (Last 3 Results):   Recent Labs  Lab 09/04/17 0444 09/05/17  0445 09/06/17  0537   GLU 96 112* 92   CALCIUM 7.8* 7.6* 8.1*   ALBUMIN 2.6* 2.5* 2.7*   PROT 5.3* 5.0* 5.8*    140 138   K 3.9 3.6 4.9   CO2 26 27 29    105 104   BUN 10 9 10   CREATININE 0.6 0.5 0.6   ALKPHOS 71 63 76   ALT 30 24 27   AST 28 28 30   BILITOT 1.7* 1.6* 2.3*       Significant Diagnostics:  I have reviewed all pertinent imaging results/findings within the past 24 hours.    Assessment/Plan:     Adenocarcinoma of rectum    S/p LAR with DLI and subsequent reversal at [facility] in 2013. Saw Dr. Chilel in 5/2016 after being found to have pelvic abscess, IR-drained, removed. Now with diarrhea s/p spontaneous  splenic bleed after anticoagulation for recurrent PE. Persistent bilat pulm dz, no chemo for the last 3 months.    -C diff is negative. Advised getting further stool studies/cultures. Portion of this is likely due to irritation from massive hemoperitoneum. Poor appetite likely also related to this. Advised to increase lomotil and imodium if need to better control diarrhea. Encouraged PO regular diet. Replete electrolytes as needed.   -He has apparently had several flex sigs since surgery. I dont see any procedure reports for this. Unsure if he's had colonoscopy, in part because it wouldn't really change his management given persistent pulmonary disease. GG enema performed on 8/24 which showed no obstruction.  -We will continue to follow along. Dr. Chilel to see today.              Ed Inman MD  Colorectal Surgery  [facility]

## 2017-09-06 NOTE — ASSESSMENT & PLAN NOTE
- Plts improving to 54 (40 on 09/05)  - Will transfuse if fall below 20; not actively bleeding at this time as H/H improving  - Will continue to hold enoxaparin

## 2017-09-06 NOTE — ASSESSMENT & PLAN NOTE
- b/l Le, pitting, tender to palpation likely 2/2 to combination of immobility as well as fluid overload  - 20 mg IVP Lasix ordered; will give additional K replacement to prevent hypokalemia w/ loop diuretic use   - 4lbs wt loss after 1 dose of lasix; will consider administering another dose today   - Repeat another 20 IV  Lasix for continuous diuresis

## 2017-09-06 NOTE — PLAN OF CARE
[facility]    HOME HEALTH ORDERS  FACE TO FACE ENCOUNTER    Patient Name: Claude Penn III  YOB: 1965    PCP: Dallas Agarwal MD   PCP Address: [address] [place] [address]  PCP Phone Number: [phone]  PCP Fax: [phone]    Encounter Date: 09/06/2017    Admit to Home Health    Diagnoses:  Active Hospital Problems    Diagnosis  POA    *Ruptured spleen [S36.09XA]  Yes     Priority: 1 - High    Spleen hematoma [S36.029A]  Yes     Priority: 2     Adenocarcinoma of rectum [C20]  Yes     Priority: 2      Surgical resection 2013, followed by chemo      Pulmonary embolism without acute cor pulmonale [I26.99]  Yes     Priority: 3     Thrombocytopenia [D69.6]  Yes     Priority: 4     Edema extremities [R60.0]  Yes    Diarrhea in adult patient [R19.7]  Yes    Hypophosphatemia [E83.39]  Unknown    Abdominal bloating [R14.0]  Yes      Resolved Hospital Problems    Diagnosis Date Resolved POA   No resolved problems to display.       No future appointments.  Follow-up Information     Nitin Traore MD.    Specialties:  General Surgery, Surgery  Contact information:  [address] [facility] [address]  [phone]                     I have seen and examined this patient face to face today. My clinical findings that support the need for the home health skilled services and home bound status are the following:  Weakness/numbness causing balance and gait disturbance due to Malignancy/Cancer making it taxing to leave home.  Requiring assistive device to leave home due to unsteady gait caused by  Weakness/Debility and Malignancy/Cancer.    Allergies:  Review of patient's allergies indicates:   Allergen Reactions    No known drug allergies        Diet: regular diet    Activities: activity as tolerated    Nursing:   SN to complete comprehensive assessment including routine vital signs. Instruct on disease process and s/s of complications to report to MD. Review/verify  medication list sent home with the patient at time of discharge  and instruct patient/caregiver as needed. Frequency may be adjusted depending on start of care date.    Notify MD if SBP > 160 or < 90; DBP > 90 or < 50; HR > 120 or < 50; Temp > 101      CONSULTS:    Physical Therapy to evaluate and treat. Evaluate for home safety and equipment needs; Establish/upgrade home exercise program. Perform / instruct on therapeutic exercises, gait training, transfer training, and Range of Motion.  Occupational Therapy to evaluate and treat. Evaluate home environment for safety and equipment needs. Perform/Instruct on transfers, ADL training, ROM, and therapeutic exercises.    MISCELLANEOUS CARE:  N/A    WOUND CARE ORDERS  n/a      Medications: Review discharge medications with patient and family and provide education.      Current Discharge Medication List      START taking these medications    Details   furosemide (LASIX) 20 MG tablet Take 1 tablet (20 mg total) by mouth 2 (two) times daily. Take as needed for edema  Qty: 60 tablet, Refills: 1      loperamide (IMODIUM) 1 mg/5 mL solution Take 10 mLs (2 mg total) by mouth 4 (four) times daily.  Refills: 0      metoprolol tartrate (LOPRESSOR) 50 MG tablet Take 1 tablet (50 mg total) by mouth 2 (two) times daily.  Qty: 60 tablet, Refills: 11      simethicone (MYLICON) 80 MG chewable tablet Take 1 tablet (80 mg total) by mouth every 6 (six) hours as needed for Flatulence.  Qty: 30 tablet, Refills: 0         CONTINUE these medications which have NOT CHANGED    Details   calcium citrate-vitamin D3 315-200 mg (CITRACAL+D) 315-200 mg-unit per tablet Take 2 tablets by mouth 2 (two) times daily.      cetirizine (ZYRTEC) 10 MG tablet Take 10 mg by mouth once daily.      diphenoxylate-atropine 2.5-0.025 mg (LOMOTIL) 2.5-0.025 mg per tablet Take 1 tablet by mouth 4 (four) times daily as needed for Diarrhea.      levetiracetam (KEPPRA) 500 MG Tab Take 1 tablet (500 mg total) by mouth 2  (two) times daily.  Qty: 60 tablet, Refills: 2      nystatin (MYCOSTATIN) 100,000 unit/mL suspension Take 5 mLs (500,000 Units total) by mouth 4 (four) times daily.  Qty: 1 Bottle, Refills: 9      pantoprazole (PROTONIX) 40 MG tablet Take 1 tablet (40 mg total) by mouth once daily.  Qty: 30 tablet, Refills: 3      promethazine (PHENERGAN) 25 MG tablet Take 25 mg by mouth every 4 (four) hours as needed.          STOP taking these medications       dexamethasone (DECADRON) 4 MG Tab Comments:   Reason for Stopping:         enoxaparin (LOVENOX) 120 mg/0.8 mL Syrg Comments:   Reason for Stopping:         warfarin (COUMADIN) 6 MG tablet Comments:   Reason for Stopping:               I certify that this patient is confined to his home and needs physical therapy and occupational therapy.

## 2017-09-06 NOTE — SUBJECTIVE & OBJECTIVE
Subjective:     Interval History: No acute events overnight. Pain is well controlled. He continues to have multiple episodes of non-bloody diarrhea. Tolerating regular diet, no nausea or vomiting. Afebrile.    Post-Op Info:  * No surgery found *          Medications:  Continuous Infusions:   Scheduled Meds:   albuterol-ipratropium 2.5mg-0.5mg/3mL  3 mL Nebulization Q4H WAKE    diphenoxylate-atropine 2.5-0.025 mg/5 ml  5 mL Oral QID    famotidine (PF)  20 mg Intravenous Daily    levetiracetam IVPB  500 mg Intravenous Q12H    loperamide  2 mg Oral QID    magnesium sulfate IVPB  2 g Intravenous Once    metoprolol tartrate  50 mg Oral BID    nystatin  500,000 Units Oral QID (WM & HS)    potassium, sodium phosphates  2 packet Oral QID (WM & HS)    simethicone  1 tablet Oral TID PC    sodium chloride 0.9%  3 mL Intravenous Q8H    tamsulosin  0.4 mg Oral Daily     PRN Meds:   sodium chloride    sodium chloride    sodium chloride    calcium carbonate    morphine    ondansetron        Objective:     Vital Signs (Most Recent):  Temp: 98.5 °F (36.9 °C) (09/06/17 0742)  Pulse: 87 (09/06/17 0842)  Resp: (!) 22 (09/06/17 0842)  BP: 111/76 (09/06/17 0742)  SpO2: 98 % (09/06/17 0842) Vital Signs (24h Range):  Temp:  [96.4 °F (35.8 °C)-98.5 °F (36.9 °C)] 98.5 °F (36.9 °C)  Pulse:  [] 87  Resp:  [16-22] 22  SpO2:  [92 %-98 %] 98 %  BP: (111-129)/(76-91) 111/76     Intake/Output - Last 3 Shifts       09/04 0700 - 09/05 0659 09/05 0700 - 09/06 0659 09/06 0700 - 09/07 0659    P.O. 710 960     I.V. (mL/kg)       IV Piggyback 100 200     Total Intake(mL/kg) 810 (6.3) 1160 (9.2)     Urine (mL/kg/hr) 1300 (0.4) 1750 (0.6)     Emesis/NG output       Other       Stool 0 (0) 0 (0)     Blood       Total Output 1300 1750      Net -490 -590             Urine Occurrence  2 x     Stool Occurrence 2 x 4 x           Physical Exam   Constitutional: He is oriented to person, place, and time. He appears well-developed and  well-nourished. No distress.   HENT:   Head: Normocephalic and atraumatic.   Eyes: EOM are normal. Pupils are equal, round, and reactive to light.   Neck: Normal range of motion. Neck supple.   Cardiovascular: Normal rate and regular rhythm.    Pulmonary/Chest: Effort normal. No respiratory distress.   Abdominal: Soft. He exhibits no distension and no mass. There is no tenderness. There is no rebound and no guarding. No hernia.   Musculoskeletal: Normal range of motion.   Neurological: He is alert and oriented to person, place, and time.   Skin: Skin is warm and dry. He is not diaphoretic.   Psychiatric: He has a normal mood and affect. His behavior is normal. Judgment and thought content normal.   Nursing note and vitals reviewed.      Significant Labs:  CBC (Last 3 Results):   Recent Labs  Lab 09/04/17 0444 09/05/17 0445 09/06/17  0537   WBC 2.30* 1.90* 3.36*   RBC 3.12* 3.01* 3.52*   HGB 9.5* 9.0* 10.7*   HCT 28.1* 26.6* 31.7*   PLT 46* 40* 54*   MCV 90 88 90   MCH 30.4 29.9 30.4   MCHC 33.8 33.8 33.8     CMP (Last 3 Results):   Recent Labs  Lab 09/04/17 0444 09/05/17 0445 09/06/17  0537   GLU 96 112* 92   CALCIUM 7.8* 7.6* 8.1*   ALBUMIN 2.6* 2.5* 2.7*   PROT 5.3* 5.0* 5.8*    140 138   K 3.9 3.6 4.9   CO2 26 27 29    105 104   BUN 10 9 10   CREATININE 0.6 0.5 0.6   ALKPHOS 71 63 76   ALT 30 24 27   AST 28 28 30   BILITOT 1.7* 1.6* 2.3*       Significant Diagnostics:  I have reviewed all pertinent imaging results/findings within the past 24 hours.

## 2017-09-06 NOTE — TELEPHONE ENCOUNTER
----- Message from Frederick Darling sent at 9/6/2017 12:01 PM CDT -----  Contact: pt   Pt is now admitted in the hospital and will like to be seen     Pt contact::[phone]

## 2017-09-06 NOTE — PLAN OF CARE
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 Transfused 2 units FFP and 2 units PRBC and [place] [facility].    8/28: Arrived to SICU 6084. Sinus tach 140s with ST depression on EKG, 2D echo performed at bedside, 6L LR given, 4 units PRBC, 2 platelets, and 3 units FFP administered. Urine and blood cultures sent.     Nursing:  + CBC q4h    Outcome: Ongoing (interventions implemented as appropriate)  POC reviewed with patient who verbalized understanding. AAOX4. Pt O2 sats stable on RA. Left Port intact and patent. Pt tolerating diet well with no nausea. Pt has good U/O. Pt on tele running NSR in the 80s.  Pt worked well with therapy today and walked down [place] X2.  Pain being controlled with PRN pain medication. Pt remained free from falls throughout the shift. VSS. Will continue to monitor.

## 2017-09-07 LAB
ALBUMIN SERPL BCP-MCNC: 2.4 G/DL
ALP SERPL-CCNC: 65 U/L
ALT SERPL W/O P-5'-P-CCNC: 22 U/L
ANION GAP SERPL CALC-SCNC: 8 MMOL/L
ANISOCYTOSIS BLD QL SMEAR: SLIGHT
AST SERPL-CCNC: 23 U/L
BACTERIA #/AREA URNS AUTO: ABNORMAL /HPF
BASOPHILS # BLD AUTO: 0 K/UL
BASOPHILS NFR BLD: 0 %
BILIRUB SERPL-MCNC: 1.8 MG/DL
BILIRUB UR QL STRIP: NEGATIVE
BUN SERPL-MCNC: 9 MG/DL
CALCIUM SERPL-MCNC: 7.6 MG/DL
CHLORIDE SERPL-SCNC: 104 MMOL/L
CLARITY UR REFRACT.AUTO: ABNORMAL
CO2 SERPL-SCNC: 26 MMOL/L
COLOR UR AUTO: ABNORMAL
CREAT SERPL-MCNC: 0.6 MG/DL
DIFFERENTIAL METHOD: ABNORMAL
E COLI SXT1 STL QL IA: NEGATIVE
E COLI SXT2 STL QL IA: NEGATIVE
EOSINOPHIL # BLD AUTO: 0 K/UL
EOSINOPHIL NFR BLD: 1 %
ERYTHROCYTE [DISTWIDTH] IN BLOOD BY AUTOMATED COUNT: 18.3 %
EST. GFR  (AFRICAN AMERICAN): >60 ML/MIN/1.73 M^2
EST. GFR  (NON AFRICAN AMERICAN): >60 ML/MIN/1.73 M^2
GLUCOSE SERPL-MCNC: 93 MG/DL
GLUCOSE UR QL STRIP: NEGATIVE
HCT VFR BLD AUTO: 28.5 %
HGB BLD-MCNC: 9.7 G/DL
HGB UR QL STRIP: ABNORMAL
HYALINE CASTS UR QL AUTO: 0 /LPF
KETONES UR QL STRIP: NEGATIVE
LEUKOCYTE ESTERASE UR QL STRIP: ABNORMAL
LYMPHOCYTES # BLD AUTO: 0.3 K/UL
LYMPHOCYTES NFR BLD: 13.8 %
MAGNESIUM SERPL-MCNC: 1.9 MG/DL
MCH RBC QN AUTO: 29.9 PG
MCHC RBC AUTO-ENTMCNC: 34 G/DL
MCV RBC AUTO: 88 FL
MICROSCOPIC COMMENT: ABNORMAL
MONOCYTES # BLD AUTO: 0.2 K/UL
MONOCYTES NFR BLD: 8.6 %
NEUTROPHILS # BLD AUTO: 1.6 K/UL
NEUTROPHILS NFR BLD: 76.6 %
NITRITE UR QL STRIP: POSITIVE
O+P STL TRI STN: NORMAL
OVALOCYTES BLD QL SMEAR: ABNORMAL
PH UR STRIP: 7 [PH] (ref 5–8)
PHOSPHATE SERPL-MCNC: 2.5 MG/DL
PLATELET # BLD AUTO: 45 K/UL
PLATELET BLD QL SMEAR: ABNORMAL
PMV BLD AUTO: 9.2 FL
POIKILOCYTOSIS BLD QL SMEAR: SLIGHT
POLYCHROMASIA BLD QL SMEAR: ABNORMAL
POTASSIUM SERPL-SCNC: 4.2 MMOL/L
PROT SERPL-MCNC: 5.1 G/DL
PROT UR QL STRIP: ABNORMAL
RBC # BLD AUTO: 3.24 M/UL
RBC #/AREA URNS AUTO: 20 /HPF (ref 0–4)
SODIUM SERPL-SCNC: 138 MMOL/L
SP GR UR STRIP: 1.01 (ref 1–1.03)
SQUAMOUS #/AREA URNS AUTO: 0 /HPF
URN SPEC COLLECT METH UR: ABNORMAL
UROBILINOGEN UR STRIP-ACNC: 4 EU/DL
WBC # BLD AUTO: 2.1 K/UL
WBC #/AREA URNS AUTO: >100 /HPF (ref 0–5)
WBC CLUMPS UR QL AUTO: ABNORMAL

## 2017-09-07 PROCEDURE — 99232 SBSQ HOSP IP/OBS MODERATE 35: CPT | Mod: ,,, | Performed by: INTERNAL MEDICINE

## 2017-09-07 PROCEDURE — 97530 THERAPEUTIC ACTIVITIES: CPT

## 2017-09-07 PROCEDURE — 25000003 PHARM REV CODE 250: Performed by: STUDENT IN AN ORGANIZED HEALTH CARE EDUCATION/TRAINING PROGRAM

## 2017-09-07 PROCEDURE — 63600175 PHARM REV CODE 636 W HCPCS: Performed by: STUDENT IN AN ORGANIZED HEALTH CARE EDUCATION/TRAINING PROGRAM

## 2017-09-07 PROCEDURE — 83735 ASSAY OF MAGNESIUM: CPT

## 2017-09-07 PROCEDURE — 36415 COLL VENOUS BLD VENIPUNCTURE: CPT

## 2017-09-07 PROCEDURE — 94799 UNLISTED PULMONARY SVC/PX: CPT

## 2017-09-07 PROCEDURE — 81001 URINALYSIS AUTO W/SCOPE: CPT

## 2017-09-07 PROCEDURE — 20600001 HC STEP DOWN PRIVATE ROOM

## 2017-09-07 PROCEDURE — 85025 COMPLETE CBC W/AUTO DIFF WBC: CPT

## 2017-09-07 PROCEDURE — A4216 STERILE WATER/SALINE, 10 ML: HCPCS | Performed by: STUDENT IN AN ORGANIZED HEALTH CARE EDUCATION/TRAINING PROGRAM

## 2017-09-07 PROCEDURE — 94640 AIRWAY INHALATION TREATMENT: CPT

## 2017-09-07 PROCEDURE — 25000242 PHARM REV CODE 250 ALT 637 W/ HCPCS: Performed by: STUDENT IN AN ORGANIZED HEALTH CARE EDUCATION/TRAINING PROGRAM

## 2017-09-07 PROCEDURE — 99900035 HC TECH TIME PER 15 MIN (STAT)

## 2017-09-07 PROCEDURE — 80053 COMPREHEN METABOLIC PANEL: CPT

## 2017-09-07 PROCEDURE — 97110 THERAPEUTIC EXERCISES: CPT

## 2017-09-07 PROCEDURE — 25000003 PHARM REV CODE 250: Performed by: SURGERY

## 2017-09-07 PROCEDURE — 84100 ASSAY OF PHOSPHORUS: CPT

## 2017-09-07 PROCEDURE — S0028 INJECTION, FAMOTIDINE, 20 MG: HCPCS | Performed by: STUDENT IN AN ORGANIZED HEALTH CARE EDUCATION/TRAINING PROGRAM

## 2017-09-07 PROCEDURE — 25000003 PHARM REV CODE 250: Performed by: COLON & RECTAL SURGERY

## 2017-09-07 PROCEDURE — 97116 GAIT TRAINING THERAPY: CPT

## 2017-09-07 PROCEDURE — 97535 SELF CARE MNGMENT TRAINING: CPT

## 2017-09-07 PROCEDURE — 97802 MEDICAL NUTRITION INDIV IN: CPT

## 2017-09-07 PROCEDURE — 94761 N-INVAS EAR/PLS OXIMETRY MLT: CPT

## 2017-09-07 RX ORDER — FUROSEMIDE 10 MG/ML
20 INJECTION INTRAMUSCULAR; INTRAVENOUS ONCE
Status: COMPLETED | OUTPATIENT
Start: 2017-09-07 | End: 2017-09-07

## 2017-09-07 RX ORDER — CHOLESTYRAMINE 4 G/9G
1 POWDER, FOR SUSPENSION ORAL 2 TIMES DAILY
Status: DISCONTINUED | OUTPATIENT
Start: 2017-09-07 | End: 2017-09-10 | Stop reason: HOSPADM

## 2017-09-07 RX ADMIN — FAMOTIDINE 20 MG: 10 INJECTION, SOLUTION INTRAVENOUS at 08:09

## 2017-09-07 RX ADMIN — METOPROLOL TARTRATE 50 MG: 50 TABLET, FILM COATED ORAL at 08:09

## 2017-09-07 RX ADMIN — LOPERAMIDE HYDROCHLORIDE 2 MG: 1 SOLUTION ORAL at 11:09

## 2017-09-07 RX ADMIN — FUROSEMIDE 20 MG: 10 INJECTION, SOLUTION INTRAMUSCULAR; INTRAVENOUS at 12:09

## 2017-09-07 RX ADMIN — LOPERAMIDE HYDROCHLORIDE 2 MG: 1 SOLUTION ORAL at 12:09

## 2017-09-07 RX ADMIN — Medication 3 ML: at 10:09

## 2017-09-07 RX ADMIN — LOPERAMIDE HYDROCHLORIDE 2 MG: 1 SOLUTION ORAL at 06:09

## 2017-09-07 RX ADMIN — DIPHENOXYLATE HYDROCHLORIDE AND ATROPINE SULFATE 5 ML: 2.5; .025 SOLUTION ORAL at 12:09

## 2017-09-07 RX ADMIN — CHOLESTYRAMINE 4 G: 4 POWDER, FOR SUSPENSION ORAL at 10:09

## 2017-09-07 RX ADMIN — IPRATROPIUM BROMIDE AND ALBUTEROL SULFATE 3 ML: .5; 3 SOLUTION RESPIRATORY (INHALATION) at 09:09

## 2017-09-07 RX ADMIN — SIMETHICONE CHEW TAB 80 MG 80 MG: 80 TABLET ORAL at 02:09

## 2017-09-07 RX ADMIN — POTASSIUM & SODIUM PHOSPHATES POWDER PACK 280-160-250 MG 2 PACKET: 280-160-250 PACK at 12:09

## 2017-09-07 RX ADMIN — SIMETHICONE CHEW TAB 80 MG 80 MG: 80 TABLET ORAL at 06:09

## 2017-09-07 RX ADMIN — IPRATROPIUM BROMIDE AND ALBUTEROL SULFATE 3 ML: .5; 3 SOLUTION RESPIRATORY (INHALATION) at 12:09

## 2017-09-07 RX ADMIN — Medication 3 ML: at 02:09

## 2017-09-07 RX ADMIN — NYSTATIN 500000 UNITS: 500000 SUSPENSION ORAL at 06:09

## 2017-09-07 RX ADMIN — TAMSULOSIN HYDROCHLORIDE 0.4 MG: 0.4 CAPSULE ORAL at 08:09

## 2017-09-07 RX ADMIN — Medication 3 ML: at 06:09

## 2017-09-07 RX ADMIN — LEVETIRACETAM 500 MG: 5 INJECTION INTRAVENOUS at 10:09

## 2017-09-07 RX ADMIN — ONDANSETRON 4 MG: 2 INJECTION INTRAMUSCULAR; INTRAVENOUS at 06:09

## 2017-09-07 RX ADMIN — POTASSIUM & SODIUM PHOSPHATES POWDER PACK 280-160-250 MG 2 PACKET: 280-160-250 PACK at 06:09

## 2017-09-07 RX ADMIN — DIPHENOXYLATE HYDROCHLORIDE AND ATROPINE SULFATE 5 ML: 2.5; .025 SOLUTION ORAL at 06:09

## 2017-09-07 RX ADMIN — DIPHENOXYLATE HYDROCHLORIDE AND ATROPINE SULFATE 5 ML: 2.5; .025 SOLUTION ORAL at 11:09

## 2017-09-07 RX ADMIN — NYSTATIN 500000 UNITS: 500000 SUSPENSION ORAL at 08:09

## 2017-09-07 RX ADMIN — POTASSIUM & SODIUM PHOSPHATES POWDER PACK 280-160-250 MG 2 PACKET: 280-160-250 PACK at 08:09

## 2017-09-07 RX ADMIN — LEVETIRACETAM 500 MG: 5 INJECTION INTRAVENOUS at 08:09

## 2017-09-07 RX ADMIN — SIMETHICONE CHEW TAB 80 MG 80 MG: 80 TABLET ORAL at 08:09

## 2017-09-07 RX ADMIN — NYSTATIN 500000 UNITS: 500000 SUSPENSION ORAL at 10:09

## 2017-09-07 RX ADMIN — CALCIUM CARBONATE (ANTACID) CHEW TAB 500 MG 500 MG: 500 CHEW TAB at 06:09

## 2017-09-07 RX ADMIN — NYSTATIN 500000 UNITS: 500000 SUSPENSION ORAL at 12:09

## 2017-09-07 RX ADMIN — IPRATROPIUM BROMIDE AND ALBUTEROL SULFATE 3 ML: .5; 3 SOLUTION RESPIRATORY (INHALATION) at 03:09

## 2017-09-07 RX ADMIN — METOPROLOL TARTRATE 50 MG: 50 TABLET, FILM COATED ORAL at 10:09

## 2017-09-07 NOTE — PLAN OF CARE
Problem: Occupational Therapy Goal  Goal: Occupational Therapy Goal  Goals to be met by:  2 weeks 9/13/17     Patient will increase functional independence with ADLs by performing:    UE Dressing with Supervision.  LE Dressing with Supervision.  Grooming while standing with Supervision.  Toileting from toilet with Supervision for hygiene and clothing management.   Stand pivot transfers with Supervision.  Toilet transfer to toilet with Supervision.     Outcome: Ongoing (interventions implemented as appropriate)  Goals remain appropriate, continue with OT POC.

## 2017-09-07 NOTE — PT/OT/SLP PROGRESS
"Occupational Therapy  Treatment    Claude Penn III   MRN: [id number]   Admitting Diagnosis: Ruptured spleen    OT Date of Treatment: 09/07/17   OT Start Time: 1140  OT Stop Time: 1203  OT Total Time (min): 23 min    Billable Minutes:  Self Care/Home Management 13 and Therapeutic Exercise 10    General Precautions: Standard, fall  Orthopedic Precautions: N/A  Braces: N/A    Subjective:  "I have been using this heating pad for my low back." Notifed RN that heat over an area of active CA (lung) is generally contraindicated but asked her to talk with MD as the benefits for this pt may outweigh the risks. Did not discuss this with pt on this date as I was not sure if his son knew of his diagnosis.     Communicated with RN prior to session.  Pt agreeable upon 2nd attempt on this date.  Pain/Comfort  Pain Rating 1: 0/10  Pain Rating Post-Intervention 1: 0/10    Objective:  Pt found sitting in chair with son present. Telemetry on.      Functional Mobility:  Bed Mobility:  Not performed on this date    Transfers:   Sit <> Stand Assistance: Stand By Assistance  Sit <> Stand Assistive Device: Rolling Walker  Toilet Transfer Assistance: Other (see comments) (ambulated to toilet)  Toilet Transfer Assistive Device: Rolling Walker  Tub Transfer Technique: Other (see comments)    Functional Ambulation:     Activities of Daily Living:  Feeding: set up A  LE Dressing Level of Assistance: Total assistance (pt has a lot of LE edema and gets SOB when leaning forward)  Hygiene/Grooming: oral care standing at the sink   Toileting: pt ambulated and voided in standing position. He was unable to make is secondary to being on Lasix.     Balance:   Static Sit: NORMAL: No deviations seen in posture held statically  Dynamic Sit: NORMAL: No deviations seen in posture held dynamically  Static Stand: FAIR: Maintains without assist but unable to take challenges  Dynamic stand: POOR: N/A    Therapeutic Activities and Exercises:  Pt performed 30 reps of " "shoulder flexion/ext, elbow flex/ext. OT fabricated a stress ball with yandel and educated pt on edema reduction techniques for L hand. Pt agreeable and motivated to perform exercises.     Pt ambulated to bathroom and was able to complete toileting and oral care standing.  Following activity, pt was winded but able to continue speaking.  He felt tired.  Prior to my arrival he had just gone to the restroom.     AM-PAC 6 CLICK ADL   How much help from another person does this patient currently need?   1 = Unable, Total/Dependent Assistance  2 = A lot, Maximum/Moderate Assistance  3 = A little, Minimum/Contact Guard/Supervision  4 = None, Modified [place]/Independent          AM-PAC Raw Score CMS "G-Code Modifier Level of Impairment Assistance   6 % Total / Unable   7 - 8 CM 80 - 100% Maximal Assist   9-13 CL 60 - 80% Moderate Assist   14 - 19 CK 40 - 60% Moderate Assist   20 - 22 CJ 20 - 40% Minimal Assist   23 CI 1-20% SBA / CGA   24 CH 0% Independent/ Mod I       Patient left supine with all lines intact    ASSESSMENT:  Claude Penn III is a 52 y.o. male with a medical diagnosis of Ruptured spleen and presents with decline in ADLs and functional mobility. Pt would benefit from skilled OT services in order to maximize independence with ADLs and facilitate safe discharge.    Rehab identified problem list/impairments:   weakness, impaired endurance, decreased functional mobility, edema and decline in ADLS    Rehab potential is good.    Activity tolerance: Fair    Discharge recommendations:   home    Barriers to discharge:  increased need for assist    Equipment recommendations:   RW    GOALS:    Occupational Therapy Goals        Problem: Occupational Therapy Goal    Goal Priority Disciplines Outcome Interventions   Occupational Therapy Goal     OT, PT/OT Ongoing (interventions implemented as appropriate)    Description:  Goals to be met by:  2 weeks 9/13/17     Patient will increase functional independence " with ADLs by performing:    UE Dressing with Supervision.  LE Dressing with Supervision.  Grooming while standing with Supervision.  Toileting from toilet with Supervision for hygiene and clothing management.   Stand pivot transfers with Supervision.  Toilet transfer to toilet with Supervision.                      Plan:  Patient to be seen 5 x/week to address the above listed problems via self-care/home management, therapeutic activities, therapeutic exercises  Plan of Care reviewed with: patient, spouse         CLYDE Nation  09/07/2017

## 2017-09-07 NOTE — PLAN OF CARE
Problem: Physical Therapy Goal  Goal: Physical Therapy Goal  Goals to be met by: 17    Patient will increase functional independence with mobility by performin. Supine to sit with Set-up [place] - not met  2. Sit to stand transfer with Supervision - not met  3. Gait  x 150 feet with Supervision using AD if needed.- not met   4. Ascend/descend 4 stair with bilateral Handrails Contact Guard Assistance - met  5. Lower extremity exercise program x15 reps per handout, with supervision - not met     Outcome: Ongoing (interventions implemented as appropriate)  Goal #4 met

## 2017-09-07 NOTE — ASSESSMENT & PLAN NOTE
- Started on simethicone TID  - C.diff negative  - Requiring scheduled imodium/lomotil for continuous diarrhea  - CRS following, appreciate recs  - f/u stool studies  - Will monitor as pt is more ambulatory and will likely improve w/ the transition to general diet; noted mild improvement in his symptoms

## 2017-09-07 NOTE — ASSESSMENT & PLAN NOTE
- b/l Le, pitting, tender to palpation likely 2/2 to combination of immobility as well as fluid overload  - given 20mg IV lasix on 9/5 and 9/6  - will give another dose today  - continue to monitor

## 2017-09-07 NOTE — ASSESSMENT & PLAN NOTE
- spontaneous bleed likely 2/2 anticoagulation  - no definitive contrast extravasation on CTA   - no surgical intervention per surgery  - s/p IVC filter placement 8/31/17  - Hg stable at 9.7 today  - continue to monitor serial CBCs and transfuse as indicated

## 2017-09-07 NOTE — PLAN OF CARE
Problem: Patient Care Overview  Goal: Plan of Care Review  Hx: Stage 4 CRC w/ bilateral lung mets s/p resection 2013, multiple right brain mets s/p gamma knife radiation and right hemicraniectomy 2013, recurrent PE (started in 2013), coumadin therapy for PE (held since 7/31),     Dx: splenic bleed, PE    7/25: discharged home from [facility] on coumadin to lovenox bridge for PE    8/28: Presented to [facility] ED with worsening abdominal pain, CT abdomen/pelvis w/contrast showed significant hemoperitoneum with associated drop in H/H. Transfused 2 units FFP and 2 units PRBC and transferred to [facility] [place].    8/28: Arrived to SICU 6084. Sinus tach 140s with ST depression on EKG, 2D echo performed at bedside, 6L LR given, 4 units PRBC, 2 platelets, and 3 units FFP administered. Urine and blood cultures sent.     Nursing:  + CBC q4h    Outcome: Ongoing (interventions implemented as appropriate)  Patient A/Ox4. Care plan reviewed with patient-verbalizes understanding. VSS. Tolerating diet moderately well,does not have large appetite. No complaints of pain. Incision clean, dry, and intact. Patient ambulates/up with assist, walked in hallway today X2. Patient remains free of falls. Patient reports of some discomfort in gluteal folds- warm compress applied, area cleaned, barrier cream applied. Spouse at bedside. Patient states no other needs at this time. WCTM.

## 2017-09-07 NOTE — SUBJECTIVE & OBJECTIVE
Interval History:   Patient seen and examined at bedside this AM w/ family member present in the room. Patient c/o multiple loose BMs; reports q2h BM but only 3 charted. Continues to work on his PO intake; states that its minimally improving. Denies any other complaints at this time aside from LE swelling that has minimally improved after another dose of 20 mg IV lasix (I/Os not accurate but weight continues to trend down).      Oncology Treatment Plan:   [No treatment plan]    Medications:  Continuous Infusions:     Scheduled Meds:   albuterol-ipratropium 2.5mg-0.5mg/3mL  3 mL Nebulization Q4H WAKE    diphenoxylate-atropine 2.5-0.025 mg/5 ml  5 mL Oral QID    famotidine (PF)  20 mg Intravenous Daily    levetiracetam IVPB  500 mg Intravenous Q12H    loperamide  2 mg Oral QID    metoprolol tartrate  50 mg Oral BID    nystatin  500,000 Units Oral QID (WM & HS)    potassium, sodium phosphates  2 packet Oral QID (WM & HS)    simethicone  1 tablet Oral TID PC    sodium chloride 0.9%  3 mL Intravenous Q8H    tamsulosin  0.4 mg Oral Daily     PRN Meds:sodium chloride, sodium chloride, sodium chloride, calcium carbonate, morphine, ondansetron     Review of Systems   Constitutional: Positive for fatigue (improving ). Negative for fever.   HENT: Negative for sore throat and trouble swallowing.    Respiratory: Positive for shortness of breath (on exertion ). Negative for cough.    Cardiovascular: Negative for chest pain and palpitations.   Gastrointestinal: Positive for diarrhea. Negative for abdominal distention, abdominal pain, constipation and nausea.   Genitourinary: Negative for difficulty urinating and hematuria.   Musculoskeletal: Negative for back pain.   Skin: Negative.    Neurological: Negative for dizziness and headaches.   All other systems reviewed and are negative.    Objective:     Vital Signs (Most Recent):  Temp: 97.9 °F (36.6 °C) (09/07/17 0300)  Pulse: (!) 115 (09/07/17 0700)  Resp: 18 (09/07/17  0300)  BP: 110/77 (09/07/17 0300)  SpO2: (!) 94 % (09/07/17 0300) Vital Signs (24h Range):  Temp:  [97.6 °F (36.4 °C)-98.6 °F (37 °C)] 97.9 °F (36.6 °C)  Pulse:  [] 115  Resp:  [18-22] 18  SpO2:  [93 %-98 %] 94 %  BP: (104-124)/(74-86) 110/77     Weight: 125.3 kg (276 lb 2 oz)  Body mass index is 34.51 kg/m².  Body surface area is 2.57 meters squared.      Intake/Output Summary (Last 24 hours) at 09/07/17 0754  Last data filed at 09/07/17 0600   Gross per 24 hour   Intake             1460 ml   Output              550 ml   Net              910 ml       Physical Exam   Constitutional: He is oriented to person, place, and time. He appears well-developed and well-nourished. No distress.   HENT:   Head: Normocephalic and atraumatic.   Right Ear: External ear normal.   Left Ear: External ear normal.   Eyes: EOM are normal. Pupils are equal, round, and reactive to light. Right eye exhibits no discharge. Left eye exhibits no discharge.   Neck: Normal range of motion. Neck supple. No tracheal deviation present.   Cardiovascular: Normal rate and regular rhythm.    Pulmonary/Chest: Effort normal and breath sounds normal. No respiratory distress. He has no wheezes. He exhibits no tenderness.   Abdominal: Soft. Bowel sounds are normal. He exhibits distension (minimal ). There is no tenderness. There is no guarding.   Musculoskeletal: Normal range of motion. He exhibits edema (2+ pitting, b/l LE and UE, tender to palpation, LUE swelling) and tenderness (LUE and b/l LE). He exhibits no deformity.   Neurological: He is alert and oriented to person, place, and time.   Skin: Skin is warm and dry.   Nursing note and vitals reviewed.      Significant Labs:   CBC:     Recent Labs  Lab 09/06/17  0537 09/07/17  0609   WBC 3.36* 2.10*   HGB 10.7* 9.7*   HCT 31.7* 28.5*   PLT 54*  --     and CMP:     Recent Labs  Lab 09/06/17  0537 09/07/17  0609    138   K 4.9 4.2    104   CO2 29 26   GLU 92 93   BUN 10 9   CREATININE 0.6  0.6   CALCIUM 8.1* 7.6*   PROT 5.8* 5.1*   ALBUMIN 2.7* 2.4*   BILITOT 2.3* 1.8*   ALKPHOS 76 65   AST 30 23   ALT 27 22   ANIONGAP 5* 8   EGFRNONAA >60.0 >60.0       Diagnostic Results:    CTA A/P 8/29/17:  1. Significant volume of hemoperitoneum throughout the abdomen and pelvis. There is somewhat more focal perisplenic collection about the enlarged spleen with associated fluid-fluid level concerning for possible splenic source of hemoperitoneum. There is a questionable hyperattenuating focus within the splenic hilum, although this does not appear to communicate with the splenic parenchyma or splenic vasculature, however small focus of active extravasation would be difficult to exclude on this limited single phase examination.     2. Multiple bilateral pulmonary masses in this patient with known pulmonary metastatic disease, better evaluated on prior dedicated chest CT of 8/22/2017.    3. Flattened configuration of the IVC concerning for hypovolemia.    4. Mild diffuse gaseous distention of the small and large bowel without definite transition point possibly related to underlying ileus. Postsurgical change of prior lower anterior resection.    5. Multiple stable appearing thoracolumbar compression deformities.

## 2017-09-07 NOTE — PROGRESS NOTES
[facility]  Hematology/Oncology  Progress Note    Patient Name: Claude Penn III  Admission Date: 8/28/2017  Hospital Length of Stay: 10 days  Code Status: Full Code     Subjective:     HPI:  Patient is a 52 y.o. male with h/o metastatic colon cancer s/p craniotomy on 7/31/17 for metastasectomy, PE on anticoagualtion,  presented to [facility] [place] as a transfer from [facility] where the patient presented for complaint of feeling cold, and clammy.  PT was found to be anemic and underwent CT of the abdomen showing a hemoperitoneum and possible splenic rupture.  Of note the patient was recently admitted to [facility] for complaint of SOB and diangnosed with a PE and SBO transferred to [facility] for evaluation and eventually discharged on lovenox bridge to coumadin on 8/25/17.  Since admission the patient underwent CTA showing no obvious active extravasation of blood intraabdominally.  The patient has required 4 units of PRBC's, 2 units of platelets, and 3 units of FFP.  Currently the patient complains of SOB on occasion, back pain, abdominal pain, and constipation.  The patient denies fever, chills, CP, N/V, diarrhea.     ONC: He was originally diagnosed with rectal ca and mets to the lung in 2013. Has followed at [facility] is additional to locally. He received CRT followed by LAR ypT3N0. KRAS and TP53 mutant, NASIR. He then received FOLFOX then FOLFIRI/avastin followed by maintenance 5FU/alicja then FOLFOX alicja followed by FOLFIRI.  Pt admitted recently on 7/27/17 for HA and confusion found to have mets to the brain s/p right craniotomy with resection of tumor on 7/31/17, s/p 8/11 gamma knife at [facility] and 2013 PE (previously on coumadin; held 7/31).    Interval History:   Patient seen and examined at bedside this AM w/ family member present in the room. Patient c/o multiple loose BMs; reports q2h BM but only 3 charted. Continues to work on his PO  intake; states that its minimally improving. Denies any other complaints at this time aside from LE swelling that has minimally improved after another dose of 20 mg IV lasix (I/Os not accurate but weight continues to trend down).      Oncology Treatment Plan:   [No treatment plan]    Medications:  Continuous Infusions:     Scheduled Meds:   albuterol-ipratropium 2.5mg-0.5mg/3mL  3 mL Nebulization Q4H WAKE    diphenoxylate-atropine 2.5-0.025 mg/5 ml  5 mL Oral QID    famotidine (PF)  20 mg Intravenous Daily    levetiracetam IVPB  500 mg Intravenous Q12H    loperamide  2 mg Oral QID    metoprolol tartrate  50 mg Oral BID    nystatin  500,000 Units Oral QID (WM & HS)    potassium, sodium phosphates  2 packet Oral QID (WM & HS)    simethicone  1 tablet Oral TID PC    sodium chloride 0.9%  3 mL Intravenous Q8H    tamsulosin  0.4 mg Oral Daily     PRN Meds:sodium chloride, sodium chloride, sodium chloride, calcium carbonate, morphine, ondansetron     Review of Systems   Constitutional: Positive for fatigue (improving ). Negative for fever.   HENT: Negative for sore throat and trouble swallowing.    Respiratory: Positive for shortness of breath (on exertion ). Negative for cough.    Cardiovascular: Negative for chest pain and palpitations.   Gastrointestinal: Positive for diarrhea. Negative for abdominal distention, abdominal pain, constipation and nausea.   Genitourinary: Negative for difficulty urinating and hematuria.   Musculoskeletal: Negative for back pain.   Skin: Negative.    Neurological: Negative for dizziness and headaches.   All other systems reviewed and are negative.    Objective:     Vital Signs (Most Recent):  Temp: 97.9 °F (36.6 °C) (09/07/17 0300)  Pulse: (!) 115 (09/07/17 0700)  Resp: 18 (09/07/17 0300)  BP: 110/77 (09/07/17 0300)  SpO2: (!) 94 % (09/07/17 0300) Vital Signs (24h Range):  Temp:  [97.6 °F (36.4 °C)-98.6 °F (37 °C)] 97.9 °F (36.6 °C)  Pulse:  [] 115  Resp:  [18-22]  18  SpO2:  [93 %-98 %] 94 %  BP: (104-124)/(74-86) 110/77     Weight: 125.3 kg (276 lb 2 oz)  Body mass index is 34.51 kg/m².  Body surface area is 2.57 meters squared.      Intake/Output Summary (Last 24 hours) at 09/07/17 0754  Last data filed at 09/07/17 0600   Gross per 24 hour   Intake             1460 ml   Output              550 ml   Net              910 ml       Physical Exam   Constitutional: He is oriented to person, place, and time. He appears well-developed and well-nourished. No distress.   HENT:   Head: Normocephalic and atraumatic.   Right Ear: External ear normal.   Left Ear: External ear normal.   Eyes: EOM are normal. Pupils are equal, round, and reactive to light. Right eye exhibits no discharge. Left eye exhibits no discharge.   Neck: Normal range of motion. Neck supple. No tracheal deviation present.   Cardiovascular: Normal rate and regular rhythm.    Pulmonary/Chest: Effort normal and breath sounds normal. No respiratory distress. He has no wheezes. He exhibits no tenderness.   Abdominal: Soft. Bowel sounds are normal. He exhibits distension (minimal ). There is no tenderness. There is no guarding.   Musculoskeletal: Normal range of motion. He exhibits edema (2+ pitting, b/l LE and UE, tender to palpation, LUE swelling) and tenderness (LUE and b/l LE). He exhibits no deformity.   Neurological: He is alert and oriented to person, place, and time.   Skin: Skin is warm and dry.   Nursing note and vitals reviewed.      Significant Labs:   CBC:     Recent Labs  Lab 09/06/17  0537 09/07/17  0609   WBC 3.36* 2.10*   HGB 10.7* 9.7*   HCT 31.7* 28.5*   PLT 54*  --     and CMP:     Recent Labs  Lab 09/06/17  0537 09/07/17  0609    138   K 4.9 4.2    104   CO2 29 26   GLU 92 93   BUN 10 9   CREATININE 0.6 0.6   CALCIUM 8.1* 7.6*   PROT 5.8* 5.1*   ALBUMIN 2.7* 2.4*   BILITOT 2.3* 1.8*   ALKPHOS 76 65   AST 30 23   ALT 27 22   ANIONGAP 5* 8   EGFRNONAA >60.0 >60.0       Diagnostic  Results:    CTA A/P 8/29/17:  1. Significant volume of hemoperitoneum throughout the abdomen and pelvis. There is somewhat more focal perisplenic collection about the enlarged spleen with associated fluid-fluid level concerning for possible splenic source of hemoperitoneum. There is a questionable hyperattenuating focus within the splenic hilum, although this does not appear to communicate with the splenic parenchyma or splenic vasculature, however small focus of active extravasation would be difficult to exclude on this limited single phase examination.     2. Multiple bilateral pulmonary masses in this patient with known pulmonary metastatic disease, better evaluated on prior dedicated chest CT of 8/22/2017.    3. Flattened configuration of the IVC concerning for hypovolemia.    4. Mild diffuse gaseous distention of the small and large bowel without definite transition point possibly related to underlying ileus. Postsurgical change of prior lower anterior resection.    5. Multiple stable appearing thoracolumbar compression deformities.     Assessment/Plan:     * Ruptured spleen    - spontaneous bleed likely 2/2 anticoagulation  - no definitive contrast extravasation on CTA   - no surgical intervention per surgery  - s/p IVC filter placement 8/31/17  - Hg stable at 9.7 today  - continue to monitor serial CBCs and transfuse as indicated        Spleen hematoma    -see above        Pulmonary embolism without acute cor pulmonale    - anticoagulation stopped 2/2 intraperitoneal bleed  - IVC filter placed 8/31/17; will require removal of IVC no later than 6 month  - continue to monitor and will need to address need for long term anticoagulation as out-patient        Diarrhea in adult patient    - Present since admission  - C.diff negative  - Continued scheduled imodium and lomotil  - Pt transitioned to general diet several days ago   - will start cholestyramine bid today  - consider paregoric   - continue to monitor         Edema extremities    - b/l Le, pitting, tender to palpation likely 2/2 to combination of immobility as well as fluid overload  - given 20mg IV lasix on 9/5 and 9/6  - will give another dose today  - continue to monitor        Hypophosphatemia    - Will continue 2 packets of Neutra-Phos WM+QHS for oral repletion.  - Will continue to monitor        Abdominal bloating    - Started on simethicone TID  - C.diff negative  - Requiring scheduled imodium/lomotil for continuous diarrhea  - CRS following, appreciate recs  - f/u stool studies  - Will monitor as pt is more ambulatory and will likely improve w/ the transition to general diet; noted mild improvement in his symptoms        Thrombocytopenia    - PLT count stable  - Will transfuse if fall below 20; not actively bleeding at this time as H/H improving  - Will continue to hold enoxaparin         Adenocarcinoma of rectum    - followed by Dr. Collins in [place]  - recently noted to have progression of disease  - will need to follow up with primary oncologist after discharge to discuss treatment options  - Dr. Chilel followed patient on admissions to [facility]; consulted [facility] in the setting of continuous diarrhea; will follow-up w/ them regarding their recommendations           DISPO: pending control of diarrhea, hopefully in 1-2 days.     Ranjit Norwood MD  Hematology/Oncology  [facility]

## 2017-09-07 NOTE — PROGRESS NOTES
"[facility]  Adult Nutrition  Progress Note    SUMMARY     Recommendations  Recommendation/Intervention:   1. Encourage increased PO intake.   2. Recommend adding Boost Plus OS to all meals.   RD to monitor.    Goals: Patient to consume > 75% of meals  Nutrition Goal Status: new  Communication of RD Recs: reviewed with RN    Reason for Assessment  Reason for Assessment: length of stay  Diagnosis:  (ruptured spleen)  Relevent Medical History: stage 4 CRC w/lung and brain mets s/p chemotherapy   General Information Comments: Patient on Regular with fair appetite and PO intake.  Nutrition Discharge Planning: Adequate nutrition via PO intake.    Nutrition Prescription Ordered  Current Diet Order: Regular    Evaluation of Received Nutrients/Fluid Intake  I/O: +14.3L since admit  Comments: LBM 9/7  % Intake of Estimated Energy Needs: 50 - 75 %  % Meal Intake: 50%     Nutrition Risk Screen   Nutrition Risk Screen: no indicators present    Nutrition/Diet History  Patient Reported Diet/Restrictions/Preferences: general  Food Preferences: No Jain or cultural needs identified at this time.  Factors Affecting Nutritional Intake:  (none)    Labs/Tests/Procedures/Meds   Pertinent Labs Reviewed: reviewed  Pertinent Labs Comments: Ca 7.6, Phos 2.5, Alb 2.4, T. bili 1.8  Pertinent Medications Reviewed: reviewed  Pertinent Medications Comments: famotidine    Physical Findings  Overall Physical Appearance: overweight  Tubes:  (none)  Oral/Mouth Cavity: WDL  Skin: edema, incision    Anthropometrics  Height: 6' 3" (190.5 cm)  Weight Method: Standard Scale  Weight: 125.3 kg (276 lb 2 oz)  Ideal Body Weight (IBW), Male: 196 lb  % Ideal Body Weight, Male (lb): 134.41 lb  BMI (Calculated): 33  BMI Grade: 30 - 34.9- obesity - grade I    Estimated/Assessed Needs  Weight Used For Calorie Calculations: 119.5 kg (263 lb 7.2 oz) (dosing weight)   Energy Calorie Requirements (kcal): 2988 kcal/day  Energy Need Method: " Kcal/kg (25 kcal/kg)  RMR (Miller-St. Jeor Equation): 2120.62  Weight Used For Protein Calculations: 119.5 kg (263 lb 7.2 oz) (dosing weight)  Protein Requirements: 120-132 g/day (1.0-1.1 g/kg)  Fluid Requirements (mL): 1 mL/kcal or per MD  Fluid Need Method: RDA Method  RDA Method (mL): 2988    Assessment and Plan  Nutrition Problem  Increased nutrient needs    Related to (etiology):   Increased demand for calories and protein     Signs and Symptoms (as evidenced by):   Stage 4 CRC w/lung and brain mets     Interventions/Recommendations (treatment strategy):  See RD recs above.    Nutrition Diagnosis Status:   New    Monitor and Evaluation  Food and Nutrient Intake: energy intake, food and beverage intake  Food and Nutrient Adminstration: diet order  Physical Activity and Function: nutrition-related ADLs and IADLs  Anthropometric Measurements: weight, weight change  Biochemical Data, Medical Tests and Procedures: electrolyte and renal panel, gastrointestinal profile, inflammatory profile  Nutrition-Focused Physical Findings: overall appearance    Nutrition Risk  Level of Risk:  (1x/week)    Nutrition Follow-Up  RD Follow-up?: Yes

## 2017-09-07 NOTE — PROGRESS NOTES
Admit Assessment    Patient Identification  Claude Penn III   :  1965  Admit Date:  2017  Attending Provider:  Alfonso Domingo MD              Referral:   Pt was admitted to  with a diagnosis of Ruptured spleen, and was admitted this hospital stay due to Ruptured spleen [S36.09XA]  Ruptured spleen [S36.09XA]  Ruptured spleen [S36.09XA].       is involved was referred to the Social Work Department via routine referral.  Patient presents as a 52 y.o. year old male.    Persons interviewed : patient and his spouse.     Living Situation:    Lives With: spouse (pt works full-time in construction and lives with his homemaker wife who will assist after discharge.) [place] [address] [place] [address] [address] [place] [address], phone: [phone] (home).  They have access to many resources and have good support from family and friends.         Current or Past Agencies and Description of Services/Supplies    DME  Patient reported having multiple pieces of equipment from his grandmother.  Equipment Currently Used at Home:  (rollator, BSC, built in shower with bench, SC)    Home Health: patient reported they have most recently been participating in outpatient therapy      IV Infusion: n/A      Nutrition: Oral but still has poor appetite    Outpatient Pharmacy:     [id number] - [place], [place] - [address]  [address] [place] [address]  Phone: [phone] Fax: [phone]      Patient Preference of agencies include: Patient satisfied with current providers     Patient/Caregiver informed of right to choose providers or agencies.  Patient provides permission to release any necessary information to [facility] and to [facility] agencies as needed to facilitate patient care, treatment planning, and patient discharge planning.  Written and verbal resources provided.      Coping; Patient reports that he has tried multiple chemos which all have not worked in his opinion. He currently is  interested in seeking holistic methods treatment as opposed to regular chemo. He is open to hearing if there are other options of traditional chemo, He reports he wants to focus more on his quality of life. He wants to be able to do the things he enjoys when he feels good.           Adjustment to Diagnosis and Treatment: Appropriate    Emotional/Behavioral/Cognitive Issues: None observed or noted.             History/Current Symptoms of Anxiety/Depression: No:   History/Current Substance Use:   Social History     Social History Main Topics    Smoking status: Never Smoker    Smokeless tobacco: Former User    Alcohol use 1.2 oz/week     2 Cans of beer per week    Drug use: No    Sexual activity: No       Indications of Abuse/Neglect: No:   Abuse Screen  Do You Feel Unsafe at Home, Work or School?: no  Has Anyone Ever Threatened to Hurt You, Your Children or Your Pets?: no  Does Anyone Try to Keep You From Having Contact With Others or Doing Things Outside Your Home?: no    Financial:  Payor/Plan Subscr  Sex Relation Sub. Ins. ID Effective Group Num   1. BLUE CROSS * PENN,CLAUDE III 1965 Male  [id number]                                   [address] [address]                            Other identified concerns/needs: None at this time    Plan: home with either home health or outpatient therapy     Interventions/Referrals: either home health or outpatient therapy.  Patient/caregiver engaged in treatment planning process.     providing psychosocial and supportive counseling, resources, education, assistance and discharge planning as appropriate.  Patient/caregiver state understanding of  available resources,  following, remains available.

## 2017-09-07 NOTE — PT/OT/SLP PROGRESS
Physical Therapy  Treatment    Claude Penn III   MRN: [id number]   Admitting Diagnosis: Ruptured spleen    PT Received On: 09/07/17  PT Start Time: 1556     PT Stop Time: 1628    PT Total Time (min): 32 min       Billable Minutes:  Gait [id number] and Therapeutic Activity 15    Treatment Type: Treatment  PT/PTA: PT             General Precautions: Standard, fall  Orthopedic Precautions: N/A   Braces:      Do you have any cultural, spiritual, Restorationist conflicts, given your current situation?: none    Subjective:  Communicated with nursing prior to session.      Pain/Comfort  Pain Rating 1: 0/10  Pain Rating Post-Intervention 1: 0/10    Objective:   Patient found with:  (port a cath)    Functional Mobility:  Bed Mobility:   Rolling/Turning to Left:  (pt. found in bedside chair)    Transfers:  Sit <> Stand Assistance: Stand By Assistance  Sit <> Stand Assistive Device: No Assistive Device  Bed <> Chair Technique: Stand Pivot  Bed <> Chair Assistance: Stand By Assistance  Bed <> Chair Assistive Device: No Assistive Device    Gait:   Gait Distance: 800'  Assistance 1: Stand by Assistance  Gait Assistive Device: Rolling walker  Gait Pattern: reciprocal  Gait Deviation(s): decreased ezra    Stairs:  Pt ascended/descend 5 stair(s) with No Assistive Device with bilateral handrails with Contact Guard Assistance.     Balance:   Static Sit: FAIR+: Able to take MINIMAL challenges from all directions  Dynamic Sit: FAIR+: Maintains balance through MINIMAL excursions of active trunk motion  Static Stand: FAIR+: Takes MINIMAL challenges from all directions  Dynamic stand: FAIR+: Needs CLOSE SUPERVISION during gait and is able to right self with minor LOB     Therapeutic Activities and Exercises:  Discussed pt.'s progress, discharge planning, and POC.     AM-PAC 6 CLICK MOBILITY  How much help from another person does this patient currently need?   1 = Unable, Total/Dependent Assistance  2 = A lot, Maximum/Moderate Assistance  3 =  A little, Minimum/Contact Guard/Supervision  4 = None, Modified [place]/Independent    Turning over in bed (including adjusting bedclothes, sheets and blankets)?: 3  Sitting down on and standing up from a chair with arms (e.g., wheelchair, bedside commode, etc.): 4  Moving from lying on back to sitting on the side of the bed?: 3  Moving to and from a bed to a chair (including a wheelchair)?: 4  Need to walk in hospital room?: 3  Climbing 3-5 steps with a railing?: 3  Total Score: 20    AM-PAC Raw Score CMS G-Code Modifier Level of Impairment Assistance   6 % Total / Unable   7 - 9 CM 80 - 100% Maximal Assist   10 - 14 CL 60 - 80% Moderate Assist   15 - 19 CK 40 - 60% Moderate Assist   20 - 22 CJ 20 - 40% Minimal Assist   23 CI 1-20% SBA / CGA   24 CH 0% Independent/ Mod I     Patient left up in chair with all lines intact and call button in reach.    Assessment:  Claude Penn III is a 52 y.o. male with a medical diagnosis of Ruptured spleen and presents with increased gait distance and attempted stairs. Pt. cooperative and tolerated treatment well. Pt. progressing with mobility. Pt. would benefit from continued PT to address functional deficits.  .    Rehab identified problem list/impairments: Rehab identified problem list/impairments: weakness, impaired endurance, impaired self care skills, impaired functional mobilty, gait instability, impaired balance, edema    Rehab potential is good.    Activity tolerance: Good    Discharge recommendations: Discharge Facility/Level Of Care Needs: home health PT (HH PT initially and then transition to OP PT)     Barriers to discharge: Barriers to Discharge: None    Equipment recommendations: Equipment Needed After Discharge: none     GOALS:    Physical Therapy Goals        Problem: Physical Therapy Goal    Goal Priority Disciplines Outcome Goal Variances Interventions   Physical Therapy Goal     PT/OT, PT Ongoing (interventions implemented as appropriate)      Description:  Goals to be met by: 17    Patient will increase functional independence with mobility by performin. Supine to sit with Set-up [place] - not met  2. Sit to stand transfer with Supervision - not met  3. Gait  x 150 feet with Supervision using AD if needed.- not met   4. Ascend/descend 4 stair with bilateral Handrails Contact Guard Assistance - met  5. Lower extremity exercise program x15 reps per handout, with supervision - not met                       PLAN:    Patient to be seen 5 x/week  to address the above listed problems via gait training, therapeutic activities, therapeutic exercises  Plan of Care expires: 17  Plan of Care reviewed with: patient, spouse         Jesse GONZALEZ Jordan, PT  2017

## 2017-09-07 NOTE — ASSESSMENT & PLAN NOTE
S/p LAR with DLI and subsequent reversal at [facility] in 2013. Saw Dr. Chilel in 5/2016 after being found to have pelvic abscess, IR-drained, removed. Now with diarrhea s/p spontaneous splenic bleed after anticoagulation for recurrent PE. Persistent bilat pulm dz, no chemo for the last 3 months.    -C diff is negative. Further stool studies/cultures pending. Portion of this is likely due to irritation from massive hemoperitoneum. Poor appetite likely also related to this. Advised to increase lomotil and imodium if need to better control diarrhea. Encouraged PO regular diet. Replete electrolytes as needed.   -He has apparently had several flex sigs since surgery. I dont see any procedure reports for this. Unsure if he's had colonoscopy, in part because it wouldn't really change his management given persistent pulmonary disease. GG enema performed on 8/24 which showed no obstruction.  -There are no surgical interventions to be made at this time. We will sign off. Please call us if you have any questions.

## 2017-09-07 NOTE — ASSESSMENT & PLAN NOTE
- Present since admission  - C.diff negative  - Continued scheduled imodium and lomotil  - Pt transitioned to general diet several days ago   - will start cholestyramine bid today  - consider paregoric   - continue to monitor

## 2017-09-07 NOTE — SUBJECTIVE & OBJECTIVE
Subjective:     Interval History: No acute events overnight. Pain well controlled. Continues to have non-bloody diarrhea. No nausea or vomiting.    Post-Op Info:  * No surgery found *          Medications:  Continuous Infusions:   Scheduled Meds:   albuterol-ipratropium 2.5mg-0.5mg/3mL  3 mL Nebulization Q4H WAKE    diphenoxylate-atropine 2.5-0.025 mg/5 ml  5 mL Oral QID    famotidine (PF)  20 mg Intravenous Daily    levetiracetam IVPB  500 mg Intravenous Q12H    loperamide  2 mg Oral QID    metoprolol tartrate  50 mg Oral BID    nystatin  500,000 Units Oral QID (WM & HS)    potassium, sodium phosphates  2 packet Oral QID (WM & HS)    simethicone  1 tablet Oral TID PC    sodium chloride 0.9%  3 mL Intravenous Q8H    tamsulosin  0.4 mg Oral Daily     PRN Meds:   sodium chloride    sodium chloride    sodium chloride    calcium carbonate    morphine    ondansetron        Objective:     Vital Signs (Most Recent):  Temp: 97.9 °F (36.6 °C) (09/07/17 0300)  Pulse: (!) 115 (09/07/17 0700)  Resp: 18 (09/07/17 0300)  BP: 110/77 (09/07/17 0300)  SpO2: (!) 94 % (09/07/17 0300) Vital Signs (24h Range):  Temp:  [97.6 °F (36.4 °C)-98.6 °F (37 °C)] 97.9 °F (36.6 °C)  Pulse:  [] 115  Resp:  [18-22] 18  SpO2:  [93 %-98 %] 94 %  BP: (104-124)/(74-86) 110/77     Intake/Output - Last 3 Shifts       09/05 0700 - 09/06 0659 09/06 0700 - 09/07 0659 09/07 0700 - 09/08 0659    P.O. 960 1260     IV Piggyback 200 200     Total Intake(mL/kg) 1160 (9.2) 1460 (11.7)     Urine (mL/kg/hr) 1750 (0.6) 550 (0.2)     Stool 0 (0) 0 (0)     Total Output 1750 550      Net -590 +910             Urine Occurrence 2 x 5 x     Stool Occurrence 4 x 3 x           Physical Exam   Constitutional: He is oriented to person, place, and time. He appears well-developed and well-nourished. No distress.   HENT:   Head: Normocephalic and atraumatic.   Eyes: EOM are normal. Pupils are equal, round, and reactive to light.   Neck: Normal range of  motion. Neck supple.   Cardiovascular: Regular rhythm.    Pulmonary/Chest: Effort normal. No respiratory distress.   Abdominal: Soft. He exhibits no distension. There is no tenderness. There is no guarding.   Musculoskeletal: Normal range of motion.   Neurological: He is alert and oriented to person, place, and time.   Skin: Skin is warm and dry. He is not diaphoretic.   Psychiatric: He has a normal mood and affect. His behavior is normal. Judgment and thought content normal.   Nursing note and vitals reviewed.    Significant Labs:  CBC (Last 3 Results):   Recent Labs  Lab 09/04/17 0444 09/05/17 0445 09/06/17  0537 09/07/17  0609   WBC 2.30* 1.90* 3.36* 2.10*   RBC 3.12* 3.01* 3.52* 3.24*   HGB 9.5* 9.0* 10.7* 9.7*   HCT 28.1* 26.6* 31.7* 28.5*   PLT 46* 40* 54*  --    MCV 90 88 90 88   MCH 30.4 29.9 30.4 29.9   MCHC 33.8 33.8 33.8 34.0     CMP (Last 3 Results):   Recent Labs  Lab 09/05/17 0445 09/06/17  0537 09/07/17  0609   * 92 93   CALCIUM 7.6* 8.1* 7.6*   ALBUMIN 2.5* 2.7* 2.4*   PROT 5.0* 5.8* 5.1*    138 138   K 3.6 4.9 4.2   CO2 27 29 26    104 104   BUN 9 10 9   CREATININE 0.5 0.6 0.6   ALKPHOS 63 76 65   ALT 24 27 22   AST 28 30 23   BILITOT 1.6* 2.3* 1.8*       Significant Diagnostics:  None

## 2017-09-07 NOTE — PLAN OF CARE
Problem: Patient Care Overview  Goal: Plan of Care Review  Outcome: Ongoing (interventions implemented as appropriate)  During shift patient A and O x 4, appropriate and cooperative with care. No c/o pain throughout shift. All VSS stable during shift. Patient voiding in urinal and toilet without issue. Continues with diarrhea, BM x 2 during shift. Up ambulating in room and hallway. Free from falls/injury during shift. Family at bedside throughout shift.

## 2017-09-07 NOTE — ASSESSMENT & PLAN NOTE
- followed by Dr. Collins in [place]  - recently noted to have progression of disease  - will need to follow up with primary oncologist after discharge to discuss treatment options  - Dr. Chilel followed patient on admissions to [facility]; consulted [place]-[facility] in the setting of continuous diarrhea; will follow-up w/ them regarding their recommendations

## 2017-09-07 NOTE — PROGRESS NOTES
[facility]  Colorectal Surgery  Progress Note    Patient Name: Claude Penn III  MRN: [id number]  Admission Date: 8/28/2017  Hospital Length of Stay: 10 days  Attending Physician: Alfonso Domingo MD    Subjective:     Interval History: No acute events overnight. Pain well controlled. Continues to have non-bloody diarrhea. No nausea or vomiting.    Post-Op Info:  * No surgery found *          Medications:  Continuous Infusions:   Scheduled Meds:   albuterol-ipratropium 2.5mg-0.5mg/3mL  3 mL Nebulization Q4H WAKE    diphenoxylate-atropine 2.5-0.025 mg/5 ml  5 mL Oral QID    famotidine (PF)  20 mg Intravenous Daily    levetiracetam IVPB  500 mg Intravenous Q12H    loperamide  2 mg Oral QID    metoprolol tartrate  50 mg Oral BID    nystatin  500,000 Units Oral QID (WM & HS)    potassium, sodium phosphates  2 packet Oral QID (WM & HS)    simethicone  1 tablet Oral TID PC    sodium chloride 0.9%  3 mL Intravenous Q8H    tamsulosin  0.4 mg Oral Daily     PRN Meds:   sodium chloride    sodium chloride    sodium chloride    calcium carbonate    morphine    ondansetron        Objective:     Vital Signs (Most Recent):  Temp: 97.9 °F (36.6 °C) (09/07/17 0300)  Pulse: (!) 115 (09/07/17 0700)  Resp: 18 (09/07/17 0300)  BP: 110/77 (09/07/17 0300)  SpO2: (!) 94 % (09/07/17 0300) Vital Signs (24h Range):  Temp:  [97.6 °F (36.4 °C)-98.6 °F (37 °C)] 97.9 °F (36.6 °C)  Pulse:  [] 115  Resp:  [18-22] 18  SpO2:  [93 %-98 %] 94 %  BP: (104-124)/(74-86) 110/77     Intake/Output - Last 3 Shifts       09/05 0700 - 09/06 0659 09/06 0700 - 09/07 0659 09/07 0700 - 09/08 0659    P.O. 960 1260     IV Piggyback 200 200     Total Intake(mL/kg) 1160 (9.2) 1460 (11.7)     Urine (mL/kg/hr) 1750 (0.6) 550 (0.2)     Stool 0 (0) 0 (0)     Total Output 1750 550      Net -590 +910             Urine Occurrence 2 x 5 x     Stool Occurrence 4 x 3 x           Physical Exam   Constitutional: He is oriented to person, place,  and time. He appears well-developed and well-nourished. No distress.   HENT:   Head: Normocephalic and atraumatic.   Eyes: EOM are normal. Pupils are equal, round, and reactive to light.   Neck: Normal range of motion. Neck supple.   Cardiovascular: Regular rhythm.    Pulmonary/Chest: Effort normal. No respiratory distress.   Abdominal: Soft. He exhibits no distension. There is no tenderness. There is no guarding.   Musculoskeletal: Normal range of motion.   Neurological: He is alert and oriented to person, place, and time.   Skin: Skin is warm and dry. He is not diaphoretic.   Psychiatric: He has a normal mood and affect. His behavior is normal. Judgment and thought content normal.   Nursing note and vitals reviewed.    Significant Labs:  CBC (Last 3 Results):   Recent Labs  Lab 09/04/17 0444 09/05/17 0445 09/06/17  0537 09/07/17  0609   WBC 2.30* 1.90* 3.36* 2.10*   RBC 3.12* 3.01* 3.52* 3.24*   HGB 9.5* 9.0* 10.7* 9.7*   HCT 28.1* 26.6* 31.7* 28.5*   PLT 46* 40* 54*  --    MCV 90 88 90 88   MCH 30.4 29.9 30.4 29.9   MCHC 33.8 33.8 33.8 34.0     CMP (Last 3 Results):   Recent Labs  Lab 09/05/17 0445 09/06/17  0537 09/07/17  0609   * 92 93   CALCIUM 7.6* 8.1* 7.6*   ALBUMIN 2.5* 2.7* 2.4*   PROT 5.0* 5.8* 5.1*    138 138   K 3.6 4.9 4.2   CO2 27 29 26    104 104   BUN 9 10 9   CREATININE 0.5 0.6 0.6   ALKPHOS 63 76 65   ALT 24 27 22   AST 28 30 23   BILITOT 1.6* 2.3* 1.8*       Significant Diagnostics:  None    Assessment/Plan:     Adenocarcinoma of rectum    S/p LAR with DLI and subsequent reversal at [facility] in 2013. Saw Dr. Chilel in 5/2016 after being found to have pelvic abscess, IR-drained, removed. Now with diarrhea s/p spontaneous splenic bleed after anticoagulation for recurrent PE. Persistent bilat pulm dz, no chemo for the last 3 months.    -C diff is negative. Further stool studies/cultures pending. Portion of this is likely due to irritation from massive hemoperitoneum. Poor  appetite likely also related to this. Advised to increase lomotil and imodium if need to better control diarrhea. Encouraged PO regular diet. Replete electrolytes as needed.   -He has apparently had several flex sigs since surgery. I dont see any procedure reports for this. Unsure if he's had colonoscopy, in part because it wouldn't really change his management given persistent pulmonary disease. GG enema performed on 8/24 which showed no obstruction.  -There are no surgical interventions to be made at this time. We will sign off. Please call us if you have any questions.              Ed Inman MD  Colorectal Surgery  [facility]

## 2017-09-08 PROBLEM — R60.9 SWELLING: Status: ACTIVE | Noted: 2017-09-08

## 2017-09-08 PROBLEM — N39.0 UTI (URINARY TRACT INFECTION): Status: ACTIVE | Noted: 2017-09-08

## 2017-09-08 LAB
ALBUMIN SERPL BCP-MCNC: 2.4 G/DL
ALP SERPL-CCNC: 64 U/L
ALT SERPL W/O P-5'-P-CCNC: 22 U/L
ANION GAP SERPL CALC-SCNC: 10 MMOL/L
AST SERPL-CCNC: 20 U/L
BASOPHILS # BLD AUTO: 0 K/UL
BASOPHILS NFR BLD: 0 %
BILIRUB SERPL-MCNC: 1.6 MG/DL
BUN SERPL-MCNC: 9 MG/DL
CALCIUM SERPL-MCNC: 7.6 MG/DL
CHLORIDE SERPL-SCNC: 102 MMOL/L
CO2 SERPL-SCNC: 27 MMOL/L
CREAT SERPL-MCNC: 0.6 MG/DL
DIFFERENTIAL METHOD: ABNORMAL
EOSINOPHIL # BLD AUTO: 0 K/UL
EOSINOPHIL NFR BLD: 0.6 %
ERYTHROCYTE [DISTWIDTH] IN BLOOD BY AUTOMATED COUNT: 18.3 %
EST. GFR  (AFRICAN AMERICAN): >60 ML/MIN/1.73 M^2
EST. GFR  (NON AFRICAN AMERICAN): >60 ML/MIN/1.73 M^2
GLUCOSE SERPL-MCNC: 98 MG/DL
HCT VFR BLD AUTO: 28 %
HGB BLD-MCNC: 9.5 G/DL
LYMPHOCYTES # BLD AUTO: 0.3 K/UL
LYMPHOCYTES NFR BLD: 18.5 %
MAGNESIUM SERPL-MCNC: 1.8 MG/DL
MCH RBC QN AUTO: 29.9 PG
MCHC RBC AUTO-ENTMCNC: 33.9 G/DL
MCV RBC AUTO: 88 FL
MONOCYTES # BLD AUTO: 0.1 K/UL
MONOCYTES NFR BLD: 7.3 %
NEUTROPHILS # BLD AUTO: 1.3 K/UL
NEUTROPHILS NFR BLD: 73.6 %
PHOSPHATE SERPL-MCNC: 3 MG/DL
PLATELET # BLD AUTO: 51 K/UL
PLATELET BLD QL SMEAR: ABNORMAL
PMV BLD AUTO: 10.3 FL
POTASSIUM SERPL-SCNC: 3.6 MMOL/L
PROT SERPL-MCNC: 5 G/DL
RBC # BLD AUTO: 3.18 M/UL
SODIUM SERPL-SCNC: 139 MMOL/L
WBC # BLD AUTO: 1.78 K/UL

## 2017-09-08 PROCEDURE — 87186 SC STD MICRODIL/AGAR DIL: CPT

## 2017-09-08 PROCEDURE — 63600175 PHARM REV CODE 636 W HCPCS: Performed by: STUDENT IN AN ORGANIZED HEALTH CARE EDUCATION/TRAINING PROGRAM

## 2017-09-08 PROCEDURE — 25000003 PHARM REV CODE 250: Performed by: SURGERY

## 2017-09-08 PROCEDURE — 87077 CULTURE AEROBIC IDENTIFY: CPT

## 2017-09-08 PROCEDURE — S0028 INJECTION, FAMOTIDINE, 20 MG: HCPCS | Performed by: STUDENT IN AN ORGANIZED HEALTH CARE EDUCATION/TRAINING PROGRAM

## 2017-09-08 PROCEDURE — 85025 COMPLETE CBC W/AUTO DIFF WBC: CPT

## 2017-09-08 PROCEDURE — 93971 EXTREMITY STUDY: CPT | Performed by: SURGERY

## 2017-09-08 PROCEDURE — 94761 N-INVAS EAR/PLS OXIMETRY MLT: CPT

## 2017-09-08 PROCEDURE — 25000003 PHARM REV CODE 250: Performed by: STUDENT IN AN ORGANIZED HEALTH CARE EDUCATION/TRAINING PROGRAM

## 2017-09-08 PROCEDURE — 25000003 PHARM REV CODE 250: Performed by: COLON & RECTAL SURGERY

## 2017-09-08 PROCEDURE — 36415 COLL VENOUS BLD VENIPUNCTURE: CPT

## 2017-09-08 PROCEDURE — A4216 STERILE WATER/SALINE, 10 ML: HCPCS | Performed by: STUDENT IN AN ORGANIZED HEALTH CARE EDUCATION/TRAINING PROGRAM

## 2017-09-08 PROCEDURE — 99900035 HC TECH TIME PER 15 MIN (STAT)

## 2017-09-08 PROCEDURE — 87086 URINE CULTURE/COLONY COUNT: CPT

## 2017-09-08 PROCEDURE — 87088 URINE BACTERIA CULTURE: CPT

## 2017-09-08 PROCEDURE — 25000242 PHARM REV CODE 250 ALT 637 W/ HCPCS: Performed by: STUDENT IN AN ORGANIZED HEALTH CARE EDUCATION/TRAINING PROGRAM

## 2017-09-08 PROCEDURE — 99233 SBSQ HOSP IP/OBS HIGH 50: CPT | Mod: ,,, | Performed by: INTERNAL MEDICINE

## 2017-09-08 PROCEDURE — 94640 AIRWAY INHALATION TREATMENT: CPT

## 2017-09-08 PROCEDURE — 94760 N-INVAS EAR/PLS OXIMETRY 1: CPT

## 2017-09-08 PROCEDURE — 80053 COMPREHEN METABOLIC PANEL: CPT

## 2017-09-08 PROCEDURE — 84100 ASSAY OF PHOSPHORUS: CPT

## 2017-09-08 PROCEDURE — 83735 ASSAY OF MAGNESIUM: CPT

## 2017-09-08 PROCEDURE — 20600001 HC STEP DOWN PRIVATE ROOM

## 2017-09-08 RX ORDER — LEVETIRACETAM 500 MG/1
500 TABLET ORAL 2 TIMES DAILY
Status: DISCONTINUED | OUTPATIENT
Start: 2017-09-08 | End: 2017-09-10 | Stop reason: HOSPADM

## 2017-09-08 RX ORDER — FUROSEMIDE 10 MG/ML
20 INJECTION INTRAMUSCULAR; INTRAVENOUS ONCE
Status: COMPLETED | OUTPATIENT
Start: 2017-09-08 | End: 2017-09-08

## 2017-09-08 RX ORDER — POTASSIUM CHLORIDE 20 MEQ/1
20 TABLET, EXTENDED RELEASE ORAL 2 TIMES DAILY
Status: DISCONTINUED | OUTPATIENT
Start: 2017-09-08 | End: 2017-09-09

## 2017-09-08 RX ADMIN — TAMSULOSIN HYDROCHLORIDE 0.4 MG: 0.4 CAPSULE ORAL at 08:09

## 2017-09-08 RX ADMIN — DIPHENOXYLATE HYDROCHLORIDE AND ATROPINE SULFATE 5 ML: 2.5; .025 SOLUTION ORAL at 12:09

## 2017-09-08 RX ADMIN — LOPERAMIDE HYDROCHLORIDE 2 MG: 1 SOLUTION ORAL at 06:09

## 2017-09-08 RX ADMIN — DIPHENOXYLATE HYDROCHLORIDE AND ATROPINE SULFATE 5 ML: 2.5; .025 SOLUTION ORAL at 06:09

## 2017-09-08 RX ADMIN — IPRATROPIUM BROMIDE AND ALBUTEROL SULFATE 3 ML: .5; 3 SOLUTION RESPIRATORY (INHALATION) at 04:09

## 2017-09-08 RX ADMIN — CHOLESTYRAMINE 4 G: 4 POWDER, FOR SUSPENSION ORAL at 08:09

## 2017-09-08 RX ADMIN — Medication 3 ML: at 10:09

## 2017-09-08 RX ADMIN — SIMETHICONE CHEW TAB 80 MG 80 MG: 80 TABLET ORAL at 10:09

## 2017-09-08 RX ADMIN — NYSTATIN 500000 UNITS: 500000 SUSPENSION ORAL at 06:09

## 2017-09-08 RX ADMIN — METOPROLOL TARTRATE 50 MG: 50 TABLET, FILM COATED ORAL at 10:09

## 2017-09-08 RX ADMIN — IPRATROPIUM BROMIDE AND ALBUTEROL SULFATE 3 ML: .5; 3 SOLUTION RESPIRATORY (INHALATION) at 11:09

## 2017-09-08 RX ADMIN — IPRATROPIUM BROMIDE AND ALBUTEROL SULFATE 3 ML: .5; 3 SOLUTION RESPIRATORY (INHALATION) at 07:09

## 2017-09-08 RX ADMIN — DIPHENOXYLATE HYDROCHLORIDE AND ATROPINE SULFATE 5 ML: 2.5; .025 SOLUTION ORAL at 11:09

## 2017-09-08 RX ADMIN — NYSTATIN 500000 UNITS: 500000 SUSPENSION ORAL at 09:09

## 2017-09-08 RX ADMIN — Medication 3 ML: at 06:09

## 2017-09-08 RX ADMIN — LOPERAMIDE HYDROCHLORIDE 2 MG: 1 SOLUTION ORAL at 12:09

## 2017-09-08 RX ADMIN — SIMETHICONE CHEW TAB 80 MG 80 MG: 80 TABLET ORAL at 08:09

## 2017-09-08 RX ADMIN — NYSTATIN 500000 UNITS: 500000 SUSPENSION ORAL at 10:09

## 2017-09-08 RX ADMIN — LOPERAMIDE HYDROCHLORIDE 2 MG: 1 SOLUTION ORAL at 11:09

## 2017-09-08 RX ADMIN — NYSTATIN 500000 UNITS: 500000 SUSPENSION ORAL at 12:09

## 2017-09-08 RX ADMIN — POTASSIUM CHLORIDE 20 MEQ: 1500 TABLET, EXTENDED RELEASE ORAL at 10:09

## 2017-09-08 RX ADMIN — CHOLESTYRAMINE 4 G: 4 POWDER, FOR SUSPENSION ORAL at 10:09

## 2017-09-08 RX ADMIN — Medication 3 ML: at 02:09

## 2017-09-08 RX ADMIN — FUROSEMIDE 20 MG: 10 INJECTION, SOLUTION INTRAMUSCULAR; INTRAVENOUS at 10:09

## 2017-09-08 RX ADMIN — LEVETIRACETAM 500 MG: 500 TABLET, FILM COATED ORAL at 12:09

## 2017-09-08 RX ADMIN — SIMETHICONE CHEW TAB 80 MG 80 MG: 80 TABLET ORAL at 02:09

## 2017-09-08 RX ADMIN — FAMOTIDINE 20 MG: 10 INJECTION, SOLUTION INTRAVENOUS at 08:09

## 2017-09-08 RX ADMIN — METOPROLOL TARTRATE 50 MG: 50 TABLET, FILM COATED ORAL at 08:09

## 2017-09-08 RX ADMIN — LEVETIRACETAM 500 MG: 500 TABLET, FILM COATED ORAL at 10:09

## 2017-09-08 RX ADMIN — LEVETIRACETAM 500 MG: 5 INJECTION INTRAVENOUS at 08:09

## 2017-09-08 NOTE — PROGRESS NOTES
[facility]  Hematology/Oncology  Progress Note    Patient Name: Claude Penn III  Admission Date: 8/28/2017  Hospital Length of Stay: 11 days  Code Status: Full Code     Subjective:     HPI:  Patient is a 52 y.o. male with h/o metastatic colon cancer s/p craniotomy on 7/31/17 for metastasectomy, PE on anticoagualtion,  [place]ed [place] [facility] [place] from [facility] where the patient presented for complaint of feeling cold, and clammy.  PT was found to be anemic and underwent CT of the abdomen showing a hemoperitoneum and possible splenic rupture.  Of note the patient was recently admitted to [facility] for complaint of SOB and diangnosed with a PE and SBO transferred to [facility] for evaluation and eventually discharged on lovenox bridge to coumadin on 8/25/17.  Since admission the patient underwent CTA showing no obvious active extravasation of blood intraabdominally.  The patient has required 4 units of PRBC's, 2 units of platelets, and 3 units of FFP.  Currently the patient complains of SOB on occasion, back pain, abdominal pain, and constipation.  The patient denies fever, chills, CP, N/V, diarrhea.     ONC: He was originally diagnosed with rectal ca and mets to the lung in 2013. Has followed at [facility] is additional to locally. He received CRT followed by LAR ypT3N0. KRAS and TP53 mutant, NASIR. He then received FOLFOX then FOLFIRI/avastin followed by maintenance 5FU/alicja then FOLFOX alicja followed by FOLFIRI.  Pt admitted recently on 7/27/17 for HA and confusion found to have mets to the brain s/p right craniotomy with resection of tumor on 7/31/17, s/p 8/11 gamma knife at [facility] and 2013 PE (previously on coumadin; held 7/31).    Interval History:   Patient seen and examined at bedside this AM w/ family member present in the room. Patient c/o multiple loose BMs; reported 4 loose B< since midnight. Continues to work on his PO  intake; states that its minimally improving, is yet to try BOOST started on 09/07. Denies any other complaints at this time aside from LE swelling that has somewhat improved after 3 doses of 20 mg IV lasix over the past 3 days ( 10 lbs water wt loss)  Per wife, pt has been having malodorous urine.  No other complaints at this time.     Oncology Treatment Plan:   [No treatment plan]    Medications:  Continuous Infusions:     Scheduled Meds:   albuterol-ipratropium 2.5mg-0.5mg/3mL  3 mL Nebulization Q4H WAKE    cholestyramine  1 packet Oral BID    diphenoxylate-atropine 2.5-0.025 mg/5 ml  5 mL Oral QID    famotidine (PF)  20 mg Intravenous Daily    levetiracetam  500 mg Oral BID    loperamide  2 mg Oral QID    metoprolol tartrate  50 mg Oral BID    nystatin  500,000 Units Oral QID (WM & HS)    potassium chloride  20 mEq Oral BID    simethicone  1 tablet Oral TID PC    sodium chloride 0.9%  3 mL Intravenous Q8H    tamsulosin  0.4 mg Oral Daily     PRN Meds:sodium chloride, sodium chloride, sodium chloride, calcium carbonate, morphine, ondansetron     Review of Systems   Constitutional: Positive for fatigue (improving ). Negative for fever.   HENT: Negative for sore throat and trouble swallowing.    Respiratory: Positive for shortness of breath (on exertion ). Negative for cough.    Cardiovascular: Negative for chest pain and palpitations.   Gastrointestinal: Positive for diarrhea. Negative for abdominal distention, abdominal pain, constipation and nausea.   Genitourinary: Negative for difficulty urinating and hematuria.        Malodorous urine    Musculoskeletal: Negative for back pain.   Skin: Negative.    Neurological: Negative for dizziness and headaches.   All other systems reviewed and are negative.    Objective:     Vital Signs (Most Recent):  Temp: 96.7 °F (35.9 °C) (09/08/17 1149)  Pulse: 92 (09/08/17 1149)  Resp: 20 (09/08/17 1139)  BP: 103/69 (09/08/17 1149)  SpO2: 97 % (09/08/17 1149) Vital Signs  (24h Range):  Temp:  [96.7 °F (35.9 °C)-99.1 °F (37.3 °C)] 96.7 °F (35.9 °C)  Pulse:  [] 92  Resp:  [14-20] 20  SpO2:  [93 %-99 %] 97 %  BP: (103-133)/(15-87) 103/69     Weight: 123.5 kg (272 lb 4.3 oz)  Body mass index is 34.03 kg/m².  Body surface area is 2.56 meters squared.      Intake/Output Summary (Last 24 hours) at 09/08/17 1331  Last data filed at 09/08/17 0600   Gross per 24 hour   Intake              830 ml   Output              551 ml   Net              279 ml       Physical Exam   Constitutional: He is oriented to person, place, and time. He appears well-developed and well-nourished. No distress.   HENT:   Head: Normocephalic and atraumatic.   Right Ear: External ear normal.   Left Ear: External ear normal.   Eyes: EOM are normal. Pupils are equal, round, and reactive to light. Right eye exhibits no discharge. Left eye exhibits no discharge.   Neck: Normal range of motion. Neck supple. No tracheal deviation present.   Cardiovascular: Normal rate and regular rhythm.    Pulmonary/Chest: Effort normal and breath sounds normal. No respiratory distress. He has no wheezes. He exhibits no tenderness.   Abdominal: Soft. Bowel sounds are normal. He exhibits no distension (minimal ). There is no tenderness. There is no guarding.   Musculoskeletal: Normal range of motion. He exhibits edema (2+ pitting, b/l LE, minimally improved. LUE swelling decreased, but  still present ) and tenderness (LUE and b/l LE). He exhibits no deformity.   Neurological: He is alert and oriented to person, place, and time.   Skin: Skin is warm and dry.   Nursing note and vitals reviewed.      Significant Labs:   CBC:     Recent Labs  Lab 09/07/17 0609 09/08/17  0435   WBC 2.10* 1.78*   HGB 9.7* 9.5*   HCT 28.5* 28.0*   PLT 45* 51*    and CMP:     Recent Labs  Lab 09/07/17 0609 09/08/17  0435    139   K 4.2 3.6    102   CO2 26 27   GLU 93 98   BUN 9 9   CREATININE 0.6 0.6   CALCIUM 7.6* 7.6*   PROT 5.1* 5.0*   ALBUMIN  2.4* 2.4*   BILITOT 1.8* 1.6*   ALKPHOS 65 64   AST 23 20   ALT 22 22   ANIONGAP 8 10   EGFRNONAA >60.0 >60.0       Diagnostic Results:    CTA A/P 8/29/17:  1. Significant volume of hemoperitoneum throughout the abdomen and pelvis. There is somewhat more focal perisplenic collection about the enlarged spleen with associated fluid-fluid level concerning for possible splenic source of hemoperitoneum. There is a questionable hyperattenuating focus within the splenic hilum, although this does not appear to communicate with the splenic parenchyma or splenic vasculature, however small focus of active extravasation would be difficult to exclude on this limited single phase examination.     2. Multiple bilateral pulmonary masses in this patient with known pulmonary metastatic disease, better evaluated on prior dedicated chest CT of 8/22/2017.    3. Flattened configuration of the IVC concerning for hypovolemia.    4. Mild diffuse gaseous distention of the small and large bowel without definite transition point possibly related to underlying ileus. Postsurgical change of prior lower anterior resection.    5. Multiple stable appearing thoracolumbar compression deformities.     Assessment/Plan:     * Ruptured spleen    - spontaneous bleed likely 2/2 anticoagulation  - no definitive contrast extravasation on CTA   - no surgical intervention per surgery  - s/p IVC filter placement 8/31/17  - Hg stable at 9.5 today  - continue to monitor serial CBCs and transfuse as indicated        Spleen hematoma    -see above        Adenocarcinoma of rectum    - followed by Dr. Collins in [place]  - recently noted to have progression of disease  - will need to follow up with primary oncologist after discharge to discuss treatment options  - Dr. Chilel followed patient on admissions to [facility]; consulted [facility] in the setting of continuous diarrhea; will follow-up w/ them regarding their recommendations         Pulmonary embolism  without acute cor pulmonale    - anticoagulation stopped 2/2 intraperitoneal bleed  - IVC filter placed 8/31/17; will require removal of IVC no later than 6 month  - continue to monitor and will need to address need for long term anticoagulation as out-patient        Thrombocytopenia    - PLT count stable  - Will transfuse if fall below 20; not actively bleeding at this time as H/H improving  - Will continue to hold enoxaparin         UTI (urinary tract infection)    - UA on 09/07 suspicious for UTI  - Ucx to follow; will initiate treatment as Cx become available         Diarrhea in adult patient    - Present since admission  - C.diff negative  - Continued scheduled imodium and lomotil  - Pt transitioned to general diet several days ago   - will start cholestyramine bid  On 09/07; will monitor for improvement   - consider paregoric   - continue to monitor        Edema extremities    - b/l Le, pitting, tender to palpation likely 2/2 to combination of immobility as well as fluid overload  - given 20mg IV lasix on 9/5 -9/8; 10 lbs wt drop since initiation of Lasix  - Compressing Stockings ordered  - Will discharge on 20 mg PO Lasix to continue aid with diuresis   - L UE US venous scan on 09/08 to evaluate for any venous abnormalities   - continue to monitor        Hypophosphatemia    - Will continue 2 packets of Neutra-Phos WM+QHS for oral repletion.  - Will continue to monitor  - Resolved        Abdominal bloating    - Started on simethicone TID  - C.diff negative  - Requiring scheduled imodium/lomotil for continuous diarrhea  - CRS following, appreciate recs  - Stool studies negative to date   - Will monitor as pt is more ambulatory and will likely improve w/ the transition to general diet; noted mild improvement in his symptoms                 Cammie Moser MD  Hematology/Oncology  [facility]

## 2017-09-08 NOTE — ASSESSMENT & PLAN NOTE
- b/l Le, pitting, tender to palpation likely 2/2 to combination of immobility as well as fluid overload  - given 20mg IV lasix on 9/5 -9/8; 10 lbs wt drop since initiation of Lasix  - Compressing Stockings ordered  - Will discharge on 20 mg PO Lasix to continue aid with diuresis   - L UE US venous scan on 09/08 to evaluate for any venous abnormalities   - continue to monitor

## 2017-09-08 NOTE — ASSESSMENT & PLAN NOTE
- PLT count stable  - Will transfuse if fall below 20; not actively bleeding at this time as H/H improving  - Will continue to hold enoxaparin

## 2017-09-08 NOTE — PLAN OF CARE
Problem: Patient Care Overview  Goal: Plan of Care Review  Hx: Stage 4 CRC w/ bilateral lung mets s/p resection 2013, multiple right brain mets s/p gamma knife radiation and right hemicraniectomy 2013, recurrent PE (started in 2013), coumadin therapy for PE (held since 7/31),     Dx: splenic bleed, PE    7/25: discharged home from [facility] on coumadin to lovenox bridge for PE    8/28: Presented to [facility] ED with worsening abdominal pain, CT abdomen/pelvis w/contrast showed significant hemoperitoneum with associated drop in H/H. Transfused 2 units FFP and 2 units PRBC and [facility].    8/28: Arrived to SICU 6084. Sinus tach 140s with ST depression on EKG, 2D echo performed at bedside, 6L LR given, 4 units PRBC, 2 platelets, and 3 units FFP administered. Urine and blood cultures sent.     Nursing:  + CBC q4h    Outcome: Ongoing (interventions implemented as appropriate)  Patient A/Ox4. Care plan reviewed with patient-verbalizes understanding. VSS. Tolerating diet moderately well. No complaints of pain. Incision clean, dry, and intact. Patient's legs remain edematous (+3)- Bilateral Ace pressure bandages applied. Patient ambulates/up with assist-ambulated x3 today. Patient remains free of falls. Spouse at bedside. Patient states no other needs at this time. WCTM.

## 2017-09-08 NOTE — ASSESSMENT & PLAN NOTE
- Will continue 2 packets of Neutra-Phos WM+QHS for oral repletion.  - Will continue to monitor  - Resolved

## 2017-09-08 NOTE — ASSESSMENT & PLAN NOTE
- followed by Dr. Collins in [place]  - recently noted to have progression of disease  - will need to follow up with primary oncologist after discharge to discuss treatment options  - [place]. Chilel [place] [facility] [facility] in the setting of continuous diarrhea; will follow-up w/ them regarding their recommendations

## 2017-09-08 NOTE — ASSESSMENT & PLAN NOTE
- Started on simethicone TID  - C.diff negative  - Requiring scheduled imodium/lomotil for continuous diarrhea  - CRS following, appreciate recs  - Stool studies negative to date   - Will monitor as pt is more ambulatory and will likely improve w/ the transition to general diet; noted mild improvement in his symptoms

## 2017-09-08 NOTE — ASSESSMENT & PLAN NOTE
- spontaneous bleed likely 2/2 anticoagulation  - no definitive contrast extravasation on CTA   - no surgical intervention per surgery  - s/p IVC filter placement 8/31/17  - Hg stable at 9.5 today  - continue to monitor serial CBCs and transfuse as indicated

## 2017-09-08 NOTE — PLAN OF CARE
Problem: Patient Care Overview  Goal: Plan of Care Review  Hx: Stage 4 CRC w/ bilateral lung mets s/p resection 2013, multiple right brain mets s/p gamma knife radiation and right hemicraniectomy 2013, recurrent PE (started in 2013), coumadin therapy for PE (held since 7/31),     Dx: splenic bleed, PE    7/25: discharged home from [facility] on coumadin to lovenox bridge for PE    8/28: Presented to [facility] ED with worsening abdominal pain, CT abdomen/pelvis w/contrast showed significant hemoperitoneum with associated drop in H/H. Transfused 2 units FFP and 2 units PRBC and transferred to [facility].    8/28: Arrived to SICU 6084. Sinus tach 140s with ST depression on EKG, 2D echo performed at bedside, 6L LR given, 4 units PRBC, 2 platelets, and 3 units FFP administered. Urine and blood cultures sent.     Nursing:  + CBC q4h    Outcome: Ongoing (interventions implemented as appropriate)  POC reviewed with pt who verbalized understanding. AAOx4. VSS. Remains free of falls and injury. Voiding per urinal. No BM this shift. Tolerating regular diet; no complaints of nausea. Ambulated 1x this shift in the jefferson with walker. No complaints of pain or nausea. Resting well between care. Tele monitored. No acute events. No distress noted. Wife at bedside. Will continue to monitor.

## 2017-09-08 NOTE — ASSESSMENT & PLAN NOTE
- Present since admission  - C.diff negative  - Continued scheduled imodium and lomotil  - Pt transitioned to general diet several days ago   - will start cholestyramine bid  On 09/07; will monitor for improvement   - consider paregoric   - continue to monitor

## 2017-09-08 NOTE — ASSESSMENT & PLAN NOTE
- UA on 09/07 suspicious for UTI  - Ucx to follow; will initiate treatment as Cx become available

## 2017-09-08 NOTE — SUBJECTIVE & OBJECTIVE
Interval History:   Patient seen and examined at bedside this AM w/ family member present in the room. Patient c/o multiple loose BMs; reported 4 loose B< since midnight. Continues to work on his PO intake; states that its minimally improving, is yet to try BOOST started on 09/07. Denies any other complaints at this time aside from LE swelling that has somewhat improved after 3 doses of 20 mg IV lasix over the past 3 days ( 10 lbs water wt loss)  Per wife, pt has been having malodorous urine.  No other complaints at this time.     Oncology Treatment Plan:   [No treatment plan]    Medications:  Continuous Infusions:     Scheduled Meds:   albuterol-ipratropium 2.5mg-0.5mg/3mL  3 mL Nebulization Q4H WAKE    cholestyramine  1 packet Oral BID    diphenoxylate-atropine 2.5-0.025 mg/5 ml  5 mL Oral QID    famotidine (PF)  20 mg Intravenous Daily    levetiracetam  500 mg Oral BID    loperamide  2 mg Oral QID    metoprolol tartrate  50 mg Oral BID    nystatin  500,000 Units Oral QID (WM & HS)    potassium chloride  20 mEq Oral BID    simethicone  1 tablet Oral TID PC    sodium chloride 0.9%  3 mL Intravenous Q8H    tamsulosin  0.4 mg Oral Daily     PRN Meds:sodium chloride, sodium chloride, sodium chloride, calcium carbonate, morphine, ondansetron     Review of Systems   Constitutional: Positive for fatigue (improving ). Negative for fever.   HENT: Negative for sore throat and trouble swallowing.    Respiratory: Positive for shortness of breath (on exertion ). Negative for cough.    Cardiovascular: Negative for chest pain and palpitations.   Gastrointestinal: Positive for diarrhea. Negative for abdominal distention, abdominal pain, constipation and nausea.   Genitourinary: Negative for difficulty urinating and hematuria.        Malodorous urine    Musculoskeletal: Negative for back pain.   Skin: Negative.    Neurological: Negative for dizziness and headaches.   All other systems reviewed and are  negative.    Objective:     Vital Signs (Most Recent):  Temp: 96.7 °F (35.9 °C) (09/08/17 1149)  Pulse: 92 (09/08/17 1149)  Resp: 20 (09/08/17 1139)  BP: 103/69 (09/08/17 1149)  SpO2: 97 % (09/08/17 1149) Vital Signs (24h Range):  Temp:  [96.7 °F (35.9 °C)-99.1 °F (37.3 °C)] 96.7 °F (35.9 °C)  Pulse:  [] 92  Resp:  [14-20] 20  SpO2:  [93 %-99 %] 97 %  BP: (103-133)/(15-87) 103/69     Weight: 123.5 kg (272 lb 4.3 oz)  Body mass index is 34.03 kg/m².  Body surface area is 2.56 meters squared.      Intake/Output Summary (Last 24 hours) at 09/08/17 1331  Last data filed at 09/08/17 0600   Gross per 24 hour   Intake              830 ml   Output              551 ml   Net              279 ml       Physical Exam   Constitutional: He is oriented to person, place, and time. He appears well-developed and well-nourished. No distress.   HENT:   Head: Normocephalic and atraumatic.   Right Ear: External ear normal.   Left Ear: External ear normal.   Eyes: EOM are normal. Pupils are equal, round, and reactive to light. Right eye exhibits no discharge. Left eye exhibits no discharge.   Neck: Normal range of motion. Neck supple. No tracheal deviation present.   Cardiovascular: Normal rate and regular rhythm.    Pulmonary/Chest: Effort normal and breath sounds normal. No respiratory distress. He has no wheezes. He exhibits no tenderness.   Abdominal: Soft. Bowel sounds are normal. He exhibits no distension (minimal ). There is no tenderness. There is no guarding.   Musculoskeletal: Normal range of motion. He exhibits edema (2+ pitting, b/l LE, minimally improved. LUE swelling decreased, but  still present ) and tenderness (LUE and b/l LE). He exhibits no deformity.   Neurological: He is alert and oriented to person, place, and time.   Skin: Skin is warm and dry.   Nursing note and vitals reviewed.      Significant Labs:   CBC:     Recent Labs  Lab 09/07/17  0609 09/08/17  0435   WBC 2.10* 1.78*   HGB 9.7* 9.5*   HCT 28.5* 28.0*    PLT 45* 51*    and CMP:     Recent Labs  Lab 09/07/17  0609 09/08/17  0435    139   K 4.2 3.6    102   CO2 26 27   GLU 93 98   BUN 9 9   CREATININE 0.6 0.6   CALCIUM 7.6* 7.6*   PROT 5.1* 5.0*   ALBUMIN 2.4* 2.4*   BILITOT 1.8* 1.6*   ALKPHOS 65 64   AST 23 20   ALT 22 22   ANIONGAP 8 10   EGFRNONAA >60.0 >60.0       Diagnostic Results:    CTA A/P 8/29/17:  1. Significant volume of hemoperitoneum throughout the abdomen and pelvis. There is somewhat more focal perisplenic collection about the enlarged spleen with associated fluid-fluid level concerning for possible splenic source of hemoperitoneum. There is a questionable hyperattenuating focus within the splenic hilum, although this does not appear to communicate with the splenic parenchyma or splenic vasculature, however small focus of active extravasation would be difficult to exclude on this limited single phase examination.     2. Multiple bilateral pulmonary masses in this patient with known pulmonary metastatic disease, better evaluated on prior dedicated chest CT of 8/22/2017.    3. Flattened configuration of the IVC concerning for hypovolemia.    4. Mild diffuse gaseous distention of the small and large bowel without definite transition point possibly related to underlying ileus. Postsurgical change of prior lower anterior resection.    5. Multiple stable appearing thoracolumbar compression deformities.

## 2017-09-09 PROBLEM — R63.0 DECREASED APPETITE: Status: ACTIVE | Noted: 2017-09-09

## 2017-09-09 LAB
ALBUMIN SERPL BCP-MCNC: 2.4 G/DL
ALP SERPL-CCNC: 66 U/L
ALT SERPL W/O P-5'-P-CCNC: 19 U/L
ANION GAP SERPL CALC-SCNC: 10 MMOL/L
AST SERPL-CCNC: 20 U/L
BACTERIA STL CULT: NORMAL
BASOPHILS # BLD AUTO: 0.01 K/UL
BASOPHILS NFR BLD: 0.5 %
BILIRUB SERPL-MCNC: 1.6 MG/DL
BUN SERPL-MCNC: 8 MG/DL
CALCIUM SERPL-MCNC: 7.6 MG/DL
CHLORIDE SERPL-SCNC: 105 MMOL/L
CO2 SERPL-SCNC: 25 MMOL/L
CREAT SERPL-MCNC: 0.6 MG/DL
DIFFERENTIAL METHOD: ABNORMAL
EOSINOPHIL # BLD AUTO: 0 K/UL
EOSINOPHIL NFR BLD: 1 %
ERYTHROCYTE [DISTWIDTH] IN BLOOD BY AUTOMATED COUNT: 18.3 %
EST. GFR  (AFRICAN AMERICAN): >60 ML/MIN/1.73 M^2
EST. GFR  (NON AFRICAN AMERICAN): >60 ML/MIN/1.73 M^2
GLUCOSE SERPL-MCNC: 98 MG/DL
HCT VFR BLD AUTO: 28.5 %
HGB BLD-MCNC: 9.6 G/DL
LYMPHOCYTES # BLD AUTO: 0.3 K/UL
LYMPHOCYTES NFR BLD: 13.4 %
MAGNESIUM SERPL-MCNC: 1.7 MG/DL
MCH RBC QN AUTO: 30.1 PG
MCHC RBC AUTO-ENTMCNC: 33.7 G/DL
MCV RBC AUTO: 89 FL
MONOCYTES # BLD AUTO: 0.2 K/UL
MONOCYTES NFR BLD: 10.5 %
NEUTROPHILS # BLD AUTO: 1.6 K/UL
NEUTROPHILS NFR BLD: 74.1 %
PHOSPHATE SERPL-MCNC: 2.9 MG/DL
PLATELET # BLD AUTO: 57 K/UL
PMV BLD AUTO: 9.4 FL
POTASSIUM SERPL-SCNC: 3.5 MMOL/L
PROT SERPL-MCNC: 5 G/DL
RBC # BLD AUTO: 3.19 M/UL
SODIUM SERPL-SCNC: 140 MMOL/L
WBC # BLD AUTO: 2.09 K/UL

## 2017-09-09 PROCEDURE — 25000003 PHARM REV CODE 250: Performed by: STUDENT IN AN ORGANIZED HEALTH CARE EDUCATION/TRAINING PROGRAM

## 2017-09-09 PROCEDURE — 84100 ASSAY OF PHOSPHORUS: CPT

## 2017-09-09 PROCEDURE — A4216 STERILE WATER/SALINE, 10 ML: HCPCS | Performed by: STUDENT IN AN ORGANIZED HEALTH CARE EDUCATION/TRAINING PROGRAM

## 2017-09-09 PROCEDURE — S0028 INJECTION, FAMOTIDINE, 20 MG: HCPCS | Performed by: STUDENT IN AN ORGANIZED HEALTH CARE EDUCATION/TRAINING PROGRAM

## 2017-09-09 PROCEDURE — 25000003 PHARM REV CODE 250: Performed by: SURGERY

## 2017-09-09 PROCEDURE — 80053 COMPREHEN METABOLIC PANEL: CPT

## 2017-09-09 PROCEDURE — 25000003 PHARM REV CODE 250: Performed by: COLON & RECTAL SURGERY

## 2017-09-09 PROCEDURE — 85025 COMPLETE CBC W/AUTO DIFF WBC: CPT

## 2017-09-09 PROCEDURE — 83735 ASSAY OF MAGNESIUM: CPT

## 2017-09-09 PROCEDURE — 36415 COLL VENOUS BLD VENIPUNCTURE: CPT

## 2017-09-09 PROCEDURE — 20600001 HC STEP DOWN PRIVATE ROOM

## 2017-09-09 PROCEDURE — 99233 SBSQ HOSP IP/OBS HIGH 50: CPT | Mod: ,,, | Performed by: INTERNAL MEDICINE

## 2017-09-09 PROCEDURE — 97535 SELF CARE MNGMENT TRAINING: CPT

## 2017-09-09 PROCEDURE — 97530 THERAPEUTIC ACTIVITIES: CPT

## 2017-09-09 PROCEDURE — 63600175 PHARM REV CODE 636 W HCPCS: Performed by: STUDENT IN AN ORGANIZED HEALTH CARE EDUCATION/TRAINING PROGRAM

## 2017-09-09 RX ORDER — POTASSIUM CHLORIDE 20 MEQ/1
20 TABLET, EXTENDED RELEASE ORAL 2 TIMES DAILY
Status: DISCONTINUED | OUTPATIENT
Start: 2017-09-09 | End: 2017-09-10 | Stop reason: HOSPADM

## 2017-09-09 RX ORDER — IPRATROPIUM BROMIDE AND ALBUTEROL SULFATE 2.5; .5 MG/3ML; MG/3ML
3 SOLUTION RESPIRATORY (INHALATION) EVERY 4 HOURS PRN
Status: DISCONTINUED | OUTPATIENT
Start: 2017-09-09 | End: 2017-09-10 | Stop reason: HOSPADM

## 2017-09-09 RX ORDER — POTASSIUM CHLORIDE 20 MEQ/1
20 TABLET, EXTENDED RELEASE ORAL 2 TIMES DAILY
Qty: 20 TABLET | Refills: 0 | Status: SHIPPED | OUTPATIENT
Start: 2017-09-09

## 2017-09-09 RX ORDER — FUROSEMIDE 20 MG/1
20 TABLET ORAL DAILY
Qty: 10 TABLET | Refills: 1 | Status: SHIPPED | OUTPATIENT
Start: 2017-09-09 | End: 2018-09-09

## 2017-09-09 RX ORDER — FUROSEMIDE 10 MG/ML
20 INJECTION INTRAMUSCULAR; INTRAVENOUS ONCE
Status: COMPLETED | OUTPATIENT
Start: 2017-09-09 | End: 2017-09-09

## 2017-09-09 RX ORDER — POTASSIUM CHLORIDE 20 MEQ/1
40 TABLET, EXTENDED RELEASE ORAL ONCE
Status: COMPLETED | OUTPATIENT
Start: 2017-09-09 | End: 2017-09-09

## 2017-09-09 RX ORDER — CIPROFLOXACIN 250 MG/1
250 TABLET, FILM COATED ORAL EVERY 12 HOURS
Status: DISCONTINUED | OUTPATIENT
Start: 2017-09-09 | End: 2017-09-10

## 2017-09-09 RX ORDER — FUROSEMIDE 20 MG/1
20 TABLET ORAL DAILY
Qty: 10 TABLET | Refills: 1 | Status: SHIPPED | OUTPATIENT
Start: 2017-09-09 | End: 2017-09-09

## 2017-09-09 RX ORDER — CHOLESTYRAMINE 4 G/9G
1 POWDER, FOR SUSPENSION ORAL 2 TIMES DAILY
Qty: 180 PACKET | Refills: 3 | Status: SHIPPED | OUTPATIENT
Start: 2017-09-09 | End: 2018-09-09

## 2017-09-09 RX ORDER — CIPROFLOXACIN 250 MG/1
250 TABLET, FILM COATED ORAL EVERY 12 HOURS
Qty: 10 TABLET | Refills: 0 | Status: SHIPPED | OUTPATIENT
Start: 2017-09-09 | End: 2017-09-10

## 2017-09-09 RX ORDER — CYPROHEPTADINE HYDROCHLORIDE 2 MG/5ML
4 SOLUTION ORAL
Qty: 473 ML | Refills: 2 | Status: SHIPPED | OUTPATIENT
Start: 2017-09-09

## 2017-09-09 RX ORDER — CYPROHEPTADINE HYDROCHLORIDE 2 MG/5ML
4 SOLUTION ORAL
Status: DISCONTINUED | OUTPATIENT
Start: 2017-09-09 | End: 2017-09-10 | Stop reason: HOSPADM

## 2017-09-09 RX ADMIN — FUROSEMIDE 20 MG: 10 INJECTION, SOLUTION INTRAMUSCULAR; INTRAVENOUS at 12:09

## 2017-09-09 RX ADMIN — Medication 3 ML: at 02:09

## 2017-09-09 RX ADMIN — SIMETHICONE CHEW TAB 80 MG 80 MG: 80 TABLET ORAL at 03:09

## 2017-09-09 RX ADMIN — METOPROLOL TARTRATE 50 MG: 50 TABLET, FILM COATED ORAL at 09:09

## 2017-09-09 RX ADMIN — LOPERAMIDE HYDROCHLORIDE 2 MG: 1 SOLUTION ORAL at 11:09

## 2017-09-09 RX ADMIN — CYPROHEPTADINE HYDROCHLORIDE 4 MG: 2 SYRUP ORAL at 05:09

## 2017-09-09 RX ADMIN — CYPROHEPTADINE HYDROCHLORIDE 4 MG: 2 SYRUP ORAL at 12:09

## 2017-09-09 RX ADMIN — LEVETIRACETAM 500 MG: 500 TABLET, FILM COATED ORAL at 09:09

## 2017-09-09 RX ADMIN — LOPERAMIDE HYDROCHLORIDE 2 MG: 1 SOLUTION ORAL at 05:09

## 2017-09-09 RX ADMIN — Medication 3 ML: at 10:09

## 2017-09-09 RX ADMIN — CIPROFLOXACIN HYDROCHLORIDE 250 MG: 250 TABLET, FILM COATED ORAL at 09:09

## 2017-09-09 RX ADMIN — POTASSIUM CHLORIDE 20 MEQ: 1500 TABLET, EXTENDED RELEASE ORAL at 09:09

## 2017-09-09 RX ADMIN — MAGNESIUM SULFATE HEPTAHYDRATE 1 G: 500 INJECTION, SOLUTION INTRAMUSCULAR; INTRAVENOUS at 09:09

## 2017-09-09 RX ADMIN — POTASSIUM CHLORIDE 20 MEQ: 1500 TABLET, EXTENDED RELEASE ORAL at 12:09

## 2017-09-09 RX ADMIN — CIPROFLOXACIN HYDROCHLORIDE 250 MG: 250 TABLET, FILM COATED ORAL at 12:09

## 2017-09-09 RX ADMIN — DIPHENOXYLATE HYDROCHLORIDE AND ATROPINE SULFATE 5 ML: 2.5; .025 SOLUTION ORAL at 05:09

## 2017-09-09 RX ADMIN — TAMSULOSIN HYDROCHLORIDE 0.4 MG: 0.4 CAPSULE ORAL at 09:09

## 2017-09-09 RX ADMIN — DIPHENOXYLATE HYDROCHLORIDE AND ATROPINE SULFATE 5 ML: 2.5; .025 SOLUTION ORAL at 12:09

## 2017-09-09 RX ADMIN — NYSTATIN 500000 UNITS: 500000 SUSPENSION ORAL at 09:09

## 2017-09-09 RX ADMIN — CHOLESTYRAMINE 4 G: 4 POWDER, FOR SUSPENSION ORAL at 09:09

## 2017-09-09 RX ADMIN — SIMETHICONE CHEW TAB 80 MG 80 MG: 80 TABLET ORAL at 09:09

## 2017-09-09 RX ADMIN — NYSTATIN 500000 UNITS: 500000 SUSPENSION ORAL at 05:09

## 2017-09-09 RX ADMIN — LOPERAMIDE HYDROCHLORIDE 2 MG: 1 SOLUTION ORAL at 12:09

## 2017-09-09 RX ADMIN — FAMOTIDINE 20 MG: 10 INJECTION, SOLUTION INTRAVENOUS at 09:09

## 2017-09-09 RX ADMIN — SIMETHICONE CHEW TAB 80 MG 80 MG: 80 TABLET ORAL at 08:09

## 2017-09-09 RX ADMIN — NYSTATIN 500000 UNITS: 500000 SUSPENSION ORAL at 12:09

## 2017-09-09 RX ADMIN — Medication 3 ML: at 06:09

## 2017-09-09 RX ADMIN — POTASSIUM CHLORIDE 40 MEQ: 1500 TABLET, EXTENDED RELEASE ORAL at 09:09

## 2017-09-09 RX ADMIN — DIPHENOXYLATE HYDROCHLORIDE AND ATROPINE SULFATE 5 ML: 2.5; .025 SOLUTION ORAL at 11:09

## 2017-09-09 NOTE — PLAN OF CARE
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       No future appointments.  Follow-up Information     Nitin Traore MD.    Specialties:  General Surgery, Surgery  Contact information:  [address]  [facility] [address] [phone]             Call Giovanna Collins MD.    Specialty:  Hematology and Oncology  Contact information:  [address]  [facility]  [place] [address]  [phone]                     I have seen and examined this patient face to face today. My clinical findings that support the need for the home health skilled services and home bound status are the following:  Weakness/numbness causing balance and gait disturbance due to Malignancy/Cancer making it taxing to leave home.  Requiring assistive device to leave home due to unsteady gait caused by  Malignancy/Cancer.    Allergies:  Review of patient's allergies indicates:   Allergen  Reactions    No known drug allergies        Diet: regular diet and supplements: BOOST w/ meals    Activities: activity as tolerated    Nursing:   SN to complete comprehensive assessment including routine vital signs. Instruct on disease process and s/s of complications to report to MD. Review/verify medication list sent home with the patient at time of discharge  and instruct patient/caregiver as needed. Frequency may be adjusted depending on start of care date.    Notify MD if SBP > 160 or < 90; DBP > 90 or < 50; HR > 120 or < 50; Temp > 101;       CONSULTS:    Physical Therapy to evaluate and treat. Evaluate for home safety and equipment needs; Establish/upgrade home exercise program. Perform / instruct on therapeutic exercises, gait training, transfer training, and Range of Motion.  Occupational Therapy to evaluate and treat. Evaluate home environment for safety and equipment needs. Perform/Instruct on transfers, ADL training, ROM, and therapeutic exercises.    MISCELLANEOUS CARE:  N/A    WOUND CARE ORDERS  n/a      Medications: Review discharge medications with patient and family and provide education.      Current Discharge Medication List      START taking these medications    Details   cholestyramine (QUESTRAN) 4 gram packet Take 1 packet (4 g total) by mouth 2 (two) times daily.  Qty: 180 packet, Refills: 3    Associated Diagnoses: Diarrhea in adult patient      ciprofloxacin HCl (CIPRO) 250 MG tablet Take 1 tablet (250 mg total) by mouth every 12 (twelve) hours.  Qty: 8 tablet, Refills: 0      cyproheptadine (,PERIACTIN,) 2 mg/5 mL syrup Take 10 mLs (4 mg total) by mouth 3 (three) times daily before meals.  Qty: 473 mL, Refills: 2      furosemide (LASIX) 20 MG tablet Take 1 tablet (20 mg total) by mouth once daily. Take as needed for edema  Qty: 10 tablet, Refills: 1      loperamide (IMODIUM) 1 mg/5 mL solution Take 10 mLs (2 mg total) by mouth 4 (four) times daily.  Refills: 0      metoprolol tartrate  (LOPRESSOR) 50 MG tablet Take 1 tablet (50 mg total) by mouth 2 (two) times daily.  Qty: 60 tablet, Refills: 11      potassium chloride SA (K-DUR,KLOR-CON) 20 MEQ tablet Take 1 tablet (20 mEq total) by mouth 2 (two) times daily.  Qty: 20 tablet, Refills: 0      simethicone (MYLICON) 80 MG chewable tablet Take 1 tablet (80 mg total) by mouth every 6 (six) hours as needed for Flatulence.  Qty: 30 tablet, Refills: 0         CONTINUE these medications which have NOT CHANGED    Details   calcium citrate-vitamin D3 315-200 mg (CITRACAL+D) 315-200 mg-unit per tablet Take 2 tablets by mouth 2 (two) times daily.      cetirizine (ZYRTEC) 10 MG tablet Take 10 mg by mouth once daily.      diphenoxylate-atropine 2.5-0.025 mg (LOMOTIL) 2.5-0.025 mg per tablet Take 1 tablet by mouth 4 (four) times daily as needed for Diarrhea.      levetiracetam (KEPPRA) 500 MG Tab Take 1 tablet (500 mg total) by mouth 2 (two) times daily.  Qty: 60 tablet, Refills: 2      nystatin (MYCOSTATIN) 100,000 unit/mL suspension Take 5 mLs (500,000 Units total) by mouth 4 (four) times daily.  Qty: 1 Bottle, Refills: 9      pantoprazole (PROTONIX) 40 MG tablet Take 1 tablet (40 mg total) by mouth once daily.  Qty: 30 tablet, Refills: 3      promethazine (PHENERGAN) 25 MG tablet Take 25 mg by mouth every 4 (four) hours as needed.          STOP taking these medications       dexamethasone (DECADRON) 4 MG Tab Comments:   Reason for Stopping:         enoxaparin (LOVENOX) 120 mg/0.8 mL Syrg Comments:   Reason for Stopping:         warfarin (COUMADIN) 6 MG tablet Comments:   Reason for Stopping:               I certify that this patient is confined to his home and needs physical therapy and occupational therapy.

## 2017-09-09 NOTE — ASSESSMENT & PLAN NOTE
- Started on simethicone TID  - C.diff negative  - Requiring scheduled imodium/lomotil for continuous diarrhea  - CRS following, appreciate recs  - Stool studies negative  - likely 2/2 bowel irritation s/p hemoperitoneum

## 2017-09-09 NOTE — PLAN OF CARE
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 Transfused 2 units FFP and 2 units PRBC and transferred to [facility].    8/28: Arrived to SICU 6084. Sinus tach 140s with ST depression on EKG, 2D echo performed at bedside, 6L LR given, 4 units PRBC, 2 platelets, and 3 units FFP administered. Urine and blood cultures sent.     Nursing:  + CBC q4h    Outcome: Ongoing (interventions implemented as appropriate)  POC reviewed with patient who verbalized understanding. AAOX4. Pt O2 sats stable on RA. Left Port intact and patent. Pt voiding via urinal with adequate output. Pt on tele running NSR/tach. Pt tolerating diet well with no nausea.  Pt worked well with therapy today and walked down jefferson X1. Swelling in left arm is now a +2.  Pain being controlled with PRN pain medication. Pt remained free from falls throughout the shift. VSS. Will continue to monitor.

## 2017-09-09 NOTE — ASSESSMENT & PLAN NOTE
- spontaneous bleed likely 2/2 anticoagulation  - no definitive contrast extravasation on CTA   - no surgical intervention per surgery  - s/p IVC filter placement 8/31/17  - continue to monitor serial CBCs and transfuse as indicated

## 2017-09-09 NOTE — ASSESSMENT & PLAN NOTE
- b/l Le, pitting, tender to palpation likely 2/2 to combination of immobility as well as fluid overload  - given 20mg IV lasix on 9/5 -9/9; 12 lbs wt drop since initiation of Lasix  - Compressing Stockings ordered  - Will discharge on 20 mg PO Lasix to continue aid with diuresis   - L UE US venous scan on 09/08 to evaluate for any venous abnormalities; noted to have an acute occlusive superficial vein thrombosis in mid-distal cephalic veins. Will continue to hold anticoagulation in the setting of recent bleed requiring transfusions.  - continue to monitor

## 2017-09-09 NOTE — PLAN OF CARE
Problem: Occupational Therapy Goal  Goal: Occupational Therapy Goal  Goals to be met by:  2 weeks 9/13/17     Patient will increase functional independence with ADLs by performing:    UE Dressing with Supervision.  LE Dressing with Supervision.  Grooming while standing with Supervision.  Toileting from toilet with Supervision for hygiene and clothing management.   Stand pivot transfers with Supervision.  Toilet transfer to toilet with Supervision.     Outcome: Outcome(s) achieved Date Met: 09/09/17  Goals achieved and pt not longer requiring acute skilled OT services at this time. D/C OT 9/9/2017

## 2017-09-09 NOTE — ASSESSMENT & PLAN NOTE
- followed by Dr. Collins in [place]  - recently noted to have progression of disease  - will need to follow up with primary oncologist after discharge to discuss treatment options  - Dr. Chilel followed patient on admissions to [facility]; consulted [facility] in the setting of continuous diarrhea; will follow-up w/ them regarding their recommendations

## 2017-09-09 NOTE — SUBJECTIVE & OBJECTIVE
Interval History:   Patient seen and examined at bedside this AM w/ family members present in the room. Pt states that the frequency of diarrhea is decreasing, yet he is still having loose stools. Appetite is marginally improving; will continue to supplement w/ BOOST and pt was started on periactin.   Swelling is slightly decreased in both LE and L Ue.   No other complaints at this time. Pt amendable to discharge tomorrow.     Oncology Treatment Plan:   [No treatment plan]    Medications:  Continuous Infusions:     Scheduled Meds:   cholestyramine  1 packet Oral BID    ciprofloxacin HCl  250 mg Oral Q12H    cyproheptadine  4 mg Oral TID AC    diphenoxylate-atropine 2.5-0.025 mg/5 ml  5 mL Oral QID    famotidine (PF)  20 mg Intravenous Daily    levetiracetam  500 mg Oral BID    loperamide  2 mg Oral QID    metoprolol tartrate  50 mg Oral BID    nystatin  500,000 Units Oral QID (WM & HS)    potassium chloride  20 mEq Oral BID    simethicone  1 tablet Oral TID PC    sodium chloride 0.9%  3 mL Intravenous Q8H    tamsulosin  0.4 mg Oral Daily     PRN Meds:sodium chloride, sodium chloride, sodium chloride, albuterol-ipratropium 2.5mg-0.5mg/3mL, calcium carbonate, morphine, ondansetron     Review of Systems   Constitutional: Positive for fatigue (improving ). Negative for fever.   HENT: Negative for sore throat and trouble swallowing.    Respiratory: Positive for shortness of breath (on exertion ). Negative for cough.    Cardiovascular: Negative for chest pain and palpitations.   Gastrointestinal: Positive for diarrhea. Negative for abdominal distention, abdominal pain, constipation and nausea.   Genitourinary: Negative for difficulty urinating and hematuria.        Malodorous urine    Musculoskeletal: Negative for back pain.   Skin: Negative.    Neurological: Negative for dizziness and headaches.   All other systems reviewed and are negative.    Objective:     Vital Signs (Most Recent):  Temp: 97.5 °F (36.4  °C) (09/09/17 1200)  Pulse: 93 (09/09/17 1200)  Resp: 20 (09/09/17 1200)  BP: 111/76 (09/09/17 1200)  SpO2: 98 % (09/09/17 1200) Vital Signs (24h Range):  Temp:  [97.4 °F (36.3 °C)-98.1 °F (36.7 °C)] 97.5 °F (36.4 °C)  Pulse:  [] 93  Resp:  [16-20] 20  SpO2:  [94 %-98 %] 98 %  BP: (108-121)/(69-80) 111/76     Weight: 122.5 kg (270 lb 1 oz)  Body mass index is 33.76 kg/m².  Body surface area is 2.55 meters squared.      Intake/Output Summary (Last 24 hours) at 09/09/17 1445  Last data filed at 09/09/17 1300   Gross per 24 hour   Intake              800 ml   Output             1200 ml   Net             -400 ml       Physical Exam   Constitutional: He is oriented to person, place, and time. He appears well-developed and well-nourished. No distress.   HENT:   Head: Normocephalic and atraumatic.   Right Ear: External ear normal.   Left Ear: External ear normal.   Eyes: EOM are normal. Pupils are equal, round, and reactive to light. Right eye exhibits no discharge. Left eye exhibits no discharge.   Neck: Normal range of motion. Neck supple. No tracheal deviation present.   Cardiovascular: Normal rate and regular rhythm.    Pulmonary/Chest: Effort normal and breath sounds normal. No respiratory distress. He has no wheezes. He exhibits no tenderness.   Abdominal: Soft. Bowel sounds are normal. He exhibits no distension (minimal ). There is no tenderness. There is no guarding.   Musculoskeletal: Normal range of motion. He exhibits edema (2+ pitting, b/l LE, minimally improved. LUE swelling decreased, but  still present ) and tenderness (LUE and b/l LE). He exhibits no deformity.   Neurological: He is alert and oriented to person, place, and time.   Skin: Skin is warm and dry.   Nursing note and vitals reviewed.      Significant Labs:   CBC:     Recent Labs  Lab 09/08/17  0435 09/09/17  0354   WBC 1.78* 2.09*   HGB 9.5* 9.6*   HCT 28.0* 28.5*   PLT 51* 57*    and CMP:     Recent Labs  Lab 09/08/17  0435 09/09/17  0355     140   K 3.6 3.5    105   CO2 27 25   GLU 98 98   BUN 9 8   CREATININE 0.6 0.6   CALCIUM 7.6* 7.6*   PROT 5.0* 5.0*   ALBUMIN 2.4* 2.4*   BILITOT 1.6* 1.6*   ALKPHOS 64 66   AST 20 20   ALT 22 19   ANIONGAP 10 10   EGFRNONAA >60.0 >60.0       Diagnostic Results:    CTA A/P 8/29/17:  1. Significant volume of hemoperitoneum throughout the abdomen and pelvis. There is somewhat more focal perisplenic collection about the enlarged spleen with associated fluid-fluid level concerning for possible splenic source of hemoperitoneum. There is a questionable hyperattenuating focus within the splenic hilum, although this does not appear to communicate with the splenic parenchyma or splenic vasculature, however small focus of active extravasation would be difficult to exclude on this limited single phase examination.     2. Multiple bilateral pulmonary masses in this patient with known pulmonary metastatic disease, better evaluated on prior dedicated chest CT of 8/22/2017.    3. Flattened configuration of the IVC concerning for hypovolemia.    4. Mild diffuse gaseous distention of the small and large bowel without definite transition point possibly related to underlying ileus. Postsurgical change of prior lower anterior resection.    5. Multiple stable appearing thoracolumbar compression deformities.

## 2017-09-09 NOTE — PROGRESS NOTES
[facility]  Hematology/Oncology  Progress Note    Patient Name: Claude Penn III  Admission Date: 8/28/2017  Hospital Length of Stay: 12 days  Code Status: Full Code     Subjective:     HPI:  Patient is a 52 y.o. male with h/o metastatic colon cancer s/p craniotomy on 7/31/17 for metastasectomy, PE on anticoagualtion,  [place] [facility] [place] [facility] where the patient presented for complaint of feeling cold, and clammy.  PT was found to be anemic and underwent CT of the abdomen showing a hemoperitoneum and possible splenic rupture.  Of note the patient was recently admitted to [facility] for complaint of SOB and diangnosed with a PE and SBO transferred to [facility] [place] and eventually discharged on lovenox bridge to coumadin on 8/25/17.  Since admission the patient underwent CTA showing no obvious active extravasation of blood intraabdominally.  The patient has required 4 units of PRBC's, 2 units of platelets, and 3 units of FFP.  Currently the patient complains of SOB on occasion, back pain, abdominal pain, and constipation.  The patient denies fever, chills, CP, N/V, diarrhea.     ONC: He was originally diagnosed with rectal ca and mets to the lung in 2013. Has followed at [facility] is additional to locally. He received CRT followed by LAR ypT3N0. KRAS and TP53 mutant, NASIR. He then received FOLFOX then FOLFIRI/avastin followed by maintenance 5FU/alicja then FOLFOX alicja followed by FOLFIRI.  Pt admitted recently on 7/27/17 for HA and confusion found to have mets to the brain s/p right craniotomy with resection of tumor on 7/31/17, s/p 8/11 gamma knife at [facility] and 2013 PE (previously on coumadin; held 7/31).    Interval History:   Patient seen and examined at bedside this AM w/ family members present in the room. Pt states that the frequency of diarrhea is decreasing, yet he is still having loose stools.  Appetite is marginally improving; will continue to supplement w/ BOOST and pt was started on periactin.   Swelling is slightly decreased in both LE and L Ue.   No other complaints at this time. Pt amendable to discharge tomorrow.     Oncology Treatment Plan:   [No treatment plan]    Medications:  Continuous Infusions:     Scheduled Meds:   cholestyramine  1 packet Oral BID    ciprofloxacin HCl  250 mg Oral Q12H    cyproheptadine  4 mg Oral TID AC    diphenoxylate-atropine 2.5-0.025 mg/5 ml  5 mL Oral QID    famotidine (PF)  20 mg Intravenous Daily    levetiracetam  500 mg Oral BID    loperamide  2 mg Oral QID    metoprolol tartrate  50 mg Oral BID    nystatin  500,000 Units Oral QID (WM & HS)    potassium chloride  20 mEq Oral BID    simethicone  1 tablet Oral TID PC    sodium chloride 0.9%  3 mL Intravenous Q8H    tamsulosin  0.4 mg Oral Daily     PRN Meds:sodium chloride, sodium chloride, sodium chloride, albuterol-ipratropium 2.5mg-0.5mg/3mL, calcium carbonate, morphine, ondansetron     Review of Systems   Constitutional: Positive for fatigue (improving ). Negative for fever.   HENT: Negative for sore throat and trouble swallowing.    Respiratory: Positive for shortness of breath (on exertion ). Negative for cough.    Cardiovascular: Negative for chest pain and palpitations.   Gastrointestinal: Positive for diarrhea. Negative for abdominal distention, abdominal pain, constipation and nausea.   Genitourinary: Negative for difficulty urinating and hematuria.        Malodorous urine    Musculoskeletal: Negative for back pain.   Skin: Negative.    Neurological: Negative for dizziness and headaches.   All other systems reviewed and are negative.    Objective:     Vital Signs (Most Recent):  Temp: 97.5 °F (36.4 °C) (09/09/17 1200)  Pulse: 93 (09/09/17 1200)  Resp: 20 (09/09/17 1200)  BP: 111/76 (09/09/17 1200)  SpO2: 98 % (09/09/17 1200) Vital Signs (24h Range):  Temp:  [97.4 °F (36.3 °C)-98.1 °F (36.7  °C)] 97.5 °F (36.4 °C)  Pulse:  [] 93  Resp:  [16-20] 20  SpO2:  [94 %-98 %] 98 %  BP: (108-121)/(69-80) 111/76     Weight: 122.5 kg (270 lb 1 oz)  Body mass index is 33.76 kg/m².  Body surface area is 2.55 meters squared.      Intake/Output Summary (Last 24 hours) at 09/09/17 1445  Last data filed at 09/09/17 1300   Gross per 24 hour   Intake              800 ml   Output             1200 ml   Net             -400 ml       Physical Exam   Constitutional: He is oriented to person, place, and time. He appears well-developed and well-nourished. No distress.   HENT:   Head: Normocephalic and atraumatic.   Right Ear: External ear normal.   Left Ear: External ear normal.   Eyes: EOM are normal. Pupils are equal, round, and reactive to light. Right eye exhibits no discharge. Left eye exhibits no discharge.   Neck: Normal range of motion. Neck supple. No tracheal deviation present.   Cardiovascular: Normal rate and regular rhythm.    Pulmonary/Chest: Effort normal and breath sounds normal. No respiratory distress. He has no wheezes. He exhibits no tenderness.   Abdominal: Soft. Bowel sounds are normal. He exhibits no distension (minimal ). There is no tenderness. There is no guarding.   Musculoskeletal: Normal range of motion. He exhibits edema (2+ pitting, b/l LE, minimally improved. LUE swelling decreased, but  still present ) and tenderness (LUE and b/l LE). He exhibits no deformity.   Neurological: He is alert and oriented to person, place, and time.   Skin: Skin is warm and dry.   Nursing note and vitals reviewed.      Significant Labs:   CBC:     Recent Labs  Lab 09/08/17 0435 09/09/17  0354   WBC 1.78* 2.09*   HGB 9.5* 9.6*   HCT 28.0* 28.5*   PLT 51* 57*    and CMP:     Recent Labs  Lab 09/08/17 0435 09/09/17  0354    140   K 3.6 3.5    105   CO2 27 25   GLU 98 98   BUN 9 8   CREATININE 0.6 0.6   CALCIUM 7.6* 7.6*   PROT 5.0* 5.0*   ALBUMIN 2.4* 2.4*   BILITOT 1.6* 1.6*   ALKPHOS 64 66   AST 20  20   ALT 22 19   ANIONGAP 10 10   EGFRNONAA >60.0 >60.0       Diagnostic Results:    CTA A/P 8/29/17:  1. Significant volume of hemoperitoneum throughout the abdomen and pelvis. There is somewhat more focal perisplenic collection about the enlarged spleen with associated fluid-fluid level concerning for possible splenic source of hemoperitoneum. There is a questionable hyperattenuating focus within the splenic hilum, although this does not appear to communicate with the splenic parenchyma or splenic vasculature, however small focus of active extravasation would be difficult to exclude on this limited single phase examination.     2. Multiple bilateral pulmonary masses in this patient with known pulmonary metastatic disease, better evaluated on prior dedicated chest CT of 8/22/2017.    3. Flattened configuration of the IVC concerning for hypovolemia.    4. Mild diffuse gaseous distention of the small and large bowel without definite transition point possibly related to underlying ileus. Postsurgical change of prior lower anterior resection.    5. Multiple stable appearing thoracolumbar compression deformities.     Assessment/Plan:     * Ruptured spleen    - spontaneous bleed likely 2/2 anticoagulation  - no definitive contrast extravasation on CTA   - no surgical intervention per surgery  - s/p IVC filter placement 8/31/17  - continue to monitor serial CBCs and transfuse as indicated        Spleen hematoma    -see above        Adenocarcinoma of rectum    - followed by Dr. Collins in [place]  - recently noted to have progression of disease  - will need to follow up with primary oncologist after discharge to discuss treatment options  - [place]l [place] [facility] [place] in the setting of continuous diarrhea; will follow-up w/ them regarding their recommendations         Pulmonary embolism without acute cor pulmonale    - anticoagulation stopped 2/2 intraperitoneal bleed  -  IVC filter placed 8/31/17; will require removal of IVC no later than 6 month  - continue to monitor and will need to address need for long term anticoagulation as out-patient        Thrombocytopenia    - PLT count stable  - Will transfuse if fall below 20; not actively bleeding at this time as H/H improving  - Will continue to hold enoxaparin         Decreased appetite    - decreased PO intake that has been mildly improving over the course of pts hospitalization  - Periactin started TID for appetite stimulation        UTI (urinary tract infection)    - UA on 09/07 suspicious for UTI  - Ucx with preliminary results of GRAM NEGATIVE VERONICA, LACTOSE > 100,000 cfu/ml Identification and susceptibility pending   - Ciprofloxacin started empirically         Diarrhea in adult patient    - Present since admission  - C.diff negative  - Continued scheduled imodium and lomotil  - Pt transitioned to general diet several days ago   - will start cholestyramine bid on 09/07; decreased frequency of stool but no resolution of diarrhea   - consider paregoric   - continue to monitor        Edema extremities    - b/l Le, pitting, tender to palpation likely 2/2 to combination of immobility as well as fluid overload  - given 20mg IV lasix on 9/5 -9/9; 12 lbs wt drop since initiation of Lasix  - Compressing Stockings ordered  - Will discharge on 20 mg PO Lasix to continue aid with diuresis   - L UE US venous scan on 09/08 to evaluate for any venous abnormalities; noted to have an acute occlusive superficial vein thrombosis in mid-distal cephalic veins. Will continue to hold anticoagulation in the setting of recent bleed requiring transfusions.  - continue to monitor        Hypophosphatemia    - Will continue KCL 20 mEq BID  - Will continue to monitor  - Resolved        Abdominal bloating    - Started on simethicone TID  - C.diff negative  - Requiring scheduled imodium/lomotil for continuous diarrhea  - CRS following, appreciate  recs  - Stool studies negative  - likely 2/2 bowel irritation s/p hemoperitoneum                  Cammie Moser MD  Hematology/Oncology  [facility]

## 2017-09-09 NOTE — ASSESSMENT & PLAN NOTE
- UA on 09/07 suspicious for UTI  - Ucx with preliminary results of GRAM NEGATIVE VERONICA, LACTOSE > 100,000 cfu/ml Identification and susceptibility pending   - Ciprofloxacin started empirically

## 2017-09-09 NOTE — PT/OT/SLP PROGRESS
"Occupational Therapy  Treatment & Discharge    Claude Penn III   MRN: [id number]   Admitting Diagnosis: Ruptured spleen    OT Date of Treatment: 09/09/17   OT Start Time: 1018  OT Stop Time: 1042  OT Total Time (min): 24 min    Billable Minutes:  Self Care/Home Management 10 and Therapeutic Activity 14    General Precautions: Standard, fall  Orthopedic Precautions: N/A  Braces: N/A    Subjective:  Communicated with nursing prior to session. As per nursing, pt ok to be seen by therapy at this time. Pt agreeable to OT treatment session.   "the swelling in my legs has gone down a bit but they are still heavy"  Pain/Comfort  Pain Rating 1: 0/10  Pain Rating Post-Intervention 1: 0/10    Objective:  Patient found with: peripheral IV     Functional Mobility:  Transfers:   Sit <> Stand Assistance: Supervision  Sit <> Stand Assistive Device: Rolling Walker  Bed <> Chair Technique:  (pt found in chair upon arrival and transferred back to chair after ambulating to the bathroom)  Bed <> Chair Transfer Assistance: Supervision  Bed <> Chair Assistive Device: Rolling Walker  Toilet Transfer Technique:  (ambualting to  the toilet from the hallways)  Toilet Transfer Assistance: Supervision  Toilet Transfer Assistive Device: Rolling Walker    Functional Ambulation: Pt ambulated functional household distances to the bathroom and hallways and back with sup utilizing a RW and no noted LOB.     Balance:   Static Sit: GOOD: Takes MODERATE challenges from all directions  Dynamic Sit: GOOD: Maintains balance through MODERATE excursions of active trunk movement  Static Stand: GOOD-: Takes MODERATE challenges from all directions inconsistently  Dynamic stand: GOOD-: Needs SUPERVISION only during gait and able to self right with moderate     Therapeutic Activities:  Pt completed reaching activity in standing for approx 8 minutes. Pt able to reach in all planes above shoulder levels and below knee level incorporating trunk rotation and knee " "flexion, with supervision, unilateral UE support, and without any noted LOB.     Additional:  Communication board updated  Communicated OT POC-d/c of services   Educated on energy conservation and asking for assistance during transfers and mobility.     AM-PAC 6 CLICK ADL   How much help from another person does this patient currently need?   1 = Unable, Total/Dependent Assistance  2 = A lot, Maximum/Moderate Assistance  3 = A little, Minimum/Contact Guard/Supervision  4 = None, Modified [place]/Independent    Putting on and taking off regular lower body clothing? : 2  Bathing (including washing, rinsing, drying)?: 3  Toileting, which includes using toilet, bedpan, or urinal? : 3  Putting on and taking off regular upper body clothing?: 4  Taking care of personal grooming such as brushing teeth?: 4  Eating meals?: 4  Total Score: 20     AM-PAC Raw Score CMS "G-Code Modifier Level of Impairment Assistance   6 % Total / Unable   7 - 8 CM 80 - 100% Maximal Assist   9-13 CL 60 - 80% Moderate Assist   14 - 19 CK 40 - 60% Moderate Assist   20 - 22 CJ 20 - 40% Minimal Assist   23 CI 1-20% SBA / CGA   24 CH 0% Independent/ Mod I       Patient left up in chair with all lines intact, call button in reach, nursing notified and family present    ASSESSMENT:  Claude Penn III is a 52 y.o. male with a medical diagnosis of Ruptured spleen and presents with no further need for acute skilled OT services at this time as pt appears to be at baseline despite BLE edema limiting LB dressing.    Rehab identified problem list/impairments: Rehab identified problem list/impairments: edema    Activity tolerance: Good    Discharge recommendations: Discharge Facility/Level Of Care Needs: home health OT     Barriers to discharge: Barriers to Discharge: None    Equipment recommendations: none     GOALS:    Occupational Therapy Goals     Not on file          Multidisciplinary Problems (Resolved)        Problem: Occupational Therapy Goal "    Goal Priority Disciplines Outcome Interventions   Occupational Therapy Goal   (Resolved)     OT, PT/OT Outcome(s) achieved    Description:  Goals to be met by:  2 weeks 9/13/17     Patient will increase functional independence with ADLs by performing:    UE Dressing with Supervision.  LE Dressing with Supervision.  Grooming while standing with Supervision.  Toileting from toilet with Supervision for hygiene and clothing management.   Stand pivot transfers with Supervision.  Toilet transfer to toilet with Supervision.                      Plan:  Patient to be seen 5 x/week to address the above listed problems via self-care/home management, therapeutic activities, therapeutic exercises  Plan of Care expires:    Plan of Care reviewed with: patient, spouse         Barby Soo, OT  09/09/2017

## 2017-09-09 NOTE — PLAN OF CARE
Problem: Patient Care Overview  Goal: Plan of Care Review  Hx: Stage 4 CRC w/ bilateral lung mets s/p resection 2013, multiple right brain mets s/p gamma knife radiation and right hemicraniectomy 2013, recurrent PE (started in 2013), coumadin therapy for PE (held since 7/31),     Dx: splenic bleed, PE    7/25: discharged home from [facility] on coumadin to lovenox bridge for PE    8/28: Presented to [facility] ED with worsening abdominal pain, CT abdomen/pelvis w/contrast showed significant hemoperitoneum with associated drop in H/H. Transfused 2 units FFP and 2 units PRBC and [place] [facility] [place]    8/28: Arrived to SICU 6084. Sinus tach 140s with ST depression on EKG, 2D echo performed at bedside, 6L LR given, 4 units PRBC, 2 platelets, and 3 units FFP administered. Urine and blood cultures sent.     Nursing:  + CBC q4h    Outcome: Ongoing (interventions implemented as appropriate)  Pt awake, alert, oriented X4. Plan of care reviewed with patient, who verbalized understanding. Right groin incision with gauze and Tegaderm. Left chest port. Pt tolerating regular diet. denies nausea/ vomiting. Pt had bowel movement last night. Voiding in the urinal. Urine has a strong odor and is lokesh color. Tele- NSR. Pt denies pain. No acute events. Patient remained free of falls.  2+ pitting edema on both lower extremities. Both lower legs wrapped and elevated. Left arm 2+ pitting edema, wrapped and elevated with pillow. No distress noted, will continue to monitor patient.

## 2017-09-09 NOTE — ASSESSMENT & PLAN NOTE
- Present since admission  - C.diff negative  - Continued scheduled imodium and lomotil  - Pt transitioned to general diet several days ago   - will start cholestyramine bid on 09/07; decreased frequency of stool but no resolution of diarrhea   - consider paregoric   - continue to monitor

## 2017-09-10 VITALS
HEART RATE: 87 BPM | WEIGHT: 270.06 LBS | BODY MASS INDEX: 33.58 KG/M2 | SYSTOLIC BLOOD PRESSURE: 110 MMHG | TEMPERATURE: 98 F | DIASTOLIC BLOOD PRESSURE: 75 MMHG | OXYGEN SATURATION: 99 % | HEIGHT: 75 IN | RESPIRATION RATE: 18 BRPM

## 2017-09-10 LAB
ALBUMIN SERPL BCP-MCNC: 2.8 G/DL
ALP SERPL-CCNC: 80 U/L
ALT SERPL W/O P-5'-P-CCNC: 22 U/L
ANION GAP SERPL CALC-SCNC: 9 MMOL/L
AST SERPL-CCNC: 18 U/L
BACTERIA UR CULT: NORMAL
BASOPHILS # BLD AUTO: 0.01 K/UL
BASOPHILS NFR BLD: 0.3 %
BILIRUB SERPL-MCNC: 1.4 MG/DL
BUN SERPL-MCNC: 8 MG/DL
CALCIUM SERPL-MCNC: 8.2 MG/DL
CHLORIDE SERPL-SCNC: 104 MMOL/L
CO2 SERPL-SCNC: 26 MMOL/L
CREAT SERPL-MCNC: 0.6 MG/DL
DIFFERENTIAL METHOD: ABNORMAL
EOSINOPHIL # BLD AUTO: 0 K/UL
EOSINOPHIL NFR BLD: 0 %
ERYTHROCYTE [DISTWIDTH] IN BLOOD BY AUTOMATED COUNT: 18.5 %
EST. GFR  (AFRICAN AMERICAN): >60 ML/MIN/1.73 M^2
EST. GFR  (NON AFRICAN AMERICAN): >60 ML/MIN/1.73 M^2
GLUCOSE SERPL-MCNC: 81 MG/DL
HCT VFR BLD AUTO: 34.9 %
HGB BLD-MCNC: 11.4 G/DL
LYMPHOCYTES # BLD AUTO: 0.6 K/UL
LYMPHOCYTES NFR BLD: 19.6 %
MAGNESIUM SERPL-MCNC: 1.9 MG/DL
MCH RBC QN AUTO: 29.7 PG
MCHC RBC AUTO-ENTMCNC: 32.7 G/DL
MCV RBC AUTO: 91 FL
MONOCYTES # BLD AUTO: 0.2 K/UL
MONOCYTES NFR BLD: 5.5 %
NEUTROPHILS # BLD AUTO: 2.4 K/UL
NEUTROPHILS NFR BLD: 74 %
PHOSPHATE SERPL-MCNC: 2.6 MG/DL
PLATELET # BLD AUTO: 92 K/UL
PMV BLD AUTO: 10.3 FL
POTASSIUM SERPL-SCNC: 4.1 MMOL/L
PROT SERPL-MCNC: 6 G/DL
RBC # BLD AUTO: 3.84 M/UL
SODIUM SERPL-SCNC: 139 MMOL/L
WBC # BLD AUTO: 3.27 K/UL

## 2017-09-10 PROCEDURE — A4216 STERILE WATER/SALINE, 10 ML: HCPCS | Performed by: STUDENT IN AN ORGANIZED HEALTH CARE EDUCATION/TRAINING PROGRAM

## 2017-09-10 PROCEDURE — 99239 HOSP IP/OBS DSCHRG MGMT >30: CPT | Mod: ,,, | Performed by: INTERNAL MEDICINE

## 2017-09-10 PROCEDURE — 25000003 PHARM REV CODE 250: Performed by: STUDENT IN AN ORGANIZED HEALTH CARE EDUCATION/TRAINING PROGRAM

## 2017-09-10 PROCEDURE — 25000003 PHARM REV CODE 250: Performed by: COLON & RECTAL SURGERY

## 2017-09-10 PROCEDURE — 83735 ASSAY OF MAGNESIUM: CPT

## 2017-09-10 PROCEDURE — 25000003 PHARM REV CODE 250: Performed by: SURGERY

## 2017-09-10 PROCEDURE — 85025 COMPLETE CBC W/AUTO DIFF WBC: CPT

## 2017-09-10 PROCEDURE — 84100 ASSAY OF PHOSPHORUS: CPT

## 2017-09-10 PROCEDURE — S0028 INJECTION, FAMOTIDINE, 20 MG: HCPCS | Performed by: STUDENT IN AN ORGANIZED HEALTH CARE EDUCATION/TRAINING PROGRAM

## 2017-09-10 PROCEDURE — 36415 COLL VENOUS BLD VENIPUNCTURE: CPT

## 2017-09-10 PROCEDURE — 80053 COMPREHEN METABOLIC PANEL: CPT

## 2017-09-10 RX ORDER — CIPROFLOXACIN 250 MG/1
250 TABLET, FILM COATED ORAL EVERY 12 HOURS
Status: DISCONTINUED | OUTPATIENT
Start: 2017-09-10 | End: 2017-09-10 | Stop reason: HOSPADM

## 2017-09-10 RX ORDER — HEPARIN 100 UNIT/ML
100 SYRINGE INTRAVENOUS
Status: DISCONTINUED | OUTPATIENT
Start: 2017-09-10 | End: 2017-09-10 | Stop reason: HOSPADM

## 2017-09-10 RX ORDER — SODIUM,POTASSIUM PHOSPHATES 280-250MG
2 POWDER IN PACKET (EA) ORAL EVERY 4 HOURS
Status: COMPLETED | OUTPATIENT
Start: 2017-09-10 | End: 2017-09-10

## 2017-09-10 RX ORDER — CIPROFLOXACIN 250 MG/1
250 TABLET, FILM COATED ORAL EVERY 12 HOURS
Qty: 8 TABLET | Refills: 0 | Status: SHIPPED | OUTPATIENT
Start: 2017-09-10 | End: 2017-09-14

## 2017-09-10 RX ADMIN — POTASSIUM & SODIUM PHOSPHATES POWDER PACK 280-160-250 MG 2 PACKET: 280-160-250 PACK at 03:09

## 2017-09-10 RX ADMIN — LEVETIRACETAM 500 MG: 500 TABLET, FILM COATED ORAL at 09:09

## 2017-09-10 RX ADMIN — LOPERAMIDE HYDROCHLORIDE 2 MG: 1 SOLUTION ORAL at 12:09

## 2017-09-10 RX ADMIN — DIPHENOXYLATE HYDROCHLORIDE AND ATROPINE SULFATE 5 ML: 2.5; .025 SOLUTION ORAL at 12:09

## 2017-09-10 RX ADMIN — CYPROHEPTADINE HYDROCHLORIDE 4 MG: 2 SYRUP ORAL at 12:09

## 2017-09-10 RX ADMIN — LOPERAMIDE HYDROCHLORIDE 2 MG: 1 SOLUTION ORAL at 06:09

## 2017-09-10 RX ADMIN — CIPROFLOXACIN HYDROCHLORIDE 250 MG: 250 TABLET, FILM COATED ORAL at 09:09

## 2017-09-10 RX ADMIN — NYSTATIN 500000 UNITS: 500000 SUSPENSION ORAL at 12:09

## 2017-09-10 RX ADMIN — FAMOTIDINE 20 MG: 10 INJECTION, SOLUTION INTRAVENOUS at 09:09

## 2017-09-10 RX ADMIN — POTASSIUM CHLORIDE 20 MEQ: 1500 TABLET, EXTENDED RELEASE ORAL at 09:09

## 2017-09-10 RX ADMIN — CYPROHEPTADINE HYDROCHLORIDE 4 MG: 2 SYRUP ORAL at 06:09

## 2017-09-10 RX ADMIN — Medication 3 ML: at 06:09

## 2017-09-10 RX ADMIN — NYSTATIN 500000 UNITS: 500000 SUSPENSION ORAL at 09:09

## 2017-09-10 RX ADMIN — SIMETHICONE CHEW TAB 80 MG 80 MG: 80 TABLET ORAL at 09:09

## 2017-09-10 RX ADMIN — Medication 3 ML: at 02:09

## 2017-09-10 RX ADMIN — DIPHENOXYLATE HYDROCHLORIDE AND ATROPINE SULFATE 5 ML: 2.5; .025 SOLUTION ORAL at 06:09

## 2017-09-10 RX ADMIN — TAMSULOSIN HYDROCHLORIDE 0.4 MG: 0.4 CAPSULE ORAL at 09:09

## 2017-09-10 RX ADMIN — METOPROLOL TARTRATE 50 MG: 50 TABLET, FILM COATED ORAL at 09:09

## 2017-09-10 RX ADMIN — CIPROFLOXACIN HYDROCHLORIDE 250 MG: 250 TABLET, FILM COATED ORAL at 03:09

## 2017-09-10 RX ADMIN — SIMETHICONE CHEW TAB 80 MG 80 MG: 80 TABLET ORAL at 03:09

## 2017-09-10 RX ADMIN — CHOLESTYRAMINE 4 G: 4 POWDER, FOR SUSPENSION ORAL at 09:09

## 2017-09-10 RX ADMIN — POTASSIUM & SODIUM PHOSPHATES POWDER PACK 280-160-250 MG 2 PACKET: 280-160-250 PACK at 09:09

## 2017-09-10 NOTE — ASSESSMENT & PLAN NOTE
- resolved  - Started on simethicone TID  - C.diff negative  - Requiring scheduled imodium/lomotil for continuous diarrhea  - Stool studies negative  - likely 2/2 bowel irritation s/p hemoperitoneum

## 2017-09-10 NOTE — ASSESSMENT & PLAN NOTE
- followed by Dr. Collins in [place]  - recently noted to have progression of disease  - will need to follow up with primary oncologist after discharge to discuss treatment options  - Dr. Chilel [place] [facility] [place] consulted [place]-rectal Surgery in the setting of continuous diarrhea; agreeable w/ the current treatment plan and anticipate that the diarrhea is due to irritation of the abdominal cavity/organs 2/2 recent hemoperitoneum

## 2017-09-10 NOTE — PLAN OF CARE
Problem: Patient Care Overview  Goal: Plan of Care Review  Hx: Stage 4 CRC w/ bilateral lung mets s/p resection 2013, multiple right brain mets s/p gamma knife radiation and right hemicraniectomy 2013, recurrent PE (started in 2013), coumadin therapy for PE (held since 7/31),     Dx: splenic bleed, PE    7/25: discharged home from [facility] on coumadin to lovenox bridge for PE    8/28: Presented to [facility] ED with worsening abdominal pain, CT abdomen/pelvis w/contrast showed significant hemoperitoneum with associated drop in H/H. Transfused 2 units FFP and 2 units PRBC [place] [facility] [place]    8/28: Arrived to SICU 6084. Sinus tach 140s with ST depression on EKG, 2D echo performed at bedside, 6L LR given, 4 units PRBC, 2 platelets, and 3 units FFP administered. Urine and blood cultures sent.     Nursing:  + CBC q4h    Outcome: Ongoing (interventions implemented as appropriate)  Plan of care reviewed, patient verbalized understanding. VSS, AAOx4. Client denied pain throughout shift. Tolerating regular diet. Left arm is edematous +2, swelling is decreasing. LLL and LRL edema present +2. Sinus rhythm on telemetry. Up with stand by assist. Free from falls/injuries. No acute distress noted. Client is sleeping in bed with family at the bedside. Will continue to monitor.

## 2017-09-10 NOTE — PROGRESS NOTES
Received call to the on call  that the patient would be discharged today and needs home health. Made primary team aware that authorization could not be obtained that earliest patient could be seen by home health would be Monday or Tuesday. They stated that would be fine. I reached out the patient's wife,Sandra [phone], and spoke with her about the discharge plans. Made her aware that home health would not start till either Monday or Tuesday and she is fine with that. Inquired about preferences for home health services. She would like togo home and get names of agencies near them and also ask friends for names. She will contact me on Monday to give me the name of the agency they would like to use.

## 2017-09-10 NOTE — ASSESSMENT & PLAN NOTE
- UA on 09/07 suspicious for UTI  - Ucx with preliminary results of GRAM NEGATIVE VERONICA, LACTOSE > 100,000 cfu/ml Identification and susceptibility pending   - Ciprofloxacin started empirically on 09/09; will require 5 days of Abx for a complicated UTI   - Symptoms improving since the initiation of the ABx

## 2017-09-10 NOTE — ASSESSMENT & PLAN NOTE
- Present since admission; C.diff negative  - Continued scheduled imodium and lomotil and cholestyramine   - Frequency decreasing   - Continue general dieta and ambuation

## 2017-09-10 NOTE — ASSESSMENT & PLAN NOTE
- PLT increased to 92; continues to improve   - Will transfuse if fall below 20; not actively bleeding at this time as H/H improving  - Will continue to hold enoxaparin in the setting of recent bleed

## 2017-09-10 NOTE — PROGRESS NOTES
Check tfts    Discharge instructions given to Pt and spouse. Pt and spouse verbalized understanding. Port deaccessed and heparin locked. Pt wheeled down by family in wheelchair.

## 2017-09-10 NOTE — DISCHARGE SUMMARY
[facility]  Hematology/Oncology  Discharge Summary      Patient Name: Claude Penn III  MRN: [id number]  Admission Date: 8/28/2017  Hospital Length of Stay: 13 days  Discharge Date and Time:  09/10/2017 11:14 AM  Attending Physician: Rom Khan MD   Discharging Provider: Cammie Moser MD  Primary Care Provider: Dallas Agarwal MD    HPI: Patient is a 52 y.o. male with h/o metastatic colon cancer s/p craniotomy on 7/31/17 for metastasectomy, PE on anticoagualtion,  [place] [facility] [place] [facility] where the patient presented for complaint of feeling cold, and clammy.  PT was found to be anemic and underwent CT of the abdomen showing a hemoperitoneum and possible splenic rupture.  Of note the patient was recently admitted to [facility] for complaint of SOB and diangnosed with a PE and SBO transferred to [facility] for evaluation and eventually discharged on lovenox bridge to coumadin on 8/25/17.  Since admission the patient underwent CTA showing no obvious active extravasation of blood intraabdominally.  The patient has required 4 units of PRBC's, 2 units of platelets, and 3 units of FFP.  Currently the patient complains of SOB on occasion, back pain, abdominal pain, and constipation.  The patient denies fever, chills, CP, N/V, diarrhea.     ONC: He was originally diagnosed with rectal ca and mets to the lung in 2013. Has followed at [facility] is additional to locally. He received CRT followed by LAR ypT3N0. KRAS and TP53 mutant, NASIR. He then received FOLFOX then FOLFIRI/avastin followed by maintenance 5FU/alicja then FOLFOX alicja followed by FOLFIRI.  Pt admitted recently on 7/27/17 for HA and confusion found to have mets to the brain s/p right craniotomy with resection of tumor on 7/31/17, s/p 8/11 gamma knife at [facility] and 2013 PE (previously on coumadin; held 7/31).    * No surgery found *     Hospital Course: Pt arrived via  EMS from outside hospital on 08/28 after a CT abdomen/pelvis with contrast was performed in the ED showing significant hemoperitoneum with a drop in his H/H, attributed to a spontaneous splenic bleed while on anticoagulation. Pt was transfused 4uPRBC, 3u FFP, and 2 platelets. No active hemoextravasation [place] [facility]  In the setting of the recent bleed and a continuous need for anticoagulation, an IVC filter was placed on 08/31/2017.  Pt had difficulty w/ saleh d/c and had required reinsertion of saleh several times. Saleh Dc'ed on 09/05, as pt became more mobile and he has been voiding appropriately.  Primary concern at this time is continuous diarrhea that has not been responsive to medical management. Colorectal Surgery has been consulted in order to address this issue as they are familiar with this patient.  On 09/08 started cholestyramine 4g oral BID for his continuous diarrhea; Boost started w/ meals for calorie supplementation.   Continues to exhibit ROB; Lasix + Compression stockings.  UA obtained on 09/07 dirty; preliminary culture shows GRAM NEGATIVE VERONICA, LACTOSE > 100,000 cfu/ml Identification and susceptibility pending; started on Ciprofloxacin for 5 days    Consults:   Consults         Status Ordering Provider     Inpatient consult to Cardiology  Once     Provider:  (Not yet assigned)    Completed SABINA HAYDEN     Inpatient consult to Colorectal Surgery  Once     Provider:  (Not yet assigned)    Completed MOLLY CALLAHAN     Inpatient consult to Hematology/Oncology  Once     Provider:  (Not yet assigned)    Completed RAYSHAWN PETERSEN     Inpatient consult to Interventional Radiology  Once     Provider:  (Not yet assigned)    Completed EULALIA DE OLIVEIRA          Significant Diagnostic Studies: Labs:   BMP:   Recent Labs  Lab 09/09/17  0354 09/10/17  0409   GLU 98 81    139   K 3.5 4.1    104   CO2 25 26   BUN 8 8   CREATININE 0.6 0.6   CALCIUM  7.6* 8.2*   MG 1.7 1.9   , CMP   Recent Labs  Lab 09/09/17  0354 09/10/17  0409    139   K 3.5 4.1    104   CO2 25 26   GLU 98 81   BUN 8 8   CREATININE 0.6 0.6   CALCIUM 7.6* 8.2*   PROT 5.0* 6.0   ALBUMIN 2.4* 2.8*   BILITOT 1.6* 1.4*   ALKPHOS 66 80   AST 20 18   ALT 19 22   ANIONGAP 10 9   ESTGFRAFRICA >60.0 >60.0   EGFRNONAA >60.0 >60.0   , CBC   Recent Labs  Lab 09/09/17  0354 09/10/17  0409   WBC 2.09* 3.27*   HGB 9.6* 11.4*   HCT 28.5* 34.9*   PLT 57* 92*   , INR   Lab Results   Component Value Date    INR 0.9 09/01/2017    INR 1.5 (H) 08/29/2017    INR 1.8 (H) 08/29/2017   , Lipid Panel No results found for: CHOL, HDL, LDLCALC, TRIG, CHOLHDL, A1C: No results for input(s): HGBA1C in the last 4320 hours. and All labs within the past 24 hours have been reviewed    Pending Diagnostic Studies:     Procedure Component Value Units Date/Time    IR Embolization Arterial or Venous for Hemorrhage [[id number]]     Order Status:  Sent Lab Status:  No result         Final Active Diagnoses:    Diagnosis Date Noted POA    PRINCIPAL PROBLEM:  Ruptured spleen [S36.09XA] 08/28/2017 Yes    Spleen hematoma [S36.029A] 08/29/2017 Yes    Adenocarcinoma of rectum [C20] 07/27/2017 Yes    Pulmonary embolism without acute cor pulmonale [I26.99] 08/23/2017 Yes    Thrombocytopenia [D69.6] 08/02/2017 Yes    Decreased appetite [R63.0] 09/09/2017 Unknown    UTI (urinary tract infection) [N39.0] 09/08/2017 Unknown    Swelling [R60.9] 09/08/2017 Yes    Edema extremities [R60.0] 09/05/2017 Yes    Diarrhea in adult patient [R19.7] 09/05/2017 Yes    Hypophosphatemia [E83.39] 09/04/2017 Unknown    Abdominal bloating [R14.0] 09/02/2017 Yes      Problems Resolved During this Admission:    Diagnosis Date Noted Date Resolved POA      Discharged Condition: stable    Disposition: Home or Self Care    Follow Up:  Follow-up Information     Call Nitin Traore MD.    Specialties:  General Surgery, Surgery  Contact  information:  [address]  [facility] [address]  [phone]             Call Giovanna Collins MD.    Specialty:  Hematology and Oncology  Contact information:  [address]  [facility]  [place] [address]  [phone]             Dallas Agarwal MD. Call in 1 week.    Specialty:  Emergency Medicine  Contact information:  [address] [place] [address]  [phone]                 Patient Instructions:     Diet general   Scheduling Instructions: BOOST Supplementation with meals for adequate calorie intake in the setting of decreased appetite.     Activity as tolerated     Call MD for:  temperature >100.4     Call MD for:  persistent nausea and vomiting or diarrhea     Call MD for:  severe uncontrolled pain     Call MD for:  difficulty breathing or increased cough     Call MD for:  severe persistent headache     Call MD for:  persistent dizziness, light-headedness, or visual disturbances     Call MD for:  increased confusion or weakness       Medications:  Reconciled Home Medications:   Current Discharge Medication List      START taking these medications    Details   cholestyramine (QUESTRAN) 4 gram packet Take 1 packet (4 g total) by mouth 2 (two) times daily.  Qty: 180 packet, Refills: 3    Associated Diagnoses: Diarrhea in adult patient      ciprofloxacin HCl (CIPRO) 250 MG tablet Take 1 tablet (250 mg total) by mouth every 12 (twelve) hours.  Qty: 8 tablet, Refills: 0      cyproheptadine (,PERIACTIN,) 2 mg/5 mL syrup Take 10 mLs (4 mg total) by mouth 3 (three) times daily before meals.  Qty: 473 mL, Refills: 2      furosemide (LASIX) 20 MG tablet Take 1 tablet (20 mg total) by mouth once daily. Take as needed for edema  Qty: 10 tablet, Refills: 1      loperamide (IMODIUM) 1 mg/5 mL solution Take 10 mLs (2 mg total) by mouth 4 (four) times daily.  Refills: 0      metoprolol tartrate (LOPRESSOR) 50 MG tablet Take 1 tablet (50 mg total) by mouth 2 (two) times daily.  Qty: 60 tablet,  Refills: 11      potassium chloride SA (K-DUR,KLOR-CON) 20 MEQ tablet Take 1 tablet (20 mEq total) by mouth 2 (two) times daily.  Qty: 20 tablet, Refills: 0      simethicone (MYLICON) 80 MG chewable tablet Take 1 tablet (80 mg total) by mouth every 6 (six) hours as needed for Flatulence.  Qty: 30 tablet, Refills: 0         CONTINUE these medications which have NOT CHANGED    Details   calcium citrate-vitamin D3 315-200 mg (CITRACAL+D) 315-200 mg-unit per tablet Take 2 tablets by mouth 2 (two) times daily.      cetirizine (ZYRTEC) 10 MG tablet Take 10 mg by mouth once daily.      diphenoxylate-atropine 2.5-0.025 mg (LOMOTIL) 2.5-0.025 mg per tablet Take 1 tablet by mouth 4 (four) times daily as needed for Diarrhea.      levetiracetam (KEPPRA) 500 MG Tab Take 1 tablet (500 mg total) by mouth 2 (two) times daily.  Qty: 60 tablet, Refills: 2      nystatin (MYCOSTATIN) 100,000 unit/mL suspension Take 5 mLs (500,000 Units total) by mouth 4 (four) times daily.  Qty: 1 Bottle, Refills: 9      pantoprazole (PROTONIX) 40 MG tablet Take 1 tablet (40 mg total) by mouth once daily.  Qty: 30 tablet, Refills: 3      promethazine (PHENERGAN) 25 MG tablet Take 25 mg by mouth every 4 (four) hours as needed.          STOP taking these medications       dexamethasone (DECADRON) 4 MG Tab Comments:   Reason for Stopping:         enoxaparin (LOVENOX) 120 mg/0.8 mL Syrg Comments:   Reason for Stopping:         warfarin (COUMADIN) 6 MG tablet Comments:   Reason for Stopping:               Cammie Moser MD  Hematology/Oncology  [facility]

## 2017-09-10 NOTE — ASSESSMENT & PLAN NOTE
- b/l Le, pitting, tender to palpation likely 2/2 to combination of immobility as well as fluid overload  - given 20mg IV lasix on 9/5 -9/9; 18 lbs wt drop since initiation of Lasix  - Compressing Stockings ordered  - Will discharge on 20 mg PO Lasix to continue aid with diuresis   - L UE US venous scan on 09/08 to evaluate for any venous abnormalities; noted to have an acute occlusive superficial vein thrombosis in mid-distal cephalic veins. Will continue to hold anticoagulation in the setting of recent bleed requiring transfusions.  - continue to monitor

## 2017-09-10 NOTE — PROGRESS NOTES
[facility]  Hematology/Oncology  Progress Note    Patient Name: Claude Penn III  Admission Date: 8/28/2017  Hospital Length of Stay: 13 days  Code Status: Full Code     Subjective:     HPI:  Patient is a 52 y.o. male with h/o metastatic colon cancer s/p craniotomy on 7/31/17 for metastasectomy, PE on anticoagualtion,  presented to [facility] as a transfer from [facility] [place] [facility] where the patient presented for complaint of feeling cold, and clammy.  PT was found to be anemic and underwent CT of the abdomen showing a hemoperitoneum and possible splenic rupture.  Of note the patient was recently admitted to [facility] [place] [facility] for complaint of SOB and diangnosed with a PE and SBO transferred to [facility] for evaluation and eventually discharged on lovenox bridge to coumadin on 8/25/17.  Since admission the patient underwent CTA showing no obvious active extravasation of blood intraabdominally.  The patient has required 4 units of PRBC's, 2 units of platelets, and 3 units of FFP.  Currently the patient complains of SOB on occasion, back pain, abdominal pain, and constipation.  The patient denies fever, chills, CP, N/V, diarrhea.     ONC: He was originally diagnosed with rectal ca and mets to the lung in 2013. Has followed at [facility] is additional to locally. He received CRT followed by LAR ypT3N0. KRAS and TP53 mutant, NASIR. He then received FOLFOX then FOLFIRI/avastin followed by maintenance 5FU/alicja then FOLFOX alicja followed by FOLFIRI.  Pt admitted recently on 7/27/17 for HA and confusion found to have mets to the brain s/p right craniotomy with resection of tumor on 7/31/17, s/p 8/11 gamma knife at [facility] and 2013 PE (previously on coumadin; held 7/31).    Interval History:   Patient seen and examined at bedside this AM w/ family members present in the room. Pt states that the frequency of diarrhea is decreasing. Appetite is improving and patient states  that he is enjoying food much more; should continue BOOST and periactin.  Swelling is much improved in his LUE and his LE edema is descending and improving.  No other complaints at this time. Anticipates today's d/c.    Oncology Treatment Plan:   [No treatment plan]    Medications:  Continuous Infusions:     Scheduled Meds:   cholestyramine  1 packet Oral BID    ciprofloxacin HCl  250 mg Oral Q12H    cyproheptadine  4 mg Oral TID AC    diphenoxylate-atropine 2.5-0.025 mg/5 ml  5 mL Oral QID    famotidine (PF)  20 mg Intravenous Daily    levetiracetam  500 mg Oral BID    loperamide  2 mg Oral QID    metoprolol tartrate  50 mg Oral BID    nystatin  500,000 Units Oral QID (WM & HS)    potassium chloride  20 mEq Oral BID    potassium, sodium phosphates  2 packet Oral Q4H    simethicone  1 tablet Oral TID PC    sodium chloride 0.9%  3 mL Intravenous Q8H    tamsulosin  0.4 mg Oral Daily     PRN Meds:sodium chloride, sodium chloride, sodium chloride, albuterol-ipratropium 2.5mg-0.5mg/3mL, calcium carbonate, morphine, ondansetron     Review of Systems   Constitutional: Positive for fatigue (improving ). Negative for fever.   HENT: Negative for sore throat and trouble swallowing.    Respiratory: Positive for shortness of breath (on exertion, improved). Negative for cough.    Cardiovascular: Negative for chest pain and palpitations.   Gastrointestinal: Positive for diarrhea (frequency decreasing). Negative for abdominal distention, abdominal pain, constipation and nausea.   Genitourinary: Negative for difficulty urinating and hematuria.        Malodorous urine    Musculoskeletal: Negative for back pain.   Skin: Negative.    Neurological: Negative for dizziness and headaches.   All other systems reviewed and are negative.    Objective:     Vital Signs (Most Recent):  Temp: 97.6 °F (36.4 °C) (09/10/17 0725)  Pulse: 89 (09/10/17 0725)  Resp: 18 (09/10/17 0725)  BP: 131/86 (09/10/17 0725)  SpO2: 98 % (09/10/17 0725)  Vital Signs (24h Range):  Temp:  [97.3 °F (36.3 °C)-98.1 °F (36.7 °C)] 97.6 °F (36.4 °C)  Pulse:  [] 89  Resp:  [18-20] 18  SpO2:  [96 %-98 %] 98 %  BP: (111-141)/(75-90) 131/86     Weight: 122.5 kg (270 lb 1 oz)  Body mass index is 33.76 kg/m².  Body surface area is 2.55 meters squared.      Intake/Output Summary (Last 24 hours) at 09/10/17 0917  Last data filed at 09/10/17 0500   Gross per 24 hour   Intake              840 ml   Output              875 ml   Net              -35 ml       Physical Exam   Constitutional: He is oriented to person, place, and time. He appears well-developed and well-nourished. No distress.   HENT:   Head: Normocephalic and atraumatic.   Right Ear: External ear normal.   Left Ear: External ear normal.   Eyes: EOM are normal. Pupils are equal, round, and reactive to light. Right eye exhibits no discharge. Left eye exhibits no discharge.   Neck: Normal range of motion. Neck supple. No tracheal deviation present.   Cardiovascular: Normal rate and regular rhythm.    Pulmonary/Chest: Effort normal and breath sounds normal. No respiratory distress. He has no wheezes. He exhibits no tenderness.   Abdominal: Soft. Bowel sounds are normal. He exhibits no distension (minimal ). There is no tenderness. There is no guarding.   Musculoskeletal: Normal range of motion. He exhibits edema (2+ pitting, b/l LE up to mid-calf; much improved. LUE is much improved and practically back to normal ) and tenderness (mild residual tenderness on b/l LE ). He exhibits no deformity.   Neurological: He is alert and oriented to person, place, and time.   Skin: Skin is warm and dry.   Nursing note and vitals reviewed.      Significant Labs:   CBC:     Recent Labs  Lab 09/09/17  0354 09/10/17  0409   WBC 2.09* 3.27*   HGB 9.6* 11.4*   HCT 28.5* 34.9*   PLT 57* 92*    and CMP:     Recent Labs  Lab 09/09/17  0354 09/10/17  0409    139   K 3.5 4.1    104   CO2 25 26   GLU 98 81   BUN 8 8   CREATININE 0.6  0.6   CALCIUM 7.6* 8.2*   PROT 5.0* 6.0   ALBUMIN 2.4* 2.8*   BILITOT 1.6* 1.4*   ALKPHOS 66 80   AST 20 18   ALT 19 22   ANIONGAP 10 9   EGFRNONAA >60.0 >60.0       Diagnostic Results:    CTA A/P 8/29/17:  1. Significant volume of hemoperitoneum throughout the abdomen and pelvis. There is somewhat more focal perisplenic collection about the enlarged spleen with associated fluid-fluid level concerning for possible splenic source of hemoperitoneum. There is a questionable hyperattenuating focus within the splenic hilum, although this does not appear to communicate with the splenic parenchyma or splenic vasculature, however small focus of active extravasation would be difficult to exclude on this limited single phase examination.     2. Multiple bilateral pulmonary masses in this patient with known pulmonary metastatic disease, better evaluated on prior dedicated chest CT of 8/22/2017.    3. Flattened configuration of the IVC concerning for hypovolemia.    4. Mild diffuse gaseous distention of the small and large bowel without definite transition point possibly related to underlying ileus. Postsurgical change of prior lower anterior resection.    5. Multiple stable appearing thoracolumbar compression deformities.     Assessment/Plan:     * Ruptured spleen    - spontaneous bleed likely 2/2 anticoagulation  - no definitive contrast extravasation on CTA   - no surgical intervention per surgery  - s/p IVC filter placement 8/31/17  - continue to monitor serial CBCs and transfuse as indicated        Spleen hematoma    -see above        Adenocarcinoma of rectum    - followed by Dr. Collins in [place]  - recently noted to have progression of disease  - will need to follow up with primary oncologist after discharge to discuss treatment options  - Dr. Chilel followed patient on admissions to [facility]; consulted [place] Surgery in the setting of continuous diarrhea; agreeable w/ the current treatment plan and anticipate that the  diarrhea is due to irritation of the abdominal cavity/organs 2/2 recent hemoperitoneum         Pulmonary embolism without acute cor pulmonale    - anticoagulation stopped 2/2 intraperitoneal bleed  - IVC filter placed 8/31/17; will require removal of IVC no later than 6 month  - continue to monitor and will need to address need for long term anticoagulation as out-patient        Thrombocytopenia    - PLT increased to 92; continues to improve   - Will transfuse if fall below 20; not actively bleeding at this time as H/H improving  - Will continue to hold enoxaparin in the setting of recent bleed        Decreased appetite    - decreased PO intake that has been mildly improving over the course of pts hospitalization  - Periactin started TID for appetite stimulation  - Continue general diet and BOOST supplementation out-patient   - Alb improving to 2.8         Swelling    - see above         UTI (urinary tract infection)    - UA on 09/07 suspicious for UTI  - Ucx with preliminary results of GRAM NEGATIVE VERONICA, LACTOSE > 100,000 cfu/ml Identification and susceptibility pending   - Ciprofloxacin started empirically on 09/09; will require 5 days of Abx for a complicated UTI   - Symptoms improving since the initiation of the ABx        Diarrhea in adult patient    - Present since admission; C.diff negative  - Continued scheduled imodium and lomotil and cholestyramine   - Frequency decreasing   - Continue general dieta and ambuation          Edema extremities    - b/l Le, pitting, tender to palpation likely 2/2 to combination of immobility as well as fluid overload  - given 20mg IV lasix on 9/5 -9/9; 18 lbs wt drop since initiation of Lasix  - Compressing Stockings ordered  - Will discharge on 20 mg PO Lasix to continue aid with diuresis   - L UE US venous scan on 09/08 to evaluate for any venous abnormalities; noted to have an acute occlusive superficial vein thrombosis in mid-distal cephalic veins. Will continue to  hold anticoagulation in the setting of recent bleed requiring transfusions.  - continue to monitor        Hypophosphatemia    - Will continue KCL 20 mEq BID  - Will continue to monitor  - Resolved        Abdominal bloating    - resolved  - Started on simethicone TID  - C.diff negative  - Requiring scheduled imodium/lomotil for continuous diarrhea  - Stool studies negative  - likely 2/2 bowel irritation s/p hemoperitoneum                  Cammie Moser MD  Hematology/Oncology  [facility]

## 2017-09-10 NOTE — ASSESSMENT & PLAN NOTE
- decreased PO intake that has been mildly improving over the course of pts hospitalization  - Periactin started TID for appetite stimulation  - Continue general diet and BOOST supplementation out-patient   - Alb improving to 2.8

## 2017-09-10 NOTE — SUBJECTIVE & OBJECTIVE
Interval History:   Patient seen and examined at bedside this AM w/ family members present in the room. Pt states that the frequency of diarrhea is decreasing. Appetite is improving and patient states that he is enjoying food much more; should continue BOOST and periactin.  Swelling is much improved in his LUE and his LE edema is descending and improving.  No other complaints at this time. Anticipates today's d/c.    Oncology Treatment Plan:   [No treatment plan]    Medications:  Continuous Infusions:     Scheduled Meds:   cholestyramine  1 packet Oral BID    ciprofloxacin HCl  250 mg Oral Q12H    cyproheptadine  4 mg Oral TID AC    diphenoxylate-atropine 2.5-0.025 mg/5 ml  5 mL Oral QID    famotidine (PF)  20 mg Intravenous Daily    levetiracetam  500 mg Oral BID    loperamide  2 mg Oral QID    metoprolol tartrate  50 mg Oral BID    nystatin  500,000 Units Oral QID (WM & HS)    potassium chloride  20 mEq Oral BID    potassium, sodium phosphates  2 packet Oral Q4H    simethicone  1 tablet Oral TID PC    sodium chloride 0.9%  3 mL Intravenous Q8H    tamsulosin  0.4 mg Oral Daily     PRN Meds:sodium chloride, sodium chloride, sodium chloride, albuterol-ipratropium 2.5mg-0.5mg/3mL, calcium carbonate, morphine, ondansetron     Review of Systems   Constitutional: Positive for fatigue (improving ). Negative for fever.   HENT: Negative for sore throat and trouble swallowing.    Respiratory: Positive for shortness of breath (on exertion, improved). Negative for cough.    Cardiovascular: Negative for chest pain and palpitations.   Gastrointestinal: Positive for diarrhea (frequency decreasing). Negative for abdominal distention, abdominal pain, constipation and nausea.   Genitourinary: Negative for difficulty urinating and hematuria.        Malodorous urine    Musculoskeletal: Negative for back pain.   Skin: Negative.    Neurological: Negative for dizziness and headaches.   All other systems reviewed and are  negative.    Objective:     Vital Signs (Most Recent):  Temp: 97.6 °F (36.4 °C) (09/10/17 0725)  Pulse: 89 (09/10/17 0725)  Resp: 18 (09/10/17 0725)  BP: 131/86 (09/10/17 0725)  SpO2: 98 % (09/10/17 0725) Vital Signs (24h Range):  Temp:  [97.3 °F (36.3 °C)-98.1 °F (36.7 °C)] 97.6 °F (36.4 °C)  Pulse:  [] 89  Resp:  [18-20] 18  SpO2:  [96 %-98 %] 98 %  BP: (111-141)/(75-90) 131/86     Weight: 122.5 kg (270 lb 1 oz)  Body mass index is 33.76 kg/m².  Body surface area is 2.55 meters squared.      Intake/Output Summary (Last 24 hours) at 09/10/17 0917  Last data filed at 09/10/17 0500   Gross per 24 hour   Intake              840 ml   Output              875 ml   Net              -35 ml       Physical Exam   Constitutional: He is oriented to person, place, and time. He appears well-developed and well-nourished. No distress.   HENT:   Head: Normocephalic and atraumatic.   Right Ear: External ear normal.   Left Ear: External ear normal.   Eyes: EOM are normal. Pupils are equal, round, and reactive to light. Right eye exhibits no discharge. Left eye exhibits no discharge.   Neck: Normal range of motion. Neck supple. No tracheal deviation present.   Cardiovascular: Normal rate and regular rhythm.    Pulmonary/Chest: Effort normal and breath sounds normal. No respiratory distress. He has no wheezes. He exhibits no tenderness.   Abdominal: Soft. Bowel sounds are normal. He exhibits no distension (minimal ). There is no tenderness. There is no guarding.   Musculoskeletal: Normal range of motion. He exhibits edema (2+ pitting, b/l LE up to mid-calf; much improved. LUE is much improved and practically back to normal ) and tenderness (mild residual tenderness on b/l LE ). He exhibits no deformity.   Neurological: He is alert and oriented to person, place, and time.   Skin: Skin is warm and dry.   Nursing note and vitals reviewed.      Significant Labs:   CBC:     Recent Labs  Lab 09/09/17  0354 09/10/17  0409   WBC 2.09*  3.27*   HGB 9.6* 11.4*   HCT 28.5* 34.9*   PLT 57* 92*    and CMP:     Recent Labs  Lab 09/09/17  0354 09/10/17  0409    139   K 3.5 4.1    104   CO2 25 26   GLU 98 81   BUN 8 8   CREATININE 0.6 0.6   CALCIUM 7.6* 8.2*   PROT 5.0* 6.0   ALBUMIN 2.4* 2.8*   BILITOT 1.6* 1.4*   ALKPHOS 66 80   AST 20 18   ALT 19 22   ANIONGAP 10 9   EGFRNONAA >60.0 >60.0       Diagnostic Results:    CTA A/P 8/29/17:  1. Significant volume of hemoperitoneum throughout the abdomen and pelvis. There is somewhat more focal perisplenic collection about the enlarged spleen with associated fluid-fluid level concerning for possible splenic source of hemoperitoneum. There is a questionable hyperattenuating focus within the splenic hilum, although this does not appear to communicate with the splenic parenchyma or splenic vasculature, however small focus of active extravasation would be difficult to exclude on this limited single phase examination.     2. Multiple bilateral pulmonary masses in this patient with known pulmonary metastatic disease, better evaluated on prior dedicated chest CT of 8/22/2017.    3. Flattened configuration of the IVC concerning for hypovolemia.    4. Mild diffuse gaseous distention of the small and large bowel without definite transition point possibly related to underlying ileus. Postsurgical change of prior lower anterior resection.    5. Multiple stable appearing thoracolumbar compression deformities.

## 2017-09-11 NOTE — PHYSICIAN QUERY
PT Name: Claude Penn III  MR #: [id number]    Physician Query Form - Cause and Effect Relationship Clarification      CDS/: Meenu Foster RN                 Contact information:[email or web address]    This form is a permanent document in the medical record.     Query Date: September 11, 2017    By submitting this query, we are merely seeking further clarification of documentation. Please utilize your independent clinical judgment when addressing the question(s) below.    The Medical record contains the following:  Supporting Clinical Findings   Location in record    Saleh in place                                                                                                           Saleh d/c'ed this AM, bladder scan for 493ccs, saleh replaced this evening.                                                                                CC H&P 8/28    Care Plan 9/1    Urinary tract infection      UA on 09/07 suspicious for UTI                                                                                                                                                                                           Hem/Onc PN  9/10         Provider, please clarify if there is any correlation between __foley__ and __UTI__.           Are the conditions:     [  ] Due to or associated with each other     [  ] Unrelated to each other     [  ] Other (Please Specify): _________________________     [  ] Clinically Undetermined

## 2017-09-11 NOTE — PHYSICIAN QUERY
PT Name: Claude Penn III  MR #: [id number]     Physician Query Form - Documentation Clarification      CDS/: Meenu Foster RN                 Contact information:[email or web address]    This form is a permanent document in the medical record.     Query Date: September 11, 2017    By submitting this query, we are merely seeking further clarification of documentation. Please utilize your independent clinical judgment when addressing the question(s) below.    The Medical record reflects the following:    Supporting Clinical Findings Location in Medical Record   Saleh in place       Saleh d/c'ed this AM, bladder scan for 493ccs, saleh replaced this evening.    CC H&P 8/28    Care Plan 9/1        Urinary tract infection      UA on 09/07 suspicious for UTI                            Urine Culture, Routine  No growth    Urine Culture, Routine  Escherichia Coli ESBL       Hem/Onc PN  9/10      Microbiology 8/29      Microbiology 9/8                                                                            Doctor, Please specify diagnosis or diagnoses associated with above clinical findings.    Provider Use Only      ____ UTI related to saleh, present on admission      ____UTI related to saleh, not present on admission      ____UTI not related to saleh      ____other_______________________________________                                                                                                                         [ x ] Clinically undetermined

## 2017-09-11 NOTE — PHYSICIAN QUERY
PT Name: Claude Penn III  MR #: [id number]    Physician Query Form - Cause and Effect Relationship Clarification      CDS/: Meenu Foster RN                 Contact information:[email or web address]    This form is a permanent document in the medical record.     Query Date: September 11, 2017    By submitting this query, we are merely seeking further clarification of documentation. Please utilize your independent clinical judgment when addressing the question(s) below.    The Medical record contains the following:  Supporting Clinical Findings   Location in record   Urinary tract infection      UA on 09/07 suspicious for UTI      Ucx with preliminary results of GRAM NEGATIVE VERONICA, LACTOSE > 100,000 cfu/ml Identification and susceptibility pending                                                        Ciprofloxacin started empirically on 09/09; will require 5 days of Abx for a complicated UTI                                                                                Hem/Onc PN  9/10   Urine Culture, Routine  Escherichia Coli ESBL                                                                                                                                                                                         Microbiology 9/8         Provider, please clarify if there is any correlation between __UTI__ and __Escherichia coli ESBL__.           Are the conditions:     [ x ] Due to or associated with each other     [  ] Unrelated to each other     [  ] Other (Please Specify): _________________________     [  ] Clinically Undetermined

## 2017-09-13 NOTE — PROGRESS NOTES
Wife called yesterday 9/12/17 to provide name of the home health company she would like her  referred. Reached out to [facility]'s today and faxed the referral packet to them.

## 2017-09-14 NOTE — PT/OT/SLP DISCHARGE
Physical Therapy Discharge Summary    Claude Penn III  MRN: [id number]   Ruptured spleen   Patient Discharged from acute Physical Therapy on 9/10/2017.  Please refer to prior PT noted date on 2017 for functional status.     Assessment:   Goals partially met.  GOALS:    Physical Therapy Goals     Not on file          Multidisciplinary Problems (Resolved)        Problem: Physical Therapy Goal    Goal Priority Disciplines Outcome Goal Variances Interventions   Physical Therapy Goal   (Resolved)     PT/OT, PT Outcome(s) achieved     Description:  Goals to be met by: 17    Patient will increase functional independence with mobility by performin. Supine to sit with Set-up [place] - not met  2. Sit to stand transfer with Supervision - not met  3. Gait  x 150 feet with Supervision using AD if needed.- not met   4. Ascend/descend 4 stair with bilateral Handrails Contact Guard Assistance - met  5. Lower extremity exercise program x15 reps per handout, with supervision - not met                     Reasons for Discontinuation of Therapy Services  Transfer to alternate level of care.      Plan:  Patient Discharged to: Home with Home Health Service.    Jesse Ortega, PT  9/10/2017

## 2017-10-12 ENCOUNTER — HOSPITAL ENCOUNTER (INPATIENT)
Facility: HOSPITAL | Age: 52
LOS: 5 days | Discharge: HOME | DRG: 025 | End: 2017-10-18
Attending: EMERGENCY MEDICINE | Admitting: NEUROLOGICAL SURGERY
Payer: COMMERCIAL

## 2017-10-12 DIAGNOSIS — C79.31 METASTASIS TO BRAIN: ICD-10-CM

## 2017-10-12 PROCEDURE — 99284 EMERGENCY DEPT VISIT MOD MDM: CPT | Mod: ,,, | Performed by: EMERGENCY MEDICINE

## 2017-10-12 PROCEDURE — 96374 THER/PROPH/DIAG INJ IV PUSH: CPT

## 2017-10-12 PROCEDURE — 99285 EMERGENCY DEPT VISIT HI MDM: CPT | Mod: 25

## 2017-10-12 PROCEDURE — 20600001 HC STEP DOWN PRIVATE ROOM

## 2017-10-13 ENCOUNTER — ANESTHESIA EVENT (OUTPATIENT)
Dept: SURGERY | Facility: HOSPITAL | Age: 52
DRG: 025 | End: 2017-10-13
Payer: COMMERCIAL

## 2017-10-13 PROBLEM — C79.31 METASTASIS TO BRAIN: Status: ACTIVE | Noted: 2017-10-13

## 2017-10-13 LAB
ANION GAP SERPL CALC-SCNC: 12 MMOL/L
APTT BLDCRRT: 33.8 SEC
BASOPHILS # BLD AUTO: 0.01 K/UL
BASOPHILS NFR BLD: 0.3 %
BUN SERPL-MCNC: 11 MG/DL
CALCIUM SERPL-MCNC: 9 MG/DL
CHLORIDE SERPL-SCNC: 110 MMOL/L
CO2 SERPL-SCNC: 21 MMOL/L
CREAT SERPL-MCNC: 0.6 MG/DL
DIFFERENTIAL METHOD: ABNORMAL
EOSINOPHIL # BLD AUTO: 0.1 K/UL
EOSINOPHIL NFR BLD: 1.8 %
ERYTHROCYTE [DISTWIDTH] IN BLOOD BY AUTOMATED COUNT: 16 %
EST. GFR  (AFRICAN AMERICAN): >60 ML/MIN/1.73 M^2
EST. GFR  (NON AFRICAN AMERICAN): >60 ML/MIN/1.73 M^2
GLUCOSE SERPL-MCNC: 95 MG/DL
HCT VFR BLD AUTO: 34.9 %
HGB BLD-MCNC: 11.4 G/DL
INR PPP: 2.3
LYMPHOCYTES # BLD AUTO: 0.6 K/UL
LYMPHOCYTES NFR BLD: 14.9 %
MCH RBC QN AUTO: 28.7 PG
MCHC RBC AUTO-ENTMCNC: 32.7 G/DL
MCV RBC AUTO: 88 FL
MONOCYTES # BLD AUTO: 0.2 K/UL
MONOCYTES NFR BLD: 5.1 %
NEUTROPHILS # BLD AUTO: 3 K/UL
NEUTROPHILS NFR BLD: 77.6 %
PLATELET # BLD AUTO: 108 K/UL
PMV BLD AUTO: 9.9 FL
POTASSIUM SERPL-SCNC: 3.3 MMOL/L
PROTHROMBIN TIME: 23.2 SEC
RBC # BLD AUTO: 3.97 M/UL
SODIUM SERPL-SCNC: 143 MMOL/L
WBC # BLD AUTO: 3.9 K/UL

## 2017-10-13 PROCEDURE — 63600175 PHARM REV CODE 636 W HCPCS: Performed by: STUDENT IN AN ORGANIZED HEALTH CARE EDUCATION/TRAINING PROGRAM

## 2017-10-13 PROCEDURE — 97166 OT EVAL MOD COMPLEX 45 MIN: CPT

## 2017-10-13 PROCEDURE — 99223 1ST HOSP IP/OBS HIGH 75: CPT | Mod: 24,,, | Performed by: NEUROLOGICAL SURGERY

## 2017-10-13 PROCEDURE — 20600001 HC STEP DOWN PRIVATE ROOM

## 2017-10-13 PROCEDURE — 25000003 PHARM REV CODE 250: Performed by: EMERGENCY MEDICINE

## 2017-10-13 PROCEDURE — A9585 GADOBUTROL INJECTION: HCPCS | Performed by: EMERGENCY MEDICINE

## 2017-10-13 PROCEDURE — 25500020 PHARM REV CODE 255: Performed by: EMERGENCY MEDICINE

## 2017-10-13 PROCEDURE — 80048 BASIC METABOLIC PNL TOTAL CA: CPT

## 2017-10-13 PROCEDURE — 97162 PT EVAL MOD COMPLEX 30 MIN: CPT

## 2017-10-13 PROCEDURE — 97530 THERAPEUTIC ACTIVITIES: CPT

## 2017-10-13 PROCEDURE — 85610 PROTHROMBIN TIME: CPT

## 2017-10-13 PROCEDURE — 25000003 PHARM REV CODE 250: Performed by: STUDENT IN AN ORGANIZED HEALTH CARE EDUCATION/TRAINING PROGRAM

## 2017-10-13 PROCEDURE — 85025 COMPLETE CBC W/AUTO DIFF WBC: CPT

## 2017-10-13 PROCEDURE — 85730 THROMBOPLASTIN TIME PARTIAL: CPT

## 2017-10-13 RX ORDER — DEXAMETHASONE SODIUM PHOSPHATE 4 MG/ML
4 INJECTION, SOLUTION INTRA-ARTICULAR; INTRALESIONAL; INTRAMUSCULAR; INTRAVENOUS; SOFT TISSUE EVERY 6 HOURS
Status: DISCONTINUED | OUTPATIENT
Start: 2017-10-13 | End: 2017-10-18 | Stop reason: HOSPADM

## 2017-10-13 RX ORDER — FUROSEMIDE 20 MG/1
20 TABLET ORAL DAILY
Status: DISCONTINUED | OUTPATIENT
Start: 2017-10-13 | End: 2017-10-14

## 2017-10-13 RX ORDER — LORAZEPAM 1 MG/1
1 TABLET ORAL
Status: COMPLETED | OUTPATIENT
Start: 2017-10-13 | End: 2017-10-13

## 2017-10-13 RX ORDER — PANTOPRAZOLE SODIUM 40 MG/1
40 TABLET, DELAYED RELEASE ORAL DAILY
Status: DISCONTINUED | OUTPATIENT
Start: 2017-10-13 | End: 2017-10-18 | Stop reason: HOSPADM

## 2017-10-13 RX ORDER — SIMETHICONE 80 MG
80 TABLET,CHEWABLE ORAL EVERY 6 HOURS PRN
Status: DISCONTINUED | OUTPATIENT
Start: 2017-10-13 | End: 2017-10-18 | Stop reason: HOSPADM

## 2017-10-13 RX ORDER — LEVETIRACETAM 500 MG/1
500 TABLET ORAL 2 TIMES DAILY
Status: DISCONTINUED | OUTPATIENT
Start: 2017-10-13 | End: 2017-10-18 | Stop reason: HOSPADM

## 2017-10-13 RX ORDER — DIPHENOXYLATE HYDROCHLORIDE AND ATROPINE SULFATE 2.5; .025 MG/1; MG/1
1 TABLET ORAL 4 TIMES DAILY PRN
Status: DISCONTINUED | OUTPATIENT
Start: 2017-10-13 | End: 2017-10-18 | Stop reason: HOSPADM

## 2017-10-13 RX ORDER — CALCIUM CARBONATE/VITAMIN D3 250-3.125
2 TABLET ORAL 2 TIMES DAILY
Status: DISCONTINUED | OUTPATIENT
Start: 2017-10-13 | End: 2017-10-18 | Stop reason: HOSPADM

## 2017-10-13 RX ORDER — PROMETHAZINE HYDROCHLORIDE 12.5 MG/1
25 TABLET ORAL EVERY 4 HOURS PRN
Status: DISCONTINUED | OUTPATIENT
Start: 2017-10-13 | End: 2017-10-18 | Stop reason: HOSPADM

## 2017-10-13 RX ORDER — NYSTATIN 100000 [USP'U]/ML
500000 SUSPENSION ORAL 4 TIMES DAILY
Status: DISCONTINUED | OUTPATIENT
Start: 2017-10-13 | End: 2017-10-18 | Stop reason: HOSPADM

## 2017-10-13 RX ORDER — CYPROHEPTADINE HYDROCHLORIDE 2 MG/5ML
4 SOLUTION ORAL
Status: DISCONTINUED | OUTPATIENT
Start: 2017-10-13 | End: 2017-10-18 | Stop reason: HOSPADM

## 2017-10-13 RX ORDER — METOPROLOL TARTRATE 50 MG/1
50 TABLET ORAL 2 TIMES DAILY
Status: DISCONTINUED | OUTPATIENT
Start: 2017-10-13 | End: 2017-10-18 | Stop reason: HOSPADM

## 2017-10-13 RX ORDER — POTASSIUM CHLORIDE 20 MEQ/1
20 TABLET, EXTENDED RELEASE ORAL 2 TIMES DAILY
Status: DISCONTINUED | OUTPATIENT
Start: 2017-10-13 | End: 2017-10-18 | Stop reason: HOSPADM

## 2017-10-13 RX ORDER — GADOBUTROL 604.72 MG/ML
10 INJECTION INTRAVENOUS
Status: COMPLETED | OUTPATIENT
Start: 2017-10-13 | End: 2017-10-13

## 2017-10-13 RX ADMIN — DEXAMETHASONE SODIUM PHOSPHATE 4 MG: 4 INJECTION, SOLUTION INTRAMUSCULAR; INTRAVENOUS at 05:10

## 2017-10-13 RX ADMIN — POTASSIUM CHLORIDE 20 MEQ: 1500 TABLET, EXTENDED RELEASE ORAL at 09:10

## 2017-10-13 RX ADMIN — DEXAMETHASONE SODIUM PHOSPHATE 4 MG: 4 INJECTION, SOLUTION INTRAMUSCULAR; INTRAVENOUS at 07:10

## 2017-10-13 RX ADMIN — NYSTATIN 500000 UNITS: 500000 SUSPENSION ORAL at 05:10

## 2017-10-13 RX ADMIN — GADOBUTROL 10 ML: 604.72 INJECTION INTRAVENOUS at 04:10

## 2017-10-13 RX ADMIN — NYSTATIN 500000 UNITS: 500000 SUSPENSION ORAL at 07:10

## 2017-10-13 RX ADMIN — NYSTATIN 500000 UNITS: 500000 SUSPENSION ORAL at 02:10

## 2017-10-13 RX ADMIN — DEXAMETHASONE SODIUM PHOSPHATE 4 MG: 4 INJECTION, SOLUTION INTRAMUSCULAR; INTRAVENOUS at 02:10

## 2017-10-13 RX ADMIN — LORAZEPAM 1 MG: 1 TABLET ORAL at 03:10

## 2017-10-13 RX ADMIN — FUROSEMIDE 20 MG: 20 TABLET ORAL at 09:10

## 2017-10-13 RX ADMIN — NYSTATIN 500000 UNITS: 500000 SUSPENSION ORAL at 11:10

## 2017-10-13 RX ADMIN — CYPROHEPTADINE HYDROCHLORIDE 4 MG: 2 SYRUP ORAL at 09:10

## 2017-10-13 RX ADMIN — CALCIUM CARBONATE-CHOLECALCIFEROL TAB 250 MG-125 UNIT 2 TABLET: 250-125 TAB at 11:10

## 2017-10-13 RX ADMIN — PANTOPRAZOLE SODIUM 40 MG: 40 TABLET, DELAYED RELEASE ORAL at 09:10

## 2017-10-13 RX ADMIN — LEVETIRACETAM 500 MG: 500 TABLET, FILM COATED ORAL at 09:10

## 2017-10-13 NOTE — ED PROVIDER NOTES
Encounter Date: 10/12/2017       History     Chief Complaint   Patient presents with    transfer      pt presents to the ed from [facility] for hemonc eval of an alteration in mental status and abnormal finding on PET scan      52-year-old male transferred to our facility from [place] for evaluation of a brain mass with midline shift and mass effect.  Patient has rectal cancer with known metastatic lesions to the lungs and the brain.  Most recently brain met was treated with gamma knife by Dr. Alonso.   He had a routine PET scan today that revealed recurrence of a mass.  The wife reports that for the past 2 weeks he has had some confusion, trouble finding words, and disorientation.  He is alert and oriented.  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 No pertinent family history.  Social History   Substance Use Topics    Smoking status: Never Smoker    Smokeless tobacco: Former User    Alcohol use 1.2 oz/week     2 Cans of beer per week     Review of Systems   Constitutional: Negative for fever.   HENT: Negative for sore throat.    Respiratory: Negative for shortness of breath.    Cardiovascular: Negative for chest pain.   Gastrointestinal: Negative for nausea.   Genitourinary: Negative for dysuria.   Musculoskeletal: Negative for back pain.   Skin: Negative for rash.   Neurological: Positive for dizziness. Negative for weakness.   Hematological: Does not bruise/bleed easily.       Physical Exam     Initial Vitals [10/12/17 2229]   BP Pulse Resp  Temp SpO2   119/72 92 18 98.2 °F (36.8 °C) 100 %      MAP       87.67         Physical Exam    Constitutional: Vital signs are normal. He appears well-developed and well-nourished.   HENT:   Head: Normocephalic and atraumatic.   Eyes: Conjunctivae are normal.   Cardiovascular: Normal rate and regular rhythm.   Abdominal: Soft. Normal appearance, normal aorta and bowel sounds are normal.   Musculoskeletal: Normal range of motion.   Neurological: He is alert and oriented to person, place, and time.   Nonfocal  No acute red flags   Skin: Skin is warm and intact.   Psychiatric: He has a normal mood and affect. His speech is normal and behavior is normal. Cognition and memory are normal.         ED Course   Procedures  Labs Reviewed - No data to display          Medical Decision Making:   ED Management:  53 yo M to the ED as a transfer from [place], [place] for eval of a brain mass with midline shift and mass effect.   Neuro intact. GCS 15. Pt will be admitted to Neurosurgery.                    ED Course      Clinical Impression:   The encounter diagnosis was Metastasis to brain.    Disposition:   Disposition: Admitted  Condition: Stable                        Enoc Ribeiro PA-C  10/13/17 0306

## 2017-10-13 NOTE — ANESTHESIA PREPROCEDURE EVALUATION
[facility]  Anesthesia Pre-Operative Evaluation         Patient Name: Claude Penn III  YOB: 1965  MRN: [id number]    SUBJECTIVE:     Pre-operative evaluation for Procedure(s) (LRB):  CRANIOTOMY (Right)     10/13/2017    Mr. Trujillo is a 51 y/o male with h/o known metastatic rectal CA w/ brain metastasis s/p right frontal craniotomy with stealth on 7/3117, PE on warfarin (10/13/17 INR 2.3), recent SRS in August 2012 who presents as a transfer from [facility] with a new PET scan showing increased uptake and worsening midline shift w/ neurologic sequela (gait imbalance, word finding difficulties, and confusion).     Patient now presents for the above procedure(s).      LDA:        Peripheral IV - Single Lumen 10/12/17 2300 Right Wrist (Active)   Site Assessment Clean;Dry;Intact 10/13/2017  4:58 AM   Line Status Flushed 10/13/2017  4:58 AM   Dressing Status Clean;Dry;Intact 10/12/2017 11:00 PM   Reason Not Rotated Not due 10/13/2017  4:58 AM   Number of days: 0       Prev airway:     Present Prior to Hospital Arrival?: No; Placement Date: 07/31/17; Placement Time: 0715; Method of Intubation: Direct laryngoscopy; Inserted by: Anesthesia Resident; Airway Device: Endotracheal Tube; Mask Ventilation: Easy; Intubated: Postinduction; Blade: Charles #3; Airway Device Size: 8.0; Style: Cuffed; Cuff Inflation: Minimal occlusive pressure; Inflation Amount: 10; Placement Verified By: Auscultation, ETT Condensation, Capnometry; Grade: Grade I; Complicating Factors: None; Intubation Findings: Positive EtCO2, Bilateral breath sounds, Atraumatic/Condition of teeth unchanged;  Depth of Insertion: 23; Securment: Lips; Complications: None (prior to direct laryngoscopy patient with oral bleeding. ); Breath Sounds: Equal Bilateral; Insertion Attempts: 1; Removal Date: 07/31/17;  Removal Time: 0934    Drips: None documented.       Patient Active Problem List   Diagnosis    Adenocarcinoma of rectum    Frontal mass  of brain    Cerebral edema    Brain metastases    Thrombocytopenia    Pulmonary embolism without acute cor pulmonale    Osteopenia    Thrush    History of pulmonary embolism    Ruptured spleen    Spleen hematoma    Abdominal bloating    Hypophosphatemia    Edema extremities    Diarrhea in adult patient    UTI (urinary tract infection)    Swelling    Decreased appetite    Metastasis to brain       Review of patient's allergies indicates:   Allergen Reactions    No known drug allergies        Current Inpatient Medications:   calcium carbonate-vitamin D3 250-125 mg  2 tablet Oral BID    cyproheptadine  4 mg Oral TID AC    dexamethasone  4 mg Intravenous Q6H    furosemide  20 mg Oral Daily    levetiracetam  500 mg Oral BID    metoprolol tartrate  50 mg Oral BID    nystatin  500,000 Units Oral QID    pantoprazole  40 mg Oral Daily    potassium chloride SA  20 mEq Oral BID       No current facility-administered medications on file prior to encounter.      Current Outpatient Prescriptions on File Prior to Encounter   Medication Sig Dispense Refill    calcium citrate-vitamin D3 315-200 mg (CITRACAL+D) 315-200 mg-unit per tablet Take 2 tablets by mouth 2 (two) times daily.      cetirizine (ZYRTEC) 10 MG tablet Take 10 mg by mouth once daily.      cholestyramine (QUESTRAN) 4 gram packet Take 1 packet (4 g total) by mouth 2 (two) times daily. 180 packet 3    cyproheptadine (,PERIACTIN,) 2 mg/5 mL syrup Take 10 mLs (4 mg total) by mouth 3 (three) times daily before meals. 473 mL 2    diphenoxylate-atropine 2.5-0.025 mg (LOMOTIL) 2.5-0.025 mg per tablet Take 1 tablet by mouth 4 (four) times daily as needed for Diarrhea.      furosemide (LASIX) 20 MG tablet Take 1 tablet (20 mg total) by mouth once daily. Take as needed for edema 10 tablet 1    levetiracetam (KEPPRA) 500 MG Tab Take 1 tablet (500 mg total) by mouth 2 (two) times daily. 60 tablet 2    metoprolol tartrate (LOPRESSOR) 50 MG tablet  Take 1 tablet (50 mg total) by mouth 2 (two) times daily. 60 tablet 11    nystatin (MYCOSTATIN) 100,000 unit/mL suspension Take 5 mLs (500,000 Units total) by mouth 4 (four) times daily. 1 Bottle 9    pantoprazole (PROTONIX) 40 MG tablet Take 1 tablet (40 mg total) by mouth once daily. 30 tablet 3    potassium chloride SA (K-DUR,KLOR-CON) 20 MEQ tablet Take 1 tablet (20 mEq total) by mouth 2 (two) times daily. 20 tablet 0    promethazine (PHENERGAN) 25 MG tablet Take 25 mg by mouth every 4 (four) hours as needed.       simethicone (MYLICON) 80 MG chewable tablet Take 1 tablet (80 mg total) by mouth every 6 (six) hours as needed for Flatulence. 30 tablet 0       Past Surgical History:   Procedure Laterality Date    BRAIN SURGERY  07/2017    HERNIA REPAIR      ILEOSTOMY REVISION      rectal tumor      removed       Social History     Social History    Marital status:      Spouse name: N/A    Number of children: N/A    Years of education: N/A     Occupational History    Not on file.     Social History Main Topics    Smoking status: Never Smoker    Smokeless tobacco: Former User    Alcohol use 1.2 oz/week     2 Cans of beer per week    Drug use: No    Sexual activity: No     Other Topics Concern    Not on file     Social History Narrative    No narrative on file       OBJECTIVE:     Vital Signs Range (Last 24H):  Temp:  [36.5 °C (97.7 °F)-36.8 °C (98.3 °F)]   Pulse:  [83-96]   Resp:  [16-18]   BP: (112-145)/(72-94)   SpO2:  [93 %-100 %]       CBC:   Recent Labs      10/13/17   0304   WBC  3.90   RBC  3.97*   HGB  11.4*   HCT  34.9*   PLT  108*   MCV  88   MCH  28.7   MCHC  32.7       CMP:   Recent Labs      10/13/17   0304   NA  143   K  3.3*   CL  110   CO2  21*   BUN  11   CREATININE  0.6   GLU  95   CALCIUM  9.0       INR:  Recent Labs      10/13/17   0241   INR  2.3*   APTT  33.8*       Diagnostic Studies:     MRI Brain:     10/13/17    1.  Postoperative change of right parietal craniotomy  for frontal lobe tumor resection.  Persistent T2/flair hyperintensity with adjacent gradient susceptibility and peripheral rim enhancement in the resection cavity may represent postsurgical change/post radiation change, however in light of reported history of hypermetabolic activity on outside PET/CT, residual/recurrent neoplasm cannot be excluded.    2.  Persistent mass effect on the right cerebral hemisphere with compression of the right lateral ventricle and approximately 7-8 mm of leftward midline shift.    3.  New punctate focus of T2/flair hyperintensity within the left parietal lobe white matter.  Finding may represent focal demyelination, however additional punctate focus of metastatic disease cannot be excluded.    EK17:  Test Reason : R00.0  Vent. Rate : 134 BPM     Atrial Rate : 134 BPM  P-R Int : 148 ms          QRS Dur : 080 ms  QT Int : 278 ms       P-R-T Axes : 030 -09 140 degrees  QTc Int : 415 ms    Sinus tachycardia  Nonspecific T wave abnormality  Abnormal ECG  When compared with ECG of 28-AUG-2017 20:19,  Borderline criteria for Anterior infarct are no longer Present  Nonspecific T wave abnormality, improved in Inferior leads  T wave inversion less evident in Anterior leads  Inverted T waves have replaced nonspecific T wave abnormality in Lateral  leads  Voltage has increased across the precordium.    2D ECHO:  Results for orders placed or performed during the hospital encounter of 17   2D echo with color flow doppler   Result Value Ref Range    EF 60 55 - 65    Mitral Valve Mobility NORMAL        ASSESSMENT/PLAN:     Anesthesia Evaluation    I have reviewed the Patient Summary Reports.    I have reviewed the Nursing Notes.      Review of Systems  Anesthesia Hx:  History of prior surgery of interest to airway management or planning: Previous anesthesia: General Denies Family Hx of Anesthesia complications.   Denies Personal Hx of Anesthesia complications.    Hematology/Oncology:  Hematology Normal   Oncology Normal     EENT/Dental:EENT/Dental Normal   Cardiovascular:  Cardiovascular Normal  Anticoagulation on coumadin for warfarin.    Pulmonary:   Prior PE   Renal/:  Renal/ Normal     Hepatic/GI:  Hepatic/GI Normal    Musculoskeletal:  Musculoskeletal Normal    Neurological:   Rectal Ca metastatic disease to the matilde. Now with vascular edema.    Endocrine:  Endocrine Normal    Dermatological:  Skin Normal    Psych:  Psychiatric Normal           Physical Exam  General:  Well nourished    Airway/Jaw/Neck:  Airway Findings: Mouth Opening: Normal Tongue: Normal  General Airway Assessment: Adult  Mallampati: III  Improves to II with phonation.  TM Distance: Normal, at least 6 cm  Jaw/Neck Findings:  Neck ROM: Normal ROM     Eyes/Ears/Nose:  EYES/EARS/NOSE FINDINGS: Normal   Dental:  Dental Findings: Periodontal disease, Mild   Chest/Lungs:  Chest/Lungs Findings: Clear to auscultation, Normal Respiratory Rate     Heart/Vascular:  Heart Findings: Rate: Normal  Rhythm: Regular Rhythm  Heart murmur: negative    Abdomen:  Abdomen Findings: Normal    Musculoskeletal:  Musculoskeletal Findings: Normal   Skin:  Skin Findings: Normal    Mental Status:  Mental Status Findings: Normal        Anesthesia Plan  Type of Anesthesia, risks & benefits discussed:  Anesthesia Type:  general, MAC, regional  Patient's Preference:   Intra-op Monitoring Plan: standard ASA monitors  Intra-op Monitoring Plan Comments:   Post Op Pain Control Plan: multimodal analgesia and per primary service following discharge from PACU  Post Op Pain Control Plan Comments:   Induction:   IV  Beta Blocker:  Patient is on a Beta-Blocker and has received one dose within the past 24 hours (No further documentation required).       Informed Consent: Patient understands risks and agrees with Anesthesia plan.  Questions answered. Anesthesia consent signed with patient.  ASA Score: 3     Day of Surgery Review of  History & Physical: I have interviewed and examined the patient. I have reviewed the patient's H&P dated:  There are no significant changes.  H&P update referred to the surgeon.         Ready For Surgery From Anesthesia Perspective.

## 2017-10-13 NOTE — PLAN OF CARE
SW following for DC needs. SW in communication with CM.    Sandra Gurrola, [facility]  [id number]

## 2017-10-13 NOTE — PT/OT/SLP EVAL
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 Bathroom has walk in shower with threshold and build in seat. Wife supervised and assisted pt with showering tasks, he used handles in shower as grab bar assist. Prior to admit, he was using a barriatric rolling walker 2/2 his heel hitting the rollator too frequently. Wife assist pt with LB dressing and majority of ADLS. He had home health and was beginning to prep for outpatient therapy. Pt and wife reported that he was getting out of home at least 1x/day for leisure activity (lunches, riding polaris, etc).  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  Completed with PT.  Family Chief Complaint: "His balance, his initiation of things.. He sometimes has a blank stare to do things"     Pain/Comfort  Pain Rating 1: 0/10  Pain Rating Post-Intervention 1: 0/10    Objective:  Patient found with: telemetry, peripheral IV (port a cath)    Cognitive Exam:  Oriented to: Person, Place, Time and Situation  Follows Commands/attention: Follows multistep commands; delayed processing noted at times  Communication: clear/fluent  Memory:  No Deficits noted  Safety awareness/insight to disability: impaired  Coping skills/emotional control: Appropriate to situation/Flat affect    Visual/perceptual:  Impaired  L inattention with functional tasks; mild L spatial disorganization with clock draw assessment    Physical Exam:  Postural examination/scapula alignment: Rounded shoulder, Head forward, Posterior pelvic tilt and forward flexed spine  Skin integrity: Visible skin intact  Edema: None noted   *Cervical rotation mildly fixed to R ~15*  BMI: 29.5 (over weight)    Sensation:   Intact  B UE for light touch; Poor proprioception of L side with functional tasks and bilateral task    Upper Extremity Range of Motion:  Right Upper Extremity: WFL  Left Upper Extremity: WFL    Upper Extremity Strength:  Right Upper Extremity: WFL  Left Upper Extremity: WFL   Strength: WFL B    Fine motor coordination:   Impaired  Left hand, finger to nose dysmetria and mild tremor and Left hand, manipulation of objects impaired with bilateral coordination    Gross motor coordination: WFL    Functional Mobility:  Bed Mobility:  Rolling/Turning to Left: Modified independent, With side rail (HOB flat)  Scooting/Bridging: Stand by Assistance  Supine to " Sit: Contact Guard Assistance, WIth side rail    Transfers:  Sit <> Stand Assistance: Contact Guard Assistance  Sit <> Stand Assistive Device: Rolling Walker  Bed <> Chair Technique: Stand Pivot  Bed <> Chair Transfer Assistance: Contact Guard Assistance  Bed <> Chair Assistive Device: Rolling Walker  Toilet Transfer Technique: Stand Pivot  Toilet Transfer Assistance: Contact Guard Assistance  Toilet Transfer Assistive Device: Rolling Walker, grab bar    Functional Ambulation: CGA with rolling walker for household and short community distances  *Requires supervision with tasks 2/2 L inattention- noted to bump into items on L side; walks to L side of walker-- able to self correct with verbal cue    Activities of Daily Living:  Feeding Level of Assistance: Activity did not occur  UE Dressing Level of Assistance: Minimum assistance (added time for bilateral coordination task; 2 trials to complete tie)  LE Dressing Level of Assistance: Contact guard (seated EOB)  Toileting Where Assessed: Toilet (standing)  Toileting Level of Assistance: Contact guard (required unilateral UE support; edu on positioning of rolling walker for best safety)    Balance:   Dynamic stand: CGA-min(A)     Additional:  -Pt alert and agreeable to therapy evaluation; re-edu on OT role in care  -With functional mobility- Pt completed cognitive task of counting backwards from 15, 12/12 months of the year, 7/7 days of the week in descending order- He demo good completion and sequencing, requiring only cue for postural control and L attention with > L side walking into walker  -OT to Edu pt and spouse on L side inattention and need for increased cognitive challenge with functional mobility as completed on this date   -Communication board updated; staff aware of functional mobility needs of rolling walker and assistance at this time for best safety    Clock Draw Assessment: Pt issued sheet of paper and asked to draw clock with all # located  "appropriately. Pt asked to model clock to report time as 10 past 11. Pt demo good executive functioning for time portion, impaired visual spatial skills for # organization and intact fine motor skills for dominant hand. He demo need for midline orientation of paper to R of midline- demo mild dis alignment for midline shifted to R.     AM-PAC 6 CLICK ADL  How much help from another person does this patient currently need?  1 = Unable, Total/Dependent Assistance  2 = A lot, Maximum/Moderate Assistance  3 = A little, Minimum/Contact Guard/Supervision  4 = None, Modified Benson/Independent    Putting on and taking off regular lower body clothing? : 3  Bathing (including washing, rinsing, drying)?: 3  Toileting, which includes using toilet, bedpan, or urinal? : 3  Putting on and taking off regular upper body clothing?: 3  Taking care of personal grooming such as brushing teeth?: 3  Eating meals?: 3 (wife reported spills and difficulty with bilateral coordination)  Total Score: 18    AM-PAC Raw Score CMS "G-Code Modifier Level of Impairment Assistance   6 % Total / Unable   7 - 9 CM 80 - 100% Maximal Assist   10-14 CL 60 - 80% Moderate Assist   15 - 19 CK 40 - 60% Moderate Assist   20 - 22 CJ 20 - 40% Minimal Assist   23 CI 1-20% SBA / CGA   24 CH 0% Independent/ Mod I       Patient left up in chair with all lines intact, call button in reach and RN notified    Assessment:  Claude Penn III is a 52 y.o. male with a medical diagnosis of  Metastasis to brain (s/p rectal cancer) and presents on this admit with new R cerebellar mass effect and Left midline shift. Therefore, pt demo impaired bilateral coordination and impaired functional mobility with L inattention noted. Pt demo mild initiation delays with functional tasks (sit<>stand, turning from toilet), delayed processing at times, and overall decline in his interaction with others from his functional baseline. He demo impaired postural control with posterior " pelvic tilt and forward flexed spine and mild R rotation of cervical spine ~15* from midline. He demo impaired bilateral coordination for functional tasks interfering with his safety and functional completion of daily tasks and activities. He will cont to benefit from skilled OT services at this time for best functional outcome.       Pt evaluation falls under moderate complexity for evaluation coding due to identification of 3-5 performance deficits noted as stated above. Eval required Min/Mod assistance to complete on this date and detailed assessment(s) were utilized. Moreover, an expanded review of history and occupational profile obtained with additional review of cognitive, physical and psychosocial hx.     Rehab identified problem list/impairments: Rehab identified problem list/impairments: impaired endurance, impaired self care skills, impaired functional mobilty, gait instability, impaired balance, visual deficits, impaired coordination, impaired fine motor (postural control)    Rehab potential is fair.    Activity tolerance: Good    Discharge recommendations: Discharge Facility/Level Of Care Needs: outpatient OT     Barriers to discharge:  None    Equipment recommendations: none     GOALS:    Occupational Therapy Goals        Problem: Occupational Therapy Goal    Goal Priority Disciplines Outcome Interventions   Occupational Therapy Goal     OT, PT/OT Ongoing (interventions implemented as appropriate)    Description:  Goals to be met by: 10/20     Patient will increase functional independence with ADLs by performing:    UE Dressing with Set-up Assistance and Supervision.  LE Dressing (pants,brief, shoes)  with Set-up Assistance and Minimal Assistance.  Grooming while standing at sink with Supervision.  Upper extremity exercise program x15 reps per handout, with assistance as needed.  Pt/CG edu on postural control exercises and tasks with teachback understanding.   Pt/CG edu on L side attention with ADLs,  self care and functional mobility with demo understanding.                       PLAN:  Patient to be seen 4 x/week to address the above listed problems via self-care/home management, therapeutic activities, therapeutic exercises, neuromuscular re-education, cognitive retraining  Plan of Care expires: 11/12/17  Plan of Care reviewed with: patient, spouse    CLYDE Monteiro  10/13/2017

## 2017-10-13 NOTE — HPI
Patient is a 52 year old male with a history of rectal cancer s/p resection  and brain metastasis s/p resection 7/31/17 by this service as well as recent SRS in August 2017 who presents as transfer from OSH with new PET scan showing increased uptake and worsening midline shift.  Per the patient and his wife he developed gait imbalance, word finding difficulties, and confusion over the past 2 weeks. Denies changes in vision, denies extremity weakness or numbness. He takes coumadin daily for history of DVT/PEs in past, last taken yesterday. Of note since resection by neurosurgery, patient has been in hospital around 2 months ago with a PE and 1 month ago with hemoperitoneum.

## 2017-10-13 NOTE — ASSESSMENT & PLAN NOTE
52 year old male with history of rectal cancer metastasis to the brain, s/p resection and SRS over past 3 months. Now with 2 weeks of confusion, gait instability, word finding difficulty. CT/PET at OSH showing increased uptake and worsening midline shift    -Admit to neurosurgery to stepdown unit  -MRI w and wo STEALTH protocol  -NPO  -INR   -dex 4q6  -continue home Keppra

## 2017-10-13 NOTE — PT/OT/SLP EVAL
Physical Therapy  Evaluation/ Treatment    Claude Penn III   MRN: [id number]   Admitting Diagnosis: Metastasis to brain    PT Received On: 10/13/17  PT Start Time: 0952     PT Stop Time: 1022    PT Total Time (min): 30 min       Billable Minutes:  Evaluation 20 and Therapeutic Activity 10    Diagnosis: Metastasis to brain  History: personal factors and/or comorbidities that impact the plan of care: rectal CA with brain mets, brain sx  Evaluation of Body Systems: cardiovascular/pulmonary, integumentary, musculoskeletal, neuromuscular, cognition/communication  Functional Outcome Tools: AMPAC, ROM, MMT  Clinical Presentation: evolving      Evaluation Complexity Level: Moderate    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 Pt reports amb with rollator and bariatric RW with wife's supervision. Pt wife provides supervision and assist with ADLs. Pt wife reports pt using bariatric RW 2* L heel hitting L side rollator. Pt wife able to provide assist upon d/c.   Equipment Currently Used at Home: rollator, walker, rolling  DME owned (not currently used): none    Previous Level of Function:  Ambulation Skills: needs device  Transfer Skills: needs assist  ADL Skills: needs assist    Subjective:  Communicated with RN prior to session.  Pt agreeable to therapy  session.   Chief Complaint: gait instability  Patient goals: return home     Pain/Comfort  Pain Rating 1: 0/10  Pain Rating Post-Intervention 1: 0/10      Objective:   Patient found with: telemetry, peripheral IV     Cognitive Exam:  Oriented to: Person, Place, Time and Situation    Follows Commands/attention: Follows multistep  commands  Communication: clear/fluent  Safety awareness/insight to disability: impaired    Physical Exam:  Postural examination/scapula alignment: Rounded shoulder    Skin integrity: Visible skin intact  Edema: None noted B LE    Sensation:   Intact  light/touch BLE    Lower Extremity Range of Motion:  Right Lower Extremity: WFL  Left Lower Extremity: WFL    Lower Extremity Strength:  Right Lower Extremity: WFL  Left Lower Extremity: WFL     Gross motor coordination: WFL    Functional Mobility:  Bed Mobility:  Supine to Sit: Supervision    Transfers:  Sit <> Stand Assistance: Contact Guard Assistance (increased time)  Sit <> Stand Assistive Device: Rolling Walker    Gait:   Gait Distance: ~200ft L sway requiring max vc's to stay in middle of RW and vc's for AD management and safety; no LOB and mild SOB  Assistance 1: Contact Guard Assistance  Gait Assistive Device: Rolling walker  Gait Pattern: reciprocal  Gait Deviation(s): decreased ezra, decreased step length, decreased stride length    Balance:   Static Sit: FAIR+: Able to take MINIMAL challenges from all directions  Dynamic Sit: FAIR+: Maintains balance through MINIMAL excursions of active trunk motion  Static Stand: FAIR: Maintains without assist but unable to take challenges  Dynamic stand: FAIR: Needs CONTACT GUARD during gait    Therapeutic Activities and Exercises:  Pt and wife educated on:  -role of PT/POC  -safe to with amb with RW  -pt need for verbal and tactile cues to navigate objects to L side during mobility   Pt safe to amb to bathroom with RW with RN staff.     AM-PAC 6 CLICK MOBILITY  How much help from another person  does this patient currently need?   1 = Unable, Total/Dependent Assistance  2 = A lot, Maximum/Moderate Assistance  3 = A little, Minimum/Contact Guard/Supervision  4 = None, Modified [place]/Independent    Turning over in bed (including adjusting bedclothes, sheets and blankets)?: 3  Sitting down on and standing up from a chair with arms (e.g., wheelchair, bedside commode, etc.): 3  Moving from lying on back to sitting on the side of the bed?: 3  Moving to and from a bed to a chair (including a wheelchair)?: 3  Need to walk in hospital room?: 3  Climbing 3-5 steps with a railing?: 3  Total Score: 18     AM-PAC Raw Score CMS G-Code Modifier Level of Impairment Assistance   6 % Total / Unable   7 - 9 CM 80 - 100% Maximal Assist   10 - 14 CL 60 - 80% Moderate Assist   15 - 19 CK 40 - 60% Moderate Assist   20 - 22 CJ 20 - 40% Minimal Assist   23 CI 1-20% SBA / CGA   24 CH 0% Independent/ Mod I     Patient left up in chair with all lines intact, call button in reach, RN notified and wife present.    Assessment:   Claude Penn III is a 52 y.o. male with a medical diagnosis of Metastasis to brain and presents with decreased strength, endurance, balance, coordination, safety awareness and overall functional mobility. Pt performed bed mobility S and transfers CGA with RW. Pt ~200ft CGA with RW, L sway requiring max vc's to stay in middle of RW and vc's for AD management and safety; no LOB and mild SOB. Pt will continue to benefit from skilled PT to improve deficits and increase overall functional mobility.     Rehab identified problem list/impairments: Rehab identified problem list/impairments: weakness, impaired balance, decreased coordination, decreased safety awareness, impaired endurance, impaired functional mobilty, gait instability, impaired cognition, decreased lower extremity function    Rehab potential is good.    Activity tolerance: Good    Discharge recommendations: Discharge Facility/Level Of Care  Needs: outpatient PT     Barriers to discharge: Barriers to Discharge: None    Equipment recommendations: Equipment Needed After Discharge: none     GOALS:    Physical Therapy Goals        Problem: Physical Therapy Goal    Goal Priority Disciplines Outcome Goal Variances Interventions   Physical Therapy Goal     PT/OT, PT Ongoing (interventions implemented as appropriate)     Description:  Goals to be met by: 10/23/2017     Patient will increase functional independence with mobility by performin. Supine to sit with Modified [place]  2. Sit to supine with Modified [place]  3. Sit to stand transfer with Supervision  4. Gait  x 200 feet with Supervision using Rolling Walker.   5. Lower extremity exercise program x15 reps per handout, with independence                      PLAN:    Patient to be seen 3 x/week to address the above listed problems via gait training, therapeutic activities, therapeutic exercises, neuromuscular re-education  Plan of Care expires: 17  Plan of Care reviewed with: patient, spouse          HUDSON BURNS, PT  10/13/2017

## 2017-10-13 NOTE — H&P
[facility]  Neuorsurgery  History and Physical     Patient Name: Claude Penn III  MRN: [id number]  Admission Date: 10/12/2017  Attending Physician: Huber Peterson MD   Primary Care Physician: Dallas Agarwal MD    Patient information was obtained from patient and spouse/SO.     Subjective:     Chief Complaint/Reason for Admission: worsening edema on CT     HPI:  Patient is a 52 year old male with a history of rectal cancer s/p resection  and brain metastasis s/p resection 7/31/17 by this service as well as recent SRS in August 2017 who presents as transfer from OSH with new PET scan showing increased uptake and worsening midline shift.  Per the patient and his wife he developed gait imbalance, word finding difficulties, and confusion over the past 2 weeks. Denies changes in vision, denies extremity weakness or numbness. He takes coumadin daily for history of DVT/PEs in past, last taken yesterday. Of note since resection by neurosurgery, patient has been in hospital around 2 months ago with a PE and 1 month ago with hemoperitoneum.         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   HENT: Negative for congestion and drooling.    Eyes: Negative for  photophobia, pain and discharge.   Respiratory: Negative for chest tightness and shortness of breath.    Cardiovascular: Negative for chest pain and palpitations.   Gastrointestinal: Negative for abdominal distention and abdominal pain.   Endocrine: Negative for cold intolerance and heat intolerance.   Genitourinary: Negative for difficulty urinating.   Musculoskeletal: Positive for back pain.   Skin: Negative for color change.   Neurological: Positive for speech difficulty. Negative for dizziness, weakness, light-headedness and numbness.   Psychiatric/Behavioral: Negative for agitation.     Objective:     Weight: 122.5 kg (270 lb)  Body mass index is 33.75 kg/m².  Vital Signs (Most Recent):  Temp: 98.2 °F (36.8 °C) (10/12/17 2229)  Pulse: 89 (10/13/17 0058)  Resp: 18 (10/12/17 2229)  BP: (!) 142/87 (10/13/17 0131)  SpO2: (!) 93 % (10/13/17 0102) Vital Signs (24h Range):  Temp:  [98.2 °F (36.8 °C)] 98.2 °F (36.8 °C)  Pulse:  [89-92] 89  Resp:  [18] 18  SpO2:  [93 %-100 %] 93 %  BP: (119-145)/(72-92) 142/87                           Neurosurgery Physical Exam   -Alert and oriented x4  -PERRL, EOMI, slight L facial asymmetry, tongue midline, facial sensation symmetric, shoulder shrug full strength and symmetric  -Motor: 5/5 throughout the upper and lower extremites bilaterally  -sensation intact to light touch throughout  -reflexes 2+ in patella and brachioradialis bilaterally  -no clonus, no Hernandez's  -coordination intact to finger to nose bilaterally      Significant Labs:  No results for input(s): GLU, NA, K, CL, CO2, BUN, CREATININE, CALCIUM, MG in the last 48 hours.  No results for input(s): WBC, HGB, HCT, PLT in the last 48 hours.  No results for input(s): LABPT, INR, APTT in the last 48 hours.  Microbiology Results (last 7 days)     ** No results found for the last 168 hours. **        All pertinent labs from the last 24 hours have been reviewed.    Significant Diagnostics:  I have reviewed all pertinent  imaging results/findings within the past 24 hours.    Assessment and Plan:     Metastasis to brain    52 year old male with history of rectal cancer metastasis to the brain, s/p resection and SRS over past 3 months. Now with 2 weeks of confusion, gait instability, word finding difficulty. CT/PET at OSH showing increased uptake and worsening midline shift    -Admit to neurosurgery to stepdown unit  -MRI w and wo STEALTH protocol  -NPO  -INR   -dex 4q6  -continue home Jay Butt MD  Neurosurgery  [facility]

## 2017-10-13 NOTE — HOSPITAL COURSE
10/13: brain MRI  10/14: ROBERTO. Planning for OR Monday  10/15: received 2 units of FFP given INR 2.1 and OR scheduled for 10/17  10/16: INR this am 1.5. Patient to receive an additional 2 units of FFP in preparation of OR to today.   10/17: doing well postop and was stepped down to the floor.  Evaluated by PT.   10/18: DC home today with keppra, 3 wk steroid taper, and outpatient PT.

## 2017-10-13 NOTE — PLAN OF CARE
CM met with patient and spouse this am. Patient planned discharge is home with family and Home Health - Plan (A) or home with family - Plan (B).    PCP:  Dallas Agarwal MD     Payor: BLUE CROSS BLUE SHIELD / Plan: [place] PPO / Product Type: PPO /        [id number] - [place], [place] - [address]  [address] [place] [address]  Phone: [phone] Fax: [phone]       10/13/17 1109   Discharge Assessment   Assessment Type Discharge Planning Assessment   Confirmed/corrected address and phone number on facesheet? Yes   Assessment information obtained from? Patient   Communicated expected length of stay with patient/caregiver no   Prior to hospitilization cognitive status: Alert/Oriented   Prior to hospitalization functional status: Assistive Equipment   Current cognitive status: Alert/Oriented   Current Functional Status: Assistive Equipment   Lives With spouse   Able to Return to Prior Arrangements yes   Is patient able to care for self after discharge? Yes   Who are your caregiver(s) and their phone number(s)? Sandra Trujillo - spouse [phone]   Patient's perception of discharge disposition home or selfcare   Readmission Within The Last 30 Days no previous admission in last 30 days   Patient currently being followed by outpatient case management? No   Patient currently receives any other outside agency services? Yes   Name and contact number of agency or person providing outside services [facility]   Is it the patient/care giver preference to resume care with the current outside agency? Yes   Equipment Currently Used at Home rollator;cane, straight   Do you have any problems affording any of your prescribed medications? No   Is the patient taking medications as prescribed? yes   Does the patient have transportation home? Yes   Transportation Available family or friend will provide   Does the patient receive services at the [facility]? Yes   Discharge Plan A Home with family;Home Health    Discharge Plan B Home with family   Patient/Family In Agreement With Plan yes

## 2017-10-13 NOTE — ED TRIAGE NOTES
Pt transferred from [place] for abnormal PET scan. Pt has hx of rectal cancer with mets and metastasis to lungs.

## 2017-10-13 NOTE — PLAN OF CARE
Problem: Patient Care Overview  Goal: Plan of Care Review  POC reviewed with pt and wife, verbalized understanding. VSS. AAOx4. No acute changes. Safety precautions maintained. Will continue to monitor.

## 2017-10-13 NOTE — PLAN OF CARE
Problem: Occupational Therapy Goal  Goal: Occupational Therapy Goal  Goals to be met by: 10/20     Patient will increase functional independence with ADLs by performing:    UE Dressing with Set-up Assistance and Supervision.  LE Dressing (pants,brief, shoes)  with Set-up Assistance and Minimal Assistance.  Grooming while standing at sink with Supervision.  Upper extremity exercise program x15 reps per handout, with assistance as needed.  Pt/CG edu on postural control exercises and tasks with teachback understanding.   Pt/CG edu on L side attention with ADLs, self care and functional mobility with demo understanding.     Outcome: Ongoing (interventions implemented as appropriate)  OT evaluation completed. CLYDE Monteiro 10/13/2017

## 2017-10-13 NOTE — SUBJECTIVE & OBJECTIVE
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   HENT: Negative for congestion and drooling.    Eyes: Negative for photophobia, pain and discharge.   Respiratory: Negative for chest tightness and shortness of breath.    Cardiovascular: Negative for chest pain and palpitations.   Gastrointestinal: Negative for abdominal distention and abdominal pain.   Endocrine: Negative for cold intolerance and heat intolerance.   Genitourinary: Negative for difficulty urinating.   Musculoskeletal: Positive for back pain.   Skin: Negative for color change.   Neurological: Positive for speech difficulty. Negative for dizziness, weakness, light-headedness and numbness.   Psychiatric/Behavioral: Negative for agitation.     Objective:     Weight: 122.5 kg (270 lb)  Body mass index is 33.75 kg/m².  Vital Signs (Most Recent):  Temp: 98.2 °F (36.8 °C) (10/12/17 2229)  Pulse: 89 (10/13/17 0058)  Resp: 18 (10/12/17 2229)  BP: (!) 142/87 (10/13/17 0131)  SpO2: (!) 93 % (10/13/17 0102) Vital Signs (24h Range):  Temp:  [98.2 °F (36.8 °C)] 98.2 °F (36.8 °C)  Pulse:  [89-92] 89  Resp:  [18] 18  SpO2:  [93 %-100 %] 93 %  BP: (119-145)/(72-92) 142/87                           Neurosurgery Physical  Exam   -Alert and oriented x4  -PERRL, EOMI, slight L facial asymmetry, tongue midline, facial sensation symmetric, shoulder shrug full strength and symmetric  -Motor: 5/5 throughout the upper and lower extremites bilaterally  -sensation intact to light touch throughout  -reflexes 2+ in patella and brachioradialis bilaterally  -no clonus, no Hernandez's  -coordination intact to finger to nose bilaterally      Significant Labs:  No results for input(s): GLU, NA, K, CL, CO2, BUN, CREATININE, CALCIUM, MG in the last 48 hours.  No results for input(s): WBC, HGB, HCT, PLT in the last 48 hours.  No results for input(s): LABPT, INR, APTT in the last 48 hours.  Microbiology Results (last 7 days)     ** No results found for the last 168 hours. **        All pertinent labs from the last 24 hours have been reviewed.    Significant Diagnostics:  I have reviewed all pertinent imaging results/findings within the past 24 hours.

## 2017-10-13 NOTE — PLAN OF CARE
Problem: Physical Therapy Goal  Goal: Physical Therapy Goal  Goals to be met by: 10/23/2017     Patient will increase functional independence with mobility by performin. Supine to sit with Modified [place]  2. Sit to supine with Modified [place]  3. Sit to stand transfer with Supervision  4. Gait  x 200 feet with Supervision using Rolling Walker.   5. Lower extremity exercise program x15 reps per handout, with independence    Outcome: Ongoing (interventions implemented as appropriate)  Pt evaluation complete.     HUDSON RICHARDSON, PT  10/13/2017

## 2017-10-14 LAB
ANION GAP SERPL CALC-SCNC: 9 MMOL/L
BASOPHILS # BLD AUTO: 0 K/UL
BASOPHILS NFR BLD: 0 %
BUN SERPL-MCNC: 14 MG/DL
CALCIUM SERPL-MCNC: 9 MG/DL
CHLORIDE SERPL-SCNC: 109 MMOL/L
CO2 SERPL-SCNC: 22 MMOL/L
CREAT SERPL-MCNC: 0.6 MG/DL
DIFFERENTIAL METHOD: ABNORMAL
EOSINOPHIL # BLD AUTO: 0 K/UL
EOSINOPHIL NFR BLD: 0 %
ERYTHROCYTE [DISTWIDTH] IN BLOOD BY AUTOMATED COUNT: 15.3 %
EST. GFR  (AFRICAN AMERICAN): >60 ML/MIN/1.73 M^2
EST. GFR  (NON AFRICAN AMERICAN): >60 ML/MIN/1.73 M^2
GLUCOSE SERPL-MCNC: 143 MG/DL
HCT VFR BLD AUTO: 34.4 %
HGB BLD-MCNC: 11.2 G/DL
INR PPP: 2.1
LYMPHOCYTES # BLD AUTO: 0.3 K/UL
LYMPHOCYTES NFR BLD: 8.6 %
MCH RBC QN AUTO: 28.6 PG
MCHC RBC AUTO-ENTMCNC: 32.6 G/DL
MCV RBC AUTO: 88 FL
MONOCYTES # BLD AUTO: 0.1 K/UL
MONOCYTES NFR BLD: 1.8 %
NEUTROPHILS # BLD AUTO: 3.6 K/UL
NEUTROPHILS NFR BLD: 89.3 %
PLATELET # BLD AUTO: 143 K/UL
PMV BLD AUTO: 10.8 FL
POTASSIUM SERPL-SCNC: 3.8 MMOL/L
PROTHROMBIN TIME: 21.1 SEC
RBC # BLD AUTO: 3.91 M/UL
SODIUM SERPL-SCNC: 140 MMOL/L
WBC # BLD AUTO: 3.97 K/UL

## 2017-10-14 PROCEDURE — 85025 COMPLETE CBC W/AUTO DIFF WBC: CPT

## 2017-10-14 PROCEDURE — 80048 BASIC METABOLIC PNL TOTAL CA: CPT

## 2017-10-14 PROCEDURE — 85610 PROTHROMBIN TIME: CPT

## 2017-10-14 PROCEDURE — 20600001 HC STEP DOWN PRIVATE ROOM

## 2017-10-14 PROCEDURE — 63600175 PHARM REV CODE 636 W HCPCS: Performed by: STUDENT IN AN ORGANIZED HEALTH CARE EDUCATION/TRAINING PROGRAM

## 2017-10-14 PROCEDURE — 25000003 PHARM REV CODE 250: Performed by: STUDENT IN AN ORGANIZED HEALTH CARE EDUCATION/TRAINING PROGRAM

## 2017-10-14 RX ADMIN — LEVETIRACETAM 500 MG: 500 TABLET, FILM COATED ORAL at 08:10

## 2017-10-14 RX ADMIN — CALCIUM CARBONATE-CHOLECALCIFEROL TAB 250 MG-125 UNIT 2 TABLET: 250-125 TAB at 08:10

## 2017-10-14 RX ADMIN — PANTOPRAZOLE SODIUM 40 MG: 40 TABLET, DELAYED RELEASE ORAL at 08:10

## 2017-10-14 RX ADMIN — DEXAMETHASONE SODIUM PHOSPHATE 4 MG: 4 INJECTION, SOLUTION INTRAMUSCULAR; INTRAVENOUS at 12:10

## 2017-10-14 RX ADMIN — DEXAMETHASONE SODIUM PHOSPHATE 4 MG: 4 INJECTION, SOLUTION INTRAMUSCULAR; INTRAVENOUS at 05:10

## 2017-10-14 RX ADMIN — NYSTATIN 500000 UNITS: 500000 SUSPENSION ORAL at 06:10

## 2017-10-14 RX ADMIN — POTASSIUM CHLORIDE 20 MEQ: 1500 TABLET, EXTENDED RELEASE ORAL at 08:10

## 2017-10-14 RX ADMIN — NYSTATIN 500000 UNITS: 500000 SUSPENSION ORAL at 12:10

## 2017-10-14 RX ADMIN — DEXAMETHASONE SODIUM PHOSPHATE 4 MG: 4 INJECTION, SOLUTION INTRAMUSCULAR; INTRAVENOUS at 06:10

## 2017-10-14 RX ADMIN — NYSTATIN 500000 UNITS: 500000 SUSPENSION ORAL at 05:10

## 2017-10-14 NOTE — PLAN OF CARE
Problem: Patient Care Overview  Goal: Plan of Care Review  POC reviewed with pt and wife, verbalizes understanding. AAOx4. VSS. No acute changes. A walker was ordered yesterday for the pt, and was never recvieved put in another order today and still have not received the walker; pt walked the halls with family and holding on to the side rail. Safety precautions maintained. Will continue to monitor.

## 2017-10-14 NOTE — PROGRESS NOTES
[facility]  Neurosurgery  Progress Note    Subjective:     History of Present Illness: Patient is a 52 year old male with a history of rectal cancer s/p resection  and brain metastasis s/p resection 7/31/17 by this service as well as recent SRS in August 2017 who presents as transfer from OSH with new PET scan showing increased uptake and worsening midline shift.  Per the patient and his wife he developed gait imbalance, word finding difficulties, and confusion over the past 2 weeks. Denies changes in vision, denies extremity weakness or numbness. He takes coumadin daily for history of DVT/PEs in past, last taken yesterday. Of note since resection by neurosurgery, patient has been in hospital around 2 months ago with a PE and 1 month ago with hemoperitoneum.       Post-Op Info:  Procedure(s) (LRB):  CRANIOTOMY (Right)         Interval History: NAEON.    Medications:  Continuous Infusions:   Scheduled Meds:   calcium carbonate-vitamin D3 250-125 mg  2 tablet Oral BID    cyproheptadine  4 mg Oral TID AC    dexamethasone  4 mg Intravenous Q6H    furosemide  20 mg Oral Daily    levetiracetam  500 mg Oral BID    metoprolol tartrate  50 mg Oral BID    nystatin  500,000 Units Oral QID    pantoprazole  40 mg Oral Daily    potassium chloride SA  20 mEq Oral BID     PRN Meds:diphenoxylate-atropine 2.5-0.025 mg, promethazine, simethicone     Review of Systems  Objective:     Weight: 107.2 kg (236 lb 6.4 oz)  Body mass index is 29.55 kg/m².  Vital Signs (Most Recent):  Temp: 97.4 °F (36.3 °C) (10/14/17 0847)  Pulse: 80 (10/14/17 0847)  Resp: 17 (10/14/17 0847)  BP: 130/78 (10/14/17 0847)  SpO2: 96 % (10/14/17 0847) Vital Signs (24h Range):  Temp:  [96.1 °F (35.6 °C)-98.8 °F (37.1 °C)] 97.4 °F (36.3 °C)  Pulse:  [] 80  Resp:  [16-20] 17  SpO2:  [95 %-98 %] 96 %  BP: (110-130)/(67-83) 130/78     Neurosurgery Physical Exam         AAOx3, PERRL, EOMI, FS, TM, 5/5 throughout, SILT.  DTR 2+  throughout, no Hernandez's, no clonus.     Significant Labs:    Recent Labs  Lab 10/13/17  0304 10/14/17  0419   GLU 95 143*    140   K 3.3* 3.8    109   CO2 21* 22*   BUN 11 14   CREATININE 0.6 0.6   CALCIUM 9.0 9.0       Recent Labs  Lab 10/13/17  0304 10/14/17  0419   WBC 3.90 3.97   HGB 11.4* 11.2*   HCT 34.9* 34.4*   * 143*       Recent Labs  Lab 10/13/17  0241 10/14/17  0419   INR 2.3* 2.1*   APTT 33.8*  --      Microbiology Results (last 7 days)     ** No results found for the last 168 hours. **        Significant Diagnostics:  I have reviewed all pertinent imaging results/findings within the past 24 hours.    Assessment/Plan:     Metastasis to brain    52 year old male with history of rectal cancer metastasis to the brain, s/p resection and SRS over past 3 months. Now with 2 weeks of confusion, gait instability, word finding difficulty. CT/PET at OSH showing increased uptake and worsening midline shift    -- MRI performed, radiation necrosis and edema noted.  -- Neurostable on exam.  -- Will plan for OR for crani for resection Monday.  -- Regular diet.  -- Dex 4q6  -- Keppra   -- Will correct INR Sunday if necessary.  -- Vitals per unit routine  -- PT/OT daily and for recs. Patient OOB >4h/day.  -- PPx: PPI, SQH, IS, SCDs, bowel regimen  -- Dispo: pending surgery                Alfonso Mclaughlin MD  Neurosurgery  [facility]

## 2017-10-14 NOTE — ASSESSMENT & PLAN NOTE
52 year old male with history of rectal cancer metastasis to the brain, s/p resection and SRS over past 3 months. Now with 2 weeks of confusion, gait instability, word finding difficulty. CT/PET at OSH showing increased uptake and worsening midline shift    -- MRI performed, radiation necrosis and edema noted.  -- Neurostable on exam.  -- Will plan for OR for crani for resection Monday.  -- Regular diet.  -- Dex 4q6  -- Keppra   -- Will correct INR Sunday if necessary.  -- Vitals per unit routine  -- PT/OT daily and for recs. Patient OOB >4h/day.  -- PPx: PPI, SQH, IS, SCDs, bowel regimen  -- Dispo: pending surgery

## 2017-10-14 NOTE — SUBJECTIVE & OBJECTIVE
Interval History: NAEON.    Medications:  Continuous Infusions:   Scheduled Meds:   calcium carbonate-vitamin D3 250-125 mg  2 tablet Oral BID    cyproheptadine  4 mg Oral TID AC    dexamethasone  4 mg Intravenous Q6H    furosemide  20 mg Oral Daily    levetiracetam  500 mg Oral BID    metoprolol tartrate  50 mg Oral BID    nystatin  500,000 Units Oral QID    pantoprazole  40 mg Oral Daily    potassium chloride SA  20 mEq Oral BID     PRN Meds:diphenoxylate-atropine 2.5-0.025 mg, promethazine, simethicone     Review of Systems  Objective:     Weight: 107.2 kg (236 lb 6.4 oz)  Body mass index is 29.55 kg/m².  Vital Signs (Most Recent):  Temp: 97.4 °F (36.3 °C) (10/14/17 0847)  Pulse: 80 (10/14/17 0847)  Resp: 17 (10/14/17 0847)  BP: 130/78 (10/14/17 0847)  SpO2: 96 % (10/14/17 0847) Vital Signs (24h Range):  Temp:  [96.1 °F (35.6 °C)-98.8 °F (37.1 °C)] 97.4 °F (36.3 °C)  Pulse:  [] 80  Resp:  [16-20] 17  SpO2:  [95 %-98 %] 96 %  BP: (110-130)/(67-83) 130/78     Neurosurgery Physical Exam         AAOx3, PERRL, EOMI, FS, TM, 5/5 throughout, SILT.  DTR 2+ throughout, no Hernandez's, no clonus.     Significant Labs:    Recent Labs  Lab 10/13/17  0304 10/14/17  0419   GLU 95 143*    140   K 3.3* 3.8    109   CO2 21* 22*   BUN 11 14   CREATININE 0.6 0.6   CALCIUM 9.0 9.0       Recent Labs  Lab 10/13/17  0304 10/14/17  0419   WBC 3.90 3.97   HGB 11.4* 11.2*   HCT 34.9* 34.4*   * 143*       Recent Labs  Lab 10/13/17  0241 10/14/17  0419   INR 2.3* 2.1*   APTT 33.8*  --      Microbiology Results (last 7 days)     ** No results found for the last 168 hours. **        Significant Diagnostics:  I have reviewed all pertinent imaging results/findings within the past 24 hours.

## 2017-10-14 NOTE — PLAN OF CARE
Problem: Patient Care Overview  Goal: Plan of Care Review  Outcome: Ongoing (interventions implemented as appropriate)   10/14/17 0400   Coping/Psychosocial   Plan Of Care Reviewed With patient;spouse       Problem: Fall Risk (Adult)  Goal: Identify Related Risk Factors and Signs and Symptoms  Related risk factors and signs and symptoms are identified upon initiation of Human Response Clinical Practice Guideline (CPG)   Outcome: Ongoing (interventions implemented as appropriate)   10/14/17 0400   Fall Risk   Related Risk Factors (Fall Risk) age-related changes;neuro disease/injury       Comments: Due to patient's diagnoses, patient is at higher risk for falling.  Patient and spouse verbalize understanding of fall precautions and have demonstrated same throughout shift.  Will continue to monitor.    0600  Patient is refusing cyproheptadine because patient and spouse have not been informed of why it is being given.  Same for metoprolol.  Neither of these medications have been given to patient before.  Would like physician to explain to them why.

## 2017-10-15 LAB
ABO + RH BLD: NORMAL
ABO + RH BLD: NORMAL
ANION GAP SERPL CALC-SCNC: 9 MMOL/L
APTT BLDCRRT: 33.3 SEC
BASOPHILS # BLD AUTO: 0 K/UL
BASOPHILS NFR BLD: 0 %
BLD GP AB SCN CELLS X3 SERPL QL: NORMAL
BLD GP AB SCN CELLS X3 SERPL QL: NORMAL
BLD PROD TYP BPU: NORMAL
BLD PROD TYP BPU: NORMAL
BLOOD UNIT EXPIRATION DATE: NORMAL
BLOOD UNIT EXPIRATION DATE: NORMAL
BLOOD UNIT TYPE CODE: 6200
BLOOD UNIT TYPE CODE: 6200
BLOOD UNIT TYPE: NORMAL
BLOOD UNIT TYPE: NORMAL
BUN SERPL-MCNC: 14 MG/DL
CALCIUM SERPL-MCNC: 8.7 MG/DL
CHLORIDE SERPL-SCNC: 110 MMOL/L
CO2 SERPL-SCNC: 21 MMOL/L
CODING SYSTEM: NORMAL
CODING SYSTEM: NORMAL
CREAT SERPL-MCNC: 0.6 MG/DL
DIFFERENTIAL METHOD: ABNORMAL
DISPENSE STATUS: NORMAL
DISPENSE STATUS: NORMAL
EOSINOPHIL # BLD AUTO: 0 K/UL
EOSINOPHIL NFR BLD: 0 %
ERYTHROCYTE [DISTWIDTH] IN BLOOD BY AUTOMATED COUNT: 15.4 %
EST. GFR  (AFRICAN AMERICAN): >60 ML/MIN/1.73 M^2
EST. GFR  (NON AFRICAN AMERICAN): >60 ML/MIN/1.73 M^2
GLUCOSE SERPL-MCNC: 146 MG/DL
HCT VFR BLD AUTO: 34.8 %
HGB BLD-MCNC: 11.5 G/DL
INR PPP: 2.1
INR PPP: 2.1
LYMPHOCYTES # BLD AUTO: 0.3 K/UL
LYMPHOCYTES NFR BLD: 5.2 %
MCH RBC QN AUTO: 29.2 PG
MCHC RBC AUTO-ENTMCNC: 33 G/DL
MCV RBC AUTO: 88 FL
MONOCYTES # BLD AUTO: 0.1 K/UL
MONOCYTES NFR BLD: 2.5 %
NEUTROPHILS # BLD AUTO: 4.8 K/UL
NEUTROPHILS NFR BLD: 92.1 %
NUM UNITS TRANS FFP: NORMAL
NUM UNITS TRANS FFP: NORMAL
PLATELET # BLD AUTO: 123 K/UL
PMV BLD AUTO: 10.1 FL
POTASSIUM SERPL-SCNC: 4.1 MMOL/L
PROTHROMBIN TIME: 21.1 SEC
PROTHROMBIN TIME: 21.1 SEC
RBC # BLD AUTO: 3.94 M/UL
SODIUM SERPL-SCNC: 140 MMOL/L
WBC # BLD AUTO: 5.21 K/UL

## 2017-10-15 PROCEDURE — 85610 PROTHROMBIN TIME: CPT

## 2017-10-15 PROCEDURE — P9017 PLASMA 1 DONOR FRZ W/IN 8 HR: HCPCS

## 2017-10-15 PROCEDURE — 27201040 HC RC 50 FILTER

## 2017-10-15 PROCEDURE — 36430 TRANSFUSION BLD/BLD COMPNT: CPT

## 2017-10-15 PROCEDURE — 63600175 PHARM REV CODE 636 W HCPCS: Performed by: STUDENT IN AN ORGANIZED HEALTH CARE EDUCATION/TRAINING PROGRAM

## 2017-10-15 PROCEDURE — 85730 THROMBOPLASTIN TIME PARTIAL: CPT

## 2017-10-15 PROCEDURE — 25000003 PHARM REV CODE 250: Performed by: STUDENT IN AN ORGANIZED HEALTH CARE EDUCATION/TRAINING PROGRAM

## 2017-10-15 PROCEDURE — 85025 COMPLETE CBC W/AUTO DIFF WBC: CPT

## 2017-10-15 PROCEDURE — 86900 BLOOD TYPING SEROLOGIC ABO: CPT | Mod: 91

## 2017-10-15 PROCEDURE — 36415 COLL VENOUS BLD VENIPUNCTURE: CPT

## 2017-10-15 PROCEDURE — 86920 COMPATIBILITY TEST SPIN: CPT

## 2017-10-15 PROCEDURE — 86900 BLOOD TYPING SEROLOGIC ABO: CPT

## 2017-10-15 PROCEDURE — 80048 BASIC METABOLIC PNL TOTAL CA: CPT

## 2017-10-15 PROCEDURE — 86850 RBC ANTIBODY SCREEN: CPT

## 2017-10-15 PROCEDURE — 86901 BLOOD TYPING SEROLOGIC RH(D): CPT | Mod: 91

## 2017-10-15 PROCEDURE — 85610 PROTHROMBIN TIME: CPT | Mod: 91

## 2017-10-15 PROCEDURE — 20600001 HC STEP DOWN PRIVATE ROOM

## 2017-10-15 RX ORDER — HYDROCODONE BITARTRATE AND ACETAMINOPHEN 500; 5 MG/1; MG/1
TABLET ORAL
Status: DISCONTINUED | OUTPATIENT
Start: 2017-10-15 | End: 2017-10-18 | Stop reason: HOSPADM

## 2017-10-15 RX ORDER — SODIUM CHLORIDE 9 MG/ML
INJECTION, SOLUTION INTRAVENOUS CONTINUOUS
Status: DISCONTINUED | OUTPATIENT
Start: 2017-10-16 | End: 2017-10-17

## 2017-10-15 RX ORDER — LIDOCAINE HYDROCHLORIDE 10 MG/ML
1 INJECTION, SOLUTION EPIDURAL; INFILTRATION; INTRACAUDAL; PERINEURAL ONCE
Status: DISCONTINUED | OUTPATIENT
Start: 2017-10-15 | End: 2017-10-18 | Stop reason: HOSPADM

## 2017-10-15 RX ORDER — SODIUM CHLORIDE 9 MG/ML
INJECTION, SOLUTION INTRAVENOUS CONTINUOUS
Status: CANCELLED | OUTPATIENT
Start: 2017-10-15

## 2017-10-15 RX ADMIN — POTASSIUM CHLORIDE 20 MEQ: 1500 TABLET, EXTENDED RELEASE ORAL at 08:10

## 2017-10-15 RX ADMIN — NYSTATIN 500000 UNITS: 500000 SUSPENSION ORAL at 12:10

## 2017-10-15 RX ADMIN — PANTOPRAZOLE SODIUM 40 MG: 40 TABLET, DELAYED RELEASE ORAL at 08:10

## 2017-10-15 RX ADMIN — DEXAMETHASONE SODIUM PHOSPHATE 4 MG: 4 INJECTION, SOLUTION INTRAMUSCULAR; INTRAVENOUS at 12:10

## 2017-10-15 RX ADMIN — DEXAMETHASONE SODIUM PHOSPHATE 4 MG: 4 INJECTION, SOLUTION INTRAMUSCULAR; INTRAVENOUS at 05:10

## 2017-10-15 RX ADMIN — CALCIUM CARBONATE-CHOLECALCIFEROL TAB 250 MG-125 UNIT 2 TABLET: 250-125 TAB at 08:10

## 2017-10-15 RX ADMIN — LEVETIRACETAM 500 MG: 500 TABLET, FILM COATED ORAL at 08:10

## 2017-10-15 RX ADMIN — NYSTATIN 500000 UNITS: 500000 SUSPENSION ORAL at 05:10

## 2017-10-15 RX ADMIN — DEXAMETHASONE SODIUM PHOSPHATE 4 MG: 4 INJECTION, SOLUTION INTRAMUSCULAR; INTRAVENOUS at 06:10

## 2017-10-15 RX ADMIN — NYSTATIN 500000 UNITS: 500000 SUSPENSION ORAL at 06:10

## 2017-10-15 NOTE — ASSESSMENT & PLAN NOTE
52 year old male with history of rectal cancer metastasis to the brain, s/p resection and SRS over past 3 months. Now with 2 weeks of confusion, gait instability, word finding difficulty. CT/PET at OSH showing increased uptake and worsening midline shift    -- MRI performed, radiation necrosis and edema noted.  -- Neurostable on exam.  -- Will plan for OR for crani for resection Monday.   -- Will ensure consent/preop/booked  -- Regular diet.  -- Dex 4q6  -- Keppra   -- Will correct INR today  -- Vitals per unit routine  -- PT/OT daily and for recs. Patient OOB >4h/day.  -- PPx: PPI, SQH, IS, SCDs, bowel regimen  -- Dispo: pending surgery

## 2017-10-15 NOTE — SUBJECTIVE & OBJECTIVE
Interval History: NAEON    Medications:  Continuous Infusions:   Scheduled Meds:   calcium carbonate-vitamin D3 250-125 mg  2 tablet Oral BID    cyproheptadine  4 mg Oral TID AC    dexamethasone  4 mg Intravenous Q6H    levetiracetam  500 mg Oral BID    metoprolol tartrate  50 mg Oral BID    nystatin  500,000 Units Oral QID    pantoprazole  40 mg Oral Daily    potassium chloride SA  20 mEq Oral BID     PRN Meds:diphenoxylate-atropine 2.5-0.025 mg, promethazine, simethicone     Review of Systems   All other systems reviewed and are negative.    Objective:     Weight: 107.2 kg (236 lb 6.4 oz)  Body mass index is 29.55 kg/m².  Vital Signs (Most Recent):  Temp: 97.5 °F (36.4 °C) (10/15/17 0851)  Pulse: 86 (10/15/17 0851)  Resp: 17 (10/15/17 0851)  BP: 122/80 (10/15/17 0851)  SpO2: 97 % (10/15/17 0851) Vital Signs (24h Range):  Temp:  [96.9 °F (36.1 °C)-98.2 °F (36.8 °C)] 97.5 °F (36.4 °C)  Pulse:  [73-92] 86  Resp:  [17-18] 17  SpO2:  [95 %-98 %] 97 %  BP: (116-123)/(70-86) 122/80                           Physical Exam:  Vitals reviewed.    Constitutional: He appears well-developed and well-nourished. He is not diaphoretic. No distress.     Eyes: Pupils are equal, round, and reactive to light.     Cardiovascular: Normal rate.     Abdominal: Soft.     Psych/Behavior: He is alert. He is oriented to person, place, and time. He has a normal mood and affect.     Neurological:        Sensory: There is no sensory deficit in the trunk. There is no sensory deficit in the extremities.        Cranial nerves: Cranial nerve(s) II, III, IV, V, VI, VII, VIII, IX, X, XI and XII are intact.   Neuro-intact, minimal L facial droop    Significant Labs:    Recent Labs  Lab 10/14/17  1818 10/15/17  0403   * 146*    140   K 3.8 4.1    110   CO2 22* 21*   BUN 14 14   CREATININE 0.6 0.6   CALCIUM 9.0 8.7       Recent Labs  Lab 10/14/17  0419 10/15/17  0403   WBC 3.97 5.21   HGB 11.2* 11.5*   HCT 34.4* 34.8*   PLT  143* 123*       Recent Labs  Lab 10/14/17  0419 10/15/17  0402   INR 2.1* 2.1*     Microbiology Results (last 7 days)     ** No results found for the last 168 hours. **        Recent Lab Results       10/15/17  0403 10/15/17  0402      Anion Gap 9      Baso # 0.00      Basophil% 0.0      BUN, Bld 14      Calcium 8.7      Chloride 110      CO2 21(L)      Creatinine 0.6      Differential Method Automated      eGFR if African American >60.0      eGFR if non  >60.0  Comment:  Calculation used to obtain the estimated glomerular filtration  rate (eGFR) is the CKD-EPI equation. Since race is unknown   in our information system, the eGFR values for   -American and Non--American patients are given   for each creatinine result.        Eos # 0.0      Eosinophil% 0.0      Glucose 146(H)      Gran # 4.8      Gran% 92.1(H)      Hematocrit 34.8(L)      Hemoglobin 11.5(L)      Coumadin Monitoring INR  2.1  Comment:  Coumadin Therapy:  2.0 - 3.0 for INR for all indicators except mechanical heart valves  and antiphospholipid syndromes which should use 2.5 - 3.5.  (H)     Lymph # 0.3(L)      Lymph% 5.2(L)      MCH 29.2      MCHC 33.0      MCV 88      Mono # 0.1(L)      Mono% 2.5(L)      MPV 10.1      Platelets 123(L)      Potassium 4.1      Protime  21.1(H)     RBC 3.94(L)      RDW 15.4(H)      Sodium 140      WBC 5.21          All pertinent labs from the last 24 hours have been reviewed.    Significant Diagnostics:  CT: No results found in the last 24 hours.  MRI: No results found in the last 24 hours.  I have reviewed all pertinent imaging results/findings within the past 24 hours.

## 2017-10-15 NOTE — PROGRESS NOTES
[facility]  Neurosurgery  Progress Note    Subjective:     History of Present Illness: Patient is a 52 year old male with a history of rectal cancer s/p resection  and brain metastasis s/p resection 7/31/17 by this service as well as recent SRS in August 2017 who presents as transfer from H with new PET scan showing increased uptake and worsening midline shift.  Per the patient and his wife he developed gait imbalance, word finding difficulties, and confusion over the past 2 weeks. Denies changes in vision, denies extremity weakness or numbness. He takes coumadin daily for history of DVT/PEs in past, last taken yesterday. Of note since resection by neurosurgery, patient has been in hospital around 2 months ago with a PE and 1 month ago with hemoperitoneum.       Post-Op Info:  Procedure(s) (LRB):  CRANIOTOMY (Right)         Interval History: NAEON    Medications:  Continuous Infusions:   Scheduled Meds:   calcium carbonate-vitamin D3 250-125 mg  2 tablet Oral BID    cyproheptadine  4 mg Oral TID AC    dexamethasone  4 mg Intravenous Q6H    levetiracetam  500 mg Oral BID    metoprolol tartrate  50 mg Oral BID    nystatin  500,000 Units Oral QID    pantoprazole  40 mg Oral Daily    potassium chloride SA  20 mEq Oral BID     PRN Meds:diphenoxylate-atropine 2.5-0.025 mg, promethazine, simethicone     Review of Systems   All other systems reviewed and are negative.    Objective:     Weight: 107.2 kg (236 lb 6.4 oz)  Body mass index is 29.55 kg/m².  Vital Signs (Most Recent):  Temp: 97.5 °F (36.4 °C) (10/15/17 0851)  Pulse: 86 (10/15/17 0851)  Resp: 17 (10/15/17 0851)  BP: 122/80 (10/15/17 0851)  SpO2: 97 % (10/15/17 0851) Vital Signs (24h Range):  Temp:  [96.9 °F (36.1 °C)-98.2 °F (36.8 °C)] 97.5 °F (36.4 °C)  Pulse:  [73-92] 86  Resp:  [17-18] 17  SpO2:  [95 %-98 %] 97 %  BP: (116-123)/(70-86) 122/80                           Physical Exam:  Vitals reviewed.    Constitutional: He appears  well-developed and well-nourished. He is not diaphoretic. No distress.     Eyes: Pupils are equal, round, and reactive to light.     Cardiovascular: Normal rate.     Abdominal: Soft.     Psych/Behavior: He is alert. He is oriented to person, place, and time. He has a normal mood and affect.     Neurological:        Sensory: There is no sensory deficit in the trunk. There is no sensory deficit in the extremities.        Cranial nerves: Cranial nerve(s) II, III, IV, V, VI, VII, VIII, IX, X, XI and XII are intact.   Neuro-intact, minimal L facial droop    Significant Labs:    Recent Labs  Lab 10/14/17  0419 10/15/17  0403   * 146*    140   K 3.8 4.1    110   CO2 22* 21*   BUN 14 14   CREATININE 0.6 0.6   CALCIUM 9.0 8.7       Recent Labs  Lab 10/14/17  0419 10/15/17  0403   WBC 3.97 5.21   HGB 11.2* 11.5*   HCT 34.4* 34.8*   * 123*       Recent Labs  Lab 10/14/17  0419 10/15/17  0402   INR 2.1* 2.1*     Microbiology Results (last 7 days)     ** No results found for the last 168 hours. **        Recent Lab Results       10/15/17  0403 10/15/17  0402      Anion Gap 9      Baso # 0.00      Basophil% 0.0      BUN, Bld 14      Calcium 8.7      Chloride 110      CO2 21(L)      Creatinine 0.6      Differential Method Automated      eGFR if African American >60.0      eGFR if non  >60.0  Comment:  Calculation used to obtain the estimated glomerular filtration  rate (eGFR) is the CKD-EPI equation. Since race is unknown   in our information system, the eGFR values for   -American and Non--American patients are given   for each creatinine result.        Eos # 0.0      Eosinophil% 0.0      Glucose 146(H)      Gran # 4.8      Gran% 92.1(H)      Hematocrit 34.8(L)      Hemoglobin 11.5(L)      Coumadin Monitoring INR  2.1  Comment:  Coumadin Therapy:  2.0 - 3.0 for INR for all indicators except mechanical heart valves  and antiphospholipid syndromes which should use 2.5 -  3.5.  (H)     Lymph # 0.3(L)      Lymph% 5.2(L)      MCH 29.2      MCHC 33.0      MCV 88      Mono # 0.1(L)      Mono% 2.5(L)      MPV 10.1      Platelets 123(L)      Potassium 4.1      Protime  21.1(H)     RBC 3.94(L)      RDW 15.4(H)      Sodium 140      WBC 5.21          All pertinent labs from the last 24 hours have been reviewed.    Significant Diagnostics:  CT: No results found in the last 24 hours.  MRI: No results found in the last 24 hours.  I have reviewed all pertinent imaging results/findings within the past 24 hours.    Assessment/Plan:     Metastasis to brain    52 year old male with history of rectal cancer metastasis to the brain, s/p resection and SRS over past 3 months. Now with 2 weeks of confusion, gait instability, word finding difficulty. CT/PET at OSH showing increased uptake and worsening midline shift    -- MRI performed, radiation necrosis and edema noted.  -- Neurostable on exam.  -- Will plan for OR for crani for resection Monday.   -- Will ensure consent/preop/booked  -- Regular diet.  -- Dex 4q6  -- Keppra   -- Will correct INR today  -- Vitals per unit routine  -- PT/OT daily and for recs. Patient OOB >4h/day.  -- PPx: PPI, SQH, IS, SCDs, bowel regimen  -- Dispo: pending surgery                Marcelino Garduno MD  Neurosurgery  [facility]

## 2017-10-15 NOTE — PLAN OF CARE
Problem: Patient Care Overview  Goal: Plan of Care Review  Outcome: Ongoing (interventions implemented as appropriate)  Patient has remained free of falls this shift. He is AAOx4 and VS's have been stable. He came in on 10/12/17 and asked for a walker so he could get out of bed and walk around. So far no walker has been brought to the bedside and the patient is upset!

## 2017-10-16 ENCOUNTER — ANESTHESIA (OUTPATIENT)
Dept: SURGERY | Facility: HOSPITAL | Age: 52
DRG: 025 | End: 2017-10-16
Payer: COMMERCIAL

## 2017-10-16 LAB
ANION GAP SERPL CALC-SCNC: 10 MMOL/L
ANION GAP SERPL CALC-SCNC: 10 MMOL/L
BASOPHILS # BLD AUTO: 0 K/UL
BASOPHILS # BLD AUTO: 0 K/UL
BASOPHILS NFR BLD: 0 %
BASOPHILS NFR BLD: 0 %
BLD PROD TYP BPU: NORMAL
BLD PROD TYP BPU: NORMAL
BLOOD UNIT EXPIRATION DATE: NORMAL
BLOOD UNIT EXPIRATION DATE: NORMAL
BLOOD UNIT TYPE CODE: 6200
BLOOD UNIT TYPE CODE: 6200
BLOOD UNIT TYPE: NORMAL
BLOOD UNIT TYPE: NORMAL
BUN SERPL-MCNC: 10 MG/DL
BUN SERPL-MCNC: 14 MG/DL
CALCIUM SERPL-MCNC: 8.5 MG/DL
CALCIUM SERPL-MCNC: 8.7 MG/DL
CHLORIDE SERPL-SCNC: 108 MMOL/L
CHLORIDE SERPL-SCNC: 109 MMOL/L
CO2 SERPL-SCNC: 21 MMOL/L
CO2 SERPL-SCNC: 21 MMOL/L
CODING SYSTEM: NORMAL
CODING SYSTEM: NORMAL
CREAT SERPL-MCNC: 0.6 MG/DL
CREAT SERPL-MCNC: 0.6 MG/DL
DIFFERENTIAL METHOD: ABNORMAL
DIFFERENTIAL METHOD: ABNORMAL
DISPENSE STATUS: NORMAL
DISPENSE STATUS: NORMAL
EOSINOPHIL # BLD AUTO: 0 K/UL
EOSINOPHIL # BLD AUTO: 0 K/UL
EOSINOPHIL NFR BLD: 0 %
EOSINOPHIL NFR BLD: 0 %
ERYTHROCYTE [DISTWIDTH] IN BLOOD BY AUTOMATED COUNT: 15 %
ERYTHROCYTE [DISTWIDTH] IN BLOOD BY AUTOMATED COUNT: 15.4 %
EST. GFR  (AFRICAN AMERICAN): >60 ML/MIN/1.73 M^2
EST. GFR  (AFRICAN AMERICAN): >60 ML/MIN/1.73 M^2
EST. GFR  (NON AFRICAN AMERICAN): >60 ML/MIN/1.73 M^2
EST. GFR  (NON AFRICAN AMERICAN): >60 ML/MIN/1.73 M^2
GLUCOSE SERPL-MCNC: 123 MG/DL
GLUCOSE SERPL-MCNC: 134 MG/DL
HCT VFR BLD AUTO: 31.5 %
HCT VFR BLD AUTO: 32.3 %
HGB BLD-MCNC: 10.8 G/DL
HGB BLD-MCNC: 10.8 G/DL
IMM GRANULOCYTES # BLD AUTO: 0.03 K/UL
IMM GRANULOCYTES # BLD AUTO: 0.05 K/UL
IMM GRANULOCYTES NFR BLD AUTO: 0.5 %
IMM GRANULOCYTES NFR BLD AUTO: 1.3 %
INR PPP: 1.5
LYMPHOCYTES # BLD AUTO: 0.1 K/UL
LYMPHOCYTES # BLD AUTO: 0.3 K/UL
LYMPHOCYTES NFR BLD: 3.7 %
LYMPHOCYTES NFR BLD: 4.5 %
MCH RBC QN AUTO: 29.5 PG
MCH RBC QN AUTO: 29.7 PG
MCHC RBC AUTO-ENTMCNC: 33.4 G/DL
MCHC RBC AUTO-ENTMCNC: 34.3 G/DL
MCV RBC AUTO: 86 FL
MCV RBC AUTO: 89 FL
MONOCYTES # BLD AUTO: 0.2 K/UL
MONOCYTES # BLD AUTO: 0.3 K/UL
MONOCYTES NFR BLD: 4 %
MONOCYTES NFR BLD: 4.7 %
NEUTROPHILS # BLD AUTO: 3.4 K/UL
NEUTROPHILS # BLD AUTO: 5 K/UL
NEUTROPHILS NFR BLD: 90.3 %
NEUTROPHILS NFR BLD: 91 %
NRBC BLD-RTO: 0 /100 WBC
NRBC BLD-RTO: 0 /100 WBC
NUM UNITS TRANS FFP: NORMAL
NUM UNITS TRANS FFP: NORMAL
PLATELET # BLD AUTO: 109 K/UL
PLATELET # BLD AUTO: 117 K/UL
PMV BLD AUTO: 11.3 FL
PMV BLD AUTO: 11.7 FL
POTASSIUM SERPL-SCNC: 3.8 MMOL/L
POTASSIUM SERPL-SCNC: 4.6 MMOL/L
PROTHROMBIN TIME: 15 SEC
RBC # BLD AUTO: 3.64 M/UL
RBC # BLD AUTO: 3.66 M/UL
SODIUM SERPL-SCNC: 139 MMOL/L
SODIUM SERPL-SCNC: 140 MMOL/L
WBC # BLD AUTO: 3.77 K/UL
WBC # BLD AUTO: 5.57 K/UL

## 2017-10-16 PROCEDURE — 85610 PROTHROMBIN TIME: CPT

## 2017-10-16 PROCEDURE — P9017 PLASMA 1 DONOR FRZ W/IN 8 HR: HCPCS

## 2017-10-16 PROCEDURE — 25000003 PHARM REV CODE 250: Performed by: NEUROLOGICAL SURGERY

## 2017-10-16 PROCEDURE — 25500020 PHARM REV CODE 255: Performed by: NEUROLOGICAL SURGERY

## 2017-10-16 PROCEDURE — 37000009 HC ANESTHESIA EA ADD 15 MINS: Performed by: NEUROLOGICAL SURGERY

## 2017-10-16 PROCEDURE — 99024 POSTOP FOLLOW-UP VISIT: CPT | Mod: ,,, | Performed by: PHYSICIAN ASSISTANT

## 2017-10-16 PROCEDURE — 27201423 OPTIME MED/SURG SUP & DEVICES STERILE SUPPLY: Performed by: NEUROLOGICAL SURGERY

## 2017-10-16 PROCEDURE — 80048 BASIC METABOLIC PNL TOTAL CA: CPT | Mod: 91

## 2017-10-16 PROCEDURE — 20000000 HC ICU ROOM

## 2017-10-16 PROCEDURE — 00B00ZZ EXCISION OF BRAIN, OPEN APPROACH: ICD-10-PCS | Performed by: NEUROLOGICAL SURGERY

## 2017-10-16 PROCEDURE — 63600175 PHARM REV CODE 636 W HCPCS: Performed by: ANESTHESIOLOGY

## 2017-10-16 PROCEDURE — C1713 ANCHOR/SCREW BN/BN,TIS/BN: HCPCS | Performed by: NEUROLOGICAL SURGERY

## 2017-10-16 PROCEDURE — A9585 GADOBUTROL INJECTION: HCPCS | Performed by: NEUROLOGICAL SURGERY

## 2017-10-16 PROCEDURE — 63600175 PHARM REV CODE 636 W HCPCS

## 2017-10-16 PROCEDURE — 85025 COMPLETE CBC W/AUTO DIFF WBC: CPT | Mod: 91

## 2017-10-16 PROCEDURE — 37000008 HC ANESTHESIA 1ST 15 MINUTES: Performed by: NEUROLOGICAL SURGERY

## 2017-10-16 PROCEDURE — 61510 CRNEC TREPH EXC BRN TUM STTL: CPT | Mod: 79,,, | Performed by: NEUROLOGICAL SURGERY

## 2017-10-16 PROCEDURE — 25000003 PHARM REV CODE 250: Performed by: STUDENT IN AN ORGANIZED HEALTH CARE EDUCATION/TRAINING PROGRAM

## 2017-10-16 PROCEDURE — 71000033 HC RECOVERY, INTIAL HOUR: Performed by: NEUROLOGICAL SURGERY

## 2017-10-16 PROCEDURE — C1762 CONN TISS, HUMAN(INC FASCIA): HCPCS | Performed by: NEUROLOGICAL SURGERY

## 2017-10-16 PROCEDURE — 36000711: Performed by: NEUROLOGICAL SURGERY

## 2017-10-16 PROCEDURE — 36000710: Performed by: NEUROLOGICAL SURGERY

## 2017-10-16 PROCEDURE — 63600175 PHARM REV CODE 636 W HCPCS: Performed by: STUDENT IN AN ORGANIZED HEALTH CARE EDUCATION/TRAINING PROGRAM

## 2017-10-16 PROCEDURE — 61781 SCAN PROC CRANIAL INTRA: CPT | Mod: ,,, | Performed by: NEUROLOGICAL SURGERY

## 2017-10-16 PROCEDURE — 25000003 PHARM REV CODE 250: Performed by: ANESTHESIOLOGY

## 2017-10-16 PROCEDURE — 27800505 HC CATH, RADIAL ARTERY KIT: Performed by: ANESTHESIOLOGY

## 2017-10-16 PROCEDURE — D9220A PRA ANESTHESIA: Mod: ,,, | Performed by: ANESTHESIOLOGY

## 2017-10-16 PROCEDURE — 63600175 PHARM REV CODE 636 W HCPCS: Performed by: NEUROLOGICAL SURGERY

## 2017-10-16 PROCEDURE — 88307 TISSUE EXAM BY PATHOLOGIST: CPT | Performed by: PATHOLOGY

## 2017-10-16 PROCEDURE — 27201037 HC PRESSURE MONITORING SET UP

## 2017-10-16 PROCEDURE — 94760 N-INVAS EAR/PLS OXIMETRY 1: CPT

## 2017-10-16 PROCEDURE — 36620 INSERTION CATHETER ARTERY: CPT | Mod: 59,,, | Performed by: ANESTHESIOLOGY

## 2017-10-16 PROCEDURE — 27100025 HC TUBING, SET FLUID WARMER: Performed by: ANESTHESIOLOGY

## 2017-10-16 PROCEDURE — 12000002 HC ACUTE/MED SURGE SEMI-PRIVATE ROOM

## 2017-10-16 PROCEDURE — 71000039 HC RECOVERY, EACH ADD'L HOUR: Performed by: NEUROLOGICAL SURGERY

## 2017-10-16 DEVICE — SCREW SD 1.5X4MM TI CROSS PIN: Type: IMPLANTABLE DEVICE | Site: CRANIAL | Status: FUNCTIONAL

## 2017-10-16 DEVICE — DURA MATRIX ONLAY PLUS 2X2: Type: IMPLANTABLE DEVICE | Site: CRANIAL | Status: FUNCTIONAL

## 2017-10-16 RX ORDER — LEVETIRACETAM 500 MG/5ML
INJECTION, SOLUTION, CONCENTRATE INTRAVENOUS
Status: DISCONTINUED | OUTPATIENT
Start: 2017-10-16 | End: 2017-10-16

## 2017-10-16 RX ORDER — BUPIVACAINE HYDROCHLORIDE AND EPINEPHRINE 5; 5 MG/ML; UG/ML
INJECTION, SOLUTION EPIDURAL; INTRACAUDAL; PERINEURAL
Status: DISCONTINUED | OUTPATIENT
Start: 2017-10-16 | End: 2017-10-16 | Stop reason: HOSPADM

## 2017-10-16 RX ORDER — NEOSTIGMINE METHYLSULFATE 1 MG/ML
INJECTION, SOLUTION INTRAVENOUS
Status: DISCONTINUED | OUTPATIENT
Start: 2017-10-16 | End: 2017-10-16

## 2017-10-16 RX ORDER — GADOBUTROL 604.72 MG/ML
10 INJECTION INTRAVENOUS
Status: COMPLETED | OUTPATIENT
Start: 2017-10-16 | End: 2017-10-16

## 2017-10-16 RX ORDER — MORPHINE SULFATE 2 MG/ML
2 INJECTION, SOLUTION INTRAMUSCULAR; INTRAVENOUS EVERY 4 HOURS PRN
Status: DISCONTINUED | OUTPATIENT
Start: 2017-10-16 | End: 2017-10-18 | Stop reason: HOSPADM

## 2017-10-16 RX ORDER — LORAZEPAM 2 MG/ML
INJECTION INTRAMUSCULAR
Status: COMPLETED
Start: 2017-10-16 | End: 2017-10-16

## 2017-10-16 RX ORDER — HYDRALAZINE HYDROCHLORIDE 20 MG/ML
INJECTION INTRAMUSCULAR; INTRAVENOUS
Status: COMPLETED
Start: 2017-10-16 | End: 2017-10-16

## 2017-10-16 RX ORDER — FENTANYL CITRATE 50 UG/ML
INJECTION, SOLUTION INTRAMUSCULAR; INTRAVENOUS
Status: DISCONTINUED | OUTPATIENT
Start: 2017-10-16 | End: 2017-10-16

## 2017-10-16 RX ORDER — SODIUM CHLORIDE 9 MG/ML
INJECTION, SOLUTION INTRAVENOUS CONTINUOUS
Status: DISCONTINUED | OUTPATIENT
Start: 2017-10-16 | End: 2017-10-17

## 2017-10-16 RX ORDER — HYDROMORPHONE HYDROCHLORIDE 1 MG/ML
0.2 INJECTION, SOLUTION INTRAMUSCULAR; INTRAVENOUS; SUBCUTANEOUS EVERY 5 MIN PRN
Status: DISCONTINUED | OUTPATIENT
Start: 2017-10-16 | End: 2017-10-17

## 2017-10-16 RX ORDER — BACITRACIN 50000 [IU]/1
INJECTION, POWDER, FOR SOLUTION INTRAMUSCULAR
Status: DISCONTINUED | OUTPATIENT
Start: 2017-10-16 | End: 2017-10-16 | Stop reason: HOSPADM

## 2017-10-16 RX ORDER — ONDANSETRON HYDROCHLORIDE 2 MG/ML
INJECTION, SOLUTION INTRAMUSCULAR; INTRAVENOUS
Status: DISCONTINUED | OUTPATIENT
Start: 2017-10-16 | End: 2017-10-16

## 2017-10-16 RX ORDER — HYDROCODONE BITARTRATE AND ACETAMINOPHEN 5; 325 MG/1; MG/1
1 TABLET ORAL EVERY 4 HOURS PRN
Status: DISCONTINUED | OUTPATIENT
Start: 2017-10-16 | End: 2017-10-18 | Stop reason: HOSPADM

## 2017-10-16 RX ORDER — MIDAZOLAM HYDROCHLORIDE 1 MG/ML
INJECTION INTRAMUSCULAR; INTRAVENOUS
Status: DISCONTINUED | OUTPATIENT
Start: 2017-10-16 | End: 2017-10-16

## 2017-10-16 RX ORDER — HYDRALAZINE HYDROCHLORIDE 20 MG/ML
10 INJECTION INTRAMUSCULAR; INTRAVENOUS EVERY 4 HOURS PRN
Status: DISCONTINUED | OUTPATIENT
Start: 2017-10-16 | End: 2017-10-17

## 2017-10-16 RX ORDER — LIDOCAINE HCL/PF 100 MG/5ML
SYRINGE (ML) INTRAVENOUS
Status: DISCONTINUED | OUTPATIENT
Start: 2017-10-16 | End: 2017-10-16

## 2017-10-16 RX ORDER — PROPOFOL 10 MG/ML
VIAL (ML) INTRAVENOUS
Status: DISCONTINUED | OUTPATIENT
Start: 2017-10-16 | End: 2017-10-16

## 2017-10-16 RX ORDER — ONDANSETRON 2 MG/ML
4 INJECTION INTRAMUSCULAR; INTRAVENOUS DAILY PRN
Status: DISCONTINUED | OUTPATIENT
Start: 2017-10-16 | End: 2017-10-17

## 2017-10-16 RX ORDER — BACITRACIN ZINC 500 UNIT/G
OINTMENT (GRAM) TOPICAL
Status: DISCONTINUED | OUTPATIENT
Start: 2017-10-16 | End: 2017-10-16 | Stop reason: HOSPADM

## 2017-10-16 RX ORDER — PROMETHAZINE HYDROCHLORIDE 12.5 MG/1
25 TABLET ORAL EVERY 6 HOURS PRN
Status: DISCONTINUED | OUTPATIENT
Start: 2017-10-16 | End: 2017-10-18 | Stop reason: HOSPADM

## 2017-10-16 RX ORDER — ROCURONIUM BROMIDE 10 MG/ML
INJECTION, SOLUTION INTRAVENOUS
Status: DISCONTINUED | OUTPATIENT
Start: 2017-10-16 | End: 2017-10-16

## 2017-10-16 RX ORDER — HYDROMORPHONE HYDROCHLORIDE 2 MG/ML
INJECTION, SOLUTION INTRAMUSCULAR; INTRAVENOUS; SUBCUTANEOUS
Status: DISCONTINUED | OUTPATIENT
Start: 2017-10-16 | End: 2017-10-16

## 2017-10-16 RX ORDER — SODIUM CHLORIDE 0.9 % (FLUSH) 0.9 %
3 SYRINGE (ML) INJECTION
Status: DISCONTINUED | OUTPATIENT
Start: 2017-10-16 | End: 2017-10-16

## 2017-10-16 RX ORDER — DEXAMETHASONE SODIUM PHOSPHATE 4 MG/ML
INJECTION, SOLUTION INTRA-ARTICULAR; INTRALESIONAL; INTRAMUSCULAR; INTRAVENOUS; SOFT TISSUE
Status: DISCONTINUED | OUTPATIENT
Start: 2017-10-16 | End: 2017-10-16

## 2017-10-16 RX ORDER — ACETAMINOPHEN 10 MG/ML
INJECTION, SOLUTION INTRAVENOUS
Status: DISCONTINUED | OUTPATIENT
Start: 2017-10-16 | End: 2017-10-16

## 2017-10-16 RX ORDER — LABETALOL HYDROCHLORIDE 5 MG/ML
INJECTION, SOLUTION INTRAVENOUS
Status: DISPENSED
Start: 2017-10-16 | End: 2017-10-17

## 2017-10-16 RX ORDER — PANTOPRAZOLE SODIUM 40 MG/10ML
40 INJECTION, POWDER, LYOPHILIZED, FOR SOLUTION INTRAVENOUS DAILY
Status: DISCONTINUED | OUTPATIENT
Start: 2017-10-16 | End: 2017-10-16

## 2017-10-16 RX ORDER — GLYCOPYRROLATE 0.2 MG/ML
INJECTION INTRAMUSCULAR; INTRAVENOUS
Status: DISCONTINUED | OUTPATIENT
Start: 2017-10-16 | End: 2017-10-16

## 2017-10-16 RX ORDER — ONDANSETRON 8 MG/1
8 TABLET, ORALLY DISINTEGRATING ORAL EVERY 8 HOURS PRN
Status: DISCONTINUED | OUTPATIENT
Start: 2017-10-16 | End: 2017-10-18 | Stop reason: HOSPADM

## 2017-10-16 RX ORDER — ACETAMINOPHEN 325 MG/1
650 TABLET ORAL EVERY 6 HOURS PRN
Status: DISCONTINUED | OUTPATIENT
Start: 2017-10-16 | End: 2017-10-18 | Stop reason: HOSPADM

## 2017-10-16 RX ORDER — ACETAMINOPHEN 650 MG/1
650 SUPPOSITORY RECTAL EVERY 6 HOURS PRN
Status: DISCONTINUED | OUTPATIENT
Start: 2017-10-16 | End: 2017-10-18 | Stop reason: HOSPADM

## 2017-10-16 RX ORDER — HYDROCODONE BITARTRATE AND ACETAMINOPHEN 500; 5 MG/1; MG/1
TABLET ORAL
Status: DISCONTINUED | OUTPATIENT
Start: 2017-10-16 | End: 2017-10-18 | Stop reason: HOSPADM

## 2017-10-16 RX ORDER — LIDOCAINE HYDROCHLORIDE AND EPINEPHRINE 15; 5 MG/ML; UG/ML
INJECTION, SOLUTION EPIDURAL
Status: DISCONTINUED | OUTPATIENT
Start: 2017-10-16 | End: 2017-10-16 | Stop reason: HOSPADM

## 2017-10-16 RX ORDER — LORAZEPAM 2 MG/ML
0.5 INJECTION INTRAMUSCULAR ONCE
Status: COMPLETED | OUTPATIENT
Start: 2017-10-16 | End: 2017-10-16

## 2017-10-16 RX ORDER — SODIUM CHLORIDE 9 MG/ML
INJECTION, SOLUTION INTRAVENOUS CONTINUOUS PRN
Status: DISCONTINUED | OUTPATIENT
Start: 2017-10-16 | End: 2017-10-16

## 2017-10-16 RX ADMIN — DEXAMETHASONE SODIUM PHOSPHATE 12 MG: 4 INJECTION, SOLUTION INTRAMUSCULAR; INTRAVENOUS at 12:10

## 2017-10-16 RX ADMIN — GADOBUTROL 10 ML: 604.72 INJECTION INTRAVENOUS at 08:10

## 2017-10-16 RX ADMIN — POTASSIUM CHLORIDE 20 MEQ: 1500 TABLET, EXTENDED RELEASE ORAL at 08:10

## 2017-10-16 RX ADMIN — LEVETIRACETAM 500 MG: 500 TABLET, FILM COATED ORAL at 08:10

## 2017-10-16 RX ADMIN — HYDROCODONE BITARTRATE AND ACETAMINOPHEN 1 TABLET: 5; 325 TABLET ORAL at 07:10

## 2017-10-16 RX ADMIN — POTASSIUM CHLORIDE 20 MEQ: 1500 TABLET, EXTENDED RELEASE ORAL at 09:10

## 2017-10-16 RX ADMIN — SODIUM CHLORIDE: 0.9 INJECTION, SOLUTION INTRAVENOUS at 12:10

## 2017-10-16 RX ADMIN — NYSTATIN 500000 UNITS: 500000 SUSPENSION ORAL at 05:10

## 2017-10-16 RX ADMIN — DEXAMETHASONE SODIUM PHOSPHATE 4 MG: 4 INJECTION, SOLUTION INTRAMUSCULAR; INTRAVENOUS at 05:10

## 2017-10-16 RX ADMIN — GLYCOPYRROLATE 0.6 MG: 0.2 INJECTION, SOLUTION INTRAMUSCULAR; INTRAVENOUS at 01:10

## 2017-10-16 RX ADMIN — ONDANSETRON 4 MG: 2 INJECTION, SOLUTION INTRAMUSCULAR; INTRAVENOUS at 01:10

## 2017-10-16 RX ADMIN — SODIUM CHLORIDE: 0.9 INJECTION, SOLUTION INTRAVENOUS at 06:10

## 2017-10-16 RX ADMIN — LORAZEPAM 0.5 MG: 2 INJECTION INTRAMUSCULAR at 07:10

## 2017-10-16 RX ADMIN — HYDROMORPHONE HYDROCHLORIDE 0.5 MG: 2 INJECTION, SOLUTION INTRAMUSCULAR; INTRAVENOUS; SUBCUTANEOUS at 01:10

## 2017-10-16 RX ADMIN — LORAZEPAM 0.5 MG: 2 INJECTION INTRAMUSCULAR; INTRAVENOUS at 07:10

## 2017-10-16 RX ADMIN — HYDRALAZINE HYDROCHLORIDE 10 MG: 20 INJECTION INTRAMUSCULAR; INTRAVENOUS at 06:10

## 2017-10-16 RX ADMIN — NEOSTIGMINE METHYLSULFATE 5 MG: 1 INJECTION INTRAVENOUS at 01:10

## 2017-10-16 RX ADMIN — SODIUM CHLORIDE: 0.9 INJECTION, SOLUTION INTRAVENOUS at 11:10

## 2017-10-16 RX ADMIN — ROCURONIUM BROMIDE 50 MG: 10 INJECTION, SOLUTION INTRAVENOUS at 11:10

## 2017-10-16 RX ADMIN — METOPROLOL TARTRATE 50 MG: 50 TABLET, FILM COATED ORAL at 08:10

## 2017-10-16 RX ADMIN — HYDROCODONE BITARTRATE AND ACETAMINOPHEN 1 TABLET: 5; 325 TABLET ORAL at 03:10

## 2017-10-16 RX ADMIN — DEXAMETHASONE SODIUM PHOSPHATE 4 MG: 4 INJECTION, SOLUTION INTRAMUSCULAR; INTRAVENOUS at 12:10

## 2017-10-16 RX ADMIN — CEFTRIAXONE 2 G: 2 INJECTION, SOLUTION INTRAVENOUS at 12:10

## 2017-10-16 RX ADMIN — LEVETIRACETAM 500 MG: 500 TABLET, FILM COATED ORAL at 09:10

## 2017-10-16 RX ADMIN — PROPOFOL 200 MG: 10 INJECTION, EMULSION INTRAVENOUS at 11:10

## 2017-10-16 RX ADMIN — SODIUM CHLORIDE, SODIUM GLUCONATE, SODIUM ACETATE, POTASSIUM CHLORIDE, MAGNESIUM CHLORIDE, SODIUM PHOSPHATE, DIBASIC, AND POTASSIUM PHOSPHATE: .53; .5; .37; .037; .03; .012; .00082 INJECTION, SOLUTION INTRAVENOUS at 11:10

## 2017-10-16 RX ADMIN — NYSTATIN 500000 UNITS: 500000 SUSPENSION ORAL at 12:10

## 2017-10-16 RX ADMIN — MIDAZOLAM HYDROCHLORIDE 2 MG: 1 INJECTION, SOLUTION INTRAMUSCULAR; INTRAVENOUS at 11:10

## 2017-10-16 RX ADMIN — FENTANYL CITRATE 100 MCG: 50 INJECTION, SOLUTION INTRAMUSCULAR; INTRAVENOUS at 11:10

## 2017-10-16 RX ADMIN — LIDOCAINE HYDROCHLORIDE 100 MG: 20 INJECTION, SOLUTION INTRAVENOUS at 11:10

## 2017-10-16 RX ADMIN — LEVETIRACETAM 1000 MG: 100 INJECTION, SOLUTION, CONCENTRATE INTRAVENOUS at 12:10

## 2017-10-16 RX ADMIN — PANTOPRAZOLE SODIUM 40 MG: 40 TABLET, DELAYED RELEASE ORAL at 09:10

## 2017-10-16 RX ADMIN — PROPOFOL 50 MG: 10 INJECTION, EMULSION INTRAVENOUS at 12:10

## 2017-10-16 RX ADMIN — CALCIUM CARBONATE-CHOLECALCIFEROL TAB 250 MG-125 UNIT 2 TABLET: 250-125 TAB at 09:10

## 2017-10-16 RX ADMIN — ACETAMINOPHEN 1000 MG: 10 INJECTION, SOLUTION INTRAVENOUS at 01:10

## 2017-10-16 NOTE — PLAN OF CARE
Problem: Physical Therapy Goal  Goal: Physical Therapy Goal  Goals to be met by: 10/23/2017     Patient will increase functional independence with mobility by performin. Supine to sit with Modified [place]  2. Sit to supine with Modified [place]  3. Sit to stand transfer with Supervision  4. Gait  x 200 feet with Supervision using Rolling Walker.   5. Lower extremity exercise program x15 reps per handout, with independence     Outcome: Outcome(s) achieved Date Met: 10/16/17  D/C 2* sx. Re-consult when medically appropriate.     HUDSON RICHARDSON, PT  10/16/2017

## 2017-10-16 NOTE — ANESTHESIA PROCEDURE NOTES
Arterial    Diagnosis: brain cancer     Patient location during procedure: done in OR  Procedure start time: 10/16/2017 11:58 AM  Timeout: 10/16/2017 11:57 AM  Procedure end time: 10/16/2017 12:00 PM  Staffing  Anesthesiologist: NABIL LUNA  Resident/CRNA: VI WALKER  Performed: resident/CRNA   Anesthesiologist was present at the time of the procedure.  Preanesthetic Checklist  Completed: patient identified, site marked, surgical consent, pre-op evaluation, timeout performed, IV checked, risks and benefits discussed, monitors and equipment checked and anesthesia consent givenArterial  Skin Prep: chlorhexidine gluconate  Local Infiltration: none  Orientation: left  Location: radialInsertion Attempts: 1  Assessment  Dressing: secured with tape and tegaderm  Patient: Tolerated well

## 2017-10-16 NOTE — TRANSFER OF CARE
"Anesthesia Transfer of Care Note    Patient: Claude Penn III    Procedure(s) Performed: Procedure(s) (LRB):  CRANIOTOMY (Right)    Anesthesia Type: general    Transport from OR: Transported from OR on room air with adequate spontaneous ventilation    Post pain: adequate analgesia    Post assessment: no apparent anesthetic complications and tolerated procedure well    Post vital signs: stable    Level of consciousness: awake, alert and oriented    Nausea/Vomiting: no nausea/vomiting    Complications: none    Transfer of care protocol was followed      Last vitals:   Visit Vitals  /71   Pulse 70   Temp 36.4 °C (97.6 °F) (Oral)   Resp 18   Ht 6' 3" (1.905 m)   Wt 107.2 kg (236 lb 6.4 oz)   SpO2 100%   BMI 29.55 kg/m²     "

## 2017-10-16 NOTE — PLAN OF CARE
CM met with patient and spouse this am. Patient to go to surgery for craniotomy. Planned discharge is home with family and Home Health - Plan (A) or home with family - Plan (B).     10/16/17 1321   Discharge Reassessment   Assessment Type Discharge Planning Reassessment   Provided patient/caregiver education on the expected discharge date and the discharge plan No   Do you have any problems affording any of your prescribed medications? No   Discharge Plan A Home with family;Home Health   Discharge Plan B Home with family   Patient choice form signed by patient/caregiver N/A   Can the patient answer the patient profile reliably? Yes, cognitively intact   How does the patient rate their overall health at the present time? Fair   Describe the patient's ability to walk at the present time. No restrictions   How often would a person be available to care for the patient? Whenever needed   Number of comorbid conditions (as recorded on the chart) One   During the past month, has the patient often been bothered by feeling down, depressed or hopeless? No   During the past month, has the patient often been bothered by little interest or pleasure in doing things? No

## 2017-10-16 NOTE — SUBJECTIVE & OBJECTIVE
Interval History: NPO since MN for OR today. Patient received 2 units of FFP given yesterday for INR 2.1. INR this am 1.5. Patient to receive an additional 2 units of FFP in preparation of OR today. Patient has no complaints on exam. Wife reports of continued slurred speech and left side weakness with left foot dragging while ambulating with rolling walker around hallway.     Medications:  Continuous Infusions:   sodium chloride 0.9% 150 mL/hr at 10/16/17 0622     Scheduled Meds:   calcium carbonate-vitamin D3 250-125 mg  2 tablet Oral BID    cefTRIAXone (ROCEPHIN) IVPB  2 g Intravenous 30 Min Pre-Op    cyproheptadine  4 mg Oral TID AC    dexamethasone  4 mg Intravenous Q6H    levetiracetam  500 mg Oral BID    lidocaine (PF) 10 mg/ml (1%)  1 mL Intradermal Once    metoprolol tartrate  50 mg Oral BID    nystatin  500,000 Units Oral QID    pantoprazole  40 mg Oral Daily    potassium chloride SA  20 mEq Oral BID     PRN Meds:sodium chloride, sodium chloride, diphenoxylate-atropine 2.5-0.025 mg, promethazine, simethicone     Review of Systems  Objective:     Weight: 107.2 kg (236 lb 6.4 oz)  Body mass index is 29.55 kg/m².  Vital Signs (Most Recent):  Temp: 97.4 °F (36.3 °C) (10/16/17 0800)  Pulse: 70 (10/16/17 0800)  Resp: 16 (10/16/17 0800)  BP: 134/87 (10/16/17 0800)  SpO2: 98 % (10/16/17 0800) Vital Signs (24h Range):  Temp:  [97.2 °F (36.2 °C)-98.6 °F (37 °C)] 97.4 °F (36.3 °C)  Pulse:  [65-95] 70  Resp:  [16-18] 16  SpO2:  [96 %-99 %] 98 %  BP: (106-135)/(75-93) 134/87                           Neurosurgery Physical Exam   Vital signs: reviewed above.   Constitutional: well-developed, no apparent distress  Cardiovascular: regular rate and rhythm  Resp: normal respiratory effort on room air.   Abd: soft and non tender  Neurological  GCS 15  Follows commands   Head: normocephalic, atraumatic  PERRL, EOMI,   Facial expression symmetric  Moves all extremities with good strength; mild left sided weakness  5-/5   Pronator Drift: mild drift on left  Sensation to Light touch: intact      Significant Labs:    Recent Labs  Lab 10/15/17  0403 10/16/17  0414   * 123*    139   K 4.1 3.8    108   CO2 21* 21*   BUN 14 14   CREATININE 0.6 0.6   CALCIUM 8.7 8.7       Recent Labs  Lab 10/15/17  0403 10/16/17  0414   WBC 5.21 5.57   HGB 11.5* 10.8*   HCT 34.8* 32.3*   * 117*       Recent Labs  Lab 10/15/17  0402 10/15/17  1055 10/16/17  0414   INR 2.1* 2.1* 1.5*   APTT  --  33.3*  --      Microbiology Results (last 7 days)     ** No results found for the last 168 hours. **            Significant Diagnostics:  None new

## 2017-10-16 NOTE — PROGRESS NOTES
"Notified AGUSTINA Hendricks NSGY re pt's urinary incontinence and frequency episodes x3 since 1430. RN requests saleh or condom cath if possible to help better monitor UOP. Pt wife to BS, requesting urinary catheter for comfort as pt's "uring is very strong and can leave him raw". PA states no foleys or condom caths -Per Dr. Alonso. Will continue perineal care PRN.   "

## 2017-10-16 NOTE — ASSESSMENT & PLAN NOTE
52 year old male with history of rectal cancer metastasis to the brain, s/p resection and SRS over past 3 months. Now with 2 weeks of confusion, gait instability, word finding difficulty. CT/PET at OSH showing increased uptake and worsening midline shift    -- MRI performed, radiation necrosis and edema noted.  -- Neurostable on exam.  -- Plan for OR for crani for resection today 10/16/17.  -- NPO since MN  -- Dex 4q6  -- Keppra   -- INR this am 1.5 after received 2 units of FFP yesterday, given INR 2.1. Patient to receive an additional 2 units of FFP in preparation of OR today. Strict nursing orders for patient to receive 2 units of FFP while in [facility].   -- Vitals per unit routine  -- PT/OT daily and for recs. Patient OOB >4h/day.  -- PPx: PPI, SQH (hold for surgery), IS, SCDs, bowel regimen  -- Dispo: pending surgery    Discussed with Dr. Alonso

## 2017-10-16 NOTE — OP NOTE
DATE OF PROCEDURE:  10/16/2017.    SURGEON:  Vlad Alonso M.D., Ph.D.    ASSISTANT:  Neeraj Sam M.D. (RES) (the assistant is a [facility] resident).    PREOPERATIVE DIAGNOSES:  1.  History of rectal cancer.  2.  History of brain metastasis, operated on 07/31/2017.  3.  Status post radiosurgery.  4.  Tumor recurrence with extensive necrosis.    POSTOPERATIVE DIAGNOSES:  1.  History of rectal cancer.  2.  History of brain metastasis, operated on 07/31/2017.  3.  Status post radiosurgery.  4.  Tumor recurrence with extensive necrosis.    PROCEDURES:  1.  Right frontotemporal craniotomy for tumor.  2.  Stealth navigation.  3.  Microsurgical technique.    INDICATIONS IN DETAIL:  Mr. Claude Penn is a very pleasant 52-year-old   gentleman, well known to me, who has a history of rectal cancer.  The patient   has had metastasis to the brain that has been resected before and has had   subsequent radiosurgery to this region.  The patient comes in with confusion, an   extensive necrosis and swelling.  There was evidence of some residual/recurrent   tumor as well as a new worsening necrosis.    PROCEDURE IN DETAIL:  The patient was brought; after the risks, benefits and   alternatives were described, the patient and his family wished to proceed.  The   patient was brought to the operating room and general anesthetic was   administered.  All proper lines were placed.  The patient was placed in the   supine position with head turned towards the left to allow access to the right   side of the head.  The patient's head was placed in 3-point fixation.  The   Stealth navigation system was brought to the field and accurate registration was   achieved using the tracer function.  The area of tumor could clearly be seen   underneath the patient's previous incision.  The patient's hair was clipped.    The previous incision line was demarcated and the patient's head was cleaned,   prepped and draped in the usual  fashion.  An incision was made with a #10 blade   and carried down to the calvarium using Bovie cautery.  There was splitting up   of both the skin and temporalis muscle and retraction was held using cerebellar   retractors.  We dissected the previous bone flap so that we could see in its   entirety.  Screwdrivers were used to remove the screws and the bone flap was   removed.  Using the Stealth navigation system, we could clearly see the area of   previous tumor resection.  The microscope was brought to the field and we made a   corticectomy superior to the previous resection.  This allowed us to find the   wall of the tumor superiorly and to dissect around it.  We used this as a plane   removing all necrotic tumor that we could see and demarcating the normal brain.    We did this on all sides until we reached the side closest to the sylvian   fissure.  We then performed a separate corticectomy in this area and carefully   dissected away the vessels, cauterizing and cutting them as we approached the   fissure.  We then performed a subpial dissection in order to resect this tumor   in its entirety while leaving the vessels within the fissure undisturbed.  The wound was   irrigated copiously.  All bleeding points were coagulated.  The tumor and   necrotic area was removed en bloc.  Once this was performed, the wound was   irrigated copiously and all bleeding points were coagulated again.  The   resection cavity was lined with Surgicel and this was glued in place with   Evicel.  We had resected the dura, also connected to this, and therefore we   reconstituted the dura using Duragen.  The bone flap was replaced using Nando   plates and screws.  The soft tissues were then closed in layers with a 4-0   Monocryl in the skin.  A clean dressing was placed.  The patient was taken out   of 3-point fixation and awakened by the Anesthesia staff.  At that point, the   patient was awake and following commands; he was  extubated.    EBL: 100  mL.    There were no intraprocedural complications.    All counts were correct at the end of surgery.    Dr. Vlad Alonso was present during the entire procedure.      GUERA/AMY  dd: 10/16/2017 13:50:19 (CDT)  td: 10/16/2017 16:46:28 (CDT)  Doc ID   #[id number]  Job ID #[id number]    CC:

## 2017-10-16 NOTE — PROGRESS NOTES
[facility]  Neurosurgery  Progress Note    Subjective:     History of Present Illness: Patient is a 52 year old male with a history of rectal cancer s/p resection  and brain metastasis s/p resection 7/31/17 by this service as well as recent SRS in August 2017 who presents as transfer from OSH with new PET scan showing increased uptake and worsening midline shift.  Per the patient and his wife he developed gait imbalance, word finding difficulties, and confusion over the past 2 weeks. Denies changes in vision, denies extremity weakness or numbness. He takes coumadin daily for history of DVT/PEs in past, last taken yesterday. Of note since resection by neurosurgery, patient has been in hospital around 2 months ago with a PE and 1 month ago with hemoperitoneum.       Post-Op Info:  Procedure(s) (LRB):  CRANIOTOMY (Right)   Day of Surgery     Interval History: NPO since MN for OR today. Patient received 2 units of FFP given yesterday for INR 2.1. INR this am 1.5. Patient to receive an additional 2 units of FFP in preparation of OR today. Patient has no complaints on exam. Wife reports of continued slurred speech and left side weakness with left foot dragging while ambulating with rolling walker around hallway.     Medications:  Continuous Infusions:   sodium chloride 0.9% 150 mL/hr at 10/16/17 0622     Scheduled Meds:   calcium carbonate-vitamin D3 250-125 mg  2 tablet Oral BID    cefTRIAXone (ROCEPHIN) IVPB  2 g Intravenous 30 Min Pre-Op    cyproheptadine  4 mg Oral TID AC    dexamethasone  4 mg Intravenous Q6H    levetiracetam  500 mg Oral BID    lidocaine (PF) 10 mg/ml (1%)  1 mL Intradermal Once    metoprolol tartrate  50 mg Oral BID    nystatin  500,000 Units Oral QID    pantoprazole  40 mg Oral Daily    potassium chloride SA  20 mEq Oral BID     PRN Meds:sodium chloride, sodium chloride, diphenoxylate-atropine 2.5-0.025 mg, promethazine, simethicone     Review of Systems  Objective:      Weight: 107.2 kg (236 lb 6.4 oz)  Body mass index is 29.55 kg/m².  Vital Signs (Most Recent):  Temp: 97.4 °F (36.3 °C) (10/16/17 0800)  Pulse: 70 (10/16/17 0800)  Resp: 16 (10/16/17 0800)  BP: 134/87 (10/16/17 0800)  SpO2: 98 % (10/16/17 0800) Vital Signs (24h Range):  Temp:  [97.2 °F (36.2 °C)-98.6 °F (37 °C)] 97.4 °F (36.3 °C)  Pulse:  [65-95] 70  Resp:  [16-18] 16  SpO2:  [96 %-99 %] 98 %  BP: (106-135)/(75-93) 134/87                           Neurosurgery Physical Exam   Vital signs: reviewed above.   Constitutional: well-developed, no apparent distress  Cardiovascular: regular rate and rhythm  Resp: normal respiratory effort on room air.   Abd: soft and non tender  Neurological  GCS 15  Follows commands   Head: normocephalic, atraumatic  PERRL, EOMI,   Facial expression symmetric  Moves all extremities with good strength; mild left sided weakness 5-/5   Pronator Drift: mild drift on left  Sensation to Light touch: intact      Significant Labs:    Recent Labs  Lab 10/15/17  0403 10/16/17  0414   * 123*    139   K 4.1 3.8    108   CO2 21* 21*   BUN 14 14   CREATININE 0.6 0.6   CALCIUM 8.7 8.7       Recent Labs  Lab 10/15/17  0403 10/16/17  0414   WBC 5.21 5.57   HGB 11.5* 10.8*   HCT 34.8* 32.3*   * 117*       Recent Labs  Lab 10/15/17  0402 10/15/17  1055 10/16/17  0414   INR 2.1* 2.1* 1.5*   APTT  --  33.3*  --      Microbiology Results (last 7 days)     ** No results found for the last 168 hours. **            Significant Diagnostics:  None new    Assessment/Plan:     * Metastasis to brain    52 year old male with history of rectal cancer metastasis to the brain, s/p resection and SRS over past 3 months. Now with 2 weeks of confusion, gait instability, word finding difficulty. CT/PET at OSH showing increased uptake and worsening midline shift    -- MRI performed, radiation necrosis and edema noted.  -- Neurostable on exam.  -- Plan for OR for crani for resection today  10/16/17.  -- NPO since MN  -- Dex 4q6  -- Keppra   -- INR this am 1.5 after received 2 units of FFP yesterday, given INR 2.1. Patient to receive an additional 2 units of FFP in preparation of OR today. Strict nursing orders for patient to receive 2 units of FFP while in DOSC.   -- Vitals per unit routine  -- PT/OT daily and for recs. Patient OOB >4h/day.  -- PPx: PPI, SQH (hold for surgery), IS, SCDs, bowel regimen  -- Dispo: pending surgery    Discussed with Dr. Celeste Cormier, PAMartinaC  Neurosurgery  [facility]

## 2017-10-16 NOTE — PLAN OF CARE
Problem: Patient Care Overview  Goal: Plan of Care Review  Outcome: Ongoing (interventions implemented as appropriate)  Patient has remained free of falls this shift. He is AAOx4 and VS's have remained stable.  He is ready for his surgery in the AM.

## 2017-10-16 NOTE — PT/OT/SLP DISCHARGE
Physical Therapy Discharge Summary    Claude Penn III  MRN: [id number]   Metastasis to brain   Patient Discharged from acute Physical Therapy on 10/16/2017.  Please refer to prior PT noted date on 10/13/2017 for functional status.     Assessment:   Patient has not met goals.  GOALS:    Physical Therapy Goals     Not on file          Multidisciplinary Problems (Resolved)        Problem: Physical Therapy Goal    Goal Priority Disciplines Outcome Goal Variances Interventions   Physical Therapy Goal   (Resolved)     PT/OT, PT Outcome(s) achieved     Description:  Goals to be met by: 10/23/2017     Patient will increase functional independence with mobility by performin. Supine to sit with Modified [place]  2. Sit to supine with Modified [place]  3. Sit to stand transfer with Supervision  4. Gait  x 200 feet with Supervision using Rolling Walker.   5. Lower extremity exercise program x15 reps per handout, with independence                    Reasons for Discontinuation of Therapy Services  Patient is unable to continue work toward goals because of medical or psychosocial complications.      Plan:  Patient Discharged to: d/c 2* craniotomy. Re-consult when medically appropriate.    HUDSON RICHARDSON, PT  10/16/2017

## 2017-10-16 NOTE — PLAN OF CARE
SW following for DC needs. SW in communication with CM.    Sandra Gurrola, [facility]  [id number]

## 2017-10-16 NOTE — INTERVAL H&P NOTE
The patient has been examined and the H&P has been reviewed:    I concur with the findings and no changes have occurred since H&P was written.    Anesthesia/Surgery risks, benefits and alternative options discussed and understood by patient/family.          Active Hospital Problems    Diagnosis  POA    *Metastasis to brain [C79.31]  Yes      Resolved Hospital Problems    Diagnosis Date Resolved POA   No resolved problems to display.

## 2017-10-17 LAB
ANION GAP SERPL CALC-SCNC: 8 MMOL/L
BASOPHILS # BLD AUTO: 0 K/UL
BASOPHILS NFR BLD: 0 %
BUN SERPL-MCNC: 11 MG/DL
CALCIUM SERPL-MCNC: 9.1 MG/DL
CHLORIDE SERPL-SCNC: 105 MMOL/L
CO2 SERPL-SCNC: 24 MMOL/L
CREAT SERPL-MCNC: 0.6 MG/DL
DIFFERENTIAL METHOD: ABNORMAL
EOSINOPHIL # BLD AUTO: 0 K/UL
EOSINOPHIL NFR BLD: 0 %
ERYTHROCYTE [DISTWIDTH] IN BLOOD BY AUTOMATED COUNT: 14.9 %
EST. GFR  (AFRICAN AMERICAN): >60 ML/MIN/1.73 M^2
EST. GFR  (NON AFRICAN AMERICAN): >60 ML/MIN/1.73 M^2
GLUCOSE SERPL-MCNC: 115 MG/DL
GLUCOSE SERPL-MCNC: 145 MG/DL (ref 70–110)
HCO3 UR-SCNC: 25.8 MMOL/L (ref 24–28)
HCT VFR BLD AUTO: 33.1 %
HCT VFR BLD CALC: 28 %PCV (ref 36–54)
HGB BLD-MCNC: 11.2 G/DL
IMM GRANULOCYTES # BLD AUTO: 0.05 K/UL
IMM GRANULOCYTES NFR BLD AUTO: 0.8 %
INR PPP: 1.3
LYMPHOCYTES # BLD AUTO: 0.3 K/UL
LYMPHOCYTES NFR BLD: 4.8 %
MCH RBC QN AUTO: 28.8 PG
MCHC RBC AUTO-ENTMCNC: 33.8 G/DL
MCV RBC AUTO: 85 FL
MONOCYTES # BLD AUTO: 0.5 K/UL
MONOCYTES NFR BLD: 8 %
NEUTROPHILS # BLD AUTO: 5.2 K/UL
NEUTROPHILS NFR BLD: 86.4 %
NRBC BLD-RTO: 0 /100 WBC
PCO2 BLDA: 33.5 MMHG (ref 35–45)
PH SMN: 7.5 [PH] (ref 7.35–7.45)
PLATELET # BLD AUTO: 113 K/UL
PMV BLD AUTO: 10.5 FL
PO2 BLDA: 572 MMHG (ref 80–100)
POC BE: 3 MMOL/L
POC IONIZED CALCIUM: 1.06 MMOL/L (ref 1.06–1.42)
POC SATURATED O2: 100 % (ref 95–100)
POC TCO2: 27 MMOL/L (ref 23–27)
POTASSIUM BLD-SCNC: 3.3 MMOL/L (ref 3.5–5.1)
POTASSIUM SERPL-SCNC: 4.4 MMOL/L
PROTHROMBIN TIME: 13.7 SEC
RBC # BLD AUTO: 3.89 M/UL
SAMPLE: ABNORMAL
SODIUM BLD-SCNC: 138 MMOL/L (ref 136–145)
SODIUM SERPL-SCNC: 137 MMOL/L
WBC # BLD AUTO: 6.03 K/UL

## 2017-10-17 PROCEDURE — 20600001 HC STEP DOWN PRIVATE ROOM

## 2017-10-17 PROCEDURE — 80048 BASIC METABOLIC PNL TOTAL CA: CPT

## 2017-10-17 PROCEDURE — 97532 *HC OT COG SKL DEV EA 15: CPT

## 2017-10-17 PROCEDURE — 25000003 PHARM REV CODE 250: Performed by: STUDENT IN AN ORGANIZED HEALTH CARE EDUCATION/TRAINING PROGRAM

## 2017-10-17 PROCEDURE — 97168 OT RE-EVAL EST PLAN CARE: CPT

## 2017-10-17 PROCEDURE — 63600175 PHARM REV CODE 636 W HCPCS: Performed by: STUDENT IN AN ORGANIZED HEALTH CARE EDUCATION/TRAINING PROGRAM

## 2017-10-17 PROCEDURE — 85610 PROTHROMBIN TIME: CPT

## 2017-10-17 PROCEDURE — 97116 GAIT TRAINING THERAPY: CPT

## 2017-10-17 PROCEDURE — 85025 COMPLETE CBC W/AUTO DIFF WBC: CPT

## 2017-10-17 PROCEDURE — 97164 PT RE-EVAL EST PLAN CARE: CPT

## 2017-10-17 RX ADMIN — METOPROLOL TARTRATE 50 MG: 50 TABLET, FILM COATED ORAL at 09:10

## 2017-10-17 RX ADMIN — CALCIUM CARBONATE-CHOLECALCIFEROL TAB 250 MG-125 UNIT 2 TABLET: 250-125 TAB at 09:10

## 2017-10-17 RX ADMIN — PANTOPRAZOLE SODIUM 40 MG: 40 TABLET, DELAYED RELEASE ORAL at 08:10

## 2017-10-17 RX ADMIN — NYSTATIN 500000 UNITS: 500000 SUSPENSION ORAL at 01:10

## 2017-10-17 RX ADMIN — DEXAMETHASONE SODIUM PHOSPHATE 4 MG: 4 INJECTION, SOLUTION INTRAMUSCULAR; INTRAVENOUS at 06:10

## 2017-10-17 RX ADMIN — CYPROHEPTADINE HYDROCHLORIDE 4 MG: 2 SYRUP ORAL at 01:10

## 2017-10-17 RX ADMIN — DEXAMETHASONE SODIUM PHOSPHATE 4 MG: 4 INJECTION, SOLUTION INTRAMUSCULAR; INTRAVENOUS at 01:10

## 2017-10-17 RX ADMIN — CALCIUM CARBONATE-CHOLECALCIFEROL TAB 250 MG-125 UNIT 2 TABLET: 250-125 TAB at 08:10

## 2017-10-17 RX ADMIN — POTASSIUM CHLORIDE 20 MEQ: 1500 TABLET, EXTENDED RELEASE ORAL at 08:10

## 2017-10-17 RX ADMIN — METOPROLOL TARTRATE 50 MG: 50 TABLET, FILM COATED ORAL at 08:10

## 2017-10-17 RX ADMIN — NYSTATIN 500000 UNITS: 500000 SUSPENSION ORAL at 06:10

## 2017-10-17 RX ADMIN — CYPROHEPTADINE HYDROCHLORIDE 4 MG: 2 SYRUP ORAL at 06:10

## 2017-10-17 RX ADMIN — HYDROCODONE BITARTRATE AND ACETAMINOPHEN 1 TABLET: 5; 325 TABLET ORAL at 08:10

## 2017-10-17 RX ADMIN — NYSTATIN 500000 UNITS: 500000 SUSPENSION ORAL at 05:10

## 2017-10-17 RX ADMIN — LEVETIRACETAM 500 MG: 500 TABLET, FILM COATED ORAL at 08:10

## 2017-10-17 RX ADMIN — LEVETIRACETAM 500 MG: 500 TABLET, FILM COATED ORAL at 09:10

## 2017-10-17 RX ADMIN — DEXAMETHASONE SODIUM PHOSPHATE 4 MG: 4 INJECTION, SOLUTION INTRAMUSCULAR; INTRAVENOUS at 05:10

## 2017-10-17 RX ADMIN — DEXAMETHASONE SODIUM PHOSPHATE 4 MG: 4 INJECTION, SOLUTION INTRAMUSCULAR; INTRAVENOUS at 12:10

## 2017-10-17 RX ADMIN — POTASSIUM CHLORIDE 20 MEQ: 1500 TABLET, EXTENDED RELEASE ORAL at 09:10

## 2017-10-17 NOTE — PLAN OF CARE
Problem: Physical Therapy Goal  Goal: Physical Therapy Goal  Goals to be met by: 10/27/2017     Patient will increase functional independence with mobility by performin. Supine to sit with Modified [place]  2. Sit to supine with Modified [place]  3. Sit to stand transfer with Supervision  4. Gait  x 200 feet with Supervision using Rolling Walker.   5. Lower extremity exercise program x15 reps per handout, with independence     Outcome: Ongoing (interventions implemented as appropriate)  Pt re-evaluation complete.    HUDSON RICHARDSON, PT  10/17/2017

## 2017-10-17 NOTE — SUBJECTIVE & OBJECTIVE
Interval History: No acute events overnight; patient TTF today    Medications:  Continuous Infusions:   sodium chloride 0.9% 150 mL/hr at 10/16/17 0622    sodium chloride 0.9%       Scheduled Meds:   calcium carbonate-vitamin D3 250-125 mg  2 tablet Oral BID    cefTRIAXone (ROCEPHIN) IVPB  2 g Intravenous 30 Min Pre-Op    cyproheptadine  4 mg Oral TID AC    dexamethasone  4 mg Intravenous Q6H    levetiracetam  500 mg Oral BID    lidocaine (PF) 10 mg/ml (1%)  1 mL Intradermal Once    metoprolol tartrate  50 mg Oral BID    nystatin  500,000 Units Oral QID    pantoprazole  40 mg Oral Daily    potassium chloride SA  20 mEq Oral BID     PRN Meds:sodium chloride, sodium chloride, acetaminophen, acetaminophen, diphenoxylate-atropine 2.5-0.025 mg, hydrocodone-acetaminophen 5-325mg, morphine, ondansetron, promethazine, promethazine, simethicone     Review of Systems   All other systems reviewed and are negative.    Objective:     Weight: 107.2 kg (236 lb 6.4 oz)  Body mass index is 29.55 kg/m².  Vital Signs (Most Recent):  Temp: 97.7 °F (36.5 °C) (10/17/17 1335)  Pulse: 63 (10/17/17 1335)  Resp: 16 (10/17/17 1335)  BP: 120/81 (10/17/17 1335)  SpO2: 96 % (10/17/17 1335) Vital Signs (24h Range):  Temp:  [97.1 °F (36.2 °C)-98.1 °F (36.7 °C)] 97.7 °F (36.5 °C)  Pulse:  [] 63  Resp:  [11-24] 16  SpO2:  [96 %-100 %] 96 %  BP: (120-168)/() 120/81  Arterial Line BP: (126-171)/() 143/81       Date 10/17/17 0700 - 10/18/17 0659   Shift 5508-1356 8133-0489 0256-8209 24 Hour Total   I  N  T  A  K  E   Shift Total  (mL/kg)       O  U  T  P  U  T   Urine  (mL/kg/hr) 325   325    Shift Total  (mL/kg) 325  (3)   325  (3)   Weight (kg) 107.2 107.2 107.2 107.2                        Physical Exam:    Constitutional: He appears well-developed and well-nourished.     Cardiovascular: Normal rate and regular rhythm.     Abdominal: Soft. Bowel sounds are normal.     Psych/Behavior: He is alert. He is oriented to  person, place, and time.      AOx3, GCS15  CN II-XII: Fine exam shows no CN deficit; PERRLA; Pronator negative  Extremities:5/5 motor throughout, sensorium intact, DTRs 2+, coordination intact    Surgical site clean dry and intact with bandage in place    Significant Labs:    Recent Labs  Lab 10/16/17  0414 10/16/17  1423 10/17/17  0403   * 134* 115*    140 137   K 3.8 4.6 4.4    109 105   CO2 21* 21* 24   BUN 14 10 11   CREATININE 0.6 0.6 0.6   CALCIUM 8.7 8.5* 9.1       Recent Labs  Lab 10/16/17  0414 10/16/17  1225 10/16/17  1423 10/17/17  0403   WBC 5.57  --  3.77* 6.03   HGB 10.8*  --  10.8* 11.2*   HCT 32.3* 28* 31.5* 33.1*   *  --  109* 113*       Recent Labs  Lab 10/16/17  0414 10/17/17  0403   INR 1.5* 1.3*     Microbiology Results (last 7 days)     ** No results found for the last 168 hours. **          All pertinent labs from the last 24 hours have been reviewed.    Significant Diagnostics:  MRI 10/16:  Postsurgical changes of right frontotemporal craniotomy for resection of previous enhancing intra-axial mass lesion with expected postsurgical changes. No definite nodular enhancement to suggest residual lesion. Continue followup recommended.    Persistent prominent vasogenic edema primarily involving the right frontoparietal region with slight interval decrease in the associated mass effect and right-to-left midline shift, now at 5 mm.

## 2017-10-17 NOTE — ASSESSMENT & PLAN NOTE
52 year old male with history of rectal cancer metastasis to the brain, s/p resection and SRS over past 3 months. Now with 2 weeks of confusion, gait instability, word finding difficulty. CT/PET at OSH showing increased uptake and worsening midline shift    -- Post-op MRI shows expected surgical changes  -- Neurostable on exam.  -- Continue Dex 4q6  -- Keppra 500 BID  -- Vitals per unit routine  -- PT/OT daily and for recs. Patient OOB >4h/day   -f/u reccs  -- PPx: PPI, SQH (hold for surgery), IS, SCDs, bowel regimen  -- Patient stable to transfer to stepdown

## 2017-10-17 NOTE — PROGRESS NOTES
Pt sitting on bedside eating breakfast, AAOx4, NAD, 98% SaO2 on room air. Pt wife to bedside, participating in care. No needs at this time. Pt to be stepped down today. L radial art line removed, pressure held and dressing applied. Will continue to monitor.

## 2017-10-17 NOTE — NURSING TRANSFER
Nursing Transfer Note      10/17/2017     Transfer 725    Transfer via bed    Transfer with    Transported by RN and PCT    Medicines sent: none    Chart send with patient: Yes    Notified: spouse    Patient reassessed at: 10/17/17 at 1315     Report given to BERNARDO Cantrell. Pt resting quietly; arouses to voice; orientedx4. VSS.

## 2017-10-17 NOTE — PLAN OF CARE
Problem: Patient Care Overview  Goal: Plan of Care Review  Outcome: Ongoing (interventions implemented as appropriate)  Plan of care reviewed with pt and spouse; both verbalize understanding. Pt resting quietly; arouses to voice; orientedx4; moves all extremities. VSS. No distress noted. Surgical dressing clean, dry and intact. See epic flowsheet for full assessment and vital signs.

## 2017-10-17 NOTE — PT/OT/SLP RE-EVAL
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 Pt reports amb with RW and wife provides supervision and assist with showering and amb. Pt wife reports pt using bariatric RW 2* L heel hitting L side rollator.   Equipment Currently Used at Home: rollator, walker, rolling  DME owned (not currently used): none    Previous Level of Function:  Ambulation Skills: needs device  Transfer Skills: needs assist  ADL Skills: needs assist    Subjective:  Communicated with RN prior to session.  Pt agreeable to therapy session.   Chief Complaint: NA  Patient goals: return to PLOF.     Pain/Comfort  Pain Rating 1: 0/10  Pain Rating Post-Intervention 1: 0/10    Objective:         Cognitive Exam:  Oriented to: Person, Place, Time and Situation    Follows Commands/attention: Easily distracted and Follows two-step commands  Communication: clear/fluent  Safety awareness/insight to disability: impaired    Physical Exam:  Postural examination/scapula  alignment: Rounded shoulder    Skin integrity: Visible skin intact  Edema: None noted B LE    Sensation:   Intact  light/touch BLE    Lower Extremity Range of Motion:  Right Lower Extremity: WFL  Left Lower Extremity: WFL    Lower Extremity Strength:  Right Lower Extremity: WFL  Left Lower Extremity: WFL     Gross motor coordination: WFL    Functional Mobility:  Bed Mobility:  Supine to Sit: Supervision    Transfers:  Sit <> Stand Assistance: Contact Guard Assistance (from EOB)  Sit <> Stand Assistive Device: Rolling Walker    Gait:   Gait Distance: ~125ft with L sway, vc's to navigate obstacle of L side; no LOB and mild SOB  Assistance 1: Contact Guard Assistance  Gait Assistive Device: Rolling walker  Gait Pattern: reciprocal  Gait Deviation(s): decreased ezra, decreased step length, decreased stride length    Balance:   Static Sit: GOOD-: Takes MODERATE challenges from all directions but inconsistently  Dynamic Sit: GOOD-: Maintains balance through MODERATE excursions of active trunk movement,     Static Stand: FAIR+: Takes MINIMAL challenges from all directions  Dynamic stand: FAIR: Needs CONTACT GUARD during gait    Therapeutic Activities and Exercises:  Pt educated on role of PT/POC, safety with amb with RW.  Pt safe to amb with RW with RN staff or family.     AM-PAC 6 CLICK MOBILITY  How much help from another person does this patient currently need?   1 = Unable, Total/Dependent Assistance  2 = A lot, Maximum/Moderate Assistance  3 = A little, Minimum/Contact Guard/Supervision  4 = None, Modified Green/Independent    Turning over in bed (including adjusting bedclothes, sheets and blankets)?: 3  Sitting down on and standing up from a chair with arms (e.g., wheelchair, bedside commode, etc.): 3  Moving from lying on back to sitting on the side of the bed?: 3  Moving to and from a bed to a chair (including a wheelchair)?: 3  Need to walk in hospital room?: 3  Climbing 3-5 steps with a railing?:  3  Total Score: 18     AM-PAC Raw Score CMS G-Code Modifier Level of Impairment Assistance   6 % Total / Unable   7 - 9 CM 80 - 100% Maximal Assist   10 - 14 CL 60 - 80% Moderate Assist   15 - 19 CK 40 - 60% Moderate Assist   20 - 22 CJ 20 - 40% Minimal Assist   23 CI 1-20% SBA / CGA   24 CH 0% Independent/ Mod I     Patient left up in chair with all lines intact, call button in reach, RN notified and OT present.    Assessment:   Claude Penn III is a 52 y.o. male with a medical diagnosis of Metastasis to brain and presents with decreased balance, coordination, balance, endurance, cognition, and overall functional mobility. Pt performed bed mobility S and transfers CGA. Pt amb ~125ft CGA with RW, with L sway vc's to navigate obstacle of L side; no LOB and mild SOB. Pt will benefit from skilled PT to improve deficits and increase overall functional mobility.     Rehab identified problem list/impairments: Rehab identified problem list/impairments: weakness, impaired balance, decreased coordination, decreased safety awareness, decreased lower extremity function, gait instability, impaired cognition, impaired functional mobilty, impaired endurance    Rehab potential is good.    Activity tolerance: Good    Discharge recommendations: Discharge Facility/Level Of Care Needs: outpatient PT (family assist)     Barriers to discharge: Barriers to Discharge: None    Equipment recommendations: Equipment Needed After Discharge: none     GOALS:    Physical Therapy Goals        Problem: Physical Therapy Goal    Goal Priority Disciplines Outcome Goal Variances Interventions   Physical Therapy Goal     PT/OT, PT Ongoing (interventions implemented as appropriate)     Description:  Goals to be met by: 10/27/2017     Patient will increase functional independence with mobility by performin. Supine to sit with Modified [place]  2. Sit to supine with Modified [place]  3. Sit to stand transfer with Supervision  4.  Gait  x 200 feet with Supervision using Rolling Walker.   5. Lower extremity exercise program x15 reps per handout, with independence                       PLAN:    Patient to be seen 3 x/week to address the above listed problems via gait training, therapeutic activities, therapeutic exercises, neuromuscular re-education  Plan of Care expires: 11/17/17  Plan of Care reviewed with: patient          HUDSON RICHARDSON, PT  10/17/2017

## 2017-10-17 NOTE — PROGRESS NOTES
[facility]  Neurosurgery  Progress Note    Subjective:     History of Present Illness: Patient is a 52 year old male with a history of rectal cancer s/p resection  and brain metastasis s/p resection 7/31/17 by this service as well as recent SRS in August 2017 who presents as transfer from OSH with new PET scan showing increased uptake and worsening midline shift.  Per the patient and his wife he developed gait imbalance, word finding difficulties, and confusion over the past 2 weeks. Denies changes in vision, denies extremity weakness or numbness. He takes coumadin daily for history of DVT/PEs in past, last taken yesterday. Of note since resection by neurosurgery, patient has been in hospital around 2 months ago with a PE and 1 month ago with hemoperitoneum.       Post-Op Info:  Procedure(s) (LRB):  CRANIOTOMY (Right)   1 Day Post-Op     Interval History: No acute events overnight; patient TTF today    Medications:  Continuous Infusions:   sodium chloride 0.9% 150 mL/hr at 10/16/17 0622    sodium chloride 0.9%       Scheduled Meds:   calcium carbonate-vitamin D3 250-125 mg  2 tablet Oral BID    cefTRIAXone (ROCEPHIN) IVPB  2 g Intravenous 30 Min Pre-Op    cyproheptadine  4 mg Oral TID AC    dexamethasone  4 mg Intravenous Q6H    levetiracetam  500 mg Oral BID    lidocaine (PF) 10 mg/ml (1%)  1 mL Intradermal Once    metoprolol tartrate  50 mg Oral BID    nystatin  500,000 Units Oral QID    pantoprazole  40 mg Oral Daily    potassium chloride SA  20 mEq Oral BID     PRN Meds:sodium chloride, sodium chloride, acetaminophen, acetaminophen, diphenoxylate-atropine 2.5-0.025 mg, hydrocodone-acetaminophen 5-325mg, morphine, ondansetron, promethazine, promethazine, simethicone     Review of Systems   All other systems reviewed and are negative.    Objective:     Weight: 107.2 kg (236 lb 6.4 oz)  Body mass index is 29.55 kg/m².  Vital Signs (Most Recent):  Temp: 97.7 °F (36.5 °C) (10/17/17  1335)  Pulse: 63 (10/17/17 1335)  Resp: 16 (10/17/17 1335)  BP: 120/81 (10/17/17 1335)  SpO2: 96 % (10/17/17 1335) Vital Signs (24h Range):  Temp:  [97.1 °F (36.2 °C)-98.1 °F (36.7 °C)] 97.7 °F (36.5 °C)  Pulse:  [] 63  Resp:  [11-24] 16  SpO2:  [96 %-100 %] 96 %  BP: (120-168)/() 120/81  Arterial Line BP: (126-171)/() 143/81       Date 10/17/17 0700 - 10/18/17 0659   Shift 2826-3632 0614-8823 3407-2947 24 Hour Total   I  N  T  A  K  E   Shift Total  (mL/kg)       O  U  T  P  U  T   Urine  (mL/kg/hr) 325   325    Shift Total  (mL/kg) 325  (3)   325  (3)   Weight (kg) 107.2 107.2 107.2 107.2                        Physical Exam:    Constitutional: He appears well-developed and well-nourished.     Cardiovascular: Normal rate and regular rhythm.     Abdominal: Soft. Bowel sounds are normal.     Psych/Behavior: He is alert. He is oriented to person, place, and time.      AOx3, GCS15  CN II-XII: Fine exam shows no CN deficit; PERRLA; Pronator negative  Extremities:5/5 motor throughout, sensorium intact, DTRs 2+, coordination intact    Surgical site clean dry and intact with bandage in place    Significant Labs:    Recent Labs  Lab 10/16/17  0414 10/16/17  1423 10/17/17  0403   * 134* 115*    140 137   K 3.8 4.6 4.4    109 105   CO2 21* 21* 24   BUN 14 10 11   CREATININE 0.6 0.6 0.6   CALCIUM 8.7 8.5* 9.1       Recent Labs  Lab 10/16/17  0414 10/16/17  1225 10/16/17  1423 10/17/17  0403   WBC 5.57  --  3.77* 6.03   HGB 10.8*  --  10.8* 11.2*   HCT 32.3* 28* 31.5* 33.1*   *  --  109* 113*       Recent Labs  Lab 10/16/17  0414 10/17/17  0403   INR 1.5* 1.3*     Microbiology Results (last 7 days)     ** No results found for the last 168 hours. **          All pertinent labs from the last 24 hours have been reviewed.    Significant Diagnostics:  MRI 10/16:  Postsurgical changes of right frontotemporal craniotomy for resection of previous enhancing intra-axial mass lesion with  expected postsurgical changes. No definite nodular enhancement to suggest residual lesion. Continue followup recommended.    Persistent prominent vasogenic edema primarily involving the right frontoparietal region with slight interval decrease in the associated mass effect and right-to-left midline shift, now at 5 mm.    Assessment/Plan:     * Metastasis to brain    52 year old male with history of rectal cancer metastasis to the brain, s/p resection and SRS over past 3 months. Now with 2 weeks of confusion, gait instability, word finding difficulty. CT/PET at OSH showing increased uptake and worsening midline shift    -- Post-op MRI shows expected surgical changes  -- Neurostable on exam.  -- Continue Dex 4q6  -- Keppra 500 BID  -- Vitals per unit routine  -- PT/OT daily and for recs. Patient OOB >4h/day   -f/u reccs  -- PPx: PPI, SQH (hold for surgery), IS, SCDs, bowel regimen  -- Patient stable to transfer to stepdown            Neeraj Sam MD  Neurosurgery  [facility]

## 2017-10-17 NOTE — PT/OT/SLP RE-EVAL
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 He has walk-in shower with grab bars and built in seat. Wife assist pt with LB dressing and majority of ADLS and used RW during functional mobility.   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  Pt agreeable to participate with therapy this date.   Chief Complaint: No complaints  Patient/Family stated goals: Return home    Pain/Comfort  Pain Rating 1: 0/10  Pain Rating Post-Intervention 1:  0/10    Objective:  Patient found with: telemetry, peripheral IV    Cognitive Exam:  Oriented to: Person, Place, Time and Situation  Follows Commands/attention: Follows multi-step commands-- delayed response at times  Communication: clear/fluent  Memory:  No Deficits noted  Safety awareness/insight to disability: impaired  Coping skills/emotional control: Appropriate to situation    Visual/perceptual:  Impaired  tracking to R side; peripheral vision deficits; mild spatial disorganization with close draw assessment    Physical Exam:  Postural examination/scapula alignment: Rounded shoulder and Head forward  Skin integrity: Visible skin intact  Edema: None noted     Sensation:   Intact    Upper Extremity Range of Motion:  Right Upper Extremity: WFL  Left Upper Extremity: WFL    Upper Extremity Strength:  Right Upper Extremity: WFL 4+/5  Left Upper Extremity: WFL  4+/5   Strength: WFL    Fine motor coordination:   Impaired  Left hand thumb/finger opposition skills  Right hand thumb/finger opposition skills , Left hand, manipulation of objects  and Right hand, manipulation of objects -- Noted difficulty with all however was able to compplete,    Gross motor coordination: WFL    Functional Mobility:  Bed Mobility:  Rolling/Turning Right: Modified independent, With side rail  Scooting/Bridging: Stand by Assistance  Supine to Sit: Contact Guard Assistance, WIth side rail  Sit to Supine:  (Pt left seated UIC)    Transfers:  Sit <> Stand Assistance: Contact Guard Assistance  Sit <> Stand Assistive Device: Rolling Walker  Bed <> Chair Technique: Stand Pivot (following functional mobility down hallway )  Bed <> Chair Transfer Assistance: Contact Guard Assistance  Bed <> Chair Assistive Device: Rolling Walker    Functional Ambulation: Pt completed functional mobility house hold distance with CGA using RW-- Pt demo poor attention to L side (required cues to stay in midline of walker and required cues to not bump into doorway  "on L side)    Activities of Daily Living:     UE Dressing Level of Assistance: Minimum assistance (to gail gown like jacket-- increased time for tie completion )  Balance:   Static Sit: GOOD: Takes MODERATE challenges from all directions  Dynamic Sit: GOOD-: Maintains balance through MODERATE excursions of active trunk movement,     Static Stand: FAIR+: Takes MINIMAL challenges from all directions  Dynamic stand: FAIR: Needs CONTACT GUARD during gait    Therapeutic Activities and Exercises:  -Pt edu on OT role/POC- no family present for education   -Importance of OOB activity with staff assistance  -Safety during functional t/f and mobility   -Communication board updated    * patient verbalized 7/7 days of week  * patient verbalized 12/12 months   * Pt with difficulty identifying items in peripheral line of vision  * Demo's decreased L attention during functional mobility     Clock Draw Assessment: Pt issued sheet of paper and asked to draw clock with all # located appropriately. Pt asked to model clock to report time as 10 past 11. Pt demo intact executive functioning for time portion, impaired visual spatial skills for # organization.    AM-PAC 6 CLICK ADL  How much help from another person does this patient currently need?  1 = Unable, Total/Dependent Assistance  2 = A lot, Maximum/Moderate Assistance  3 = A little, Minimum/Contact Guard/Supervision  4 = None, Modified Grover/Independent    Putting on and taking off regular lower body clothing? : 2  Bathing (including washing, rinsing, drying)?: 3  Toileting, which includes using toilet, bedpan, or urinal? : 3  Putting on and taking off regular upper body clothing?: 3  Taking care of personal grooming such as brushing teeth?: 3  Eating meals?: 3  Total Score: 17    AM-PAC Raw Score CMS "G-Code Modifier Level of Impairment Assistance   6 % Total / Unable   7 - 9 CM 80 - 100% Maximal Assist   10 - 14 CL 60 - 80% Moderate Assist   15 - 19 CK 40 - 60% " Moderate Assist   20 - 22 CJ 20 - 40% Minimal Assist   23 CI 1-20% SBA / CGA   24 CH 0% Independent/ Mod I       Patient left up in chair with all lines intact, call button in reach and RN notified    Assessment:  Claude Penn III is a 52 y.o. male with a medical diagnosis of Metastasis to brain s/p craniotomy 10/16. Pt would benefit from continued skilled OT to address deficits listed below and maximize return to PLOF. OT recommending home with HHOT following d/c to increase overall independence and safety with ADL.     Rehab identified problem list/impairments: Rehab identified problem list/impairments: weakness, impaired endurance, impaired self care skills, impaired balance, gait instability, impaired cognition, visual deficits    Rehab potential is good.    Activity tolerance: Good    Discharge recommendations: Discharge Facility/Level Of Care Needs: outpatient OT     Barriers to discharge: Barriers to Discharge: None    Equipment recommendations: none     GOALS:    Occupational Therapy Goals        Problem: Occupational Therapy Goal    Goal Priority Disciplines Outcome Interventions   Occupational Therapy Goal     OT, PT/OT Ongoing (interventions implemented as appropriate)    Description:  Goals to be met by: 10/31/17    Patient will increase functional independence with ADLs by performing:    UE Dressing with Set-up Assistance and Supervision.  LE Dressing (pants,brief, shoes)  with Set-up Assistance and Minimal Assistance.  Grooming while standing at sink with Supervision.  Upper extremity exercise program x15 reps per handout, with assistance as needed.  Pt/CG edu on L side attention with ADLs, self care and functional mobility with demo understanding.   Toileting from toilet with Supervision for hygiene and clothing management.                       PLAN:  Patient to be seen 4 x/week to address the above listed problems via self-care/home management, therapeutic activities, therapeutic exercises,  neuromuscular re-education, cognitive retraining  Plan of Care expires: 11/16/17  Plan of Care reviewed with: patient         Rachel peoples, OT  10/17/2017

## 2017-10-17 NOTE — PHYSICIAN QUERY
PT Name: Claude Penn III  MR #: [id number]     Physician Query Form - Documentation Clarification      CDS: Maia Daley RN, CCDS       Contact information: [email or web address]    This form is a permanent document in the medical record.     Query Date: October 17, 2017    By submitting this query, we are merely seeking further clarification of documentation. Please utilize your independent clinical judgment when addressing the question(s) below.    The Medical record reflects the following:    Supporting Clinical Findings Location in Medical Record   Metastasis to brain  52 year old male with history of rectal cancer metastasis to the brain, s/p resection and SRS over past 3 months. Now with 2 weeks of confusion, gait instability, word finding difficulty. CT/PET at OSH showing increased uptake and worsening midline shift   10/17-PN   Persistent prominent vasogenic edema primarily involving the right frontoparietal region with slight interval decrease in the associated mass effect and right-to-left midline shift, now at 5 mm.   10/16-MRI                                                                            Doctor, Please specify diagnosis or diagnoses associated with above clinical findings.    Provider Use Only     [x   ] Brain Compression     [   ] Other dx (please specify)___________________________                                                                                                          [   ] Clinically undetermined

## 2017-10-17 NOTE — PROGRESS NOTES
Neurosurgery paged at 0020 for pt having incontinence and requesting a saleh catheter due to bedrest, no response yet.  Previous nurse also attempted to page twice and did not receive a call back.  Will continue to monitor pt and make pt more comfortable.

## 2017-10-17 NOTE — PLAN OF CARE
Problem: Occupational Therapy Goal  Goal: Occupational Therapy Goal  Goals to be met by: 10/31/17    Patient will increase functional independence with ADLs by performing:    UE Dressing with Set-up Assistance and Supervision.  LE Dressing (pants,brief, shoes)  with Set-up Assistance and Minimal Assistance.  Grooming while standing at sink with Supervision.  Upper extremity exercise program x15 reps per handout, with assistance as needed.  Pt/CG edu on L side attention with ADLs, self care and functional mobility with demo understanding.   Toileting from toilet with Supervision for hygiene and clothing management.     Outcome: Ongoing (interventions implemented as appropriate)  Re-eval completed. Initiate POC.   Rachel peoples OT  10/17/2017

## 2017-10-17 NOTE — ANESTHESIA RELEASE NOTE
"Anesthesia Release from PACU Note    Patient: Claude Penn III    Procedure(s) Performed: Procedure(s) (LRB):  CRANIOTOMY (Right)    Anesthesia type: general    Post pain: Adequate analgesia    Post assessment: no apparent anesthetic complications    Last Vitals:   Visit Vitals  /84 (BP Location: Right arm, Patient Position: Lying)   Pulse (!) 58   Temp 36.4 °C (97.6 °F) (Oral)   Resp 12   Ht 6' 3" (1.905 m)   Wt 107.2 kg (236 lb 6.4 oz)   SpO2 99%   BMI 29.55 kg/m²       Post vital signs: stable    Level of consciousness: awake, alert  and responds to stimulation    Nausea/Vomiting: no nausea/no vomiting    Complications: none    Airway Patency: patent    Respiratory: unassisted, spontaneous ventilation, room air    Cardiovascular: stable and blood pressure at baseline    Hydration: euvolemic  "

## 2017-10-17 NOTE — PLAN OF CARE
SW following for DC needs. SW in communication with CM.    Sandra Gurrola, [facility]  [id number]

## 2017-10-17 NOTE — ANESTHESIA POSTPROCEDURE EVALUATION
"Anesthesia Post Evaluation    Patient: Claude Penn III    Procedure(s) Performed: Procedure(s) (LRB):  CRANIOTOMY (Right)    Final Anesthesia Type: general  Patient location during evaluation: PACU  Patient participation: Yes- Able to Participate  Level of consciousness: awake and alert  Post-procedure vital signs: reviewed and stable  Pain management: adequate  Airway patency: patent  PONV status at discharge: No PONV  Anesthetic complications: no      Cardiovascular status: blood pressure returned to baseline  Respiratory status: unassisted, spontaneous ventilation and room air  Hydration status: euvolemic  Follow-up not needed.        Visit Vitals  /84 (BP Location: Right arm, Patient Position: Lying)   Pulse (!) 58   Temp 36.4 °C (97.6 °F) (Oral)   Resp 12   Ht 6' 3" (1.905 m)   Wt 107.2 kg (236 lb 6.4 oz)   SpO2 99%   BMI 29.55 kg/m²       Pain/Marie Score: Pain Assessment Performed: Yes (10/17/2017  9:00 AM)  Presence of Pain: complains of pain/discomfort (10/17/2017  9:00 AM)  Pain Rating Prior to Med Admin: 6 (10/17/2017  8:39 AM)  Pain Rating Post Med Admin: 4 (10/17/2017  9:00 AM)  Marie Score: 9 (10/17/2017  6:00 AM)      "

## 2017-10-17 NOTE — NURSING
Patient arrived to the unit at 13:30 pm, awake. alert ,responsive,and oriented x 4. Vital signs within parameters, no complaint of pain.

## 2017-10-18 VITALS
WEIGHT: 236.38 LBS | HEIGHT: 75 IN | DIASTOLIC BLOOD PRESSURE: 74 MMHG | HEART RATE: 61 BPM | OXYGEN SATURATION: 94 % | TEMPERATURE: 98 F | SYSTOLIC BLOOD PRESSURE: 110 MMHG | BODY MASS INDEX: 29.39 KG/M2 | RESPIRATION RATE: 17 BRPM

## 2017-10-18 LAB
INR PPP: 1.2
PROTHROMBIN TIME: 12.8 SEC

## 2017-10-18 PROCEDURE — 36415 COLL VENOUS BLD VENIPUNCTURE: CPT

## 2017-10-18 PROCEDURE — 97535 SELF CARE MNGMENT TRAINING: CPT

## 2017-10-18 PROCEDURE — 97530 THERAPEUTIC ACTIVITIES: CPT

## 2017-10-18 PROCEDURE — 63600175 PHARM REV CODE 636 W HCPCS: Performed by: STUDENT IN AN ORGANIZED HEALTH CARE EDUCATION/TRAINING PROGRAM

## 2017-10-18 PROCEDURE — 25000003 PHARM REV CODE 250: Performed by: STUDENT IN AN ORGANIZED HEALTH CARE EDUCATION/TRAINING PROGRAM

## 2017-10-18 PROCEDURE — 85610 PROTHROMBIN TIME: CPT

## 2017-10-18 PROCEDURE — 99024 POSTOP FOLLOW-UP VISIT: CPT | Mod: ,,, | Performed by: PHYSICIAN ASSISTANT

## 2017-10-18 RX ORDER — DEXAMETHASONE 2 MG/1
TABLET ORAL
Qty: 75 TABLET | Refills: 0 | Status: SHIPPED | OUTPATIENT
Start: 2017-10-18 | End: 2018-01-04

## 2017-10-18 RX ORDER — HYDROCODONE BITARTRATE AND ACETAMINOPHEN 5; 325 MG/1; MG/1
1 TABLET ORAL EVERY 4 HOURS PRN
Qty: 75 TABLET | Refills: 0 | Status: SHIPPED | OUTPATIENT
Start: 2017-10-18 | End: 2018-01-04 | Stop reason: ALTCHOICE

## 2017-10-18 RX ORDER — LEVETIRACETAM 500 MG/1
500 TABLET ORAL 2 TIMES DAILY
Qty: 60 TABLET | Refills: 11 | Status: SHIPPED | OUTPATIENT
Start: 2017-10-18 | End: 2018-01-04 | Stop reason: SDUPTHER

## 2017-10-18 RX ADMIN — CALCIUM CARBONATE-CHOLECALCIFEROL TAB 250 MG-125 UNIT 2 TABLET: 250-125 TAB at 08:10

## 2017-10-18 RX ADMIN — DEXAMETHASONE SODIUM PHOSPHATE 4 MG: 4 INJECTION, SOLUTION INTRAMUSCULAR; INTRAVENOUS at 12:10

## 2017-10-18 RX ADMIN — POTASSIUM CHLORIDE 20 MEQ: 1500 TABLET, EXTENDED RELEASE ORAL at 08:10

## 2017-10-18 RX ADMIN — HYDROCODONE BITARTRATE AND ACETAMINOPHEN 1 TABLET: 5; 325 TABLET ORAL at 08:10

## 2017-10-18 RX ADMIN — DEXAMETHASONE SODIUM PHOSPHATE 4 MG: 4 INJECTION, SOLUTION INTRAMUSCULAR; INTRAVENOUS at 05:10

## 2017-10-18 RX ADMIN — NYSTATIN 500000 UNITS: 500000 SUSPENSION ORAL at 12:10

## 2017-10-18 RX ADMIN — PANTOPRAZOLE SODIUM 40 MG: 40 TABLET, DELAYED RELEASE ORAL at 08:10

## 2017-10-18 RX ADMIN — METOPROLOL TARTRATE 50 MG: 50 TABLET, FILM COATED ORAL at 08:10

## 2017-10-18 RX ADMIN — LEVETIRACETAM 500 MG: 500 TABLET, FILM COATED ORAL at 08:10

## 2017-10-18 RX ADMIN — NYSTATIN 500000 UNITS: 500000 SUSPENSION ORAL at 05:10

## 2017-10-18 NOTE — PLAN OF CARE
Problem: Occupational Therapy Goal  Goal: Occupational Therapy Goal  Goals to be met by: 10/31/17    Patient will increase functional independence with ADLs by performing:    UE Dressing with Set-up Assistance and Supervision.  LE Dressing (pants,brief, shoes)  with Set-up Assistance and Minimal Assistance.  Grooming while standing at sink with Supervision.  Upper extremity exercise program x15 reps per handout, with assistance as needed.  Pt/CG edu on L side attention with ADLs, self care and functional mobility with demo understanding.   Toileting from toilet with Supervision for hygiene and clothing management.      Outcome: Ongoing (interventions implemented as appropriate)  Goals remain appropriate     Comments: Cont OT POC

## 2017-10-18 NOTE — PLAN OF CARE
Problem: Patient Care Overview  Goal: Plan of Care Review  Outcome: Ongoing (interventions implemented as appropriate)  Patient awake, alert and responsive this shift, oriented x 4..No complaints of pain.  Vital signs within normal parameters. Administered medications as ordered. Dressing in right side of head dry, clean and intact. Wife at the bedside.Patient able to sit in the recliner this afternoon assisted by PT , able to ambulate in the bathroom assisted.  Reviewed plan of care with patient, and spouse, verbalized understanding.

## 2017-10-18 NOTE — DISCHARGE INSTRUCTIONS
Please follow ONLY the instructions that are checked below.    Activity Restrictions:  [x]  Return to work will be determined on an individual basis.  [x]  No lifting greater than 10 pounds.  [x]  No driving or operating machinery:  [x]  until cleared by your surgeon.  [x]  while taking narcotic pain medications or muscle relaxants.  [x]  Increase ambulation over the next 2 weeks so that you are walking 2 miles per day at 2 weeks post-operatively.  [x]  Walk on paved surfaces only. It is okay to walk up and down stairs while holding onto a side rail.  []  No sexual activity for 2-3 weeks.    Discharge Medication/Follow-up:  [x]  Please refer to discharge medication reconciliation form.  [x]  Do not take ANY non-steroidal anti-inflammatory drugs (NSAIDS), including the following: ibuprofen, naprosyn, Aleve, Advil, Indocin, Mobic, or Celebrex for:  []  4 weeks  [x]  8 weeks  []  6 months  [x]  Prescriptions for appropriate medication will be given upon discharge.   [x]  Pain control:             [x]  Other:             [x]  Take docusate (Colace 100 mg): take one capsule a day as needed for constipation. You can get this over the counter.  [x]  Follow-up appointment:  [x]  10-14 days post-op for wound check by Dr. Alonso  [x]  An appointment will be mailed to you.    Wound Care:  [x]  No bandage required. Keep your incision open to the air.  [x]  You may shower on the 2nd day after your surgery. Have the force of water hit you opposite from the incision. Pat the incision dry after your shower; do not scrub the incision.  [x]  You cannot take a bath until 8 weeks after surgery.  [x]  Apply bacitracin to incision twice a day for  14  more days.    Call your doctor or go to the Emergency Room for any signs of infection, including: increased redness, drainage, pain, or fever (temperature ?101.5 for 24 hours). Call your doctor or go to the Emergency Room if there are any localized neurological changes; problems with speech,  vision, numbness, tingling, weakness, or severe headache; or for other concerns.    Special Instructions:  [x]  No use of tobacco products.  [x]  Diet: Please eat a regular diet as tolerated.  []  Other diet:              Specific physician instructions: Okay to resume previous coumadin for PE one week from today on 10/25/17. Patient is to follow up with PCP (Dr. Agarwal) or [facility] [place] to resume/manage coumadin therapy.       Physicians need 3 days' notice for pain medicine to be refilled. Pain medicine will only be refilled between 8 AM and 5 PM, Monday through Friday, due to Food and Drug Administration regulation of documentation.    If you have any questions about this form, please call [phone].    Form No. 77012 (Revised 10/31/2013)

## 2017-10-18 NOTE — ASSESSMENT & PLAN NOTE
52 year old male with history of rectal cancer metastasis to the brain, s/p resection 7/31/17 and SRS over past 3 months. Now with 2 weeks of confusion, gait instability, word finding difficulty. CT/PET at OSH showing increased uptake and worsening midline shift. Now s/p right frontal crani for tumor resection with Dr. Alonso on 10/16/17    -- Post-op MRI shows expected surgical changes  -- Neurostable on exam and doing well overall.   -- Continue Dex 4q6 now; taper to off over 3 weeks.   -- Keppra 500 BID  -- PT/OT daily and for recs. Patient OOB >4h/day; recommended outpatient PT  -- PPx: PPI, SQH, IS, SCDs, bowel regimen  -- Plan to DC home with keppra, 3 wk steroid taper, and outpatient PT rx.  -- Okay to resume previous coumadin for PE one week from today on 10/25/17. Patient is to follow up with PCP (Dr. Agarwal) or [facility] [place] to resume/manage coumadin therapy.     Patient and family was counseled about activity restriction, wound care, follow up appointment, and other discharge instructions. Patient and family verbalized understanding. All of their questions were answered. They were encouraged to call clinic with any concerns.     Discussed with Dr. Alonso.

## 2017-10-18 NOTE — PLAN OF CARE
SW following for DC needs. SW in communication with CM.    Sandra Gurrola, [facility]  [id number]

## 2017-10-18 NOTE — PLAN OF CARE
Patient to be discharged home. The patient does not have any home needs. The patient will have Outpatient Therapy both Physical and Occupational.  Family will provide transportation home.    Future Appointments  Date Time Provider Department Center   10/31/2017 11:45 AM Vlad [facility]onso MD [facility]        10/18/17 1153   Final Note   Assessment Type Final Discharge Note   Discharge Disposition Home   Hospital Follow Up  Appt(s) scheduled? (Neurosurgery clinic to schedule follow up appointment.)   Discharge plans and expectations educations in teach back method with documentation complete? Yes

## 2017-10-18 NOTE — PT/OT/SLP PROGRESS
Occupational Therapy  Treatment    Claude Penn III   MRN: [id number]   Admitting Diagnosis: Metastasis to brain    OT Date of Treatment: 10/18/17   OT Start Time: 1053  OT Stop Time: 1123  OT Total Time (min): 30 min    Billable Minutes:  Self Care/Home Management 15 and Therapeutic Activity 15    General Precautions: Standard, fall  Orthopedic Precautions: N/A  Braces:           Subjective:  Communicated with RN prior to session.  Pt agreeable to therapy following moderate encouragement from therapist and spouse.   Pain/Comfort  Pain Rating 1: 0/10  Pain Rating Post-Intervention 1: 0/10    Objective:   Pt found in bed w/ spouse present at bedside.      Functional Mobility:  Bed Mobility:  Rolling/Turning to Left: Modified independent, With side rail  Scooting/Bridging: Supervision  Supine to Sit: Supervision    Transfers:   Sit <> Stand Assistance: Stand By Assistance  Sit <> Stand Assistive Device: Rolling Walker  Bed <> Chair Technique: Stand Pivot  Bed <> Chair Transfer Assistance: Supervision  Bed <> Chair Assistive Device: Rolling Walker    Functional Ambulation: Pt ambulated community mobility distance w/ RW and SBA for safety.     Activities of Daily Living:  UE Dressing Level of Assistance: Minimum assistance  LE Dressing Level of Assistance: Set-up Assistance (dof/don socks; required increased time)    Balance:   Static Sit: GOOD-: Takes MODERATE challenges from all directions but inconsistently  Dynamic Sit: GOOD-: Maintains balance through MODERATE excursions of active trunk movement,     Static Stand: GOOD-: Takes MODERATE challenges from all directions inconsistently  Dynamic stand: GOOD-: Needs SUPERVISION only during gait and able to self right with moderate     Therapeutic Activities and Exercises:  Pt educated on OT POC and importance of OOB activity. Pt completed functional mobility tasks SBA w/ Rw. Pt completed LB dressing tasks w/ Setup and increased time to complete.     AM-PAC 6 CLICK ADL   How  "much help from another person does this patient currently need?   1 = Unable, Total/Dependent Assistance  2 = A lot, Maximum/Moderate Assistance  3 = A little, Minimum/Contact Guard/Supervision  4 = None, Modified Page/Independent    Putting on and taking off regular lower body clothing? : 3  Bathing (including washing, rinsing, drying)?: 3  Toileting, which includes using toilet, bedpan, or urinal? : 3  Putting on and taking off regular upper body clothing?: 3  Taking care of personal grooming such as brushing teeth?: 3  Eating meals?: 3  Total Score: 18     AM-PAC Raw Score CMS "G-Code Modifier Level of Impairment Assistance   6 % Total / Unable   7 - 8 CM 80 - 100% Maximal Assist   9-13 CL 60 - 80% Moderate Assist   14 - 19 CK 40 - 60% Moderate Assist   20 - 22 CJ 20 - 40% Minimal Assist   23 CI 1-20% SBA / CGA   24 CH 0% Independent/ Mod I       Patient left up in chair with call button in reach and wife present    ASSESSMENT:  Claude Penn III is a 52 y.o. male with a medical diagnosis of Metastasis to brain and presents with difficulty tending to tasks and delayed reaction time to task demands. Pt required mod verbal cues to complete LB dressing due to increased distractibility. Pt continues to benefit from OT services to increase pt's functional ability to PLOF and increase safety w/ self-care and functional mobility tasks.     Rehab identified problem list/impairments: Rehab identified problem list/impairments: weakness, impaired endurance, impaired self care skills, impaired cognition    Rehab potential is good.    Activity tolerance: Good    Discharge recommendations: Discharge Facility/Level Of Care Needs: outpatient OT     Barriers to discharge: Barriers to Discharge: None    Equipment recommendations: none     GOALS:    Occupational Therapy Goals        Problem: Occupational Therapy Goal    Goal Priority Disciplines Outcome Interventions   Occupational Therapy Goal     OT, PT/OT Ongoing " (interventions implemented as appropriate)    Description:  Goals to be met by: 10/31/17    Patient will increase functional independence with ADLs by performing:    UE Dressing with Set-up Assistance and Supervision.  LE Dressing (pants,brief, shoes)  with Set-up Assistance and Minimal Assistance.  Grooming while standing at sink with Supervision.  Upper extremity exercise program x15 reps per handout, with assistance as needed.  Pt/CG edu on L side attention with ADLs, self care and functional mobility with demo understanding.   Toileting from toilet with Supervision for hygiene and clothing management.                       Plan:  Patient to be seen 4 x/week to address the above listed problems via self-care/home management, therapeutic activities, therapeutic exercises, neuromuscular re-education, cognitive retraining  Plan of Care expires: 11/16/17  Plan of Care reviewed with: patient, spouse         Perla Enriquez, OT  10/18/2017

## 2017-10-18 NOTE — PROGRESS NOTES
[facility]  Neurosurgery  Progress Note    Subjective:     History of Present Illness: Patient is a 52 year old male with a history of rectal cancer s/p resection  and brain metastasis s/p resection 7/31/17 by this service as well as recent SRS in August 2017 who presents as transfer from OSH with new PET scan showing increased uptake and worsening midline shift.  Per the patient and his wife he developed gait imbalance, word finding difficulties, and confusion over the past 2 weeks. Denies changes in vision, denies extremity weakness or numbness. He takes coumadin daily for history of DVT/PEs in past, last taken yesterday. Of note since resection by neurosurgery, patient has been in hospital around 2 months ago with a PE and 1 month ago with hemoperitoneum.       Post-Op Info:  Procedure(s) (LRB):  CRANIOTOMY (Right)   2 Days Post-Op     Interval History: NAEON. Patient was stepped down to the floor yesterday. He was laying comfortably in bed complaining of mild incisional pain. Patient was oob with therapy, tolerating diet, and voiding appropriately. Wife at bedside.     Medications:  Continuous Infusions:   Scheduled Meds:   calcium carbonate-vitamin D3 250-125 mg  2 tablet Oral BID    cyproheptadine  4 mg Oral TID AC    dexamethasone  4 mg Intravenous Q6H    levetiracetam  500 mg Oral BID    lidocaine (PF) 10 mg/ml (1%)  1 mL Intradermal Once    metoprolol tartrate  50 mg Oral BID    nystatin  500,000 Units Oral QID    pantoprazole  40 mg Oral Daily    potassium chloride SA  20 mEq Oral BID     PRN Meds:sodium chloride, sodium chloride, acetaminophen, acetaminophen, diphenoxylate-atropine 2.5-0.025 mg, hydrocodone-acetaminophen 5-325mg, morphine, ondansetron, promethazine, promethazine, simethicone     Review of Systems  Objective:     Weight: 107.2 kg (236 lb 6.4 oz)  Body mass index is 29.55 kg/m².  Vital Signs (Most Recent):  Temp: 97.8 °F (36.6 °C) (10/18/17 0747)  Pulse: (!) 57  (10/18/17 0747)  Resp: 18 (10/18/17 0747)  BP: 133/87 (10/18/17 0747)  SpO2: 98 % (10/18/17 0747) Vital Signs (24h Range):  Temp:  [96.4 °F (35.8 °C)-97.8 °F (36.6 °C)] 97.8 °F (36.6 °C)  Pulse:  [56-77] 57  Resp:  [13-18] 18  SpO2:  [95 %-100 %] 98 %  BP: (119-143)/(72-91) 133/87                           Neurosurgery Physical Exam  Vital signs: reviewed above.   Constitutional: well-developed, no apparent distress  Cardiovascular: regular rate and rhythm  Resp: normal respiratory effort on room air.   Abd: soft and non tender  Neurological  GCS 15  Follows commands   Head: normocephalic, atraumatic  PERRL, EOMI,   Facial expression symmetric  Moves all extremities with good strength; mild left sided weakness 5-/5   Pronator Drift: mild drift on left  Sensation to Light touch: intact      Incision C/D/I, well approximated with staples.       Significant Labs:    Recent Labs  Lab 10/16/17  1423 10/17/17  0403   * 115*    137   K 4.6 4.4    105   CO2 21* 24   BUN 10 11   CREATININE 0.6 0.6   CALCIUM 8.5* 9.1       Recent Labs  Lab 10/16/17  1225 10/16/17  1423 10/17/17  0403   WBC  --  3.77* 6.03   HGB  --  10.8* 11.2*   HCT 28* 31.5* 33.1*   PLT  --  109* 113*       Recent Labs  Lab 10/17/17  0403 10/18/17  1022   INR 1.3* 1.2     Microbiology Results (last 7 days)     ** No results found for the last 168 hours. **            Significant Diagnostics:  None new    Assessment/Plan:     * Metastasis to brain    52 year old male with history of rectal cancer metastasis to the brain, s/p resection 7/31/17 and SRS over past 3 months. Now with 2 weeks of confusion, gait instability, word finding difficulty. CT/PET at OSH showing increased uptake and worsening midline shift. Now s/p right frontal crani for tumor resection with Dr. Alonso on 10/16/17    -- Post-op MRI shows expected surgical changes  -- Neurostable on exam and doing well overall.   -- Continue Dex 4q6 now; taper to off over 3 weeks.   --  Keppra 500 BID  -- PT/OT daily and for recs. Patient OOB >4h/day; recommended outpatient PT  -- PPx: PPI, SQH, IS, SCDs, bowel regimen  -- Plan to DC home with keppra, 3 wk steroid taper, and outpatient PT rx.  -- Okay to resume previous coumadin for PE one week from today on 10/25/17. Patient is to follow up with PCP (Dr. Agarwal) or [facility] to resume/manage coumadin therapy.     Patient and family was counseled about activity restriction, wound care, follow up appointment, and other discharge instructions. Patient and family verbalized understanding. All of their questions were answered. They were encouraged to call clinic with any concerns.     Discussed with Dr. Alonso.             Salo Cormier PA-C  Neurosurgery  [facility]

## 2017-10-18 NOTE — SUBJECTIVE & OBJECTIVE
Interval History: NAEON. Patient was stepped down to the floor yesterday. He was laying comfortably in bed complaining of mild incisional pain. Patient was oob with therapy, tolerating diet, and voiding appropriately. Wife at bedside.     Medications:  Continuous Infusions:   Scheduled Meds:   calcium carbonate-vitamin D3 250-125 mg  2 tablet Oral BID    cyproheptadine  4 mg Oral TID AC    dexamethasone  4 mg Intravenous Q6H    levetiracetam  500 mg Oral BID    lidocaine (PF) 10 mg/ml (1%)  1 mL Intradermal Once    metoprolol tartrate  50 mg Oral BID    nystatin  500,000 Units Oral QID    pantoprazole  40 mg Oral Daily    potassium chloride SA  20 mEq Oral BID     PRN Meds:sodium chloride, sodium chloride, acetaminophen, acetaminophen, diphenoxylate-atropine 2.5-0.025 mg, hydrocodone-acetaminophen 5-325mg, morphine, ondansetron, promethazine, promethazine, simethicone     Review of Systems  Objective:     Weight: 107.2 kg (236 lb 6.4 oz)  Body mass index is 29.55 kg/m².  Vital Signs (Most Recent):  Temp: 97.8 °F (36.6 °C) (10/18/17 0747)  Pulse: (!) 57 (10/18/17 0747)  Resp: 18 (10/18/17 0747)  BP: 133/87 (10/18/17 0747)  SpO2: 98 % (10/18/17 0747) Vital Signs (24h Range):  Temp:  [96.4 °F (35.8 °C)-97.8 °F (36.6 °C)] 97.8 °F (36.6 °C)  Pulse:  [56-77] 57  Resp:  [13-18] 18  SpO2:  [95 %-100 %] 98 %  BP: (119-143)/(72-91) 133/87                           Neurosurgery Physical Exam  Vital signs: reviewed above.   Constitutional: well-developed, no apparent distress  Cardiovascular: regular rate and rhythm  Resp: normal respiratory effort on room air.   Abd: soft and non tender  Neurological  GCS 15  Follows commands   Head: normocephalic, atraumatic  PERRL, EOMI,   Facial expression symmetric  Moves all extremities with good strength; mild left sided weakness 5-/5   Pronator Drift: mild drift on left  Sensation to Light touch: intact      Incision C/D/I, well approximated with staples.       Significant  Labs:    Recent Labs  Lab 10/16/17  1423 10/17/17  0403   * 115*    137   K 4.6 4.4    105   CO2 21* 24   BUN 10 11   CREATININE 0.6 0.6   CALCIUM 8.5* 9.1       Recent Labs  Lab 10/16/17  1225 10/16/17  1423 10/17/17  0403   WBC  --  3.77* 6.03   HGB  --  10.8* 11.2*   HCT 28* 31.5* 33.1*   PLT  --  109* 113*       Recent Labs  Lab 10/17/17  0403 10/18/17  1022   INR 1.3* 1.2     Microbiology Results (last 7 days)     ** No results found for the last 168 hours. **            Significant Diagnostics:  None new

## 2017-10-18 NOTE — DISCHARGE SUMMARY
[facility]  Neurosurgery  Discharge Summary      Patient Name: Claude Penn III  MRN: [id number]  Admission Date: 10/12/2017  Hospital Length of Stay: 5 days  Discharge Date and Time: No discharge date for patient encounter.  Attending Physician: Vlad Alonso MD   Discharging Provider: Salo Cormier PA-C  Primary Care Provider: Dallas Agawral MD    HPI:   Patient is a 52 year old male with a history of rectal cancer s/p resection  and brain metastasis s/p resection 7/31/17 by this service as well as recent SRS in August 2017 who presents as transfer from OSH with new PET scan showing increased uptake and worsening midline shift.  Per the patient and his wife he developed gait imbalance, word finding difficulties, and confusion over the past 2 weeks. Denies changes in vision, denies extremity weakness or numbness. He takes coumadin daily for history of DVT/PEs in past, last taken yesterday. Of note since resection by neurosurgery, patient has been in hospital around 2 months ago with a PE and 1 month ago with hemoperitoneum.       Procedure(s) (LRB):  CRANIOTOMY (Right)     Hospital Course:  10/13: brain MRI  10/14: NAEON. Planning for OR Monday  10/15: received 2 units of FFP given INR 2.1 and OR scheduled for 10/17  10/16: INR this am 1.5. Patient to receive an additional 2 units of FFP in preparation of OR to today.   10/17: doing well postop and was stepped down to the floor.  Evaluated by PT.   10/18: DC home today with keppra, 3 wk steroid taper, and outpatient PT.       Consults:   Consults         Status Ordering Provider     Inpatient consult to Neurosurgery  Once     Provider:  (Not yet assigned)    Acknowledged GLADIS AGUDELO     Inpatient consult to Social Work  Once     Provider:  (Not yet assigned)    Acknowledged STEW MEJIAS     Inpatient consult to Social Work  Once     Provider:  (Not yet assigned)    Acknowledged STEW MEJIAS     Inpatient consult to Social Work/Case  Management  Once     Provider:  (Not yet assigned)    VLAD Pacheco            Pending Diagnostic Studies:     Procedure Component Value Units Date/Time    Basic metabolic panel [[id number]] Collected:  10/17/17 0105    Order Status:  Sent Lab Status:  In process Updated:  10/17/17 0109    Specimen:  Blood from Blood     Basic metabolic panel [[id number]] Collected:  10/17/17 0105    Order Status:  Sent Lab Status:  In process Updated:  10/17/17 0109    Specimen:  Blood from Blood     CBC auto differential [[id number]] Collected:  10/17/17 0105    Order Status:  Sent Lab Status:  In process Updated:  10/17/17 0109    Specimen:  Blood from Blood     CBC auto differential [[id number]] Collected:  10/17/17 0105    Order Status:  Sent Lab Status:  In process Updated:  10/17/17 0109    Specimen:  Blood from Blood     Protime-INR [[id number]] Collected:  10/17/17 0105    Order Status:  Sent Lab Status:  In process Updated:  10/17/17 0109    Specimen:  Blood from Blood         Final Active Diagnoses:    Diagnosis Date Noted POA    PRINCIPAL PROBLEM:  Metastasis to brain [C79.31] 10/13/2017 Yes      Problems Resolved During this Admission:    Diagnosis Date Noted Date Resolved POA      Discharged Condition: good    Disposition: Home or Self Care    Follow Up:  Follow-up Information     Vlad Alonso MD In 2 weeks.    Specialties:  Neurosurgery, Spine Surgery  Why:  for wound check and crani  Contact information:  [address] [place] [id number]  [phone]                 Patient Instructions:   -Okay to resume previous coumadin for PE one week from today on 10/25/17. Patient is to follow up with PCP (Dr. Agarwal) or [facility] to resume/manage coumadin therapy.   -2 week follow up with Dr. Alonso in clinic for wound check         Ambulatory Referral to Physical/Occupational Therapy   Referral Priority: Routine Referral Type: Physical Medicine   Referral Reason: Specialty Services Required     Requested Specialty: Physical Therapy    Number of Visits Requested: 1        Medications:  Reconciled Home Medications:   Current Discharge Medication List      START taking these medications    Details   dexamethasone (DECADRON) 2 MG tablet Then 4mg q 8h for 3 days, then 4mg q 12h for 3 days, then 2mg q 8h for 3 days, then 2mg q 12h for 3 days, then 2mg daily for 6 days, then 1mg for 3 days, then OFF.  Qty: 75 tablet, Refills: 0      hydrocodone-acetaminophen 5-325mg (NORCO) 5-325 mg per tablet Take 1 tablet by mouth every 4 (four) hours as needed (Pain 1-10).  Qty: 75 tablet, Refills: 0         CONTINUE these medications which have CHANGED    Details   levetiracetam (KEPPRA) 500 MG Tab Take 1 tablet (500 mg total) by mouth 2 (two) times daily.  Qty: 60 tablet, Refills: 11         CONTINUE these medications which have NOT CHANGED    Details   calcium citrate-vitamin D3 315-200 mg (CITRACAL+D) 315-200 mg-unit per tablet Take 2 tablets by mouth 2 (two) times daily.      cetirizine (ZYRTEC) 10 MG tablet Take 10 mg by mouth once daily.      cholestyramine (QUESTRAN) 4 gram packet Take 1 packet (4 g total) by mouth 2 (two) times daily.  Qty: 180 packet, Refills: 3    Associated Diagnoses: Diarrhea in adult patient      cyproheptadine (,PERIACTIN,) 2 mg/5 mL syrup Take 10 mLs (4 mg total) by mouth 3 (three) times daily before meals.  Qty: 473 mL, Refills: 2      diphenoxylate-atropine 2.5-0.025 mg (LOMOTIL) 2.5-0.025 mg per tablet Take 1 tablet by mouth 4 (four) times daily as needed for Diarrhea.      furosemide (LASIX) 20 MG tablet Take 1 tablet (20 mg total) by mouth once daily. Take as needed for edema  Qty: 10 tablet, Refills: 1      metoprolol tartrate (LOPRESSOR) 50 MG tablet Take 1 tablet (50 mg total) by mouth 2 (two) times daily.  Qty: 60 tablet, Refills: 11      nystatin (MYCOSTATIN) 100,000 unit/mL suspension Take 5 mLs (500,000 Units total) by mouth 4 (four) times daily.  Qty: 1 Bottle, Refills: 9       pantoprazole (PROTONIX) 40 MG tablet Take 1 tablet (40 mg total) by mouth once daily.  Qty: 30 tablet, Refills: 3      potassium chloride SA (K-DUR,KLOR-CON) 20 MEQ tablet Take 1 tablet (20 mEq total) by mouth 2 (two) times daily.  Qty: 20 tablet, Refills: 0      promethazine (PHENERGAN) 25 MG tablet Take 25 mg by mouth every 4 (four) hours as needed.       simethicone (MYLICON) 80 MG chewable tablet Take 1 tablet (80 mg total) by mouth every 6 (six) hours as needed for Flatulence.  Qty: 30 tablet, Refills: 0             Salo Cormier PA-C  Neurosurgery [facility]

## 2017-10-18 NOTE — PLAN OF CARE
Problem: Patient Care Overview  Goal: Plan of Care Review  Outcome: Ongoing (interventions implemented as appropriate)  POC reviewed with patient and spouse at 2130. AAOx4. VS remained stable throughout the shift. Afebrile. Pt remained free of falls and injuries during the night. Safety precautions remained in place. No new neuro changes. No new skin impairments noted. No c/o pain or nausea/vomiting. Continuous telemetry monitoring remained in place. Bed locked and low, side rails up x2, with call light in reach and spouse to remain at bedside. Will continue to monitor.

## 2017-10-19 NOTE — PT/OT/SLP DISCHARGE
Occupational Therapy Discharge Summary    Claude Penn III  MRN: [id number]   Metastasis to brain   Patient Discharged from acute Occupational Therapy on 10/18/2017.  Please refer to prior OT note dated on 10/18/2017 for functional status.     Assessment:   Patient appropriate for care in another setting.  GOALS:    Occupational Therapy Goals        Problem: Occupational Therapy Goal    Goal Priority Disciplines Outcome Interventions   Occupational Therapy Goal     OT, PT/OT Ongoing (interventions implemented as appropriate)    Description:  Goals to be met by: 10/31/17    Patient will increase functional independence with ADLs by performing:    UE Dressing with Set-up Assistance and Supervision.  LE Dressing (pants,brief, shoes)  with Set-up Assistance and Minimal Assistance.  Grooming while standing at sink with Supervision.  Upper extremity exercise program x15 reps per handout, with assistance as needed.  Pt/CG edu on L side attention with ADLs, self care and functional mobility with demo understanding.   Toileting from toilet with Supervision for hygiene and clothing management.                     Reasons for Discontinuation of Therapy Services  Transfer to alternate level of care.      Plan:  Patient Discharged to: Outpatient Therapy Services - both PT and OT       Perla Enriquez OT  10/19/2017

## 2017-10-23 NOTE — PT/OT/SLP DISCHARGE
Physical Therapy Discharge Summary    Claude Penn III  MRN: [id number]   Metastasis to brain   Patient Discharged from acute Physical Therapy on 10/18/2017.  Please refer to prior PT noted date on 10/17/2017 for functional status.     Assessment:   Patient appropriate for care in another setting.  GOALS:    Physical Therapy Goals        Problem: Physical Therapy Goal    Goal Priority Disciplines Outcome Goal Variances Interventions   Physical Therapy Goal     PT/OT, PT Ongoing (interventions implemented as appropriate)     Description:  Goals to be met by: 10/27/2017     Patient will increase functional independence with mobility by performin. Supine to sit with Modified [place]  2. Sit to supine with Modified [place]  3. Sit to stand transfer with Supervision  4. Gait  x 200 feet with Supervision using Rolling Walker.   5. Lower extremity exercise program x15 reps per handout, with independence                     Reasons for Discontinuation of Therapy Services  Transfer to alternate level of care.      Plan:  Patient Discharged to: Outpatient Therapy Services.    HUDSON RICHARDSON, PT  10/23/2017

## 2017-10-31 ENCOUNTER — OFFICE VISIT (OUTPATIENT)
Dept: NEUROSURGERY | Facility: CLINIC | Age: 52
End: 2017-10-31
Payer: COMMERCIAL

## 2017-10-31 VITALS
WEIGHT: 232.75 LBS | SYSTOLIC BLOOD PRESSURE: 110 MMHG | DIASTOLIC BLOOD PRESSURE: 75 MMHG | TEMPERATURE: 98 F | BODY MASS INDEX: 29.09 KG/M2 | HEART RATE: 107 BPM

## 2017-10-31 DIAGNOSIS — G93.89 FRONTAL MASS OF BRAIN: ICD-10-CM

## 2017-10-31 DIAGNOSIS — G93.6 CEREBRAL EDEMA: Primary | ICD-10-CM

## 2017-10-31 PROCEDURE — 99024 POSTOP FOLLOW-UP VISIT: CPT | Mod: S$GLB,,, | Performed by: NEUROLOGICAL SURGERY

## 2017-10-31 PROCEDURE — 99999 PR PBB SHADOW E&M-EST. PATIENT-LVL III: CPT | Mod: PBBFAC,,, | Performed by: NEUROLOGICAL SURGERY

## 2017-10-31 NOTE — PATIENT INSTRUCTIONS
I have reviewed the patient's MRI of brain, which shows postsurgical changes of right frontotemporal craniotomy for resection of previous enhancing intra-axial mass lesion with expected postsurgical changes. Persistent prominent vasogenic edema primarily involving the right frontoparietal region with slight interval decrease in the associated mass effect and right-to-left midline shift.     I recommend that the patient FU with a GI Specialist to test for possible C. Diff diagnosis for his current symptoms.     Patient is cleared to return to taking blood thinners. Patient is also cleared to use the Jacuzzi.     I will schedule the patient a 2 month FU with MRI of brain

## 2017-10-31 NOTE — PROGRESS NOTES
Subjective:    I, Barbara Mejias, am scribing for, and in the presence of, Dr. Vlad Alonso.     Patient ID: Claude Penn III is a 52 y.o. male.    Chief Complaint: Post-op Evaluation    HPI   Pt is a 53 yo male with brain metastasis who presents today for 2 week PO right frontotemporal craniotomy for tumor (10/16/2017). On 7/31/2017, pt right frontal craniotomy for resection of tumor. Pt complains of buttock burning and redness along with diarrhea. Previous treatments: Lidocaine, blow drying, and elisa's butt paste. Pt is currently taking Dexamethasone.     Pt has a past medical history for C. Diff.    Patient is scheduled to FU with GI physician tomorrow November 1, 2017  for symptoms.     Review of Systems   Constitutional: Negative for chills and fever.   HENT: Negative.    Eyes: Negative.    Respiratory: Negative.    Cardiovascular: Negative.    Gastrointestinal: Positive for diarrhea and rectal pain.   Endocrine: Negative.    Genitourinary: Negative.    Musculoskeletal: Negative.    Skin: Positive for color change (buttock).   Allergic/Immunologic: Negative.    Neurological: Negative for tremors, weakness, light-headedness, numbness and headaches.   Hematological: Negative.    Psychiatric/Behavioral: Negative.        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 He appears well-developed and well-nourished.   Eyes: Pupils are equal, round, and reactive to light.   Neurological: He is alert and oriented to person, place, and time. No cranial nerve deficit.       Imaging:  MRI Brain W WO Contrast 10/16/2017 shows postsurgical changes of right  frontotemporal craniotomy for resection of previous enhancing intra-axial mass lesion with expected postsurgical changes. Persistent prominent vasogenic edema primarily involving the right frontoparietal region with slight interval decrease in the associated mass effect and right-to-left midline shift.    I have personally reviewed the images with the pt.      I, Dr. Vlad Alonso, personally performed the services described in this documentation as scribed by Barbara Mejisa in my presence, and it is both accurate and complete.    Assessment:       1. Cerebral edema    2. Frontal mass of brain        Plan:   I have reviewed the patient's MRI of brain, which shows postsurgical changes of right frontotemporal craniotomy for resection of previous enhancing intra-axial mass lesion with expected postsurgical changes. Persistent prominent vasogenic edema primarily involving the right frontoparietal region with slight interval decrease in the associated mass effect and right-to-left midline shift.     I recommend that the patient FU with a GI Specialist to test for possible C. Diff diagnosis for his current symptoms.     Patient is cleared to return to taking blood thinners. Patient is also cleared to use the Jacuzzi.     I will schedule the patient a 2 month FU with MRI of brain.

## 2017-11-01 ENCOUNTER — TELEPHONE (OUTPATIENT)
Dept: NEUROSURGERY | Facility: CLINIC | Age: 52
End: 2017-11-01

## 2017-11-02 ENCOUNTER — TELEPHONE (OUTPATIENT)
Dept: NEUROSURGERY | Facility: CLINIC | Age: 52
End: 2017-11-02

## 2017-11-02 NOTE — TELEPHONE ENCOUNTER
Spoke to pt's wife. This morning he said he didn't feel right, speech was slurred and hands started shaking. She took him to the ER. They did a head CT and said it was likely a seizure from the surgery. He was released home.    I reviewed this with Dr. Alonso and he wants patient to increase Keppra to 750mg BID and to let us know if this happens again.    Called the pt's wife. Gave her the above instructions. She read them back to me.    ----- Message from Arabella Saleh sent at 11/2/2017  2:39 PM CDT -----  Contact: pt's spouse Sandra [phone]  Pt's spouse called to inform the nurse that pt was released today from hospital due to mini seizures. She asks that she is called at [phone].

## 2017-11-16 ENCOUNTER — TELEPHONE (OUTPATIENT)
Dept: NEUROSURGERY | Facility: CLINIC | Age: 52
End: 2017-11-16

## 2017-11-16 NOTE — TELEPHONE ENCOUNTER
----- Message from Hernan Yeager sent at 11/16/2017 12:24 PM CST -----  Contact: Sandra (wife) @ [phone]  Sandra is asking to speak with someone to confirm if pt can discontinue dexamethasone (DECADRON) 2 MG tablet. Pls call.

## 2017-11-16 NOTE — TELEPHONE ENCOUNTER
Per the written medication instructions the pt is to stop once completed. The pt wife inquired if the pt should be driving right now. I informed her that the pt should wait until his f/u with  12/19/17 to determine full driving privileges.

## 2017-11-22 ENCOUNTER — TELEPHONE (OUTPATIENT)
Dept: NEUROSURGERY | Facility: CLINIC | Age: 52
End: 2017-11-22

## 2017-11-22 NOTE — TELEPHONE ENCOUNTER
STATED THAT THE PT WAS NOT RESPONDING VERBALLY UNTIL SEEN BY THEIR PCP LATER WITHIN THE DAY. SHE DOES NOT BELIEVE HE HAD A STROKE. HE SHOWED NO SIGNS OF FEVER , CLAMMY, OR SWEATING. HIS SPEAK WAS JUST SOMEWHAT OF SLOW BUT NOT DELAYED.  I INFORMED THE WIFE THAT IF THE PT SHOWS SIGNS AGAIN TO REPORT TO THE NEAREST ED.

## 2017-11-22 NOTE — TELEPHONE ENCOUNTER
----- Message from Daron Monreal sent at 11/22/2017  3:55 PM CST -----  Contact: Sandra ( spouse ) @ [phone]  Caller was instructed to contact to office with any concerns following surgery, caller is on about pt not speaking well, he has a mouth is quivering, pls call

## 2017-12-19 ENCOUNTER — OFFICE VISIT (OUTPATIENT)
Dept: NEUROSURGERY | Facility: CLINIC | Age: 52
End: 2017-12-19
Payer: COMMERCIAL

## 2017-12-19 ENCOUNTER — TELEPHONE (OUTPATIENT)
Dept: NEUROSURGERY | Facility: CLINIC | Age: 52
End: 2017-12-19

## 2017-12-19 ENCOUNTER — HOSPITAL ENCOUNTER (OUTPATIENT)
Dept: RADIOLOGY | Facility: HOSPITAL | Age: 52
Discharge: HOME | End: 2017-12-19
Attending: NEUROLOGICAL SURGERY
Payer: COMMERCIAL

## 2017-12-19 VITALS — BODY MASS INDEX: 29.21 KG/M2 | WEIGHT: 233.69 LBS

## 2017-12-19 DIAGNOSIS — G93.89 FRONTAL MASS OF BRAIN: ICD-10-CM

## 2017-12-19 DIAGNOSIS — G93.89 BRAIN MASS: Primary | ICD-10-CM

## 2017-12-19 DIAGNOSIS — G93.6 CEREBRAL EDEMA: ICD-10-CM

## 2017-12-19 DIAGNOSIS — G93.89 FRONTAL MASS OF BRAIN: Primary | ICD-10-CM

## 2017-12-19 PROCEDURE — 70553 MRI BRAIN STEM W/O & W/DYE: CPT | Mod: TC

## 2017-12-19 PROCEDURE — 70553 MRI BRAIN STEM W/O & W/DYE: CPT | Mod: 26,,, | Performed by: RADIOLOGY

## 2017-12-19 PROCEDURE — A9585 GADOBUTROL INJECTION: HCPCS | Performed by: NEUROLOGICAL SURGERY

## 2017-12-19 PROCEDURE — 99024 POSTOP FOLLOW-UP VISIT: CPT | Mod: S$GLB,,, | Performed by: NEUROLOGICAL SURGERY

## 2017-12-19 PROCEDURE — 25500020 PHARM REV CODE 255: Performed by: NEUROLOGICAL SURGERY

## 2017-12-19 PROCEDURE — 99999 PR PBB SHADOW E&M-EST. PATIENT-LVL III: CPT | Mod: PBBFAC,,, | Performed by: NEUROLOGICAL SURGERY

## 2017-12-19 RX ORDER — GADOBUTROL 604.72 MG/ML
10 INJECTION INTRAVENOUS
Status: COMPLETED | OUTPATIENT
Start: 2017-12-19 | End: 2017-12-19

## 2017-12-19 RX ORDER — IBUPROFEN 200 MG
200 TABLET ORAL
COMMUNITY

## 2017-12-19 RX ADMIN — GADOBUTROL 10 ML: 604.72 INJECTION INTRAVENOUS at 02:12

## 2017-12-19 NOTE — PATIENT INSTRUCTIONS
I have reviewed the patient's MRI brain, which shows new brain sub-centimeter metastases as well as resolved swelling.     I recommend the patient Whole Brain Radiation for the new brain metastases to be done as soon as possible along with the other recommended radiation treatments for sternum and back.     I will schedule the patient a 2 month FU with MRI brain.

## 2017-12-19 NOTE — PROGRESS NOTES
Subjective:    I, Barbara Mejias, am scribing for, and in the presence of, Dr. Vlad Alonso.     Patient ID: Claude Penn III is a 52 y.o. male.    Chief Complaint: No chief complaint on file.    HPI   Pt is a 53 yo male with cerebral edema who presents today for 2 month FU with MRI. On 10/16/2017, pt underwent right frontotemporal craniotomy for tumor. On 7/31/2017, pt right frontal craniotomy for resection of tumor. Pt is also s/p gamma knife treatment.    Today, per pt's family the pt has been experiencing confusion, decrease in energy level (fatigue), chest and back pain (associated with new lesions), and unexpected decrease in weight. Pt states he often experiences excruciating chest and back pain while using the restroom. Pt reports due to the result of his fatigue he frequently falls asleep during various daily activities. Pt is currently taking Oxycodone.      Pt reports cancer on both sternum/ribs and back. Pt will soon undergo radiation (10 consecutive days) for the new lesions at [facility]. Pt FU with Radiation Oncologist on 12/14/2017.     Pt presents with a walking cane.     Review of Systems   Constitutional: Positive for fatigue and unexpected weight change (decreased). Negative for chills and fever.   HENT: Negative.    Eyes: Negative.    Respiratory: Negative.    Cardiovascular: Positive for chest pain (associated with lesions).   Gastrointestinal: Negative.    Endocrine: Negative.    Genitourinary: Negative.    Musculoskeletal: Positive for back pain (associated with lesions).   Skin: Negative.    Allergic/Immunologic: Negative.    Neurological: Negative for tremors, weakness, light-headedness, numbness and headaches.   Hematological: Negative.    Psychiatric/Behavioral: Negative.        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 He appears well-developed and well-nourished.   Eyes: Pupils are equal, round, and reactive to light.   Neurological: He is alert and oriented to person, place, and time. No cranial nerve deficit.       Imaging:  MRI Brain W WO Contrast 12/19/2017 shows new brain sub-centimeter metastases as well as resolved swelling.    I have personally reviewed the images with the pt.      I, Dr. Vlad Alonso, personally performed the services described in this documentation. All medical record entries made by the scribe were at my direction and in my presence.  I have reviewed the chart and agree that the record reflects my personal performance and is accurate and complete. Vlad Alonso MD.  2:43 PM 12/19/2017    Assessment:       1. Frontal mass of brain        Plan:   I have reviewed the patient's MRI brain, which shows new brain sub-centimeter metastases as well as resolved swelling.     I recommend the patient Whole Brain Radiation for the new brain metastases to be done as soon as possible along with the other recommended radiation treatments for sternum and back.    I will schedule the patient a 2 month FU with MRI brain.

## 2018-01-09 PROBLEM — A41.9 SEPSIS: Status: ACTIVE | Noted: 2018-01-09

## 2018-07-20 NOTE — PROGRESS NOTES
Patient remains hemodynamically stable  I accepted this patient due to concerns about his bleeding spleen  His spleen bleed is no longer a problem now that hsi anticoagulation has been reversed  Will continue to monitor overnight  If stable tomorrow, will ask Hem-Onc to assume care of this patient with multiple medical problems and advanced malignancy   20-Jul-2018 12:14

## 2019-11-03 NOTE — ASSESSMENT & PLAN NOTE
- decreased PO intake that has been mildly improving over the course of pts hospitalization  - Periactin started TID for appetite stimulation   SURG

## 2023-06-26 NOTE — ASSESSMENT & PLAN NOTE
- Will initiate lovenox to coumadin bridging once CRC has finished procedures.     Suturegard Intro: Intraoperative tissue expansion was performed, utilizing the SUTUREGARD device, in order to reduce wound tension.
